# Patient Record
Sex: FEMALE | Race: WHITE | NOT HISPANIC OR LATINO | Employment: OTHER | URBAN - METROPOLITAN AREA
[De-identification: names, ages, dates, MRNs, and addresses within clinical notes are randomized per-mention and may not be internally consistent; named-entity substitution may affect disease eponyms.]

---

## 2017-06-05 LAB — HIV 1+2 AB+HIV1 P24 AG SERPL QL IA: NON REACTIVE

## 2017-10-12 ENCOUNTER — OFFICE VISIT (OUTPATIENT)
Dept: INTERNAL MEDICINE | Facility: CLINIC | Age: 52
End: 2017-10-12
Payer: COMMERCIAL

## 2017-10-12 VITALS
HEIGHT: 69 IN | DIASTOLIC BLOOD PRESSURE: 72 MMHG | HEART RATE: 96 BPM | OXYGEN SATURATION: 98 % | BODY MASS INDEX: 33.99 KG/M2 | WEIGHT: 229.5 LBS | SYSTOLIC BLOOD PRESSURE: 122 MMHG | TEMPERATURE: 98 F

## 2017-10-12 DIAGNOSIS — J45.20 MILD INTERMITTENT ASTHMA WITHOUT COMPLICATION: ICD-10-CM

## 2017-10-12 DIAGNOSIS — Z00.00 ANNUAL PHYSICAL EXAM: Primary | ICD-10-CM

## 2017-10-12 PROCEDURE — 99396 PREV VISIT EST AGE 40-64: CPT | Mod: S$GLB,,, | Performed by: FAMILY MEDICINE

## 2017-10-12 PROCEDURE — 99999 PR PBB SHADOW E&M-EST. PATIENT-LVL III: CPT | Mod: PBBFAC,,, | Performed by: FAMILY MEDICINE

## 2017-10-12 RX ORDER — GABAPENTIN 600 MG/1
600 TABLET ORAL 2 TIMES DAILY
COMMUNITY
End: 2021-04-18 | Stop reason: SDUPTHER

## 2017-10-12 NOTE — Clinical Note
Pap and mmg Dr. Sonia falcon - has appointment next month it has been about a year Colonoscopy about a [facility]

## 2017-10-12 NOTE — PROGRESS NOTES
"Subjective:       Patient ID: Luann Mcgovern is a 51 y.o. female.    Chief Complaint: Annual Exam      Patient here today for wellness visit. Has no acute complaints.      Review of Systems   Constitutional: Negative for fever.   HENT: Negative for congestion.    Eyes: Negative for visual disturbance.   Respiratory: Negative for cough, chest tightness and shortness of breath.    Cardiovascular: Negative for chest pain.   Gastrointestinal: Negative for abdominal pain.   Endocrine: Negative for polyuria.   Musculoskeletal: Negative for gait problem.   Skin: Negative for color change.   Allergic/Immunologic: Negative for immunocompromised state.   Neurological: Negative for dizziness.   Hematological: Does not bruise/bleed easily.     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        Objective:       /72 (BP Location: Right arm, Patient Position: Sitting, BP Method: Large (Automatic))   Pulse 96   Temp 98 °F (36.7 °C) (Tympanic)   Ht 5' 9" (1.753 m)   Wt 104.1 kg " (229 lb 8 oz)   LMP 11/15/2002   SpO2 98%   BMI 33.89 kg/m²   Physical Exam   Constitutional: She is oriented to person, place, and time. Vital signs are normal. She appears well-developed and well-nourished. No distress.   HENT:   Head: Normocephalic and atraumatic.   Right Ear: Hearing, tympanic membrane, external ear and ear canal normal.   Left Ear: Hearing, tympanic membrane, external ear and ear canal normal.   Nose: Nose normal.   Mouth/Throat: Uvula is midline, oropharynx is clear and moist and mucous membranes are normal. No oropharyngeal exudate.   Eyes: Conjunctivae and EOM are normal. Pupils are equal, round, and reactive to light.   Neck: No thyromegaly present.   Cardiovascular: Normal rate and regular rhythm.    Pulmonary/Chest: Effort normal and breath sounds normal. No respiratory distress.   Abdominal: Soft. Bowel sounds are normal. No hernia.   Musculoskeletal: Normal range of motion. She exhibits no edema.   Lymphadenopathy:     She has no cervical adenopathy.   Neurological: She is alert and oriented to person, place, and time.   Skin: Skin is warm and dry. Capillary refill takes less than 2 seconds. She is not diaphoretic.   Psychiatric: She has a normal mood and affect. Her behavior is normal. Judgment and thought content normal.   Nursing note and vitals reviewed.    Assessment:     1. Annual physical exam      Plan:   Annual physical exam  -     Mammo Digital Screening Bilat with CAD; Future; Expected date: 10/12/2017  -     Lipid panel; Future; Expected date: 10/12/2017  -     CBC auto differential; Future; Expected date: 10/12/2017  -     Comprehensive metabolic panel; Future; Expected date: 10/12/2017  -     TSH; Future; Expected date: 10/12/2017      Medication List with Changes/Refills   Current Medications    CETIRIZINE (ZYRTEC) 10 MG TABLET    Take 10 mg by mouth as needed for Allergies.    DEXBROMPHENIRAMINE-PSEUDOEPHED 2-60 MG TAB    Take 1 tablet by mouth as needed.     DIPHENHYDRAMINE (BENADRYL) 25 MG CAPSULE    Take 25 mg by mouth nightly as needed for Itching.    GABAPENTIN (NEURONTIN) 600 MG TABLET    Take 600 mg by mouth 3 (three) times daily.

## 2017-10-13 ENCOUNTER — LAB VISIT (OUTPATIENT)
Dept: LAB | Facility: HOSPITAL | Age: 52
End: 2017-10-13
Attending: FAMILY MEDICINE
Payer: COMMERCIAL

## 2017-10-13 DIAGNOSIS — Z00.00 ANNUAL PHYSICAL EXAM: ICD-10-CM

## 2017-10-13 LAB
ALBUMIN SERPL BCP-MCNC: 3.7 G/DL
ALP SERPL-CCNC: 123 U/L
ALT SERPL W/O P-5'-P-CCNC: 79 U/L
ANION GAP SERPL CALC-SCNC: 10 MMOL/L
AST SERPL-CCNC: 58 U/L
BASOPHILS # BLD AUTO: 0.04 K/UL
BASOPHILS NFR BLD: 0.5 %
BILIRUB SERPL-MCNC: 0.6 MG/DL
BUN SERPL-MCNC: 22 MG/DL
CALCIUM SERPL-MCNC: 9.9 MG/DL
CHLORIDE SERPL-SCNC: 105 MMOL/L
CHOLEST SERPL-MCNC: 205 MG/DL
CHOLEST/HDLC SERPL: 4.5 {RATIO}
CO2 SERPL-SCNC: 25 MMOL/L
CREAT SERPL-MCNC: 1 MG/DL
DIFFERENTIAL METHOD: ABNORMAL
EOSINOPHIL # BLD AUTO: 0.6 K/UL
EOSINOPHIL NFR BLD: 6.5 %
ERYTHROCYTE [DISTWIDTH] IN BLOOD BY AUTOMATED COUNT: 13.5 %
EST. GFR  (AFRICAN AMERICAN): >60 ML/MIN/1.73 M^2
EST. GFR  (NON AFRICAN AMERICAN): >60 ML/MIN/1.73 M^2
GLUCOSE SERPL-MCNC: 93 MG/DL
HCT VFR BLD AUTO: 43.2 %
HDLC SERPL-MCNC: 46 MG/DL
HDLC SERPL: 22.4 %
HGB BLD-MCNC: 14.2 G/DL
LDLC SERPL CALC-MCNC: 132 MG/DL
LYMPHOCYTES # BLD AUTO: 2.6 K/UL
LYMPHOCYTES NFR BLD: 31.1 %
MCH RBC QN AUTO: 29.6 PG
MCHC RBC AUTO-ENTMCNC: 32.9 G/DL
MCV RBC AUTO: 90 FL
MONOCYTES # BLD AUTO: 0.8 K/UL
MONOCYTES NFR BLD: 9.1 %
NEUTROPHILS # BLD AUTO: 4.5 K/UL
NEUTROPHILS NFR BLD: 52.4 %
NONHDLC SERPL-MCNC: 159 MG/DL
PLATELET # BLD AUTO: 299 K/UL
PMV BLD AUTO: 9.6 FL
POTASSIUM SERPL-SCNC: 3.9 MMOL/L
PROT SERPL-MCNC: 7.4 G/DL
RBC # BLD AUTO: 4.79 M/UL
SODIUM SERPL-SCNC: 140 MMOL/L
TRIGL SERPL-MCNC: 135 MG/DL
TSH SERPL DL<=0.005 MIU/L-ACNC: 0.79 UIU/ML
WBC # BLD AUTO: 8.49 K/UL

## 2017-10-13 PROCEDURE — 85025 COMPLETE CBC W/AUTO DIFF WBC: CPT

## 2017-10-13 PROCEDURE — 80061 LIPID PANEL: CPT

## 2017-10-13 PROCEDURE — 36415 COLL VENOUS BLD VENIPUNCTURE: CPT | Mod: PO

## 2017-10-13 PROCEDURE — 80053 COMPREHEN METABOLIC PANEL: CPT

## 2017-10-13 PROCEDURE — 84443 ASSAY THYROID STIM HORMONE: CPT

## 2017-10-16 ENCOUNTER — TELEPHONE (OUTPATIENT)
Dept: INTERNAL MEDICINE | Facility: CLINIC | Age: 52
End: 2017-10-16

## 2017-10-16 NOTE — TELEPHONE ENCOUNTER
----- Message from Jerrica Justice sent at 10/16/2017 12:48 PM CDT -----  returned call..[phone] (home)

## 2017-10-16 NOTE — TELEPHONE ENCOUNTER
----- Message from Bri Freedman MD sent at 10/16/2017  8:21 AM CDT -----  Please let her know that all labs look good except AST and ALT slightly elevated. Would recommend repeat in a year.

## 2017-10-18 ENCOUNTER — PATIENT OUTREACH (OUTPATIENT)
Dept: ADMINISTRATIVE | Facility: HOSPITAL | Age: 52
End: 2017-10-18

## 2017-10-18 NOTE — PROGRESS NOTES
GI ([id number])) was contacted and a copy of the pt's EGD will be faxed , there wasn't a report/procedure for a colonoscopy

## 2018-01-12 ENCOUNTER — HOSPITAL ENCOUNTER (OUTPATIENT)
Dept: RADIOLOGY | Facility: HOSPITAL | Age: 53
Discharge: HOME | End: 2018-01-12
Attending: FAMILY MEDICINE
Payer: COMMERCIAL

## 2018-01-12 ENCOUNTER — OFFICE VISIT (OUTPATIENT)
Dept: INTERNAL MEDICINE | Facility: CLINIC | Age: 53
End: 2018-01-12
Payer: COMMERCIAL

## 2018-01-12 VITALS
HEIGHT: 68 IN | SYSTOLIC BLOOD PRESSURE: 138 MMHG | OXYGEN SATURATION: 96 % | HEART RATE: 81 BPM | TEMPERATURE: 98 F | WEIGHT: 233.69 LBS | DIASTOLIC BLOOD PRESSURE: 76 MMHG | BODY MASS INDEX: 35.42 KG/M2

## 2018-01-12 DIAGNOSIS — G47.00 INSOMNIA, UNSPECIFIED TYPE: ICD-10-CM

## 2018-01-12 DIAGNOSIS — R05.9 COUGH: ICD-10-CM

## 2018-01-12 DIAGNOSIS — J18.9 PNEUMONIA OF RIGHT LUNG DUE TO INFECTIOUS ORGANISM, UNSPECIFIED PART OF LUNG: Primary | ICD-10-CM

## 2018-01-12 PROCEDURE — 71046 X-RAY EXAM CHEST 2 VIEWS: CPT | Mod: TC,PO

## 2018-01-12 PROCEDURE — 99214 OFFICE O/P EST MOD 30 MIN: CPT | Mod: S$GLB,,, | Performed by: FAMILY MEDICINE

## 2018-01-12 PROCEDURE — 99999 PR PBB SHADOW E&M-EST. PATIENT-LVL III: CPT | Mod: PBBFAC,,, | Performed by: FAMILY MEDICINE

## 2018-01-12 PROCEDURE — 71046 X-RAY EXAM CHEST 2 VIEWS: CPT | Mod: 26,,, | Performed by: RADIOLOGY

## 2018-01-12 RX ORDER — ALBUTEROL SULFATE 0.83 MG/ML
2.5 SOLUTION RESPIRATORY (INHALATION) EVERY 6 HOURS PRN
Qty: 25 EACH | Refills: 1 | Status: SHIPPED | OUTPATIENT
Start: 2018-01-12 | End: 2018-05-08 | Stop reason: SDUPTHER

## 2018-01-12 RX ORDER — ESZOPICLONE 3 MG/1
3 TABLET, FILM COATED ORAL NIGHTLY
Qty: 30 TABLET | Refills: 5 | Status: SHIPPED | OUTPATIENT
Start: 2018-01-12 | End: 2018-02-11

## 2018-01-12 NOTE — LETTER
January 12, 2018                   [facility]  Internal Medicine  [address] [facility] [address]  Phone: [phone]   January 12, 2018     Patient: Luann Mcgovern   YOB: 1965   Date of Visit: 1/12/2018       To Whom it May Concern:    Luann Mcgovern was seen in my clinic on 1/12/2018. She may return to work on 1/15/2018.    If you have any questions or concerns, please don't hesitate to call.    Sincerely,       Dr. Bri Hummel LPN

## 2018-01-13 NOTE — PROGRESS NOTES
Subjective:       Patient ID: Luann Mcgovern is a 52 y.o. female.    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 Was seen twice, diagnosed with probable pneumonia, given steroids, Augmentin, Rocephin, omnicef, azithromycin. Here today for recheck,says that she is feeling a bit better today.      Cough   Associated symptoms include rhinorrhea, shortness of breath and wheezing. Pertinent negatives include no fever or sore throat.   Sinusitis   Associated symptoms include congestion, coughing, shortness of breath and sinus pressure. Pertinent negatives include no sore throat.     Review of Systems   Constitutional: Positive for activity change and fatigue. Negative for fever.   HENT: Positive for congestion, rhinorrhea and sinus pressure. Negative for sore throat.    Respiratory: Positive for cough, shortness of breath and wheezing.    Gastrointestinal: Negative for nausea.     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        Objective:       /76   Pulse 81   Temp 97.9 °F (36.6 °C) (Tympanic)   Ht 5' 7.5" (1.715 m)   Wt 106 kg (233 lb 11 oz)   LMP 11/15/2002   SpO2 96%   BMI 36.06 kg/m²   Physical Exam   Constitutional: She appears well-developed and well-nourished. No distress.   HENT:   Head: Normocephalic.   Right Ear: Hearing, tympanic membrane, external ear and ear canal normal.   Left Ear: Hearing, tympanic membrane, external ear and ear canal normal.   Nose: Mucosal edema present. Right sinus exhibits no maxillary sinus tenderness and no frontal sinus tenderness. Left sinus exhibits no maxillary sinus tenderness and no frontal sinus tenderness.   Mouth/Throat: Uvula is midline and mucous membranes are normal. Posterior oropharyngeal erythema present.   Eyes: Conjunctivae and EOM are normal. Pupils are equal, round, and reactive to light.   Cardiovascular: Normal rate, regular rhythm and normal heart sounds.    Pulmonary/Chest: Effort normal. No respiratory distress. She has wheezes (faint expiratory).   Abdominal: Soft. Bowel sounds are normal.   Lymphadenopathy:     She has no cervical adenopathy.   Skin: Skin is warm and dry. Capillary refill takes less than 2 seconds. She is not diaphoretic.   Psychiatric: She has a normal mood and affect. Her behavior is normal.   Nursing note and vitals reviewed.    Assessment:     1. Pneumonia of right lung due to infectious organism, unspecified part of lung    2. Cough    3. Insomnia, unspecified type      Plan:   Pneumonia of right lung due to infectious organism, unspecified part of lung  Comments:  improving      Cough  -     X-Ray Chest PA And Lateral; Future; Expected date: 01/12/2018    Insomnia, unspecified type    Other orders  -     albuterol (PROVENTIL) 2.5 mg /3 mL (0.083 %) nebulizer solution; Take 3 mLs (2.5 mg total) by nebulization every 6 (six) hours as needed for Wheezing or " Shortness of Breath. please dispense in vials.  Dispense: 25 each; Refill: 1  -     eszopiclone 3 mg Tab; Take 1 tablet (3 mg total) by mouth every evening.  Dispense: 30 tablet; Refill: 05      Medication List with Changes/Refills   New Medications    ALBUTEROL (PROVENTIL) 2.5 MG /3 ML (0.083 %) NEBULIZER SOLUTION    Take 3 mLs (2.5 mg total) by nebulization every 6 (six) hours as needed for Wheezing or Shortness of Breath. please dispense in vials.    ESZOPICLONE 3 MG TAB    Take 1 tablet (3 mg total) by mouth every evening.   Current Medications    CETIRIZINE (ZYRTEC) 10 MG TABLET    Take 10 mg by mouth as needed for Allergies.    DEXBROMPHENIRAMINE-PSEUDOEPHED 2-60 MG TAB    Take 1 tablet by mouth as needed.    DIPHENHYDRAMINE (BENADRYL) 25 MG CAPSULE    Take 25 mg by mouth nightly as needed for Itching.    GABAPENTIN (NEURONTIN) 600 MG TABLET    Take 600 mg by mouth 3 (three) times daily.

## 2018-05-08 ENCOUNTER — PATIENT OUTREACH (OUTPATIENT)
Dept: ADMINISTRATIVE | Facility: HOSPITAL | Age: 53
End: 2018-05-08

## 2018-05-08 ENCOUNTER — OFFICE VISIT (OUTPATIENT)
Dept: INTERNAL MEDICINE | Facility: CLINIC | Age: 53
End: 2018-05-08
Payer: COMMERCIAL

## 2018-05-08 ENCOUNTER — TELEPHONE (OUTPATIENT)
Dept: INTERNAL MEDICINE | Facility: CLINIC | Age: 53
End: 2018-05-08

## 2018-05-08 VITALS
TEMPERATURE: 97 F | RESPIRATION RATE: 18 BRPM | WEIGHT: 230.19 LBS | HEART RATE: 87 BPM | HEIGHT: 66 IN | BODY MASS INDEX: 36.99 KG/M2 | SYSTOLIC BLOOD PRESSURE: 110 MMHG | DIASTOLIC BLOOD PRESSURE: 80 MMHG | OXYGEN SATURATION: 96 %

## 2018-05-08 DIAGNOSIS — R05.8 RECURRENT PRODUCTIVE COUGH: ICD-10-CM

## 2018-05-08 DIAGNOSIS — Z12.39 SCREENING FOR MALIGNANT NEOPLASM OF BREAST: ICD-10-CM

## 2018-05-08 DIAGNOSIS — J40 BRONCHITIS: Primary | ICD-10-CM

## 2018-05-08 PROCEDURE — 99213 OFFICE O/P EST LOW 20 MIN: CPT | Mod: 25,S$GLB,, | Performed by: FAMILY MEDICINE

## 2018-05-08 PROCEDURE — 87070 CULTURE OTHR SPECIMN AEROBIC: CPT

## 2018-05-08 PROCEDURE — 96372 THER/PROPH/DIAG INJ SC/IM: CPT | Mod: S$GLB,,, | Performed by: FAMILY MEDICINE

## 2018-05-08 PROCEDURE — 87185 SC STD ENZYME DETCJ PER NZM: CPT

## 2018-05-08 PROCEDURE — 3008F BODY MASS INDEX DOCD: CPT | Mod: CPTII,S$GLB,, | Performed by: FAMILY MEDICINE

## 2018-05-08 PROCEDURE — 87077 CULTURE AEROBIC IDENTIFY: CPT

## 2018-05-08 PROCEDURE — 99999 PR PBB SHADOW E&M-EST. PATIENT-LVL V: CPT | Mod: PBBFAC,,, | Performed by: FAMILY MEDICINE

## 2018-05-08 PROCEDURE — 87205 SMEAR GRAM STAIN: CPT

## 2018-05-08 RX ORDER — ALBUTEROL SULFATE 0.83 MG/ML
2.5 SOLUTION RESPIRATORY (INHALATION) EVERY 6 HOURS PRN
Qty: 25 EACH | Refills: 1 | Status: SHIPPED | OUTPATIENT
Start: 2018-05-08 | End: 2018-11-06 | Stop reason: SDUPTHER

## 2018-05-08 RX ORDER — METHYLPREDNISOLONE 4 MG/1
TABLET ORAL
Qty: 1 PACKAGE | Refills: 0 | Status: SHIPPED | OUTPATIENT
Start: 2018-05-08 | End: 2018-05-18 | Stop reason: ALTCHOICE

## 2018-05-08 RX ORDER — BENZONATATE 200 MG/1
200 CAPSULE ORAL 3 TIMES DAILY PRN
Qty: 30 CAPSULE | Refills: 2 | Status: SHIPPED | OUTPATIENT
Start: 2018-05-08 | End: 2018-05-18

## 2018-05-08 RX ORDER — LEVOFLOXACIN 750 MG/1
750 TABLET ORAL DAILY
Qty: 10 TABLET | Refills: 0 | Status: SHIPPED | OUTPATIENT
Start: 2018-05-08 | End: 2018-05-18 | Stop reason: ALTCHOICE

## 2018-05-08 RX ORDER — DIAZEPAM 5 MG/1
5 TABLET ORAL EVERY 12 HOURS PRN
COMMUNITY
End: 2022-09-14

## 2018-05-08 RX ORDER — HYDROCODONE BITARTRATE AND ACETAMINOPHEN 10; 325 MG/1; MG/1
TABLET ORAL EVERY 12 HOURS PRN
COMMUNITY
End: 2019-12-20 | Stop reason: SDUPTHER

## 2018-05-08 RX ORDER — METHYLPREDNISOLONE ACETATE 80 MG/ML
80 INJECTION, SUSPENSION INTRA-ARTICULAR; INTRALESIONAL; INTRAMUSCULAR; SOFT TISSUE
Status: COMPLETED | OUTPATIENT
Start: 2018-05-08 | End: 2018-05-08

## 2018-05-08 RX ORDER — IPRATROPIUM BROMIDE 0.5 MG/2.5ML
500 SOLUTION RESPIRATORY (INHALATION) 4 TIMES DAILY
Qty: 1 BOX | Refills: 2 | Status: SHIPPED | OUTPATIENT
Start: 2018-05-08 | End: 2022-09-14 | Stop reason: SDUPTHER

## 2018-05-08 RX ADMIN — METHYLPREDNISOLONE ACETATE 80 MG: 80 INJECTION, SUSPENSION INTRA-ARTICULAR; INTRALESIONAL; INTRAMUSCULAR; SOFT TISSUE at 09:05

## 2018-05-08 NOTE — TELEPHONE ENCOUNTER
Dr. Freedman, the patient is asking for an external order for her mammogram for [facility], Thanks.

## 2018-05-08 NOTE — PROGRESS NOTES
Subjective:       Patient ID: Luann Mcgovern is a 52 y.o. female.    Chief Complaint: atypical pneumonia and Recurrent Pneumonia      Patient reports shortness of breath, coughing productive of a greyish yellow sputum, wheezing. She has worsened significantly in the past 2 weeks but has been having trouble with her coughing for the past few months. She is an ER nurse.  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 She decided against going to ER and came in here this morning instead.      Review of Systems   Constitutional: Positive for activity change, appetite change and fatigue. Negative for fever.   HENT: Positive for sore throat. Negative for congestion, rhinorrhea and sinus pressure.    Respiratory: Positive for cough, shortness of breath and wheezing.    Gastrointestinal: Negative for abdominal pain, nausea and vomiting.   Musculoskeletal: Negative for myalgias.   Skin: Negative for rash.     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    Iodine and iodide containing products      Injectables ???     Loperamide-simethicone     Metoclopramide hcl     Diltiazem hcl Rash and Swelling       Objective:       /80   Pulse 87   Temp 97.4 °F (36.3 °C)   Resp 18   Ht 5' 6.14" (1.68 m)   Wt 104.4 kg (230 lb 2.6 oz)   LMP 11/15/2002   SpO2 96%   BMI 36.99 kg/m²   Physical Exam   Constitutional: She is oriented to person, place, and time. Vital signs are normal. She appears well-developed and well-nourished. No distress.   HENT:   Head: Normocephalic and atraumatic.   Right Ear: Hearing, tympanic membrane, external ear and ear canal normal.   Left Ear: Hearing, tympanic membrane, external ear and ear canal normal.   Nose: Nose normal.   Mouth/Throat: Uvula is midline, oropharynx is clear and moist and mucous membranes are normal. No oropharyngeal exudate.   Eyes: Conjunctivae and EOM are normal. Pupils are equal, round, and reactive to light.   Neck: Normal range of motion. Neck supple. No tracheal deviation present. No thyromegaly present.   Cardiovascular: Normal rate, regular rhythm, normal heart sounds and intact distal pulses.    No murmur heard.  Pulmonary/Chest: Effort normal. No respiratory distress. She has wheezes.   Rhonchi  Almost constantly calling   Musculoskeletal: She exhibits no edema or tenderness.   Lymphadenopathy:     She has no cervical adenopathy.   Neurological: She is alert and oriented to person, place, and time.   Skin: Skin is warm and dry. Capillary refill takes less than 2 seconds. She is not diaphoretic.   Psychiatric: She has a normal mood and affect. Her behavior is normal. Judgment and thought content normal.   Nursing note and vitals reviewed.    Assessment:     1. Bronchitis    2. Recurrent productive cough      Plan:   Bronchitis  Reviewed xray taken at " [facility] Last night as well as the radiology report - no infiltrates or active changes. 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 However will treat with antibiotics in view of her exposures and how long she has had this.  Will consider referral to pulmonary         Recurrent productive cough  -     Culture, Respiratory with Gram Stain    Other orders  -     benzonatate (TESSALON) 200 MG capsule; Take 1 capsule (200 mg total) by mouth 3 (three) times daily as needed for Cough.  Dispense: 30 capsule; Refill: 2  -     albuterol (PROVENTIL) 2.5 mg /3 mL (0.083 %) nebulizer solution; Take 3 mLs (2.5 mg total) by nebulization every 6 (six) hours as needed for Wheezing or Shortness of Breath. please dispense in vials.  Dispense: 25 each; Refill: 1  -     levoFLOXacin (LEVAQUIN) 750 MG tablet; Take 1 tablet (750 mg total) by mouth once daily.  Dispense: 10 tablet; Refill: 0  -     methylPREDNISolone acetate injection 80 mg; Inject 1 mL (80 mg total) into the muscle one time.  -     methylPREDNISolone (MEDROL DOSEPACK) 4 mg tablet; use as directed  Dispense: 1 Package; Refill: 0  -     ipratropium (ATROVENT) 0.02 % nebulizer solution; Take 2.5 mLs (500 mcg total) by nebulization 4 (four) times daily. Rescue  Dispense: 1 Box; Refill: 2      Medication List with Changes/Refills   New Medications    BENZONATATE (TESSALON) 200 MG CAPSULE    Take 1 capsule (200 mg total) by mouth 3 (three) times daily as needed for Cough.    IPRATROPIUM (ATROVENT) 0.02 % NEBULIZER SOLUTION    Take 2.5 mLs (500 mcg total) by nebulization 4 (four) times daily. Rescue    LEVOFLOXACIN (LEVAQUIN) 750 MG TABLET    Take 1 tablet (750 mg total) by mouth once daily.    METHYLPREDNISOLONE (MEDROL DOSEPACK) 4 MG TABLET    use as directed   Current Medications    CETIRIZINE (ZYRTEC) 10 MG TABLET    Take 10 mg by mouth as needed for Allergies.    DIAZEPAM (VALIUM) 5 MG TABLET    Take 5 mg by mouth every 12 (twelve) hours as needed for  Anxiety.    DIPHENHYDRAMINE (BENADRYL) 25 MG CAPSULE    Take 25 mg by mouth nightly as needed for Itching.    GABAPENTIN (NEURONTIN) 600 MG TABLET    Take 600 mg by mouth 3 (three) times daily.    HYDROCODONE-ACETAMINOPHEN 10-325MG (NORCO)  MG TAB    Take by mouth every 12 (twelve) hours as needed for Pain.   Changed and/or Refilled Medications    Modified Medication Previous Medication    ALBUTEROL (PROVENTIL) 2.5 MG /3 ML (0.083 %) NEBULIZER SOLUTION albuterol (PROVENTIL) 2.5 mg /3 mL (0.083 %) nebulizer solution       Take 3 mLs (2.5 mg total) by nebulization every 6 (six) hours as needed for Wheezing or Shortness of Breath. please dispense in vials.    Take 3 mLs (2.5 mg total) by nebulization every 6 (six) hours as needed for Wheezing or Shortness of Breath. please dispense in vials.   Discontinued Medications    DEXBROMPHENIRAMINE-PSEUDOEPHED 2-60 MG TAB    Take 1 tablet by mouth as needed.

## 2018-05-10 LAB
BACTERIA SPEC AEROBE CULT: NORMAL
GRAM STN SPEC: NORMAL

## 2018-05-15 ENCOUNTER — PATIENT MESSAGE (OUTPATIENT)
Dept: INTERNAL MEDICINE | Facility: CLINIC | Age: 53
End: 2018-05-15

## 2018-05-18 ENCOUNTER — OFFICE VISIT (OUTPATIENT)
Dept: INTERNAL MEDICINE | Facility: CLINIC | Age: 53
End: 2018-05-18
Payer: COMMERCIAL

## 2018-05-18 VITALS
BODY MASS INDEX: 37.03 KG/M2 | TEMPERATURE: 99 F | OXYGEN SATURATION: 96 % | HEIGHT: 66 IN | WEIGHT: 230.38 LBS | HEART RATE: 94 BPM | DIASTOLIC BLOOD PRESSURE: 90 MMHG | SYSTOLIC BLOOD PRESSURE: 128 MMHG

## 2018-05-18 DIAGNOSIS — R82.90 URINE MALODOR: ICD-10-CM

## 2018-05-18 DIAGNOSIS — R05.8 RECURRENT PRODUCTIVE COUGH: ICD-10-CM

## 2018-05-18 DIAGNOSIS — A49.2 H. INFLUENZAE INFECTION: Primary | ICD-10-CM

## 2018-05-18 LAB
BILIRUB SERPL-MCNC: ABNORMAL MG/DL
BLOOD URINE, POC: ABNORMAL
COLOR, POC UA: ABNORMAL
GLUCOSE UR QL STRIP: ABNORMAL
KETONES UR QL STRIP: ABNORMAL
LEUKOCYTE ESTERASE URINE, POC: ABNORMAL
NITRITE, POC UA: ABNORMAL
PH, POC UA: 5
PROTEIN, POC: ABNORMAL
SPECIFIC GRAVITY, POC UA: 1.02
UROBILINOGEN, POC UA: ABNORMAL

## 2018-05-18 PROCEDURE — 3008F BODY MASS INDEX DOCD: CPT | Mod: CPTII,S$GLB,, | Performed by: FAMILY MEDICINE

## 2018-05-18 PROCEDURE — 87070 CULTURE OTHR SPECIMN AEROBIC: CPT

## 2018-05-18 PROCEDURE — 87086 URINE CULTURE/COLONY COUNT: CPT

## 2018-05-18 PROCEDURE — 99999 PR PBB SHADOW E&M-EST. PATIENT-LVL III: CPT | Mod: PBBFAC,,, | Performed by: FAMILY MEDICINE

## 2018-05-18 PROCEDURE — 87205 SMEAR GRAM STAIN: CPT

## 2018-05-18 PROCEDURE — 81002 URINALYSIS NONAUTO W/O SCOPE: CPT | Mod: S$GLB,,, | Performed by: FAMILY MEDICINE

## 2018-05-18 PROCEDURE — 99213 OFFICE O/P EST LOW 20 MIN: CPT | Mod: 25,S$GLB,, | Performed by: FAMILY MEDICINE

## 2018-05-18 RX ORDER — PANTOPRAZOLE SODIUM 40 MG/1
TABLET, DELAYED RELEASE ORAL
Refills: 3 | COMMUNITY
Start: 2018-05-10 | End: 2018-07-10

## 2018-05-18 RX ORDER — LEVOFLOXACIN 750 MG/1
750 TABLET ORAL DAILY
Qty: 10 TABLET | Refills: 0 | Status: SHIPPED | OUTPATIENT
Start: 2018-05-18 | End: 2018-07-10

## 2018-05-18 NOTE — PROGRESS NOTES
Subjective:       Patient ID: Luann Mcgovern is a 52 y.o. female.    Chief Complaint: follow up bronchitis      Patient here to followup on H.influenzae infection in lungs. She reports overall she is feeling better, coughing improved but still there, still coughing up greyish yellow sputum although decreased. Has body aches and pain in chest from coughing so much. She just completed course of levaquin. She has also notice strong smell to her urine in the past few days.      Review of Systems   Constitutional: Positive for activity change and fatigue. Negative for appetite change and fever.   HENT: Positive for sore throat. Negative for congestion, rhinorrhea, sinus pressure and sneezing.    Respiratory: Positive for cough. Negative for shortness of breath and wheezing.    Gastrointestinal: Negative for abdominal pain, nausea and vomiting.   Musculoskeletal: Positive for myalgias.   Skin: Negative for rash.     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    Iodine and iodide containing products      Injectables ???     Loperamide-simethicone     Metoclopramide hcl     Diltiazem hcl Rash and Swelling       Objective:       BP (!) 128/90 (BP Location: Right arm)   Pulse 94   Temp 98.5 °F (36.9 °C) (Tympanic)   Ht 5' 6" (1.676 m)   Wt 104.5 kg (230 lb 6.1 oz)   LMP 11/15/2002   SpO2 96%   BMI 37.18 kg/m²   Physical Exam   Constitutional: She appears well-developed and well-nourished. No distress.   HENT:   Head: Normocephalic.   Right Ear: Hearing, tympanic membrane, external ear and ear canal normal.   Left Ear: Hearing, tympanic membrane, external ear and ear canal normal.   Nose: Nose normal. Right sinus exhibits no maxillary sinus tenderness and no frontal sinus tenderness. Left sinus exhibits no maxillary sinus tenderness and no frontal sinus tenderness.   Mouth/Throat: Uvula is midline and mucous membranes are normal. Posterior oropharyngeal erythema present.   Eyes: Conjunctivae and EOM are normal. Pupils are equal, round, and reactive to light.   Cardiovascular: Normal rate, regular rhythm and normal heart sounds.    Pulmonary/Chest: Effort normal and breath sounds normal. No respiratory distress.   Lymphadenopathy:     She has no cervical adenopathy.   Skin: Skin is warm and dry. She is not diaphoretic.   Psychiatric: She has a normal mood and affect. Her behavior is normal.   Nursing note and vitals reviewed.    Assessment:     1. H. influenzae infection    2. Recurrent productive cough    3. Urine malodor      Plan:   H. influenzae infection    Recurrent productive cough  -     Culture, Respiratory with Gram Stain    Urine malodor  -     POCT URINE DIPSTICK WITHOUT MICROSCOPE  -     CULTURE, URINE    Other orders  -     levoFLOXacin (LEVAQUIN) 750 MG tablet; Take 1 tablet (750 mg total) by mouth once daily.  Dispense: 10 tablet; Refill: 0    Will continue levaquin until results of sputum culture " are back. Consider ID consult if still positive.  Medication List with Changes/Refills   New Medications    LEVOFLOXACIN (LEVAQUIN) 750 MG TABLET    Take 1 tablet (750 mg total) by mouth once daily.   Current Medications    ALBUTEROL (PROVENTIL) 2.5 MG /3 ML (0.083 %) NEBULIZER SOLUTION    Take 3 mLs (2.5 mg total) by nebulization every 6 (six) hours as needed for Wheezing or Shortness of Breath. please dispense in vials.    BENZONATATE (TESSALON) 200 MG CAPSULE    Take 1 capsule (200 mg total) by mouth 3 (three) times daily as needed for Cough.    CETIRIZINE (ZYRTEC) 10 MG TABLET    Take 10 mg by mouth as needed for Allergies.    DIAZEPAM (VALIUM) 5 MG TABLET    Take 5 mg by mouth every 12 (twelve) hours as needed for Anxiety.    DIPHENHYDRAMINE (BENADRYL) 25 MG CAPSULE    Take 25 mg by mouth nightly as needed for Itching.    GABAPENTIN (NEURONTIN) 600 MG TABLET    Take 600 mg by mouth 3 (three) times daily.    HYDROCODONE-ACETAMINOPHEN 10-325MG (NORCO)  MG TAB    Take by mouth every 12 (twelve) hours as needed for Pain.    IPRATROPIUM (ATROVENT) 0.02 % NEBULIZER SOLUTION    Take 2.5 mLs (500 mcg total) by nebulization 4 (four) times daily. Rescue    PANTOPRAZOLE (PROTONIX) 40 MG TABLET    TAKE ONE (1) TABLET(S) BY MOUTH TWICE A DAY.   Discontinued Medications    LEVOFLOXACIN (LEVAQUIN) 750 MG TABLET    Take 1 tablet (750 mg total) by mouth once daily.    METHYLPREDNISOLONE (MEDROL DOSEPACK) 4 MG TABLET    use as directed

## 2018-05-19 LAB — BACTERIA UR CULT: NORMAL

## 2018-05-21 LAB
BACTERIA SPEC AEROBE CULT: NORMAL
GRAM STN SPEC: NORMAL

## 2018-05-22 ENCOUNTER — TELEPHONE (OUTPATIENT)
Dept: INTERNAL MEDICINE | Facility: CLINIC | Age: 53
End: 2018-05-22

## 2018-05-22 RX ORDER — NYSTATIN 100000 [USP'U]/G
POWDER TOPICAL 2 TIMES DAILY
Qty: 30 G | Refills: 2 | Status: SHIPPED | OUTPATIENT
Start: 2018-05-22 | End: 2018-07-10

## 2018-05-22 RX ORDER — NYSTATIN 100000 U/G
CREAM TOPICAL 2 TIMES DAILY
Qty: 30 G | Refills: 2 | Status: SHIPPED | OUTPATIENT
Start: 2018-05-22 | End: 2018-07-10

## 2018-05-22 NOTE — TELEPHONE ENCOUNTER
----- Message from Evelyn Riveradeny sent at 5/22/2018  8:21 AM CDT -----  Contact: self [phone]  States that she thinks she has yeast under her breast and would like a rx for nystatin cream and nystatin powder. States that she tried the an over the counter medication that did not work. Pt uses     [facility] [address] [place] [id number]  Phone: [phone] Fax: [phone]    Please call back at [phone]//thank you acc

## 2018-05-22 NOTE — TELEPHONE ENCOUNTER
----- Message from Evelyn Riveardeny sent at 5/22/2018  8:21 AM CDT -----  Contact: self [phone]  States that she thinks she has yeast under her breast and would like a rx for nystatin cream and nystatin powder. States that she tried the an over the counter medication that did not work. Pt uses     [facility] [address] [place] [address]  Phone: [phone] Fax: [phone]    Please call back at [phone]//thank you acc

## 2018-05-25 ENCOUNTER — PATIENT OUTREACH (OUTPATIENT)
Dept: ADMINISTRATIVE | Facility: HOSPITAL | Age: 53
End: 2018-05-25

## 2018-07-10 ENCOUNTER — OFFICE VISIT (OUTPATIENT)
Dept: INTERNAL MEDICINE | Facility: CLINIC | Age: 53
End: 2018-07-10
Payer: COMMERCIAL

## 2018-07-10 ENCOUNTER — LAB VISIT (OUTPATIENT)
Dept: LAB | Facility: HOSPITAL | Age: 53
End: 2018-07-10
Payer: COMMERCIAL

## 2018-07-10 VITALS
DIASTOLIC BLOOD PRESSURE: 76 MMHG | HEART RATE: 92 BPM | HEIGHT: 66 IN | TEMPERATURE: 99 F | WEIGHT: 236.56 LBS | OXYGEN SATURATION: 98 % | SYSTOLIC BLOOD PRESSURE: 118 MMHG | RESPIRATION RATE: 18 BRPM | BODY MASS INDEX: 38.02 KG/M2

## 2018-07-10 DIAGNOSIS — Z00.00 ROUTINE ADULT HEALTH MAINTENANCE: ICD-10-CM

## 2018-07-10 DIAGNOSIS — J45.20 MILD INTERMITTENT ASTHMA WITHOUT COMPLICATION: ICD-10-CM

## 2018-07-10 DIAGNOSIS — Z00.00 ROUTINE ADULT HEALTH MAINTENANCE: Primary | ICD-10-CM

## 2018-07-10 DIAGNOSIS — Z23 NEED FOR MEASLES-MUMPS-RUBELLA (MMR) VACCINE: ICD-10-CM

## 2018-07-10 LAB
ALBUMIN SERPL BCP-MCNC: 3.8 G/DL
ALP SERPL-CCNC: 143 U/L
ALT SERPL W/O P-5'-P-CCNC: 76 U/L
ANION GAP SERPL CALC-SCNC: 9 MMOL/L
AST SERPL-CCNC: 45 U/L
BASOPHILS # BLD AUTO: 0.07 K/UL
BASOPHILS NFR BLD: 0.8 %
BILIRUB SERPL-MCNC: 0.4 MG/DL
BUN SERPL-MCNC: 21 MG/DL
CALCIUM SERPL-MCNC: 9.8 MG/DL
CHLORIDE SERPL-SCNC: 105 MMOL/L
CHOLEST SERPL-MCNC: 210 MG/DL
CHOLEST/HDLC SERPL: 3.3 {RATIO}
CO2 SERPL-SCNC: 26 MMOL/L
CREAT SERPL-MCNC: 0.9 MG/DL
DIFFERENTIAL METHOD: ABNORMAL
EOSINOPHIL # BLD AUTO: 0.5 K/UL
EOSINOPHIL NFR BLD: 5.4 %
ERYTHROCYTE [DISTWIDTH] IN BLOOD BY AUTOMATED COUNT: 12.9 %
EST. GFR  (AFRICAN AMERICAN): >60 ML/MIN/1.73 M^2
EST. GFR  (NON AFRICAN AMERICAN): >60 ML/MIN/1.73 M^2
GLUCOSE SERPL-MCNC: 89 MG/DL
HCT VFR BLD AUTO: 45.7 %
HDLC SERPL-MCNC: 64 MG/DL
HDLC SERPL: 30.5 %
HGB BLD-MCNC: 14.4 G/DL
IMM GRANULOCYTES # BLD AUTO: 0.02 K/UL
IMM GRANULOCYTES NFR BLD AUTO: 0.2 %
LDLC SERPL CALC-MCNC: 127 MG/DL
LYMPHOCYTES # BLD AUTO: 2.8 K/UL
LYMPHOCYTES NFR BLD: 33.6 %
MCH RBC QN AUTO: 29.3 PG
MCHC RBC AUTO-ENTMCNC: 31.5 G/DL
MCV RBC AUTO: 93 FL
MONOCYTES # BLD AUTO: 0.6 K/UL
MONOCYTES NFR BLD: 7.4 %
NEUTROPHILS # BLD AUTO: 4.4 K/UL
NEUTROPHILS NFR BLD: 52.6 %
NONHDLC SERPL-MCNC: 146 MG/DL
NRBC BLD-RTO: 0 /100 WBC
PLATELET # BLD AUTO: 293 K/UL
PMV BLD AUTO: 9.7 FL
POTASSIUM SERPL-SCNC: 4 MMOL/L
PROT SERPL-MCNC: 7 G/DL
RBC # BLD AUTO: 4.92 M/UL
SODIUM SERPL-SCNC: 140 MMOL/L
T4 SERPL-MCNC: 8 UG/DL
TRIGL SERPL-MCNC: 95 MG/DL
TSH SERPL DL<=0.005 MIU/L-ACNC: 0.85 UIU/ML
WBC # BLD AUTO: 8.34 K/UL

## 2018-07-10 PROCEDURE — 99999 PR PBB SHADOW E&M-EST. PATIENT-LVL IV: CPT | Mod: PBBFAC,,, | Performed by: FAMILY MEDICINE

## 2018-07-10 PROCEDURE — 90707 MMR VACCINE SC: CPT | Mod: S$GLB,,, | Performed by: FAMILY MEDICINE

## 2018-07-10 PROCEDURE — 80061 LIPID PANEL: CPT

## 2018-07-10 PROCEDURE — 99213 OFFICE O/P EST LOW 20 MIN: CPT | Mod: 25,S$GLB,, | Performed by: FAMILY MEDICINE

## 2018-07-10 PROCEDURE — 80053 COMPREHEN METABOLIC PANEL: CPT

## 2018-07-10 PROCEDURE — 84443 ASSAY THYROID STIM HORMONE: CPT

## 2018-07-10 PROCEDURE — 84436 ASSAY OF TOTAL THYROXINE: CPT

## 2018-07-10 PROCEDURE — 36415 COLL VENOUS BLD VENIPUNCTURE: CPT | Mod: PO

## 2018-07-10 PROCEDURE — 86803 HEPATITIS C AB TEST: CPT

## 2018-07-10 PROCEDURE — 85025 COMPLETE CBC W/AUTO DIFF WBC: CPT

## 2018-07-10 PROCEDURE — 90471 IMMUNIZATION ADMIN: CPT | Mod: S$GLB,,, | Performed by: FAMILY MEDICINE

## 2018-07-10 RX ORDER — ESZOPICLONE 2 MG/1
2 TABLET, FILM COATED ORAL NIGHTLY
COMMUNITY
End: 2018-07-10 | Stop reason: SDUPTHER

## 2018-07-10 RX ORDER — ESZOPICLONE 2 MG/1
2 TABLET, FILM COATED ORAL NIGHTLY
Qty: 30 TABLET | Refills: 5 | Status: SHIPPED | OUTPATIENT
Start: 2018-07-10 | End: 2018-08-17 | Stop reason: SDUPTHER

## 2018-07-10 NOTE — PROGRESS NOTES
Subjective:       Patient ID: Luann Mcgovern is a 52 y.o. female.    Chief Complaint: Annual Exam      Patient here today for annual physical. She reports that she is feeling much better after Hib infection, no longer coughing and feeling back to normal.   She has no new complaints today.  She has apt with her GYN Dr. Calzada October of this year.   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      Review of Systems   Constitutional: Negative for activity change, appetite change, fatigue, fever and unexpected weight change.   HENT: Negative for congestion and sore throat.    Eyes: Negative for visual disturbance.   Respiratory: Negative for cough, chest tightness and shortness of breath.    Cardiovascular: Negative for chest pain.   Gastrointestinal: Negative for abdominal pain.   Endocrine: Negative for polyuria.   Musculoskeletal: Negative for gait problem and myalgias.   Skin: Negative for color change.   Allergic/Immunologic: Negative for immunocompromised state.   Neurological: Negative for dizziness.   Psychiatric/Behavioral: Negative for dysphoric mood and sleep disturbance.     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    Iodine and iodide containing products      Injectables ???     Loperamide-simethicone     Metoclopramide hcl     Diltiazem hcl Rash and Swelling       Objective:       /76   Pulse 92   Temp 98.9 °F (37.2 °C)   Resp 18   Ht 5' 6" (1.676 m)   Wt 107.3 kg (236 lb 8.9 oz)   LMP 11/15/2002   SpO2 98%   BMI 38.18 kg/m²   Physical Exam   Constitutional: She is oriented to person, place, and time. Vital signs are normal. She appears well-developed and well-nourished. No distress.   HENT:   Head: Normocephalic and atraumatic.   Right Ear: Hearing, tympanic membrane, external ear and ear canal normal.   Left Ear: Hearing, tympanic membrane, external ear and ear canal normal.   Nose: Nose normal.   Mouth/Throat: Uvula is midline, oropharynx is clear and moist and mucous membranes are normal. No oropharyngeal exudate.   Eyes: Conjunctivae and EOM are normal. Pupils are equal, round, and reactive to light.   Neck: Normal range of motion. Neck supple. No tracheal deviation present. No thyromegaly present.   Cardiovascular: Normal rate, regular rhythm, normal heart sounds and intact distal pulses.    No murmur heard.  Pulmonary/Chest: Effort normal and breath sounds normal. No respiratory distress. She has no wheezes.   Abdominal: Soft. Bowel sounds are normal. There is no tenderness. There is no guarding. No hernia.   Musculoskeletal: Normal range of motion. She exhibits no edema.   Lymphadenopathy:     She has no cervical adenopathy.   Neurological: She is alert and oriented to person, place, and time.   Skin: Skin is warm and dry. Capillary refill takes less than 2 seconds. She is not diaphoretic.   Psychiatric: She has a normal mood and affect. Her behavior is normal. " Judgment and thought content normal.   Nursing note and vitals reviewed.    Assessment:     1. Routine adult health maintenance    2. Mild intermittent asthma without complication    3. Need for measles-mumps-rubella (MMR) vaccine      Plan:   Routine adult health maintenance  -     Hepatitis C antibody; Future; Expected date: 07/10/2018  -     Comprehensive metabolic panel; Future; Expected date: 07/10/2018  -     CBC auto differential; Future; Expected date: 07/10/2018  -     TSH; Future; Expected date: 07/10/2018  -     T4; Future; Expected date: 07/10/2018  -     Lipid panel; Future; Expected date: 07/10/2018    Mild intermittent asthma without complication    Need for measles-mumps-rubella (MMR) vaccine  -     (In Office Administered) MMR Vaccine (SQ)    Other orders  -     eszopiclone (LUNESTA) 2 MG Tab; Take 1 tablet (2 mg total) by mouth every evening.  Dispense: 30 tablet; Refill: 5      Medication List with Changes/Refills   Current Medications    ALBUTEROL (PROVENTIL) 2.5 MG /3 ML (0.083 %) NEBULIZER SOLUTION    Take 3 mLs (2.5 mg total) by nebulization every 6 (six) hours as needed for Wheezing or Shortness of Breath. please dispense in vials.    CETIRIZINE (ZYRTEC) 10 MG TABLET    Take 10 mg by mouth as needed for Allergies.    DIAZEPAM (VALIUM) 5 MG TABLET    Take 5 mg by mouth every 12 (twelve) hours as needed for Anxiety.    DIPHENHYDRAMINE (BENADRYL) 25 MG CAPSULE    Take 25 mg by mouth nightly as needed for Itching.    GABAPENTIN (NEURONTIN) 600 MG TABLET    Take 600 mg by mouth 3 (three) times daily.    HYDROCODONE-ACETAMINOPHEN 10-325MG (NORCO)  MG TAB    Take by mouth every 12 (twelve) hours as needed for Pain.    IPRATROPIUM (ATROVENT) 0.02 % NEBULIZER SOLUTION    Take 2.5 mLs (500 mcg total) by nebulization 4 (four) times daily. Rescue   Changed and/or Refilled Medications    Modified Medication Previous Medication    ESZOPICLONE (LUNESTA) 2 MG TAB eszopiclone (LUNESTA) 2 MG Tab        Take 1 tablet (2 mg total) by mouth every evening.    Take 2 mg by mouth every evening.   Discontinued Medications    LEVOFLOXACIN (LEVAQUIN) 750 MG TABLET    Take 1 tablet (750 mg total) by mouth once daily.    NYSTATIN (MYCOSTATIN) CREAM    Apply topically 2 (two) times daily.    NYSTATIN (MYCOSTATIN) POWDER    Apply topically 2 (two) times daily.    PANTOPRAZOLE (PROTONIX) 40 MG TABLET    TAKE ONE (1) TABLET(S) BY MOUTH TWICE A DAY.

## 2018-07-11 DIAGNOSIS — R94.5 LIVER FUNCTION ABNORMALITY: Primary | ICD-10-CM

## 2018-07-11 LAB — HCV AB SERPL QL IA: NEGATIVE

## 2018-08-15 NOTE — TELEPHONE ENCOUNTER
Pt called about having Rx for lunesta changed to 3 mg. Also needs orders for MMR tider placed . Pt will do labs at [facility] on 8/16/2018 at 11am  -Please Advise.

## 2018-08-15 NOTE — TELEPHONE ENCOUNTER
----- Message from Lin Brown sent at 8/15/2018  1:29 PM CDT -----  Contact: Bill [phone]  Patient needs to discuss rx refill and test.

## 2018-08-16 ENCOUNTER — APPOINTMENT (OUTPATIENT)
Dept: RADIOLOGY | Facility: HOSPITAL | Age: 53
End: 2018-08-16
Attending: FAMILY MEDICINE
Payer: COMMERCIAL

## 2018-08-16 DIAGNOSIS — R94.5 LIVER FUNCTION ABNORMALITY: ICD-10-CM

## 2018-08-16 DIAGNOSIS — Z00.00 PHYSICAL EXAM, ROUTINE: Primary | ICD-10-CM

## 2018-08-16 PROCEDURE — 76705 ECHO EXAM OF ABDOMEN: CPT | Mod: TC,PO

## 2018-08-16 PROCEDURE — 76705 ECHO EXAM OF ABDOMEN: CPT | Mod: 26,,, | Performed by: RADIOLOGY

## 2018-08-16 RX ORDER — NYSTATIN 100000 [USP'U]/G
POWDER TOPICAL
Qty: 30 G | Refills: 2 | Status: SHIPPED | OUTPATIENT
Start: 2018-08-16 | End: 2021-02-05

## 2018-08-16 RX ORDER — NYSTATIN 100000 U/G
CREAM TOPICAL
Qty: 30 G | Refills: 2 | Status: SHIPPED | OUTPATIENT
Start: 2018-08-16 | End: 2021-02-04 | Stop reason: SDUPTHER

## 2018-08-17 RX ORDER — ESZOPICLONE 2 MG/1
2 TABLET, FILM COATED ORAL NIGHTLY
Qty: 30 TABLET | Refills: 5 | Status: SHIPPED | OUTPATIENT
Start: 2018-08-17 | End: 2018-11-26

## 2018-08-18 ENCOUNTER — PATIENT MESSAGE (OUTPATIENT)
Dept: INTERNAL MEDICINE | Facility: CLINIC | Age: 53
End: 2018-08-18

## 2018-09-26 ENCOUNTER — PATIENT OUTREACH (OUTPATIENT)
Dept: ADMINISTRATIVE | Facility: HOSPITAL | Age: 53
End: 2018-09-26

## 2018-09-26 NOTE — PROGRESS NOTES
Contacted patient as a reminder for pap smear. Left message requesting a call back.      Patient returned call stating she will call to schedule an appointment.

## 2018-10-15 ENCOUNTER — HOSPITAL ENCOUNTER (OUTPATIENT)
Dept: RADIOLOGY | Facility: HOSPITAL | Age: 53
Discharge: HOME | End: 2018-10-15
Attending: FAMILY MEDICINE
Payer: COMMERCIAL

## 2018-10-15 ENCOUNTER — CLINICAL SUPPORT (OUTPATIENT)
Dept: CARDIOLOGY | Facility: CLINIC | Age: 53
End: 2018-10-15
Payer: COMMERCIAL

## 2018-10-15 ENCOUNTER — OFFICE VISIT (OUTPATIENT)
Dept: INTERNAL MEDICINE | Facility: CLINIC | Age: 53
End: 2018-10-15
Payer: COMMERCIAL

## 2018-10-15 VITALS
OXYGEN SATURATION: 97 % | HEART RATE: 97 BPM | RESPIRATION RATE: 18 BRPM | HEIGHT: 66 IN | BODY MASS INDEX: 39.15 KG/M2 | SYSTOLIC BLOOD PRESSURE: 118 MMHG | WEIGHT: 243.63 LBS | DIASTOLIC BLOOD PRESSURE: 82 MMHG | TEMPERATURE: 100 F

## 2018-10-15 DIAGNOSIS — M48.061 SPINAL STENOSIS, LUMBAR REGION, WITHOUT NEUROGENIC CLAUDICATION: ICD-10-CM

## 2018-10-15 DIAGNOSIS — Z01.818 PREOP GENERAL PHYSICAL EXAM: Primary | ICD-10-CM

## 2018-10-15 DIAGNOSIS — L30.9 DERMATITIS: ICD-10-CM

## 2018-10-15 DIAGNOSIS — Z01.818 PREOP GENERAL PHYSICAL EXAM: ICD-10-CM

## 2018-10-15 DIAGNOSIS — J30.9 ALLERGIC RHINITIS, UNSPECIFIED SEASONALITY, UNSPECIFIED TRIGGER: ICD-10-CM

## 2018-10-15 LAB
BILIRUB UR QL STRIP: NEGATIVE
CLARITY UR REFRACT.AUTO: CLEAR
COLOR UR AUTO: YELLOW
GLUCOSE UR QL STRIP: NEGATIVE
HGB UR QL STRIP: NEGATIVE
KETONES UR QL STRIP: NEGATIVE
LEUKOCYTE ESTERASE UR QL STRIP: NEGATIVE
NITRITE UR QL STRIP: NEGATIVE
PH UR STRIP: 5 [PH] (ref 5–8)
PROT UR QL STRIP: NEGATIVE
SP GR UR STRIP: 1.02 (ref 1–1.03)
URN SPEC COLLECT METH UR: NORMAL
UROBILINOGEN UR STRIP-ACNC: NEGATIVE EU/DL

## 2018-10-15 PROCEDURE — 71046 X-RAY EXAM CHEST 2 VIEWS: CPT | Mod: 26,,, | Performed by: RADIOLOGY

## 2018-10-15 PROCEDURE — 93005 ELECTROCARDIOGRAM TRACING: CPT | Mod: S$GLB,,, | Performed by: FAMILY MEDICINE

## 2018-10-15 PROCEDURE — 81003 URINALYSIS AUTO W/O SCOPE: CPT

## 2018-10-15 PROCEDURE — 3008F BODY MASS INDEX DOCD: CPT | Mod: CPTII,S$GLB,, | Performed by: FAMILY MEDICINE

## 2018-10-15 PROCEDURE — 99214 OFFICE O/P EST MOD 30 MIN: CPT | Mod: S$GLB,,, | Performed by: FAMILY MEDICINE

## 2018-10-15 PROCEDURE — 71046 X-RAY EXAM CHEST 2 VIEWS: CPT | Mod: TC,PO

## 2018-10-15 PROCEDURE — 93010 ELECTROCARDIOGRAM REPORT: CPT | Mod: S$GLB,,, | Performed by: INTERNAL MEDICINE

## 2018-10-15 PROCEDURE — 99999 PR PBB SHADOW E&M-EST. PATIENT-LVL III: CPT | Mod: PBBFAC,,, | Performed by: FAMILY MEDICINE

## 2018-10-15 RX ORDER — AZELASTINE 1 MG/ML
1 SPRAY, METERED NASAL 2 TIMES DAILY
Qty: 30 ML | Refills: 5 | Status: SHIPPED | OUTPATIENT
Start: 2018-10-15 | End: 2022-09-14

## 2018-10-15 RX ORDER — METHOCARBAMOL 750 MG/1
500 TABLET, FILM COATED ORAL 3 TIMES DAILY
COMMUNITY
End: 2021-04-20

## 2018-10-15 RX ORDER — TIZANIDINE HYDROCHLORIDE 4 MG/1
4 CAPSULE, GELATIN COATED ORAL
COMMUNITY

## 2018-10-15 RX ORDER — PANTOPRAZOLE SODIUM 40 MG/1
TABLET, DELAYED RELEASE ORAL
Refills: 3 | COMMUNITY
Start: 2018-08-05 | End: 2019-12-20 | Stop reason: SDUPTHER

## 2018-10-15 RX ORDER — PROMETHAZINE HYDROCHLORIDE 25 MG/1
25 TABLET ORAL EVERY 6 HOURS PRN
Status: ON HOLD | COMMUNITY
End: 2021-01-24 | Stop reason: SDUPTHER

## 2018-10-15 RX ORDER — BENZONATATE 200 MG/1
200 CAPSULE ORAL 3 TIMES DAILY PRN
Qty: 90 CAPSULE | Refills: 5 | Status: SHIPPED | OUTPATIENT
Start: 2018-10-15 | End: 2018-10-25

## 2018-10-15 RX ORDER — TRIAMCINOLONE ACETONIDE 1 MG/G
OINTMENT TOPICAL 2 TIMES DAILY
Qty: 30 G | Refills: 3 | Status: SHIPPED | OUTPATIENT
Start: 2018-10-15 | End: 2019-05-07 | Stop reason: SDUPTHER

## 2018-10-15 NOTE — PROGRESS NOTES
Subjective:       Patient ID: Lunan Mcgovern is a 52 y.o. female.    Chief Complaint: Pre-op Exam      Patient here today for preop exam for spinal cord stimulator implant placement. She reports worsened back pain, leg weakness, has been unable to work for several months now.   She does report worsened seasonal allergies as she normally has this time of year. No SOB or increased coughing.  She also reports rash to left thumb for a few months now      Review of Systems   Constitutional: Positive for activity change and fatigue. Negative for appetite change and fever.   HENT: Positive for postnasal drip, sinus pressure and sore throat. Negative for congestion.    Eyes: Negative for visual disturbance.   Respiratory: Negative for cough, chest tightness and shortness of breath.    Cardiovascular: Negative for chest pain.   Gastrointestinal: Negative for abdominal pain, constipation and diarrhea.   Endocrine: Negative for polyuria.   Musculoskeletal: Positive for arthralgias, back pain and gait problem.   Skin: Positive for rash. Negative for color change.   Allergic/Immunologic: Negative for immunocompromised state.   Neurological: Negative for dizziness.     Past Medical History:   Diagnosis Date    Allergy     seasonal      Past Surgical History:   Procedure Laterality Date    KNEE SURGERY Left     age 13 - injury - no tears, just cleaned it up     TONSILLECTOMY      TUBAL LIGATION  01/19/1990    VAGINAL DELIVERY      2 births      Family History   Problem Relation Age of Onset    Thyroid disease Mother      Social History     Socioeconomic History    Marital status:      Spouse name: Not on file    Number of children: 2    Years of education: Not on file    Highest education level: Not on file   Social Needs    Financial resource strain: Not on file    Food insecurity - worry: Not on file    Food insecurity - inability: Not on file    Transportation needs - medical: Not on file     "Transportation needs - non-medical: Not on file   Occupational History    Occupation: RN - ED or surgery     Comment: [employer]     Occupation: CDL - for [employer]   Tobacco Use    Smoking status: Never Smoker    Smokeless tobacco: Never Used   Substance and Sexual Activity    Alcohol use: No    Drug use: No    Sexual activity: Yes     Partners: Male   Other Topics Concern    Not on file   Social History Narrative    Not on file     Review of patient's allergies indicates:   Allergen Reactions    Iodinated contrast- oral and iv dye      Pt states that she is not allergic omnipeg dye, but is only allergic to the older dyes.    Iodine and iodide containing products      Injectables ???     Loperamide-simethicone     Metoclopramide hcl      reglan     Diltiazem hcl Rash and Swelling       Objective:       /82   Pulse 97   Temp 99.7 °F (37.6 °C)   Resp 18   Ht 5' 6" (1.676 m)   Wt 110.5 kg (243 lb 9.7 oz)   LMP 11/15/2002   SpO2 97%   BMI 39.32 kg/m²   Physical Exam   Constitutional: She is oriented to person, place, and time. Vital signs are normal. She appears well-developed and well-nourished. No distress.   HENT:   Head: Normocephalic and atraumatic.   Right Ear: Hearing, tympanic membrane, external ear and ear canal normal.   Left Ear: Hearing, tympanic membrane, external ear and ear canal normal.   Nose: Mucosal edema present.   Mouth/Throat: Uvula is midline, oropharynx is clear and moist and mucous membranes are normal. No oropharyngeal exudate.   Eyes: Conjunctivae and EOM are normal. Pupils are equal, round, and reactive to light.   Neck: Normal range of motion. Neck supple. No tracheal deviation present. No thyromegaly present.   Cardiovascular: Normal rate, regular rhythm, normal heart sounds and intact distal pulses.   No murmur heard.  Pulmonary/Chest: Effort normal and breath sounds normal. No respiratory distress.   Abdominal: Soft. Bowel sounds are normal. "   Musculoskeletal: Normal range of motion. She exhibits no edema.   Lymphadenopathy:     She has no cervical adenopathy.   Neurological: She is alert and oriented to person, place, and time.   Skin: Skin is warm and dry. Capillary refill takes less than 2 seconds. She is not diaphoretic.   Left thumb with eczematous rash on palmar surface.   Psychiatric: She has a normal mood and affect. Her behavior is normal. Judgment and thought content normal.   Nursing note and vitals reviewed.    Assessment:     1. Preop general physical exam    2. Spinal stenosis, lumbar region, without neurogenic claudication    3. Dermatitis    4. Allergic rhinitis, unspecified seasonality, unspecified trigger      Plan:   Preop general physical exam  -     IN OFFICE EKG 12-LEAD (to Muse)  -     X-Ray Chest PA And Lateral; Future; Expected date: 10/15/2018  -     CBC auto differential; Future; Expected date: 10/15/2018  -     Basic metabolic panel; Future; Expected date: 10/15/2018  -     C-reactive protein; Future; Expected date: 10/15/2018  -     Protime-INR; Future; Expected date: 10/15/2018  -     APTT; Future; Expected date: 10/15/2018  -     Sedimentation rate; Future; Expected date: 10/15/2018  -     Urinalysis Microscopic  -     URINALYSIS    After review of labs, EKG, chest xray patient appears to be low risk for surgery. She is cleared for procedure under general anesthesia.              Spinal stenosis, lumbar region, without neurogenic claudication  Dermatitis  Allergic rhinitis, unspecified seasonality, unspecified trigger    Other orders  -     triamcinolone acetonide 0.1% (KENALOG) 0.1 % ointment; Apply topically 2 (two) times daily.  Dispense: 30 g; Refill: 3  -     azelastine (ASTELIN) 137 mcg (0.1 %) nasal spray; 1 spray (137 mcg total) by Nasal route 2 (two) times daily.  Dispense: 30 mL; Refill: 5  -     benzonatate (TESSALON) 200 MG capsule; Take 1 capsule (200 mg total) by mouth 3 (three) times daily as needed for  Cough.  Dispense: 90 capsule; Refill: 5         Medication List           Accurate as of 10/15/18 11:23 AM. If you have any questions, ask your nurse or doctor.               START taking these medications    azelastine 137 mcg (0.1 %) nasal spray  Commonly known as:  ASTELIN  1 spray (137 mcg total) by Nasal route 2 (two) times daily.  Started by:  Bri Freedman MD     benzonatate 200 MG capsule  Commonly known as:  TESSALON  Take 1 capsule (200 mg total) by mouth 3 (three) times daily as needed for Cough.  Started by:  Bri Freedman MD     triamcinolone acetonide 0.1% 0.1 % ointment  Commonly known as:  KENALOG  Apply topically 2 (two) times daily.  Started by:  Bri Freedman MD        CONTINUE taking these medications    albuterol 2.5 mg /3 mL (0.083 %) nebulizer solution  Commonly known as:  PROVENTIL  Take 3 mLs (2.5 mg total) by nebulization every 6 (six) hours as needed for Wheezing or Shortness of Breath. please dispense in vials.     aspirin-acetaminophen-caffeine 250-250-65 mg 250-250-65 mg per tablet  Commonly known as:  EXCEDRIN MIGRAINE     BENADRYL 25 mg capsule  Generic drug:  diphenhydrAMINE     cetirizine 10 MG tablet  Commonly known as:  ZYRTEC     diazePAM 5 MG tablet  Commonly known as:  VALIUM     eszopiclone 2 MG Tab  Commonly known as:  LUNESTA  Take 1 tablet (2 mg total) by mouth every evening.     gabapentin 600 MG tablet  Commonly known as:  NEURONTIN     HYDROcodone-acetaminophen  mg per tablet  Commonly known as:  NORCO     ipratropium 0.02 % nebulizer solution  Commonly known as:  ATROVENT  Take 2.5 mLs (500 mcg total) by nebulization 4 (four) times daily. Rescue     methocarbamol 750 MG Tab  Commonly known as:  ROBAXIN     * NYSTOP powder  Generic drug:  nystatin  APPLY  POWDER TOPICALLY TWICE DAILY     * nystatin cream  Commonly known as:  MYCOSTATIN  APPLY  CREAM TOPICALLY TWICE DAILY     pantoprazole 40 MG tablet  Commonly known as:  PROTONIX     promethazine  25 MG tablet  Commonly known as:  PHENERGAN     tiZANidine 4 mg Cap         * This list has 2 medication(s) that are the same as other medications prescribed for you. Read the directions carefully, and ask your doctor or other care provider to review them with you.               Where to Get Your Medications      These medications were sent to [facility] [address] [place] [id number]    Phone:  [phone]   · azelastine 137 mcg (0.1 %) nasal spray  · benzonatate 200 MG capsule  · triamcinolone acetonide 0.1% 0.1 % ointment

## 2018-11-06 ENCOUNTER — HOSPITAL ENCOUNTER (OUTPATIENT)
Dept: RADIOLOGY | Facility: HOSPITAL | Age: 53
Discharge: HOME | End: 2018-11-06
Attending: FAMILY MEDICINE
Payer: COMMERCIAL

## 2018-11-06 ENCOUNTER — OFFICE VISIT (OUTPATIENT)
Dept: INTERNAL MEDICINE | Facility: CLINIC | Age: 53
End: 2018-11-06
Payer: COMMERCIAL

## 2018-11-06 VITALS
HEIGHT: 66 IN | TEMPERATURE: 99 F | SYSTOLIC BLOOD PRESSURE: 136 MMHG | WEIGHT: 252 LBS | DIASTOLIC BLOOD PRESSURE: 90 MMHG | OXYGEN SATURATION: 96 % | HEART RATE: 110 BPM | BODY MASS INDEX: 40.5 KG/M2 | RESPIRATION RATE: 18 BRPM

## 2018-11-06 DIAGNOSIS — R05.9 COUGH: ICD-10-CM

## 2018-11-06 DIAGNOSIS — J18.9 PNEUMONIA OF RIGHT LOWER LOBE DUE TO INFECTIOUS ORGANISM: Primary | ICD-10-CM

## 2018-11-06 DIAGNOSIS — J45.20 MILD INTERMITTENT ASTHMA WITHOUT COMPLICATION: ICD-10-CM

## 2018-11-06 DIAGNOSIS — B37.0 THRUSH: ICD-10-CM

## 2018-11-06 PROCEDURE — 3008F BODY MASS INDEX DOCD: CPT | Mod: CPTII,S$GLB,, | Performed by: FAMILY MEDICINE

## 2018-11-06 PROCEDURE — 99999 PR PBB SHADOW E&M-EST. PATIENT-LVL IV: CPT | Mod: PBBFAC,,, | Performed by: FAMILY MEDICINE

## 2018-11-06 PROCEDURE — 99213 OFFICE O/P EST LOW 20 MIN: CPT | Mod: S$GLB,,, | Performed by: FAMILY MEDICINE

## 2018-11-06 PROCEDURE — 71046 X-RAY EXAM CHEST 2 VIEWS: CPT | Mod: 26,,, | Performed by: RADIOLOGY

## 2018-11-06 PROCEDURE — 87070 CULTURE OTHR SPECIMN AEROBIC: CPT

## 2018-11-06 PROCEDURE — 87205 SMEAR GRAM STAIN: CPT

## 2018-11-06 PROCEDURE — 71046 X-RAY EXAM CHEST 2 VIEWS: CPT | Mod: TC,PO

## 2018-11-06 RX ORDER — LEVOFLOXACIN 750 MG/1
750 TABLET ORAL DAILY
Qty: 10 TABLET | Refills: 0 | Status: SHIPPED | OUTPATIENT
Start: 2018-11-06 | End: 2018-11-20

## 2018-11-06 RX ORDER — CODEINE PHOSPHATE AND GUAIFENESIN 10; 100 MG/5ML; MG/5ML
5 SOLUTION ORAL 3 TIMES DAILY PRN
Qty: 118 ML | Refills: 0 | Status: SHIPPED | OUTPATIENT
Start: 2018-11-06 | End: 2018-11-16

## 2018-11-06 RX ORDER — ALBUTEROL SULFATE 90 UG/1
POWDER, METERED RESPIRATORY (INHALATION)
COMMUNITY
Start: 2018-09-08 | End: 2019-05-15 | Stop reason: SDUPTHER

## 2018-11-06 RX ORDER — ALBUTEROL SULFATE 0.83 MG/ML
2.5 SOLUTION RESPIRATORY (INHALATION) EVERY 6 HOURS PRN
Qty: 25 EACH | Refills: 11 | Status: SHIPPED | OUTPATIENT
Start: 2018-11-06 | End: 2019-11-06

## 2018-11-06 RX ORDER — CLOTRIMAZOLE 10 MG/1
10 LOZENGE ORAL; TOPICAL
Qty: 50 TABLET | Refills: 1 | Status: SHIPPED | OUTPATIENT
Start: 2018-11-06 | End: 2018-12-06

## 2018-11-06 RX ORDER — PREDNISONE 20 MG/1
40 TABLET ORAL DAILY
Qty: 8 TABLET | Refills: 0 | Status: SHIPPED | OUTPATIENT
Start: 2018-11-06 | End: 2018-11-10

## 2018-11-06 RX ORDER — BENZONATATE 200 MG/1
200 CAPSULE ORAL 3 TIMES DAILY PRN
COMMUNITY
End: 2020-03-30

## 2018-11-06 RX ORDER — ALBUTEROL SULFATE 0.83 MG/ML
SOLUTION RESPIRATORY (INHALATION)
COMMUNITY
Start: 2016-10-11 | End: 2018-11-06 | Stop reason: SDUPTHER

## 2018-11-06 NOTE — PROGRESS NOTES
Subjective:       Patient ID: Luann Mcgovern is a 52 y.o. female.    Chief Complaint: Cough      Patient is complaining of coughing up thick sputum, coughing almost constantly. She is also having significant pain in her mouth and on her tongue. She is 8th day postop from having stimulator placed in her back. Afebrile.      Review of Systems   Constitutional: Positive for activity change, appetite change and fatigue. Negative for fever.   Respiratory: Positive for cough, chest tightness, shortness of breath and wheezing.    Musculoskeletal: Positive for back pain (from incision).   Skin: Positive for color change.     Past Medical History:   Diagnosis Date    Allergy     seasonal      Past Surgical History:   Procedure Laterality Date    KNEE SURGERY Left     age 13 - injury - no tears, just cleaned it up     LUMBAR NERVE STIMLATOR INSERTION  2018    TONSILLECTOMY      TUBAL LIGATION  01/19/1990    VAGINAL DELIVERY      2 births      Family History   Problem Relation Age of Onset    Thyroid disease Mother      Social History     Socioeconomic History    Marital status:      Spouse name: Not on file    Number of children: 2    Years of education: Not on file    Highest education level: Not on file   Social Needs    Financial resource strain: Not on file    Food insecurity - worry: Not on file    Food insecurity - inability: Not on file    Transportation needs - medical: Not on file    Transportation needs - non-medical: Not on file   Occupational History    Occupation: RN - ED or surgery     Comment: [employer]     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    Iodine and iodide containing products      Injectables ???     Loperamide-simethicone     Metoclopramide hcl      reglan     Diltiazem hcl Rash and Swelling       Objective:       BP (!) 136/90 (BP Location: Right arm, Patient Position: Sitting, BP Method: Medium (Automatic))   Pulse 110   Temp 98.8 °F (37.1 °C)   Resp 18   Ht 5' 6" (1.676 m)   Wt 114.3 kg (251 lb 15.8 oz)   LMP 11/15/2002   SpO2 96%   BMI 40.67 kg/m²   Physical Exam   Constitutional: She appears well-developed and well-nourished. No distress.   Almost constant coughing   HENT:   Thrush noted on tongue, pharynx   Cardiovascular: Normal rate, regular rhythm and normal heart sounds.   Pulmonary/Chest: Effort normal. She has wheezes (throughout lungfields).   Skin: She is not diaphoretic.   Large bruise over sternum   Psychiatric: She has a normal mood and affect. Her behavior is normal.   Vitals reviewed.    Assessment:     1. Pneumonia of right lower lobe due to infectious organism    2. Cough    3. Thrush    4. Mild intermittent asthma without complication      Plan:   Pneumonia of right lower lobe due to infectious organism    Cough  -     Culture, Respiratory with Gram Stain  -     X-Ray Chest PA And Lateral; Future; Expected date: 11/06/2018    Thrush    Mild intermittent asthma without complication    Other orders  -     clotrimazole (MYCELEX) 10 mg george; Take 1 tablet (10 mg total) by mouth 5 (five) times daily.  Dispense: 50 tablet; Refill: 1  -     levoFLOXacin (LEVAQUIN) 750 MG tablet; Take 1 tablet (750 mg total) by mouth once daily.  Dispense: 10 tablet; Refill: 0  -     predniSONE (DELTASONE) 20 MG tablet; Take 2 tablets (40 mg total) by mouth once daily. for 4 days  Dispense: 8 tablet; Refill: 0  -     guaifenesin-codeine 100-10 mg/5 ml (TUSSI-ORGANIDIN NR)  mg/5 mL syrup; Take 5 mLs " by mouth 3 (three) times daily as needed for Cough.  Dispense: 118 mL; Refill: 0  -     albuterol (PROVENTIL) 2.5 mg /3 mL (0.083 %) nebulizer solution; Take 3 mLs (2.5 mg total) by nebulization every 6 (six) hours as needed for Wheezing or Shortness of Breath. please dispense in vials.  Dispense: 25 each; Refill: 11         Medication List           Accurate as of 11/6/18  9:56 AM. If you have any questions, ask your nurse or doctor.               START taking these medications    clotrimazole 10 mg george  Commonly known as:  MYCELEX  Take 1 tablet (10 mg total) by mouth 5 (five) times daily.  Started by:  Bri Freedman MD     guaifenesin-codeine 100-10 mg/5 ml  mg/5 mL syrup  Commonly known as:  TUSSI-ORGANIDIN NR  Take 5 mLs by mouth 3 (three) times daily as needed for Cough.  Started by:  Bri Freedman MD     levoFLOXacin 750 MG tablet  Commonly known as:  LEVAQUIN  Take 1 tablet (750 mg total) by mouth once daily.  Started by:  Bri Freedman MD     predniSONE 20 MG tablet  Commonly known as:  DELTASONE  Take 2 tablets (40 mg total) by mouth once daily. for 4 days  Started by:  Bri Freedman MD        CONTINUE taking these medications    ALBUTEROL INHL     aspirin-acetaminophen-caffeine 250-250-65 mg 250-250-65 mg per tablet  Commonly known as:  EXCEDRIN MIGRAINE     azelastine 137 mcg (0.1 %) nasal spray  Commonly known as:  ASTELIN  1 spray (137 mcg total) by Nasal route 2 (two) times daily.     BENADRYL 25 mg capsule  Generic drug:  diphenhydrAMINE     benzonatate 200 MG capsule  Commonly known as:  TESSALON     cetirizine 10 MG tablet  Commonly known as:  ZYRTEC     diazePAM 5 MG tablet  Commonly known as:  VALIUM     eszopiclone 2 MG Tab  Commonly known as:  LUNESTA  Take 1 tablet (2 mg total) by mouth every evening.     gabapentin 600 MG tablet  Commonly known as:  NEURONTIN     HYDROcodone-acetaminophen  mg per tablet  Commonly known as:  NORCO     ipratropium 0.02 %  nebulizer solution  Commonly known as:  ATROVENT  Take 2.5 mLs (500 mcg total) by nebulization 4 (four) times daily. Rescue     methocarbamol 750 MG Tab  Commonly known as:  ROBAXIN     * NYSTOP powder  Generic drug:  nystatin  APPLY  POWDER TOPICALLY TWICE DAILY     * nystatin cream  Commonly known as:  MYCOSTATIN  APPLY  CREAM TOPICALLY TWICE DAILY     pantoprazole 40 MG tablet  Commonly known as:  PROTONIX     * PROAIR RESPICLICK 90 mcg/actuation Aepb  Generic drug:  albuterol sulfate     * albuterol 2.5 mg /3 mL (0.083 %) nebulizer solution  Commonly known as:  PROVENTIL  Take 3 mLs (2.5 mg total) by nebulization every 6 (six) hours as needed for Wheezing or Shortness of Breath. please dispense in vials.     promethazine 25 MG tablet  Commonly known as:  PHENERGAN     tiZANidine 4 mg Cap     triamcinolone acetonide 0.1% 0.1 % ointment  Commonly known as:  KENALOG  Apply topically 2 (two) times daily.         * This list has 4 medication(s) that are the same as other medications prescribed for you. Read the directions carefully, and ask your doctor or other care provider to review them with you.               Where to Get Your Medications      These medications were sent to [facility] [address] [place] [id number]    Phone:  [phone]   · albuterol 2.5 mg /3 mL (0.083 %) nebulizer solution  · clotrimazole 10 mg george  · guaifenesin-codeine 100-10 mg/5 ml  mg/5 mL syrup  · levoFLOXacin 750 MG tablet  · predniSONE 20 MG tablet

## 2018-11-06 NOTE — PATIENT INSTRUCTIONS
Pneumonia (Adult)  Pneumonia is an infection deep within the lungs. It is in the small air sacs (alveoli). Pneumonia may be caused by a virus or bacteria. Pneumonia caused by bacteria is usually treated with an antibiotic. Severe cases may need to be treated in the hospital. Milder cases can be treated at home. Symptoms usually start to get better during the first 2 days of treatment.    Home care  Follow these guidelines when caring for yourself at home:  · Rest at home for the first 2 to 3 days, or until you feel stronger. Dont let yourself get overly tired when you go back to your activities.  · Stay away from cigarette smoke - yours or other peoples.  · You may use acetaminophen or ibuprofen to control fever or pain, unless another medicine was prescribed. If you have chronic liver or kidney disease, talk with your healthcare provider before using these medicines. Also talk with your provider if youve had a stomach ulcer or gastrointestinal bleeding. Dont give aspirin to anyone younger than 18 years of age who is ill with a fever. It may cause severe liver damage.  · Your appetite may be poor, so a light diet is fine.  · Drink 6 to 8 glasses of fluids every day to make sure you are getting enough fluids. Beverages can include water, sport drinks, sodas without caffeine, juices, tea, or soup. Fluids will help loosen secretions in the lung. This will make it easier for you to cough up the phlegm (sputum). If you also have heart or kidney disease, check with your healthcare provider before you drink extra fluids.  · Take antibiotic medicine prescribed until it is all gone, even if you are feeling better after a few days.  Follow-up care  Follow up with your healthcare provider in the next 2 to 3 days, or as advised. This is to be sure the medicine is helping you get better.  If you are 65 or older, you should get a pneumococcal vaccine and a yearly flu (influenza) shot. You should also get these vaccines if  you have chronic lung disease like asthma, emphysema, or COPD. Recently, a second type of pneumonia vaccine has become available for everyone over 65 years old. This is in addition to the previous vaccine. Ask your provider about this.  When to seek medical advice  Call your healthcare provider right away if any of these occur:  · You dont get better within the first 48 hours of treatment  · Shortness of breath gets worse  · Rapid breathing (more than 25 breaths per minute)  · Coughing up blood  · Chest pain gets worse with breathing  · Fever of 100.4°F (38°C) or higher that doesnt get better with fever medicine  · Weakness, dizziness, or fainting that gets worse  · Thirst or dry mouth that gets worse  · Sinus pain, headache, or a stiff neck  · Chest pain not caused by coughing  Date Last Reviewed: 1/1/2017  © 1217-1223 [employer]. [address] [place] [id number]. All rights reserved. This information is not intended as a substitute for professional medical care. Always follow your healthcare professional's instructions.

## 2018-11-08 ENCOUNTER — PATIENT OUTREACH (OUTPATIENT)
Dept: ADMINISTRATIVE | Facility: HOSPITAL | Age: 53
End: 2018-11-08

## 2018-11-09 LAB
BACTERIA SPEC AEROBE CULT: NORMAL
GRAM STN SPEC: NORMAL

## 2018-11-15 ENCOUNTER — PATIENT MESSAGE (OUTPATIENT)
Dept: INTERNAL MEDICINE | Facility: CLINIC | Age: 53
End: 2018-11-15

## 2018-11-19 DIAGNOSIS — R05.9 COUGH: ICD-10-CM

## 2018-11-19 DIAGNOSIS — J45.20 MILD INTERMITTENT ASTHMA WITHOUT COMPLICATION: Primary | ICD-10-CM

## 2018-11-20 ENCOUNTER — OFFICE VISIT (OUTPATIENT)
Dept: INTERNAL MEDICINE | Facility: CLINIC | Age: 53
End: 2018-11-20
Payer: COMMERCIAL

## 2018-11-20 ENCOUNTER — TELEPHONE (OUTPATIENT)
Dept: INTERNAL MEDICINE | Facility: CLINIC | Age: 53
End: 2018-11-20

## 2018-11-20 ENCOUNTER — HOSPITAL ENCOUNTER (OUTPATIENT)
Dept: RADIOLOGY | Facility: HOSPITAL | Age: 53
Discharge: HOME | End: 2018-11-20
Attending: FAMILY MEDICINE
Payer: COMMERCIAL

## 2018-11-20 VITALS
BODY MASS INDEX: 39.82 KG/M2 | RESPIRATION RATE: 16 BRPM | OXYGEN SATURATION: 95 % | HEIGHT: 66 IN | TEMPERATURE: 101 F | DIASTOLIC BLOOD PRESSURE: 73 MMHG | HEART RATE: 95 BPM | SYSTOLIC BLOOD PRESSURE: 134 MMHG | WEIGHT: 247.81 LBS

## 2018-11-20 DIAGNOSIS — R05.9 COUGH: Primary | ICD-10-CM

## 2018-11-20 DIAGNOSIS — R05.9 COUGH: ICD-10-CM

## 2018-11-20 DIAGNOSIS — R50.9 FEVER, UNSPECIFIED FEVER CAUSE: ICD-10-CM

## 2018-11-20 DIAGNOSIS — A49.2 H. INFLUENZAE INFECTION: ICD-10-CM

## 2018-11-20 LAB
CTP QC/QA: YES
FLUAV AG NPH QL: NEGATIVE
FLUBV AG NPH QL: NEGATIVE

## 2018-11-20 PROCEDURE — 71046 X-RAY EXAM CHEST 2 VIEWS: CPT | Mod: 26,,, | Performed by: RADIOLOGY

## 2018-11-20 PROCEDURE — 3008F BODY MASS INDEX DOCD: CPT | Mod: CPTII,S$GLB,, | Performed by: FAMILY MEDICINE

## 2018-11-20 PROCEDURE — 87804 INFLUENZA ASSAY W/OPTIC: CPT | Mod: 59,QW,S$GLB, | Performed by: FAMILY MEDICINE

## 2018-11-20 PROCEDURE — 99999 PR PBB SHADOW E&M-EST. PATIENT-LVL IV: CPT | Mod: PBBFAC,,, | Performed by: FAMILY MEDICINE

## 2018-11-20 PROCEDURE — 71046 X-RAY EXAM CHEST 2 VIEWS: CPT | Mod: TC,PO

## 2018-11-20 PROCEDURE — 99213 OFFICE O/P EST LOW 20 MIN: CPT | Mod: S$GLB,,, | Performed by: FAMILY MEDICINE

## 2018-11-20 RX ORDER — CEFDINIR 300 MG/1
300 CAPSULE ORAL 2 TIMES DAILY
Qty: 20 CAPSULE | Refills: 0 | Status: SHIPPED | OUTPATIENT
Start: 2018-11-20 | End: 2018-11-30

## 2018-11-20 RX ORDER — METHYLPREDNISOLONE 4 MG/1
TABLET ORAL
Qty: 1 PACKAGE | Refills: 0 | Status: SHIPPED | OUTPATIENT
Start: 2018-11-20 | End: 2018-11-26

## 2018-11-20 NOTE — PROGRESS NOTES
Subjective:       Patient ID: Luann Mcgovern is a 53 y.o. female.    Chief Complaint: follow up (pneumonia )      Patient continues to cough, sometimes productive, having chest wall pains, body aches, now also febrile.       Review of Systems   Constitutional: Positive for activity change, appetite change, fatigue and fever. Negative for unexpected weight change.   Respiratory: Positive for cough, shortness of breath and wheezing.    Musculoskeletal: Positive for myalgias.   Neurological: Positive for headaches.     Past Medical History:   Diagnosis Date    Allergy     seasonal      Past Surgical History:   Procedure Laterality Date    KNEE SURGERY Left     age 13 - injury - no tears, just cleaned it up     LUMBAR NERVE STIMLATOR INSERTION  2018    TONSILLECTOMY      TUBAL LIGATION  01/19/1990    VAGINAL DELIVERY      2 births      Family History   Problem Relation Age of Onset    Thyroid disease Mother      Social History     Socioeconomic History    Marital status:      Spouse name: Not on file    Number of children: 2    Years of education: Not on file    Highest education level: Not on file   Social Needs    Financial resource strain: Not on file    Food insecurity - worry: Not on file    Food insecurity - inability: Not on file    Transportation needs - medical: Not on file    Transportation needs - non-medical: Not on file   Occupational History    Occupation: RN - ED or surgery     Comment: [employer]     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    Iodine and iodide containing products      Injectables ???     Loperamide-simethicone     Metoclopramide hcl      reglan     Mobic [meloxicam]      Chest and neck pain .  Decrease O2    Diltiazem hcl Rash and Swelling       Objective:       /73   Pulse 95   Temp (!) 100.7 °F (38.2 °C)   Resp 16   Ht 5' 6" (1.676 m)   Wt 112.4 kg (247 lb 12.8 oz)   LMP 11/15/2002   SpO2 95%   BMI 40.00 kg/m²   Physical Exam   Constitutional: She appears well-developed and well-nourished. No distress.   Almost constantly coughing while in office   HENT:   Head: Normocephalic.   Right Ear: Hearing, tympanic membrane, external ear and ear canal normal.   Left Ear: Hearing, tympanic membrane, external ear and ear canal normal.   Nose: Mucosal edema present. Right sinus exhibits no maxillary sinus tenderness and no frontal sinus tenderness. Left sinus exhibits no maxillary sinus tenderness and no frontal sinus tenderness.   Mouth/Throat: Uvula is midline and mucous membranes are normal. Posterior oropharyngeal erythema present.   Eyes: Conjunctivae and EOM are normal. Pupils are equal, round, and reactive to light.   Cardiovascular: Normal rate, regular rhythm and normal heart sounds.   Pulmonary/Chest: Effort normal. No respiratory distress. She has wheezes.   Lymphadenopathy:     She has no cervical adenopathy.   Skin: Skin is warm and dry. She is not diaphoretic.   Psychiatric: She has a normal mood and affect. Her behavior is normal.   Nursing note and vitals reviewed.    Assessment:     1. Cough    2. Fever, unspecified fever cause    3. H. influenzae infection      Plan:   Cough  -     POCT Influenza A/B - negative  -     Ambulatory consult to Pulmonology  -     Ambulatory consult to Infectious Disease    Fever, unspecified fever cause  H. influenzae infection  -     Other orders  -     cefdinir (OMNICEF) 300 MG capsule; Take 1 capsule (300 mg total) by mouth 2 (two) times " daily. for 10 days  Dispense: 20 capsule; Refill: 0  -     methylPREDNISolone (MEDROL DOSEPACK) 4 mg tablet; use as directed  Dispense: 1 Package; Refill: 0    Chest xray appears slightly improved from previous 2 weeks ago     Medication List           Accurate as of 11/20/18  5:38 PM. If you have any questions, ask your nurse or doctor.               START taking these medications    cefdinir 300 MG capsule  Commonly known as:  OMNICEF  Take 1 capsule (300 mg total) by mouth 2 (two) times daily. for 10 days  Started by:  Bri Freedman MD     methylPREDNISolone 4 mg tablet  Commonly known as:  MEDROL DOSEPACK  use as directed  Started by:  Bri Freedman MD        CONTINUE taking these medications    ALBUTEROL INHL     aspirin-acetaminophen-caffeine 250-250-65 mg 250-250-65 mg per tablet  Commonly known as:  EXCEDRIN MIGRAINE     azelastine 137 mcg (0.1 %) nasal spray  Commonly known as:  ASTELIN  1 spray (137 mcg total) by Nasal route 2 (two) times daily.     BENADRYL 25 mg capsule  Generic drug:  diphenhydrAMINE     benzonatate 200 MG capsule  Commonly known as:  TESSALON     cetirizine 10 MG tablet  Commonly known as:  ZYRTEC     clotrimazole 10 mg george  Commonly known as:  MYCELEX  Take 1 tablet (10 mg total) by mouth 5 (five) times daily.     diazePAM 5 MG tablet  Commonly known as:  VALIUM     eszopiclone 2 MG Tab  Commonly known as:  LUNESTA  Take 1 tablet (2 mg total) by mouth every evening.     gabapentin 600 MG tablet  Commonly known as:  NEURONTIN     HYDROcodone-acetaminophen  mg per tablet  Commonly known as:  NORCO     ipratropium 0.02 % nebulizer solution  Commonly known as:  ATROVENT  Take 2.5 mLs (500 mcg total) by nebulization 4 (four) times daily. Rescue     methocarbamol 750 MG Tab  Commonly known as:  ROBAXIN     * NYSTOP powder  Generic drug:  nystatin  APPLY  POWDER TOPICALLY TWICE DAILY     * nystatin cream  Commonly known as:  MYCOSTATIN  APPLY  CREAM TOPICALLY TWICE  DAILY     pantoprazole 40 MG tablet  Commonly known as:  PROTONIX     * PROAIR RESPICLICK 90 mcg/actuation Aepb  Generic drug:  albuterol sulfate     * albuterol 2.5 mg /3 mL (0.083 %) nebulizer solution  Commonly known as:  PROVENTIL  Take 3 mLs (2.5 mg total) by nebulization every 6 (six) hours as needed for Wheezing or Shortness of Breath. please dispense in vials.     promethazine 25 MG tablet  Commonly known as:  PHENERGAN     tiZANidine 4 mg Cap     triamcinolone acetonide 0.1% 0.1 % ointment  Commonly known as:  KENALOG  Apply topically 2 (two) times daily.         * This list has 4 medication(s) that are the same as other medications prescribed for you. Read the directions carefully, and ask your doctor or other care provider to review them with you.            STOP taking these medications    levoFLOXacin 750 MG tablet  Commonly known as:  LEVAQUIN  Stopped by:  Bri Freedman MD           Where to Get Your Medications      These medications were sent to [facility] [address] [place] [id number]    Phone:  [phone]   · cefdinir 300 MG capsule  · methylPREDNISolone 4 mg tablet

## 2018-11-20 NOTE — TELEPHONE ENCOUNTER
Dr. Freedman has put in a referral for Infectious Disease . Can you please call and get her scheduled . THX

## 2018-11-26 ENCOUNTER — OFFICE VISIT (OUTPATIENT)
Dept: PULMONOLOGY | Facility: CLINIC | Age: 53
End: 2018-11-26
Payer: COMMERCIAL

## 2018-11-26 VITALS
DIASTOLIC BLOOD PRESSURE: 80 MMHG | SYSTOLIC BLOOD PRESSURE: 126 MMHG | HEART RATE: 100 BPM | WEIGHT: 245.69 LBS | HEIGHT: 66 IN | OXYGEN SATURATION: 98 % | RESPIRATION RATE: 18 BRPM | BODY MASS INDEX: 39.48 KG/M2

## 2018-11-26 DIAGNOSIS — R05.3 COUGH, PERSISTENT: ICD-10-CM

## 2018-11-26 DIAGNOSIS — Z01.00 ROUTINE EYE EXAM: ICD-10-CM

## 2018-11-26 DIAGNOSIS — J45.40 NOT WELL CONTROLLED MODERATE PERSISTENT ASTHMA: Primary | ICD-10-CM

## 2018-11-26 PROCEDURE — 3008F BODY MASS INDEX DOCD: CPT | Mod: CPTII,S$GLB,, | Performed by: NURSE PRACTITIONER

## 2018-11-26 PROCEDURE — 99999 PR PBB SHADOW E&M-EST. PATIENT-LVL V: CPT | Mod: PBBFAC,,, | Performed by: NURSE PRACTITIONER

## 2018-11-26 PROCEDURE — 94640 AIRWAY INHALATION TREATMENT: CPT | Mod: 59,S$GLB,, | Performed by: NURSE PRACTITIONER

## 2018-11-26 PROCEDURE — 87205 SMEAR GRAM STAIN: CPT

## 2018-11-26 PROCEDURE — 87070 CULTURE OTHR SPECIMN AEROBIC: CPT

## 2018-11-26 PROCEDURE — 96372 THER/PROPH/DIAG INJ SC/IM: CPT | Mod: 59,S$GLB,, | Performed by: NURSE PRACTITIONER

## 2018-11-26 PROCEDURE — 99204 OFFICE O/P NEW MOD 45 MIN: CPT | Mod: 25,S$GLB,, | Performed by: NURSE PRACTITIONER

## 2018-11-26 RX ORDER — FLUTICASONE FUROATE AND VILANTEROL 200; 25 UG/1; UG/1
1 POWDER RESPIRATORY (INHALATION) DAILY
Qty: 1 EACH | Refills: 11 | Status: SHIPPED | OUTPATIENT
Start: 2018-11-26 | End: 2019-02-15 | Stop reason: ALTCHOICE

## 2018-11-26 RX ORDER — PREDNISONE 20 MG/1
TABLET ORAL
Qty: 20 TABLET | Refills: 0 | Status: SHIPPED | OUTPATIENT
Start: 2018-11-26 | End: 2019-05-07 | Stop reason: SDUPTHER

## 2018-11-26 RX ORDER — ESZOPICLONE 3 MG/1
TABLET, FILM COATED ORAL
COMMUNITY
Start: 2018-11-24 | End: 2019-03-20 | Stop reason: SDUPTHER

## 2018-11-26 RX ORDER — ALBUTEROL SULFATE 0.83 MG/ML
2.5 SOLUTION RESPIRATORY (INHALATION)
Status: COMPLETED | OUTPATIENT
Start: 2018-11-26 | End: 2018-11-26

## 2018-11-26 RX ORDER — TRIAMCINOLONE ACETONIDE 40 MG/ML
40 INJECTION, SUSPENSION INTRA-ARTICULAR; INTRAMUSCULAR
Status: COMPLETED | OUTPATIENT
Start: 2018-11-26 | End: 2018-11-26

## 2018-11-26 RX ORDER — IPRATROPIUM BROMIDE AND ALBUTEROL SULFATE 2.5; .5 MG/3ML; MG/3ML
3 SOLUTION RESPIRATORY (INHALATION)
Status: COMPLETED | OUTPATIENT
Start: 2018-11-26 | End: 2018-11-26

## 2018-11-26 RX ADMIN — TRIAMCINOLONE ACETONIDE 40 MG: 40 INJECTION, SUSPENSION INTRA-ARTICULAR; INTRAMUSCULAR at 09:11

## 2018-11-26 RX ADMIN — IPRATROPIUM BROMIDE AND ALBUTEROL SULFATE 3 ML: 2.5; .5 SOLUTION RESPIRATORY (INHALATION) at 08:11

## 2018-11-26 RX ADMIN — ALBUTEROL SULFATE 2.5 MG: 0.83 SOLUTION RESPIRATORY (INHALATION) at 09:11

## 2018-11-26 NOTE — PROGRESS NOTES
"  Subjective:      Patient ID: Luann Mcgovern is a 53 y.o. female.    Patient Active Problem List   Diagnosis    Mild intermittent asthma without complication    Liver function abnormality     she has been referred by Bri Freedman MD for evaluation and management for   Chief Complaint   Patient presents with    Cough     Chief Complaint: Cough    HPI:  Luann Mcgovern is a 53 y.o. female presents to pulmonary clinic initial evaluation accompanied by her mother and father related to persistent cough since October 29 when she had spinal cord stimulator implant placement for chronic back pain.  Her last flare of coughing was "Easter Sunday" April 1st 2018 with cough, sputum tested positive for Haemophilus influenzae, beta lactamase negative. Normal respiratory vashit also present. She reports after a couple of months cough resolved.   There is history of Asthma.   She has albuterol and atrovent nebs and proair inhaler up that began use of albuterol/atrovent nebs every 6 hours.  Albuterol nebs in between about 3 times a day over past week.  She has had controller Advair in past which she states caused problems with her right eye. She states she is unsure of problem but told by her eye doctor in [place] many years ago was caused by Advair that she had been using for several years.   Asthma had been well controlled over many years and she has not used inhaled steroid long standing, unsure when last used.     Current inhaled medication: Albuterol nebs, Atrovent nebs and proair respiclick albuterol inhaler rescue.   11/20/2018 prescribed cefdinir (OMNICEF) 300 MG capsule 2 (two) times daily. for 10 days  and Medrol dosepack 4 mg, per Primary Care Provider, Dr. Freedman. Levaquin 750 mg prescribed on 11/6. No improvement with antibiotic therapy.   Recent lab testing for Influenza was Negative done after complaint of body aches and temp of 100.7.  Chest xray's no acute infiltrates, Stable scarring versus atelectasis " noted within the right lung base is seen.      Previous Report Reviewed: lab reports, office notes and radiology reports.    Past Medical History: The following portions of the patient's history were reviewed and updated as appropriate:   She  has a past surgical history that includes Tonsillectomy; Vaginal delivery; Tubal ligation (01/19/1990); Knee surgery (Left); and Lumbar nerve stimlator insertion (2018).  Her family history includes Thyroid disease in her mother.  She  reports that  has never smoked. she has never used smokeless tobacco. She reports that she does not drink alcohol or use drugs.  She has a current medication list which includes the following prescription(s): albuterol, albuterol, aspirin-acetaminophen-caffeine 250-250-65 mg, azelastine, benzonatate, cefdinir, cetirizine, clotrimazole, diazepam, diphenhydramine, eszopiclone, gabapentin, hydrocodone-acetaminophen, ipratropium, methocarbamol, nystatin, nystop, pantoprazole, proair respiclick, promethazine, tizanidine, triamcinolone acetonide 0.1%, fluticasone-vilanterol, and prednisone.  She is allergic to iodinated contrast- oral and iv dye; iodine and iodide containing products; loperamide-simethicone; metoclopramide hcl; mobic [meloxicam]; and diltiazem hcl.    Review of Systems   Constitutional: Negative for fever, chills, weight loss, weight gain, activity change, appetite change, fatigue and night sweats.   HENT: Negative for postnasal drip, rhinorrhea, sinus pressure, voice change and congestion.    Eyes: Negative for redness and itching.   Respiratory: Positive for cough, chest tightness, shortness of breath and wheezing. Negative for snoring, sputum production, orthopnea, asthma nighttime symptoms, dyspnea on extertion, use of rescue inhaler and somnolence.    Cardiovascular: Negative.  Negative for chest pain, palpitations and leg swelling.   Genitourinary: Negative for difficulty urinating and hematuria.   Endocrine: Negative for cold  "intolerance and heat intolerance.    Musculoskeletal: Negative for arthralgias, gait problem, joint swelling and myalgias.   Skin: Negative.    Gastrointestinal: Negative for nausea, vomiting, abdominal pain and acid reflux.   Neurological: Negative for dizziness, weakness, light-headedness and headaches.   Hematological: Negative for adenopathy. No excessive bruising.   All other systems reviewed and are negative.    Objective:   /80   Pulse 100   Resp 18   Ht 5' 6" (1.676 m)   Wt 111.4 kg (245 lb 11.2 oz)   LMP 11/15/2002   SpO2 98%   BMI 39.66 kg/m²   Physical Exam   Constitutional: She is oriented to person, place, and time. She appears well-developed and well-nourished. She is active and cooperative.  Non-toxic appearance. She does not appear ill. No distress.   HENT:   Head: Normocephalic.   Right Ear: External ear normal.   Left Ear: External ear normal.   Nose: Nose normal.   Mouth/Throat: Oropharynx is clear and moist. No oropharyngeal exudate.   Eyes: Conjunctivae are normal.   Neck: Normal range of motion. Neck supple.   Cardiovascular: Normal rate, regular rhythm, normal heart sounds and intact distal pulses.   Pulmonary/Chest: Effort normal. No accessory muscle usage or stridor. No tachypnea. She has no decreased breath sounds. She has wheezes in the right upper field, the right middle field, the right lower field, the left middle field and the left lower field. She has no rhonchi. She has no rales.   Abdominal: Soft.   Musculoskeletal: Normal range of motion.   Lymphadenopathy:     She has no cervical adenopathy.   Neurological: She is alert and oriented to person, place, and time.   Skin: Skin is warm and dry.   Psychiatric: She has a normal mood and affect. Her behavior is normal. Judgment and thought content normal.   Vitals reviewed.    Personal Diagnostic Review  Chest xray 11/19/2018  COMPARISON:  11/06/2018  FINDINGS:  Stable scarring versus atelectasis noted within the right lung " base.  The   right hemidiaphragm is chronically elevated.  Spinal electrodes are seen   entering the lower to mid thoracic spine.  The heart is not enlarged.   Surgical clips noted in the right upper quadrant.     Assessment:     1. Not well controlled moderate persistent asthma    2. Cough, persistent    3. Routine eye exam      Orders Placed This Encounter   Procedures    Culture, Respiratory with Gram Stain    Ambulatory Referral to Ophthalmology     Referral Priority:   Routine     Referral Type:   Consultation     Referral Reason:   Specialty Services Required     Requested Specialty:   Ophthalmology     Number of Visits Requested:   1     Plan:   Discussed diagnosis, its evaluation, treatment and usual course. All questions answered.  Problem List Items Addressed This Visit     None      Visit Diagnoses     Not well controlled moderate persistent asthma    -  Primary    Relevant Medications    albuterol-ipratropium 2.5 mg-0.5 mg/3 mL nebulizer solution 3 mL (Completed)    albuterol nebulizer solution 2.5 mg (Completed)    predniSONE (DELTASONE) 20 MG tablet    fluticasone-vilanterol (BREO ELLIPTA) 200-25 mcg/dose DsDv diskus inhaler    triamcinolone acetonide injection 40 mg (Completed)    Cough, persistent        Relevant Medications    predniSONE (DELTASONE) 20 MG tablet    triamcinolone acetonide injection 40 mg (Completed)    Other Relevant Orders    Culture, Respiratory with Gram Stain    Routine eye exam        Relevant Orders    Ambulatory Referral to Ophthalmology        Plan summary:  Diffuse wheezing in clinic, some improvement post nebs, mild residual diffuse expiratory wheeze. Persistent cough in clinic improved post nebs. Cough productive, white and yellow (sputum for gram stain collected).   Begin controller Breo 200 mcg, patient wanted powder inhaler, free and $10 copay coupons provided.  Kenalog 40 mg IM in clinic, patient request IM steroid in clinic.  Prednisone 13 days taper.   No  indication antibiotic therapy at this time, patient has complete levaquin 750 mg and Omnicef since flare. Viral syndrome suspected with acute Asthma flare. Await sputum culture, gram stain.   With presentation of acute asthma flare, defer CT chest to determine treatment response over next 2 weeks.  Consider CT chest if not improving over next 2-3 weeks, sooner consideration if worsening.      Risk and benefits of steroid therapy were reviewed in detail and include, but not limited to, elevated blood sugars, joint avascular necrosis, necrosis of tissue or lipodystrophy or infection of the injection site, hallucinations, depression or worsening depression, insomnia, sweating, hyperactivity, tremors, suppression of one's own cortisone production, delayed wound healing and GI bleeding. Last eye exam 2 years ago, advised patient eye exam today before beginning Breo. Advised of risk of increased intraocular pressure and risk for cataracts with inhaler steroid use. The patient would like to proceed with steroid treatment (injection/medication) knowing and verbally accepting the risks.    Follow-up in about 2 weeks (around 12/10/2018) for persistent Cough/ Asthma w/pulmonologist after acute treatment.

## 2018-11-26 NOTE — LETTER
November 26, 2018      Bri Freedman MD  [id number] [address] [facility]  [address] [place] [id number]  Phone: [phone]  Fax: [phone]          Patient: Luann Mcgovern   MR Number: [id number]   YOB: 1965   Date of Visit: 11/26/2018       Dear Dr. Bri Freedman:    Thank you for referring Luann Mcgovern to me for evaluation. Attached you will find relevant portions of my assessment and plan of care.    If you have questions, please do not hesitate to call me. I look forward to following Luann Mcgovern along with you.    Sincerely,    Rocio Mensah, NP    Enclosure  CC:  No Recipients    If you would like to receive this communication electronically, please contact [email or web address] or [phone] to request more information on [employer] access.    For providers and/or their staff who would like to refer a patient to [facility], please contact us through our one-stop-shop provider referral line, [facility], at [phone].    If you feel you have received this communication in error or would no longer like to receive these types of communications, please e-mail [email or web address]

## 2018-11-27 ENCOUNTER — TELEPHONE (OUTPATIENT)
Dept: PULMONOLOGY | Facility: CLINIC | Age: 53
End: 2018-11-27

## 2018-11-27 ENCOUNTER — TELEPHONE (OUTPATIENT)
Dept: INTERNAL MEDICINE | Facility: CLINIC | Age: 53
End: 2018-11-27

## 2018-11-27 NOTE — TELEPHONE ENCOUNTER
----- Message from Chidi Avilez sent at 11/27/2018  6:37 AM CST -----  Contact: PT  She's calling in regards to ordering a nebulizer, [facility] advise, [phone] (home)

## 2018-11-27 NOTE — TELEPHONE ENCOUNTER
----- Message from Chidi Avilez sent at 11/27/2018  6:37 AM CST -----  Contact: PT  She's calling in regards to ordering a nebulizer, [facility] advise, [phone] (home)

## 2018-11-28 ENCOUNTER — HOSPITAL ENCOUNTER (OUTPATIENT)
Dept: RADIOLOGY | Facility: HOSPITAL | Age: 53
Discharge: HOME | End: 2018-11-28
Attending: INTERNAL MEDICINE
Payer: COMMERCIAL

## 2018-11-28 DIAGNOSIS — R06.02 SHORTNESS OF BREATH: ICD-10-CM

## 2018-11-28 DIAGNOSIS — R05.3 COUGH, PERSISTENT: ICD-10-CM

## 2018-11-28 LAB
BACTERIA SPEC AEROBE CULT: NORMAL
GRAM STN SPEC: NORMAL

## 2018-11-28 PROCEDURE — 71250 CT THORAX DX C-: CPT | Mod: TC

## 2018-11-30 ENCOUNTER — TELEPHONE (OUTPATIENT)
Dept: INTERNAL MEDICINE | Facility: CLINIC | Age: 53
End: 2018-11-30

## 2018-11-30 NOTE — TELEPHONE ENCOUNTER
Spoke with Abida with [facility]  Advised her that Dr. Freedman is aware of  Other medication  They are from surgery. And its ok to refill lunesta .

## 2018-11-30 NOTE — TELEPHONE ENCOUNTER
----- Message from Paresh Ruggiero sent at 11/30/2018  9:51 AM CST -----  Contact: [facility] [phone]  Would like to consult with nurse regarding prescription for Lunesta.  Please call back at [phone].  Md Cira

## 2018-11-30 NOTE — TELEPHONE ENCOUNTER
----- Message from Loly Dia sent at 11/30/2018 10:44 AM CST -----  Contact: pt  She's calling stating that her Rx for Lunesta needs an approval for refill through [facility] in [place], please advise [phone] (home)

## 2018-12-10 ENCOUNTER — OFFICE VISIT (OUTPATIENT)
Dept: PULMONOLOGY | Facility: CLINIC | Age: 53
End: 2018-12-10
Payer: COMMERCIAL

## 2018-12-10 ENCOUNTER — TELEPHONE (OUTPATIENT)
Dept: PULMONOLOGY | Facility: CLINIC | Age: 53
End: 2018-12-10

## 2018-12-10 VITALS
BODY MASS INDEX: 39.74 KG/M2 | WEIGHT: 247.25 LBS | RESPIRATION RATE: 17 BRPM | OXYGEN SATURATION: 98 % | DIASTOLIC BLOOD PRESSURE: 70 MMHG | SYSTOLIC BLOOD PRESSURE: 125 MMHG | HEART RATE: 90 BPM | HEIGHT: 66 IN

## 2018-12-10 DIAGNOSIS — R06.83 SNORING: ICD-10-CM

## 2018-12-10 DIAGNOSIS — G47.19 EXCESSIVE DAYTIME SLEEPINESS: ICD-10-CM

## 2018-12-10 DIAGNOSIS — J45.998 ASTHMA, PERSISTENT CONTROLLED: Primary | ICD-10-CM

## 2018-12-10 DIAGNOSIS — J98.6 ELEVATED DIAPHRAGM: ICD-10-CM

## 2018-12-10 DIAGNOSIS — R06.02 SOBOE (SHORTNESS OF BREATH ON EXERTION): ICD-10-CM

## 2018-12-10 DIAGNOSIS — R29.818 SUSPECTED SLEEP APNEA: ICD-10-CM

## 2018-12-10 PROBLEM — J98.11 ATELECTASIS: Status: ACTIVE | Noted: 2018-12-10

## 2018-12-10 PROCEDURE — 99214 OFFICE O/P EST MOD 30 MIN: CPT | Mod: S$GLB,,, | Performed by: INTERNAL MEDICINE

## 2018-12-10 PROCEDURE — 3008F BODY MASS INDEX DOCD: CPT | Mod: CPTII,S$GLB,, | Performed by: INTERNAL MEDICINE

## 2018-12-10 PROCEDURE — 99999 PR PBB SHADOW E&M-EST. PATIENT-LVL III: CPT | Mod: PBBFAC,,, | Performed by: INTERNAL MEDICINE

## 2018-12-10 NOTE — PROGRESS NOTES
Pulmonary Outpatient Follow Up Visit     Subjective:    This patient is new to me   Patient ID: Luann Mcgovern is a 53 y.o. female.    Chief Complaint: acute cough; Asthma; and Pneumonia      HPI  53-year-old female patient presenting for follow-up.    She was evaluated 2 weeks ago by nurse practitioner in our clinic regarding coughing and wheezing and shortness of Breath.    She is known with longstanding history of asthma at some point she was on Advair the largest does, moved 2008 to [place].  Has been only on short-acting bronchodilator since then.    Her asthma appears stable up until she had October 29 a spinal cord stimulator implant placement for chronic back pain, in the pulmonary clinic last visit she did have a severe coughing and wheezing and she received steroid injection.  She was also started on Breo 200 mcg once a day and a prednisone taper.    She reports a significant improvement.  However she still has shortness of breath on exertion, complains of snoring, daytime fatigue,  has witnessed apnea 1 time, [place] Sleepiness Scale score today of 7.      Review of Systems   Constitutional: Positive for fatigue. Negative for fever and chills.   HENT: Negative for nosebleeds and hearing loss.    Eyes: Negative for redness.   Respiratory: Positive for apnea, snoring, cough, shortness of breath, wheezing and use of rescue inhaler.    Cardiovascular: Negative for chest pain.   Genitourinary: Negative for hematuria.   Endocrine: Negative for polyphagia.    Musculoskeletal: Positive for arthralgias, back pain and gait problem.   Skin: Negative for rash.   Gastrointestinal: Negative for vomiting.   Neurological: Positive for headaches. Negative for syncope.   Hematological: Negative for adenopathy.   Psychiatric/Behavioral: Positive for sleep disturbance. The patient is not nervous/anxious.          Outpatient Encounter Medications as of 12/10/2018   Medication  Sig Dispense Refill    albuterol (PROVENTIL) 2.5 mg /3 mL (0.083 %) nebulizer solution Take 3 mLs (2.5 mg total) by nebulization every 6 (six) hours as needed for Wheezing or Shortness of Breath. please dispense in vials. 25 each 11    ALBUTEROL INHL Inhale into the lungs.      aspirin-acetaminophen-caffeine 250-250-65 mg (EXCEDRIN MIGRAINE) 250-250-65 mg per tablet Take 1 tablet by mouth.      azelastine (ASTELIN) 137 mcg (0.1 %) nasal spray 1 spray (137 mcg total) by Nasal route 2 (two) times daily. 30 mL 5    benzonatate (TESSALON) 200 MG capsule Take 200 mg by mouth 3 (three) times daily as needed for Cough.      cetirizine (ZYRTEC) 10 MG tablet Take 10 mg by mouth as needed for Allergies.      diazePAM (VALIUM) 5 MG tablet Take 5 mg by mouth every 12 (twelve) hours as needed for Anxiety.      diphenhydrAMINE (BENADRYL) 25 mg capsule Take 25 mg by mouth nightly as needed for Itching.      eszopiclone (LUNESTA) 3 mg Tab       fluticasone-vilanterol (BREO ELLIPTA) 200-25 mcg/dose DsDv diskus inhaler Inhale 1 puff into the lungs once daily. Controller 1 each 11    gabapentin (NEURONTIN) 600 MG tablet Take 300 mg by mouth.       hydrocodone-acetaminophen 10-325mg (NORCO)  mg Tab Take by mouth every 12 (twelve) hours as needed for Pain.      ipratropium (ATROVENT) 0.02 % nebulizer solution Take 2.5 mLs (500 mcg total) by nebulization 4 (four) times daily. Rescue 1 Box 2    methocarbamol (ROBAXIN) 750 MG Tab Take 500 mg by mouth 3 (three) times daily.      nystatin (MYCOSTATIN) cream APPLY  CREAM TOPICALLY TWICE DAILY 30 g 2    NYSTOP powder APPLY  POWDER TOPICALLY TWICE DAILY 30 g 2    pantoprazole (PROTONIX) 40 MG tablet TAKE ONE (1) TABLET(S) BY MOUTH TWICE A DAY.  3    PROAIR RESPICLICK 90 mcg/actuation AePB       promethazine (PHENERGAN) 25 MG tablet Take 25 mg by mouth every 6 (six) hours as needed for Nausea.      tiZANidine 4 mg Cap Take 4 mg by mouth every 8 (eight) hours as needed.   "    triamcinolone acetonide 0.1% (KENALOG) 0.1 % ointment Apply topically 2 (two) times daily. 30 g 3    predniSONE (DELTASONE) 20 MG tablet 3 daily x 3 days, 2 daily x 3 days, 1 daily x 3 days, 1/2 daily x 4 days. 20 tablet 0     No facility-administered encounter medications on file as of 12/10/2018.        Objective:     Vital Signs (Most Recent)  Vital Signs  Pulse: 90  Resp: 17  SpO2: 98 %  BP: 125/70  Height and Weight  Height: 5' 6" (167.6 cm)  Weight: 112.1 kg (247 lb 3.9 oz)  BSA (Calculated - sq m): 2.29 sq meters  BMI (Calculated): 40  Weight in (lb) to have BMI = 25: 154.6]  Wt Readings from Last 3 Encounters:   12/10/18 112.1 kg (247 lb 3.9 oz)   11/26/18 111.4 kg (245 lb 11.2 oz)   11/20/18 112.4 kg (247 lb 12.8 oz)     Temp Readings from Last 3 Encounters:   12/10/18 98.5 °F (36.9 °C)   11/28/18 98.2 °F (36.8 °C)   11/20/18 (!) 100.7 °F (38.2 °C)     Height: 5' 6" (167.6 cm)          Physical Exam   Constitutional: She is oriented to person, place, and time. She appears well-developed and well-nourished.   HENT:   Head: Normocephalic.   Mouth/Throat: Mallampati Score: IV.   Neck: Neck supple.   Neck circumference 18 in   Cardiovascular: Normal rate.   Pulmonary/Chest: Normal expansion. She has decreased breath sounds.   Breath sounds decreased on the right   Abdominal: Soft.   Musculoskeletal: She exhibits no tenderness.   Neurological: She is alert and oriented to person, place, and time.   Psychiatric: She has a normal mood and affect.   Nursing note and vitals reviewed.      Laboratory    Lab Results   Component Value Date    WBC 9.09 10/15/2018    HGB 15.5 10/15/2018    HCT 50.0 (H) 10/15/2018    MCV 96 10/15/2018     10/15/2018     BMP  Lab Results   Component Value Date     10/15/2018    K 4.4 10/15/2018     10/15/2018    CO2 29 10/15/2018    BUN 16 10/15/2018    CREATININE 0.9 10/15/2018    CALCIUM 9.4 10/15/2018    ANIONGAP 7 (L) 10/15/2018    ESTGFRAFRICA >60.0 10/15/2018 "    EGFRNONAA >60.0 10/15/2018     BNP  @LABRCNTIP(BNP,BNPTRIAGEBLO)@  Lab Results   Component Value Date    TSH 0.852 07/10/2018     ABG  @LABRCNTIP(PH,PO2,PCO2,HCO3,BE)@    Diagnostic Results:    Chest x-ray personally reviewed 11/20/2018 showed elevated of the right hemidiaphragm    CT chest without contrast 11/28/2018 personally reviewed shows a right lung scar.  Assessment/Plan:   Asthma, persistent controlled  -     Complete PFT with bronchodilator; Future    Elevated diaphragm  -     FL Fluoro of Diaphragm; Future; Expected date: 12/10/2018    SOBOE (shortness of breath on exertion)  -     Complete PFT with bronchodilator; Future  -     Stress test, pulmonary; Future    Suspected sleep apnea  -     Home Sleep Studies; Future    Snoring  -     Home Sleep Studies; Future    Excessive daytime sleepiness  -     Home Sleep Studies; Future    Continue albuterol p.r.n.    Continue Breo 200 mcg daily.    Patient has elevated right hemidiaphragm and shortness of the on exertion, will assess for restrictive defect with a PFT, for right hemidiaphragm paralysis with fluoroscopy, will assess her exercise capacity with 6 min walking test.    Follow-up in about 2 months (around 2/10/2019).    This note was prepared using voice recognition system and is likely to have sound alike errors that may have been overlooked even after proof reading.  Please call me with any questions    Discussed diagnosis, its evaluation, treatment and usual course. All questions answered.    Thank you for the courtesy of participating in the care of this patient    Blane Birch MD

## 2018-12-18 ENCOUNTER — TELEPHONE (OUTPATIENT)
Dept: PULMONOLOGY | Facility: CLINIC | Age: 53
End: 2018-12-18

## 2018-12-18 NOTE — TELEPHONE ENCOUNTER
Called patient and LVM----- Message from Sweetie Dawkins sent at 12/18/2018 11:59 AM CST -----  Contact: pt  Please call pt @ [phone] , pt has some questions regarding a procedure..

## 2018-12-27 ENCOUNTER — CLINICAL SUPPORT (OUTPATIENT)
Dept: PULMONOLOGY | Facility: CLINIC | Age: 53
End: 2018-12-27
Payer: COMMERCIAL

## 2018-12-27 ENCOUNTER — HOSPITAL ENCOUNTER (OUTPATIENT)
Dept: RADIOLOGY | Facility: HOSPITAL | Age: 53
Discharge: HOME | End: 2018-12-27
Attending: INTERNAL MEDICINE
Payer: COMMERCIAL

## 2018-12-27 VITALS — HEIGHT: 67 IN | WEIGHT: 247 LBS | BODY MASS INDEX: 38.77 KG/M2

## 2018-12-27 DIAGNOSIS — J98.6 ELEVATED DIAPHRAGM: ICD-10-CM

## 2018-12-27 DIAGNOSIS — J45.998 ASTHMA, PERSISTENT CONTROLLED: ICD-10-CM

## 2018-12-27 DIAGNOSIS — R06.02 SOBOE (SHORTNESS OF BREATH ON EXERTION): ICD-10-CM

## 2018-12-27 PROCEDURE — 94060 EVALUATION OF WHEEZING: CPT | Mod: 59,S$GLB,, | Performed by: INTERNAL MEDICINE

## 2018-12-27 PROCEDURE — 94726 PLETHYSMOGRAPHY LUNG VOLUMES: CPT | Mod: S$GLB,,, | Performed by: INTERNAL MEDICINE

## 2018-12-27 PROCEDURE — 94729 DIFFUSING CAPACITY: CPT | Mod: S$GLB,,, | Performed by: INTERNAL MEDICINE

## 2018-12-27 PROCEDURE — 76000 FLUOROSCOPY <1 HR PHYS/QHP: CPT | Mod: TC

## 2018-12-27 PROCEDURE — 94618 PULMONARY STRESS TESTING: CPT | Mod: S$GLB,,, | Performed by: INTERNAL MEDICINE

## 2018-12-27 NOTE — PROCEDURES
"[facility]  Six Minute Walk     SUMMARY     Ordering Provider: DR Birch   Interpreting Provider: DR Birch  Performing nurse/tech/RT: Rama  Diagnosis: Shortness of Breath  Height: 5' 6.5" (168.9 cm)  Weight: 112 kg (247 lb)  BMI (Calculated): 39.4   Patient Race:             Phase Oxygen Assessment Supplemental O2 Heart   Rate Blood Pressure Jeremy Dyspnea Scale Rating   Resting 95 % Room Air 95 bpm (!) 136/94 0   Exercise        Minute        1 93 % Room Air 109 bpm     2 93 % Room Air 109 bpm     3 91 % Room Air 115 bpm     4 92 % Room Air 114 bpm     5 93 % Room Air 115 bpm     6  94 % Room Air 111 bpm (!) 139/91 4   Recovery        Minute        1 94 % Room Air 104 bpm     2 94 % Room Air 100 bpm     3 93 % Room Air 102 bpm     4 95 % Room Air 98 bpm (!) 142/94 1     Six Minute Walk Summary     Patient Reported: No complaints     Interpretation:  Did the patient stop or pause?: No                                         Total Time Walked (Calculated): 360 seconds  Final Partial Lap Distance (feet): 150 feet  Total Distance Meters (Calculated): 289.56 meters  Predicted Distance Meters (Calculated): 454.69 meters  Percentage of Predicted (Calculated): 63.68  Peak VO2 (Calculated): 12.67  Mets: 3.62  Has The Patient Had a Previous Six Minute Walk Test?: No       Previous 6MWT Results  Has The Patient Had a Previous Six Minute Walk Test?: No  "

## 2018-12-31 LAB
BRPFT: ABNORMAL
DLCO ADJ PRE: 19.97 ML/(MIN*MMHG) (ref 19.46–30.93)
DLCO SINGLE BREATH LLN: 19.46
DLCO SINGLE BREATH PRE REF: 83.9 %
DLCO SINGLE BREATH REF: 25.2
DLCOC SBVA LLN: 3.32
DLCOC SBVA PRE REF: 119.5 %
DLCOC SBVA REF: 4.7
DLCOC SINGLE BREATH LLN: 19.46
DLCOC SINGLE BREATH PRE REF: 79.3 %
DLCOC SINGLE BREATH REF: 25.2
DLCOVA LLN: 3.32
DLCOVA PRE REF: 126.6 %
DLCOVA PRE: 5.95 ML/(MIN*MMHG*L) (ref 3.32–6.07)
DLCOVA REF: 4.7
DLVAADJ PRE: 5.61 ML/(MIN*MMHG*L) (ref 3.32–6.07)
ERV LLN: 0.92
ERV PRE REF: 11.2 %
ERV REF: 0.92
ERVN2 LLN: 0.92
ERVN2 REF: 0.92
FEF 25 75 CHG: 7 %
FEF 25 75 LLN: 1.45
FEF 25 75 POST REF: 109.2 %
FEF 25 75 PRE REF: 102.1 %
FEF 25 75 REF: 2.68
FET100 CHG: 5.8 %
FEV1 CHG: 5.1 %
FEV1 FVC CHG: 0.6 %
FEV1 FVC LLN: 68
FEV1 FVC POST REF: 106.3 %
FEV1 FVC PRE REF: 105.7 %
FEV1 FVC REF: 80
FEV1 LLN: 2.23
FEV1 POST REF: 79 %
FEV1 PRE REF: 75.2 %
FEV1 REF: 2.89
FEV6 CHG: 3.9 %
FEV6 LLN: 2.93
FEV6 POST REF: 72.8 %
FEV6 POST: 2.66 L (ref 2.93–4.39)
FEV6 PRE REF: 70 %
FEV6 PRE: 2.56 L (ref 2.93–4.39)
FEV6 REF: 3.66
FRCN2 LLN: 2.02
FRCN2 REF: 2.84
FRCPLETH LLN: 2.02
FRCPLETH PREREF: 77.7 %
FRCPLETH REF: 2.84
FVC CHG: 4.5 %
FVC LLN: 2.82
FVC POST REF: 73.8 %
FVC PRE REF: 70.6 %
FVC REF: 3.65
IVC PRE: 2.02 L (ref 2.82–4.48)
IVC SINGLE BREATH LLN: 2.82
IVC SINGLE BREATH PRE REF: 55.3 %
IVC SINGLE BREATH REF: 3.65
MVV LLN: 93
MVV PRE REF: 18.3 %
MVV REF: 109
PEF CHG: -7 %
PEF LLN: 5.13
PEF POST REF: 65.5 %
PEF PRE REF: 70.4 %
PEF REF: 6.98
POST FEF 25 75: 2.93 L/S (ref 1.45–3.91)
POST FET 100: 7.94 SEC
POST FEV1 FVC: 84.74 % (ref 68.3–91.08)
POST FEV1: 2.28 L (ref 2.23–3.55)
POST FVC: 2.69 L (ref 2.82–4.48)
POST PEF: 4.57 L/S (ref 5.13–8.83)
PRE DLCO: 21.15 ML/(MIN*MMHG) (ref 19.46–30.93)
PRE ERV: 0.1 L (ref 0.92–0.92)
PRE FEF 25 75: 2.74 L/S (ref 1.45–3.91)
PRE FET 100: 7.51 SEC
PRE FEV1 FVC: 84.21 % (ref 68.3–91.08)
PRE FEV1: 2.17 L (ref 2.23–3.55)
PRE FRC PL: 2.21 L
PRE FVC: 2.58 L (ref 2.82–4.48)
PRE MVV: 20 L/MIN (ref 92.67–125.37)
PRE PEF: 4.91 L/S (ref 5.13–8.83)
PRE RV: 2.18 L (ref 1.35–2.5)
PRE TLC: 5.17 L (ref 4.38–6.35)
RAW LLN: 3.06
RAW PRE REF: 237.5 %
RAW PRE: 7.27 CMH2O*S/L (ref 3.06–3.06)
RAW REF: 3.06
RV LLN: 1.35
RV PRE REF: 113.2 %
RV REF: 1.92
RVN2 LLN: 1.35
RVN2 REF: 1.92
RVN2TLCN2 LLN: 28
RVN2TLCN2 REF: 37
RVTLC LLN: 28
RVTLC PRE REF: 112.9 %
RVTLC PRE: 42.13 % (ref 27.73–46.91)
RVTLC REF: 37
TLC LLN: 4.38
TLC PRE REF: 96.3 %
TLC REF: 5.36
TLCN2 LLN: 4.38
TLCN2 REF: 5.36
VA PRE: 3.55 L (ref 5.21–5.21)
VA SINGLE BREATH LLN: 5.21
VA SINGLE BREATH PRE REF: 68.1 %
VA SINGLE BREATH REF: 5.21
VC LLN: 2.82
VC PRE REF: 81.9 %
VC PRE: 2.99 L (ref 2.82–4.48)
VC REF: 3.65
VCMAXN2 LLN: 2.82
VCMAXN2 REF: 3.65
VTGRAWPRE: 2.33 L

## 2019-01-02 ENCOUNTER — TELEPHONE (OUTPATIENT)
Dept: PULMONOLOGY | Facility: CLINIC | Age: 54
End: 2019-01-02

## 2019-01-02 NOTE — TELEPHONE ENCOUNTER
Called patient, verified   Patient had no further questions and confirmed understanding verbally about results  ----- Message from Evelyn Lambert sent at 2019  1:50 PM CST -----  Contact: self [phone]  States that she is returning Dang's call. Please call back at [phone]//thank you acc

## 2019-01-02 NOTE — TELEPHONE ENCOUNTER
Called and LVM for her to return my phone call,   Will inform patient that cxr and breathing test were ok per Dr. ----- Message from Blane Birch MD sent at 12/31/2018 12:14 PM CST -----  Please inform patient her breathing test and her chest x-ray where okay.

## 2019-02-15 ENCOUNTER — OFFICE VISIT (OUTPATIENT)
Dept: PULMONOLOGY | Facility: CLINIC | Age: 54
End: 2019-02-15
Payer: COMMERCIAL

## 2019-02-15 VITALS
HEIGHT: 67 IN | SYSTOLIC BLOOD PRESSURE: 140 MMHG | DIASTOLIC BLOOD PRESSURE: 82 MMHG | RESPIRATION RATE: 18 BRPM | WEIGHT: 248.44 LBS | BODY MASS INDEX: 38.99 KG/M2 | HEART RATE: 96 BPM | OXYGEN SATURATION: 98 %

## 2019-02-15 DIAGNOSIS — J45.909 MILD ASTHMA WITHOUT COMPLICATION, UNSPECIFIED WHETHER PERSISTENT: Primary | ICD-10-CM

## 2019-02-15 DIAGNOSIS — R29.818 SUSPECTED SLEEP APNEA: ICD-10-CM

## 2019-02-15 DIAGNOSIS — R06.83 SNORING: ICD-10-CM

## 2019-02-15 PROCEDURE — 3008F PR BODY MASS INDEX (BMI) DOCUMENTED: ICD-10-PCS | Mod: CPTII,S$GLB,, | Performed by: INTERNAL MEDICINE

## 2019-02-15 PROCEDURE — 3008F BODY MASS INDEX DOCD: CPT | Mod: CPTII,S$GLB,, | Performed by: INTERNAL MEDICINE

## 2019-02-15 PROCEDURE — 99999 PR PBB SHADOW E&M-EST. PATIENT-LVL III: ICD-10-PCS | Mod: PBBFAC,,, | Performed by: INTERNAL MEDICINE

## 2019-02-15 PROCEDURE — 99999 PR PBB SHADOW E&M-EST. PATIENT-LVL III: CPT | Mod: PBBFAC,,, | Performed by: INTERNAL MEDICINE

## 2019-02-15 PROCEDURE — 99214 OFFICE O/P EST MOD 30 MIN: CPT | Mod: S$GLB,,, | Performed by: INTERNAL MEDICINE

## 2019-02-15 PROCEDURE — 99214 PR OFFICE/OUTPT VISIT, EST, LEVL IV, 30-39 MIN: ICD-10-PCS | Mod: S$GLB,,, | Performed by: INTERNAL MEDICINE

## 2019-02-15 NOTE — PATIENT INSTRUCTIONS
Your provider has scheduled you for a sleep study.   You should be receiving a phone call from the sleep lab shortly after your sleep study had been approved by your insurance. Please make sure you have your current phone number in the [facility] system. If you do not hear from anyone in the next 10 business days, please call the sleep lab at [phone] to schedule your sleep study.   The sleep studies are performed at [facility] seven nights a week. When you are scheduling your sleep study, they will also make you a follow up appointment with your provider. This follow up appointment will be 10-14 days after your sleep study to review the results. If it is noted that you do not have sleep apnea on your initial sleep study, you may receive a call back for a second night sleep study with the CPAP before you come back to the office.

## 2019-02-15 NOTE — PROGRESS NOTES
Pulmonary Outpatient Follow Up Visit     Subjective:       Patient ID: Luann Mcgovern is a 53 y.o. female.    Chief Complaint: Shortness of Breath      HPI  53-year-old female patient presenting for follow-up.    She was initially evaluated by our nurse practitioner November 2018 after she had what seemed to be asthma attack October 2018 after having a stimulator placed for back pain.    I have seen her in December 10, 2018, she underwent PFT, complained of shortness of breath on exertion, underwent stress test and diaphragm fluoroscopy for elevated diaphragm.    Patient is controlled currently on Breo 200 mcg.  Not needing albuterol.  Her asthma was significant during her pregnancy she had status asthmaticus and at some point was also on theophylline.  She was also on Advair large does prior to 2008 when she moved to [place].    She did home sleep study however it was not valid.    Has snoring, fatigue and [place] Sleepiness Scale score today was 7.  STOP - BANG Questionnaire:     1. Snoring : Do you snore loudly ?    Yes    2. Tired : Do you often feel tired, fatigued, or sleepy during daytime? Yes    3. Observed: Has anyone observed you stop breathing during your sleep?   No     4. Blood pressure : Do you have or are you being treated for high blood pressure?   No    5. BMI :BMI more than 35 kg/m2?   Yes    6. Age : Age over 50 yr old?   Yes    7. Neck circumference:   For male, is your shirt collar 17 inches / 43cm or larger?  For female, is your shirt collar 16 inches / 41cm or larger?    Yes    8. Gender: Gender male?   No    STOP BANG SCORE 5    High risk of COREY: Yes 5 - 8  Intermediate risk of COREY: Yes 3 - 4  Low risk of COREY: Yes 0 - 2      References:   STOP Questionnaire   A Tool to Screen Patients for Obstructive Sleep Apnea: VIGNESH Frederick., Paras Olmos M.B.B.S., Diogenes Bone M.D.,Luann Barros, Ph.D., HANY Braswell.B.B.S., [facility]  Syed Perez.,_ Heather Castle M.D., AQUILINO WallaceC.P.C.; Anesthesiology 2008; 108:812-21 Copyright © 2008, the American Society of Anesthesiologists, Inc. [employer]      Known with long history of asthma, her asthma on dormant for few years before the exacerbation perioperatively in 2018.      Started on Breo 200 mcg prednisone taper and received intramuscular steroid on her visit to our practice in November 2018.  Review of Systems   Constitutional: Positive for fatigue. Negative for fever and chills.   HENT: Negative for nosebleeds and hearing loss.    Eyes: Negative for redness.   Respiratory: Positive for apnea and snoring. Negative for cough, shortness of breath, wheezing and use of rescue inhaler.    Cardiovascular: Negative for chest pain.   Genitourinary: Negative for hematuria.   Endocrine: Negative for polyphagia.    Musculoskeletal: Positive for arthralgias, back pain and gait problem.   Skin: Negative for rash.   Gastrointestinal: Negative for vomiting.   Neurological: Positive for headaches. Negative for syncope.   Hematological: Negative for adenopathy.   Psychiatric/Behavioral: Positive for sleep disturbance. The patient is not nervous/anxious.        Outpatient Encounter Medications as of 2/15/2019   Medication Sig Dispense Refill    albuterol (PROVENTIL) 2.5 mg /3 mL (0.083 %) nebulizer solution Take 3 mLs (2.5 mg total) by nebulization every 6 (six) hours as needed for Wheezing or Shortness of Breath. please dispense in vials. 25 each 11    aspirin-acetaminophen-caffeine 250-250-65 mg (EXCEDRIN MIGRAINE) 250-250-65 mg per tablet Take 1 tablet by mouth.      azelastine (ASTELIN) 137 mcg (0.1 %) nasal spray 1 spray (137 mcg total) by Nasal route 2 (two) times daily. 30 mL 5    benzonatate (TESSALON) 200 MG capsule Take 200 mg by mouth 3 (three) times daily as needed for Cough.      cetirizine (ZYRTEC) 10 MG tablet Take 10 mg by mouth as needed for Allergies.    "   diazePAM (VALIUM) 5 MG tablet Take 5 mg by mouth every 12 (twelve) hours as needed for Anxiety.      diphenhydrAMINE (BENADRYL) 25 mg capsule Take 25 mg by mouth nightly as needed for Itching.      eszopiclone (LUNESTA) 3 mg Tab       gabapentin (NEURONTIN) 600 MG tablet Take 300 mg by mouth.       hydrocodone-acetaminophen 10-325mg (NORCO)  mg Tab Take by mouth every 12 (twelve) hours as needed for Pain.      ipratropium (ATROVENT) 0.02 % nebulizer solution Take 2.5 mLs (500 mcg total) by nebulization 4 (four) times daily. Rescue 1 Box 2    methocarbamol (ROBAXIN) 750 MG Tab Take 500 mg by mouth 3 (three) times daily.      nystatin (MYCOSTATIN) cream APPLY  CREAM TOPICALLY TWICE DAILY 30 g 2    NYSTOP powder APPLY  POWDER TOPICALLY TWICE DAILY 30 g 2    pantoprazole (PROTONIX) 40 MG tablet TAKE ONE (1) TABLET(S) BY MOUTH TWICE A DAY.  3    PROAIR RESPICLICK 90 mcg/actuation AePB       promethazine (PHENERGAN) 25 MG tablet Take 25 mg by mouth every 6 (six) hours as needed for Nausea.      tiZANidine 4 mg Cap Take 4 mg by mouth every 8 (eight) hours as needed.      [DISCONTINUED] fluticasone-vilanterol (BREO ELLIPTA) 200-25 mcg/dose DsDv diskus inhaler Inhale 1 puff into the lungs once daily. Controller 1 each 11    ALBUTEROL INHL Inhale into the lungs.      fluticasone furoate (ARNUITY ELLIPTA) 100 mcg/actuation DsDv Inhale 100 mcg into the lungs once daily. Controller 30 each 3    predniSONE (DELTASONE) 20 MG tablet 3 daily x 3 days, 2 daily x 3 days, 1 daily x 3 days, 1/2 daily x 4 days. 20 tablet 0    triamcinolone acetonide 0.1% (KENALOG) 0.1 % ointment Apply topically 2 (two) times daily. 30 g 3     No facility-administered encounter medications on file as of 2/15/2019.        Objective:     Vital Signs (Most Recent)  Vital Signs  Pulse: 96  Resp: 18  SpO2: 98 %  BP: (!) 140/82  Height and Weight  Height: 5' 6.5" (168.9 cm)  Weight: 112.7 kg (248 lb 7.3 oz)  BSA (Calculated - sq m): " 2.3 sq meters  BMI (Calculated): 39.6  Weight in (lb) to have BMI = 25: 156.9]  Wt Readings from Last 2 Encounters:   02/15/19 112.7 kg (248 lb 7.3 oz)   12/27/18 112 kg (247 lb)       Physical Exam   Constitutional: She is oriented to person, place, and time. She appears well-developed and well-nourished.   HENT:   Head: Normocephalic.   Mouth/Throat: Mallampati Score: IV.   Neck: Neck supple.   Neck circumference 18 in   Cardiovascular: Normal rate.   Pulmonary/Chest: Normal expansion. She has decreased breath sounds.   Breath sounds decreased on the right   Abdominal: Soft.   Musculoskeletal: She exhibits no tenderness.   Neurological: She is alert and oriented to person, place, and time.   Psychiatric: She has a normal mood and affect.   Nursing note and vitals reviewed.      Laboratory  Lab Results   Component Value Date    WBC 9.09 10/15/2018    RBC 5.20 10/15/2018    HGB 15.5 10/15/2018    HCT 50.0 (H) 10/15/2018    MCV 96 10/15/2018    MCH 29.8 10/15/2018    MCHC 31.0 (L) 10/15/2018    RDW 12.8 10/15/2018     10/15/2018    MPV 9.6 10/15/2018    GRAN 4.6 10/15/2018    GRAN 51.0 10/15/2018    LYMPH 2.6 10/15/2018    LYMPH 28.5 10/15/2018    MONO 0.8 10/15/2018    MONO 8.8 10/15/2018    EOS 0.9 (H) 10/15/2018    BASO 0.10 10/15/2018    EOSINOPHIL 10.3 (H) 10/15/2018    BASOPHIL 1.1 10/15/2018       BMP  Lab Results   Component Value Date     10/15/2018    K 4.4 10/15/2018     10/15/2018    CO2 29 10/15/2018    BUN 16 10/15/2018    CREATININE 0.9 10/15/2018    CALCIUM 9.4 10/15/2018    ANIONGAP 7 (L) 10/15/2018    ESTGFRAFRICA >60.0 10/15/2018    EGFRNONAA >60.0 10/15/2018    AST 45 (H) 07/10/2018    ALT 76 (H) 07/10/2018    PROT 7.0 07/10/2018       No results found for: BNP    Lab Results   Component Value Date    TSH 0.852 07/10/2018       Lab Results   Component Value Date    SEDRATE 27 10/15/2018       Lab Results   Component Value Date    CRP 8.9 (H) 10/15/2018         Diagnostic  Results:  I have personally reviewed today the following studies:    Chest x-ray Lungs are clear.  Elevated right hemidiaphragm  CT chest Clear lungs elevated right hemidiaphragm  PFT Nonspecific ventilatory limitation.  No restriction, DLCO within normal.  6 min walking test Total distance walked in six minutes is mildly reduced indicating an mild reduction in  functional capacity.  There was moderate oxygen desaturation with exercise . Lowest O2 sat 91%.  Diaphragm fluoroscopy shows negative sniff test.  Assessment/Plan:   Mild asthma without complication, unspecified whether persistent  -     fluticasone furoate (ARNUITY ELLIPTA) 100 mcg/actuation DsDv; Inhale 100 mcg into the lungs once daily. Controller  Dispense: 30 each; Refill: 3    Suspected sleep apnea  -     Home Sleep Studies; Future    Snoring  -     Home Sleep Studies; Future      Continue albuterol p.r.n.    Deescalate controller inhaler to Arnuity 100 mcg 1 puff daily.      Patient willing to undergo repeat home sleep study.    Up-to-date on influenza vaccination and pneumococcal vaccines.?  History of IgA deficiency.    Follow-up in about 2 months (around 4/15/2019).    This note was prepared using voice recognition system and is likely to have sound alike errors that may have been overlooked even after proof reading.  Please call me with any questions    Discussed diagnosis, its evaluation, treatment and usual course. All questions answered.      Blane Birch MD

## 2019-02-18 ENCOUNTER — TELEPHONE (OUTPATIENT)
Dept: PULMONOLOGY | Facility: CLINIC | Age: 54
End: 2019-02-18

## 2019-02-18 NOTE — TELEPHONE ENCOUNTER
----- Message from Sandeep Montero sent at 2/18/2019  1:12 PM CST -----  [facility]

## 2019-02-28 DIAGNOSIS — J45.909 MILD ASTHMA WITHOUT COMPLICATION, UNSPECIFIED WHETHER PERSISTENT: ICD-10-CM

## 2019-02-28 NOTE — TELEPHONE ENCOUNTER
----- Message from Seymouryosvanyradha Mariama sent at 2/28/2019  8:48 AM CST -----  Contact: Pt  ..1. What is the name of the medication you are requesting? ARNUITY -ELLIPA  2. What is the dose? 100 Mcg  3. How do you take the medication? Orally, topically, etc? Orally  4. How often do you take this medication? Daily  5. Do you need a 30 day or 90 day supply? 90 day  6. How many refills are you requesting? 3  7. What is your preferred pharmacy and location of the pharmacy.. ..  [facility] [address] [place] [address] - [address] [facility]  [address] [facility] [address]  Phone: [phone] Fax: [phone]          8. Who can we contact with further questions? ..[phone] ([place])

## 2019-02-28 NOTE — TELEPHONE ENCOUNTER
Sent request to Dr. Birch ----- Message from Yoli Leslie sent at 2/28/2019  8:48 AM CST -----  Contact: Pt  ..1. What is the name of the medication you are requesting? ARNUITY -ELLIPA  2. What is the dose? 100 Mcg  3. How do you take the medication? Orally, topically, etc? Orally  4. How often do you take this medication? Daily  5. Do you need a 30 day or 90 day supply? 90 day  6. How many refills are you requesting? 3  7. What is your preferred pharmacy and location of the pharmacy.. ..  [facility] [address] [place] [address] - [address] [place] [address]  [address] [facility] [address]  Phone: [phone] Fax: [phone]          8. Who can we contact with further questions? ..[phone] (home)

## 2019-03-21 RX ORDER — ESZOPICLONE 3 MG/1
3 TABLET, FILM COATED ORAL NIGHTLY
Qty: 30 TABLET | Refills: 5 | Status: SHIPPED | OUTPATIENT
Start: 2019-03-21 | End: 2019-03-22 | Stop reason: SDUPTHER

## 2019-03-22 ENCOUNTER — TELEPHONE (OUTPATIENT)
Dept: INTERNAL MEDICINE | Facility: CLINIC | Age: 54
End: 2019-03-22

## 2019-03-22 RX ORDER — ESZOPICLONE 3 MG/1
3 TABLET, FILM COATED ORAL NIGHTLY
Qty: 30 TABLET | Refills: 5 | Status: SHIPPED | OUTPATIENT
Start: 2019-03-22 | End: 2019-12-20

## 2019-03-22 NOTE — TELEPHONE ENCOUNTER
Spoke with lolly with [facility] ,, wanted to call and see if Md was aware of of all medications that pt is on that's prescribe from other providers . Also needed a Dx code for lunesta  G47.00 given

## 2019-03-22 NOTE — TELEPHONE ENCOUNTER
----- Message from Anup Foster sent at 3/22/2019  8:33 AM CDT -----  ..Type:  Pharmacy Calling to Clarify an RX    Name of Caller:Jose   Pharmacy Name: [facility]   Prescription Name: Eszoticlo 0.3  What do they need to clarify?:  Best Call Back Number:[phone]  Additional Information: Jose  ( [facility] pharmacy ) is requesting a call from nurse to discuss questions and concerns regarding a medication.

## 2019-05-07 ENCOUNTER — TELEPHONE (OUTPATIENT)
Dept: INTERNAL MEDICINE | Facility: CLINIC | Age: 54
End: 2019-05-07

## 2019-05-07 ENCOUNTER — HOSPITAL ENCOUNTER (OUTPATIENT)
Dept: RADIOLOGY | Facility: HOSPITAL | Age: 54
Discharge: HOME | End: 2019-05-07
Attending: FAMILY MEDICINE
Payer: COMMERCIAL

## 2019-05-07 ENCOUNTER — OFFICE VISIT (OUTPATIENT)
Dept: INTERNAL MEDICINE | Facility: CLINIC | Age: 54
End: 2019-05-07
Payer: COMMERCIAL

## 2019-05-07 VITALS
DIASTOLIC BLOOD PRESSURE: 92 MMHG | WEIGHT: 240.06 LBS | TEMPERATURE: 98 F | SYSTOLIC BLOOD PRESSURE: 138 MMHG | OXYGEN SATURATION: 97 % | BODY MASS INDEX: 38.17 KG/M2 | HEART RATE: 96 BPM

## 2019-05-07 DIAGNOSIS — J45.20 MILD INTERMITTENT ASTHMA WITHOUT COMPLICATION: ICD-10-CM

## 2019-05-07 DIAGNOSIS — M79.642 LEFT HAND PAIN: ICD-10-CM

## 2019-05-07 DIAGNOSIS — J32.9 SINUSITIS, UNSPECIFIED CHRONICITY, UNSPECIFIED LOCATION: Primary | ICD-10-CM

## 2019-05-07 DIAGNOSIS — R05.3 COUGH, PERSISTENT: ICD-10-CM

## 2019-05-07 PROCEDURE — 3008F PR BODY MASS INDEX (BMI) DOCUMENTED: ICD-10-PCS | Mod: CPTII,S$GLB,, | Performed by: FAMILY MEDICINE

## 2019-05-07 PROCEDURE — 73130 X-RAY EXAM OF HAND: CPT | Mod: TC,PO,LT

## 2019-05-07 PROCEDURE — 99999 PR PBB SHADOW E&M-EST. PATIENT-LVL III: ICD-10-PCS | Mod: PBBFAC,,, | Performed by: FAMILY MEDICINE

## 2019-05-07 PROCEDURE — 99213 OFFICE O/P EST LOW 20 MIN: CPT | Mod: S$GLB,,, | Performed by: FAMILY MEDICINE

## 2019-05-07 PROCEDURE — 99999 PR PBB SHADOW E&M-EST. PATIENT-LVL III: CPT | Mod: PBBFAC,,, | Performed by: FAMILY MEDICINE

## 2019-05-07 PROCEDURE — 73130 X-RAY EXAM OF HAND: CPT | Mod: 26,LT,, | Performed by: RADIOLOGY

## 2019-05-07 PROCEDURE — 3008F BODY MASS INDEX DOCD: CPT | Mod: CPTII,S$GLB,, | Performed by: FAMILY MEDICINE

## 2019-05-07 PROCEDURE — 99213 PR OFFICE/OUTPT VISIT, EST, LEVL III, 20-29 MIN: ICD-10-PCS | Mod: S$GLB,,, | Performed by: FAMILY MEDICINE

## 2019-05-07 PROCEDURE — 73130 XR HAND COMPLETE 3 VIEW LEFT: ICD-10-PCS | Mod: 26,LT,, | Performed by: RADIOLOGY

## 2019-05-07 RX ORDER — TRIAMCINOLONE ACETONIDE 1 MG/G
OINTMENT TOPICAL 2 TIMES DAILY
Qty: 30 G | Refills: 3 | Status: SHIPPED | OUTPATIENT
Start: 2019-05-07 | End: 2021-04-15

## 2019-05-07 RX ORDER — LEVOFLOXACIN 750 MG/1
750 TABLET ORAL DAILY
Qty: 7 TABLET | Refills: 0 | Status: SHIPPED | OUTPATIENT
Start: 2019-05-07 | End: 2019-12-20 | Stop reason: ALTCHOICE

## 2019-05-07 RX ORDER — PREDNISONE 20 MG/1
TABLET ORAL
Qty: 20 TABLET | Refills: 0 | Status: SHIPPED | OUTPATIENT
Start: 2019-05-07 | End: 2019-12-20 | Stop reason: ALTCHOICE

## 2019-05-07 RX ORDER — DICLOFENAC SODIUM 10 MG/G
2 GEL TOPICAL 4 TIMES DAILY
Qty: 1 TUBE | Refills: 3 | Status: SHIPPED | OUTPATIENT
Start: 2019-05-07 | End: 2021-06-24

## 2019-05-07 NOTE — PROGRESS NOTES
Subjective:      Patient ID: Luann Mcgovern is a 53 y.o. female.    Chief Complaint: Sinus Problem (x2 weeks) and Hand Pain (left thumb)      Patient reports sinus pain and pressure for over 2 weeks now, has tried multiple over the counter medications without relief, is concerned the infection will go into her lungs again, so far not wheezing currently.   She also reports pain in left hand thenar eminence which comes and goes, when pain gets worse dry skin patch on thumb gets larger.     Review of Systems   Constitutional: Positive for fatigue. Negative for activity change, appetite change and fever.   HENT: Positive for congestion, postnasal drip, rhinorrhea, sinus pressure, sinus pain and sore throat.    Respiratory: Positive for cough. Negative for shortness of breath and wheezing.    Gastrointestinal: Negative for abdominal pain, nausea and vomiting.   Musculoskeletal: Positive for arthralgias. Negative for myalgias.   Skin: Positive for rash.   Neurological: Positive for headaches.     Past Medical History:   Diagnosis Date    Allergy     seasonal     Asthma     Pneumonia      Past Surgical History:   Procedure Laterality Date    KNEE SURGERY Left     age 13 - injury - no tears, just cleaned it up     LUMBAR NERVE STIMLATOR INSERTION  2018    TONSILLECTOMY      TUBAL LIGATION  01/19/1990    VAGINAL DELIVERY      2 births      Family History   Problem Relation Age of Onset    Thyroid disease Mother      Social History     Socioeconomic History    Marital status:      Spouse name: Not on file    Number of children: 2    Years of education: Not on file    Highest education level: Not on file   Occupational History    Occupation: RN - ED or surgery     Comment: [employer]     Occupation: CDL - for [employer]   Social Needs    Financial resource strain: Not on file    Food insecurity:     Worry: Not on file     Inability: Not on file    Transportation needs:     Medical: Not on  file     Non-medical: Not on file   Tobacco Use    Smoking status: Never Smoker    Smokeless tobacco: Never Used   Substance and Sexual Activity    Alcohol use: No    Drug use: No    Sexual activity: Yes     Partners: Male   Lifestyle    Physical activity:     Days per week: Not on file     Minutes per session: Not on file    Stress: Not on file   Relationships    Social connections:     Talks on phone: Not on file     Gets together: Not on file     Attends Buddhism service: Not on file     Active member of club or organization: Not on file     Attends meetings of clubs or organizations: Not on file     Relationship status: Not on file   Other Topics Concern    Not on file   Social History Narrative    Not on file     Review of patient's allergies indicates:   Allergen Reactions    Iodinated contrast- oral and iv dye      Pt states that she is not allergic omnipeg dye, but is only allergic to the older dyes.    Iodine and iodide containing products      Injectables ???     Loperamide-simethicone     Metoclopramide hcl      reglan     Mobic [meloxicam]      Chest and neck pain .  Decrease O2    Diltiazem hcl Rash and Swelling       Objective:       BP (!) 138/92   Pulse 96   Temp 98.4 °F (36.9 °C)   Wt 108.9 kg (240 lb 1.3 oz)   LMP 11/15/2002   SpO2 97%   BMI 38.17 kg/m²   Physical Exam   Constitutional: She appears well-developed and well-nourished. No distress.   HENT:   Head: Normocephalic.   Right Ear: Hearing, tympanic membrane, external ear and ear canal normal.   Left Ear: Hearing, tympanic membrane, external ear and ear canal normal.   Nose: Mucosal edema present. Right sinus exhibits maxillary sinus tenderness and frontal sinus tenderness. Left sinus exhibits maxillary sinus tenderness and frontal sinus tenderness.   Mouth/Throat: Uvula is midline and mucous membranes are normal. Posterior oropharyngeal erythema present.   Eyes: Pupils are equal, round, and reactive to light.  Conjunctivae and EOM are normal.   Cardiovascular: Normal rate, regular rhythm and normal heart sounds.   Pulmonary/Chest: Effort normal and breath sounds normal. No respiratory distress.   Abdominal: Soft. Bowel sounds are normal.   Musculoskeletal: Normal range of motion. She exhibits tenderness (left thenar eminence).   Lymphadenopathy:     She has no cervical adenopathy.   Skin: Skin is warm and dry. Capillary refill takes less than 2 seconds. Rash (eczematous rash on left thumb) noted. She is not diaphoretic.   Psychiatric: She has a normal mood and affect. Her behavior is normal.   Nursing note and vitals reviewed.    Assessment:     1. Sinusitis, unspecified chronicity, unspecified location    2. Mild intermittent asthma without complication    3. Cough, persistent    4. Left hand pain      Plan:   Sinusitis, unspecified chronicity, unspecified location    Mild intermittent asthma without complication    Cough, persistent  -     predniSONE (DELTASONE) 20 MG tablet; 3 daily x 3 days, 2 daily x 3 days, 1 daily x 3 days, 1/2 daily x 4 days.  Dispense: 20 tablet; Refill: 0    Left hand pain  -     X-Ray Hand 3 view Left; Future; Expected date: 05/07/2019    Other orders  -     levoFLOXacin (LEVAQUIN) 750 MG tablet; Take 1 tablet (750 mg total) by mouth once daily.  Dispense: 7 tablet; Refill: 0  -     triamcinolone acetonide 0.1% (KENALOG) 0.1 % ointment; Apply topically 2 (two) times daily.  Dispense: 30 g; Refill: 3  -     diclofenac sodium (VOLTAREN) 1 % Gel; Apply 2 g topically 4 (four) times daily.  Dispense: 1 Tube; Refill: 3      Medication List with Changes/Refills   New Medications    DICLOFENAC SODIUM (VOLTAREN) 1 % GEL    Apply 2 g topically 4 (four) times daily.    LEVOFLOXACIN (LEVAQUIN) 750 MG TABLET    Take 1 tablet (750 mg total) by mouth once daily.   Current Medications    ALBUTEROL (PROVENTIL) 2.5 MG /3 ML (0.083 %) NEBULIZER SOLUTION    Take 3 mLs (2.5 mg total) by nebulization every 6 (six)  hours as needed for Wheezing or Shortness of Breath. please dispense in vials.    ALBUTEROL INHL    Inhale into the lungs.    ASPIRIN-ACETAMINOPHEN-CAFFEINE 250-250-65 MG (EXCEDRIN MIGRAINE) 250-250-65 MG PER TABLET    Take 1 tablet by mouth.    AZELASTINE (ASTELIN) 137 MCG (0.1 %) NASAL SPRAY    1 spray (137 mcg total) by Nasal route 2 (two) times daily.    BENZONATATE (TESSALON) 200 MG CAPSULE    Take 200 mg by mouth 3 (three) times daily as needed for Cough.    CETIRIZINE (ZYRTEC) 10 MG TABLET    Take 10 mg by mouth as needed for Allergies.    DIAZEPAM (VALIUM) 5 MG TABLET    Take 5 mg by mouth every 12 (twelve) hours as needed for Anxiety.    DIPHENHYDRAMINE (BENADRYL) 25 MG CAPSULE    Take 25 mg by mouth nightly as needed for Itching.    ESZOPICLONE (LUNESTA) 3 MG TAB    Take 1 tablet (3 mg total) by mouth every evening.    FLUTICASONE FUROATE (ARNUITY ELLIPTA) 100 MCG/ACTUATION DSDV    Inhale 100 mcg into the lungs once daily. Controller    GABAPENTIN (NEURONTIN) 600 MG TABLET    Take 300 mg by mouth.     HYDROCODONE-ACETAMINOPHEN 10-325MG (NORCO)  MG TAB    Take by mouth every 12 (twelve) hours as needed for Pain.    IPRATROPIUM (ATROVENT) 0.02 % NEBULIZER SOLUTION    Take 2.5 mLs (500 mcg total) by nebulization 4 (four) times daily. Rescue    METHOCARBAMOL (ROBAXIN) 750 MG TAB    Take 500 mg by mouth 3 (three) times daily.    NYSTATIN (MYCOSTATIN) CREAM    APPLY  CREAM TOPICALLY TWICE DAILY    NYSTOP POWDER    APPLY  POWDER TOPICALLY TWICE DAILY    PANTOPRAZOLE (PROTONIX) 40 MG TABLET    TAKE ONE (1) TABLET(S) BY MOUTH TWICE A DAY.    PROAIR RESPICLICK 90 MCG/ACTUATION AEPB        PROMETHAZINE (PHENERGAN) 25 MG TABLET    Take 25 mg by mouth every 6 (six) hours as needed for Nausea.    TIZANIDINE 4 MG CAP    Take 4 mg by mouth every 8 (eight) hours as needed.   Changed and/or Refilled Medications    Modified Medication Previous Medication    PREDNISONE (DELTASONE) 20 MG TABLET predniSONE (DELTASONE) 20  MG tablet       3 daily x 3 days, 2 daily x 3 days, 1 daily x 3 days, 1/2 daily x 4 days.    3 daily x 3 days, 2 daily x 3 days, 1 daily x 3 days, 1/2 daily x 4 days.    TRIAMCINOLONE ACETONIDE 0.1% (KENALOG) 0.1 % OINTMENT triamcinolone acetonide 0.1% (KENALOG) 0.1 % ointment       Apply topically 2 (two) times daily.    Apply topically 2 (two) times daily.

## 2019-05-15 ENCOUNTER — TELEPHONE (OUTPATIENT)
Dept: INTERNAL MEDICINE | Facility: CLINIC | Age: 54
End: 2019-05-15

## 2019-05-15 RX ORDER — ALBUTEROL SULFATE 90 UG/1
2 POWDER, METERED RESPIRATORY (INHALATION) EVERY 4 HOURS PRN
Qty: 1 EACH | Refills: 11 | Status: SHIPPED | OUTPATIENT
Start: 2019-05-15 | End: 2020-05-17

## 2019-05-15 NOTE — TELEPHONE ENCOUNTER
----- Message from Rita Solomon sent at 5/15/2019 10:59 AM CDT -----  Contact: Self  Patient requesting a prescription be sent over for ProAir Respiclick to pharmacy listed below. Please call back at [phone]      Pt uses:    [facility] [address] [place] [id number]  Phone: [phone] Fax: [phone]

## 2019-06-05 ENCOUNTER — TELEPHONE (OUTPATIENT)
Dept: RHEUMATOLOGY | Facility: CLINIC | Age: 54
End: 2019-06-05

## 2019-06-06 ENCOUNTER — HOSPITAL ENCOUNTER (OUTPATIENT)
Dept: RADIOLOGY | Facility: HOSPITAL | Age: 54
Discharge: HOME | End: 2019-06-06
Attending: INTERNAL MEDICINE
Payer: COMMERCIAL

## 2019-06-06 ENCOUNTER — OFFICE VISIT (OUTPATIENT)
Dept: RHEUMATOLOGY | Facility: CLINIC | Age: 54
End: 2019-06-06
Payer: COMMERCIAL

## 2019-06-06 VITALS
HEIGHT: 67 IN | DIASTOLIC BLOOD PRESSURE: 98 MMHG | SYSTOLIC BLOOD PRESSURE: 144 MMHG | BODY MASS INDEX: 37.06 KG/M2 | WEIGHT: 236.13 LBS | HEART RATE: 90 BPM

## 2019-06-06 DIAGNOSIS — G89.29 CHRONIC PAIN OF BOTH SHOULDERS: ICD-10-CM

## 2019-06-06 DIAGNOSIS — M25.511 CHRONIC PAIN OF BOTH SHOULDERS: Primary | ICD-10-CM

## 2019-06-06 DIAGNOSIS — G89.29 CHRONIC PAIN OF BOTH SHOULDERS: Primary | ICD-10-CM

## 2019-06-06 DIAGNOSIS — M25.512 CHRONIC PAIN OF BOTH SHOULDERS: ICD-10-CM

## 2019-06-06 DIAGNOSIS — M25.512 CHRONIC PAIN OF BOTH SHOULDERS: Primary | ICD-10-CM

## 2019-06-06 DIAGNOSIS — M25.511 CHRONIC PAIN OF BOTH SHOULDERS: ICD-10-CM

## 2019-06-06 PROCEDURE — 3008F BODY MASS INDEX DOCD: CPT | Mod: CPTII,S$GLB,, | Performed by: INTERNAL MEDICINE

## 2019-06-06 PROCEDURE — 99999 PR PBB SHADOW E&M-EST. PATIENT-LVL III: CPT | Mod: PBBFAC,,, | Performed by: INTERNAL MEDICINE

## 2019-06-06 PROCEDURE — 3008F PR BODY MASS INDEX (BMI) DOCUMENTED: ICD-10-PCS | Mod: CPTII,S$GLB,, | Performed by: INTERNAL MEDICINE

## 2019-06-06 PROCEDURE — 73030 X-RAY EXAM OF SHOULDER: CPT | Mod: 26,50,, | Performed by: RADIOLOGY

## 2019-06-06 PROCEDURE — 73030 X-RAY EXAM OF SHOULDER: CPT | Mod: TC,50

## 2019-06-06 PROCEDURE — 99999 PR PBB SHADOW E&M-EST. PATIENT-LVL III: ICD-10-PCS | Mod: PBBFAC,,, | Performed by: INTERNAL MEDICINE

## 2019-06-06 PROCEDURE — 99204 OFFICE O/P NEW MOD 45 MIN: CPT | Mod: S$GLB,,, | Performed by: INTERNAL MEDICINE

## 2019-06-06 PROCEDURE — 73030 XR SHOULDER COMPLETE 2 OR MORE VIEWS BILATERAL: ICD-10-PCS | Mod: 26,50,, | Performed by: RADIOLOGY

## 2019-06-06 PROCEDURE — 99204 PR OFFICE/OUTPT VISIT, NEW, LEVL IV, 45-59 MIN: ICD-10-PCS | Mod: S$GLB,,, | Performed by: INTERNAL MEDICINE

## 2019-06-06 NOTE — ASSESSMENT & PLAN NOTE
Severe worsening bilateral shoulder joint pain associated with restricted range of motion likely secondary to adhesive capsulitis with underlying impingement syndrome.  She is here to rule out any underlying autoimmune disease before proceeding interventional therapy with her orthopedic surgeon at [facility].  Check serologies to rule out any underlying autoimmune inflammatory arthritic disease.

## 2019-06-06 NOTE — PROGRESS NOTES
RHEUMATOLOGY CLINIC INITIAL VISIT  Chief complaints:-  Much shoulders hurt.  I want to make sure I do not have any underlying autoimmune arthritis before undergoing any workup with my orthopedic surgeon..    HPI:-  Luann Ro a 53 y.o. pleasant female comes in for an initial visit with above chief complaints.  She has severe degenerative disc disease and has underwent thoracic spinal cord stimulator implant recently.  She has recovered well and is undergoing physical therapy.  She was doing well until a few months ago when she started noticing pain around the left shoulder.  A month or 2 later the right shoulder also started hurting.  The pain is constant, progressive, sharp, associated with restricted range of motion.  She could not move at certain ankles and it catches and causes sharp stabbing pain.  She denies any significant pain over her MCPs and PIP joints.  She has constant worsening pain over bilateral 1st carpometacarpal joints.  She denies photosensitive malar rash, sicca syndrome,  treatment resistant headaches, seizures or psychosis.  Review of Systems   Constitutional: Positive for malaise/fatigue. Negative for chills and fever.   HENT: Negative for congestion and sore throat.    Eyes: Negative for blurred vision and redness.   Respiratory: Negative for cough and shortness of breath.    Cardiovascular: Negative for chest pain and leg swelling.   Gastrointestinal: Negative for abdominal pain.   Genitourinary: Negative for dysuria.   Musculoskeletal: Positive for back pain, joint pain, myalgias and neck pain. Negative for falls.   Skin: Negative for rash.   Neurological: Negative for headaches.   Endo/Heme/Allergies: Does not bruise/bleed easily.   Psychiatric/Behavioral: Negative for memory loss. The patient does not have insomnia.        Past Medical History:   Diagnosis Date    Allergy     seasonal     Asthma     Pneumonia        Past Surgical History:   Procedure Laterality Date    KNEE  "SURGERY Left     age 13 - injury - no tears, just cleaned it up     LUMBAR NERVE STIMLATOR INSERTION  2018    TONSILLECTOMY      TUBAL LIGATION  01/19/1990    VAGINAL DELIVERY      2 births         Social History     Tobacco Use    Smoking status: Never Smoker    Smokeless tobacco: Never Used   Substance Use Topics    Alcohol use: No    Drug use: No       Family History   Problem Relation Age of Onset    Thyroid disease Mother        Review of patient's allergies indicates:   Allergen Reactions    Iodinated contrast- oral and iv dye      Pt states that she is not allergic omnipeg dye, but is only allergic to the older dyes.    Iodine and iodide containing products      Injectables ???     Loperamide-simethicone     Metoclopramide hcl      reglan     Mobic [meloxicam]      Chest and neck pain .  Decrease O2    Diltiazem hcl Rash and Swelling       Vitals:    06/06/19 1218 06/06/19 1223   BP: (!) 159/110 (!) 144/98   Pulse: 98 90   Weight: 107.1 kg (236 lb 1.8 oz)    Height: 5' 6.5" (1.689 m)    PainSc:   8        Physical Exam   Constitutional: She is oriented to person, place, and time and well-developed, well-nourished, and in no distress. No distress.   HENT:   Head: Normocephalic.   Mouth/Throat: Oropharynx is clear and moist.   Eyes: Pupils are equal, round, and reactive to light. Conjunctivae and EOM are normal.   Neck: Normal range of motion. Neck supple.   Cardiovascular: Normal rate and intact distal pulses.   Pulmonary/Chest: Effort normal. No respiratory distress.   Abdominal: Soft. There is no tenderness.   Musculoskeletal:   No synovitis over small joints of hands or feet.  Severe tenderness of bilateral shoulder joint associated with significant restriction in range of motion.  Tenderness over bilateral acromioclavicular joints.   Neurological: She is alert and oriented to person, place, and time. No cranial nerve deficit.   Skin: Skin is warm. No rash noted. No erythema.   Psychiatric: " Mood and affect normal.   Nursing note and vitals reviewed.        Radiographs:-  Independent visualization of images done.  Degenerative disc disease.      Medication List with Changes/Refills   Current Medications    ALBUTEROL (PROVENTIL) 2.5 MG /3 ML (0.083 %) NEBULIZER SOLUTION    Take 3 mLs (2.5 mg total) by nebulization every 6 (six) hours as needed for Wheezing or Shortness of Breath. please dispense in vials.    ALBUTEROL INHL    Inhale into the lungs.    ASPIRIN-ACETAMINOPHEN-CAFFEINE 250-250-65 MG (EXCEDRIN MIGRAINE) 250-250-65 MG PER TABLET    Take 1 tablet by mouth.    AZELASTINE (ASTELIN) 137 MCG (0.1 %) NASAL SPRAY    1 spray (137 mcg total) by Nasal route 2 (two) times daily.    BENZONATATE (TESSALON) 200 MG CAPSULE    Take 200 mg by mouth 3 (three) times daily as needed for Cough.    CETIRIZINE (ZYRTEC) 10 MG TABLET    Take 10 mg by mouth as needed for Allergies.    DIAZEPAM (VALIUM) 5 MG TABLET    Take 5 mg by mouth every 12 (twelve) hours as needed for Anxiety.    DICLOFENAC SODIUM (VOLTAREN) 1 % GEL    Apply 2 g topically 4 (four) times daily.    DIPHENHYDRAMINE (BENADRYL) 25 MG CAPSULE    Take 25 mg by mouth nightly as needed for Itching.    ESZOPICLONE (LUNESTA) 3 MG TAB    Take 1 tablet (3 mg total) by mouth every evening.    GABAPENTIN (NEURONTIN) 600 MG TABLET    Take 600 mg by mouth 2 (two) times daily.     HYDROCODONE-ACETAMINOPHEN 10-325MG (NORCO)  MG TAB    Take by mouth every 12 (twelve) hours as needed for Pain.    IPRATROPIUM (ATROVENT) 0.02 % NEBULIZER SOLUTION    Take 2.5 mLs (500 mcg total) by nebulization 4 (four) times daily. Rescue    LEVOFLOXACIN (LEVAQUIN) 750 MG TABLET    Take 1 tablet (750 mg total) by mouth once daily.    METHOCARBAMOL (ROBAXIN) 750 MG TAB    Take 500 mg by mouth 3 (three) times daily.    NYSTATIN (MYCOSTATIN) CREAM    APPLY  CREAM TOPICALLY TWICE DAILY    NYSTOP POWDER    APPLY  POWDER TOPICALLY TWICE DAILY    PANTOPRAZOLE (PROTONIX) 40 MG TABLET     TAKE ONE (1) TABLET(S) BY MOUTH TWICE A DAY.    PREDNISONE (DELTASONE) 20 MG TABLET    3 daily x 3 days, 2 daily x 3 days, 1 daily x 3 days, 1/2 daily x 4 days.    PROAIR RESPICLICK 90 MCG/ACTUATION AEPB    Inhale 2 puffs into the lungs every 4 (four) hours as needed.    PROMETHAZINE (PHENERGAN) 25 MG TABLET    Take 25 mg by mouth every 6 (six) hours as needed for Nausea.    TIZANIDINE 4 MG CAP    Take 4 mg by mouth every 8 (eight) hours as needed.    TRIAMCINOLONE ACETONIDE 0.1% (KENALOG) 0.1 % OINTMENT    Apply topically 2 (two) times daily.       Assessment/Plans:-  1. Chronic pain of both shoulders      Problem List Items Addressed This Visit        Orthopedic    Chronic pain of both shoulders - Primary    Current Assessment & Plan     Severe worsening bilateral shoulder joint pain associated with restricted range of motion likely secondary to adhesive capsulitis with underlying impingement syndrome.  She is here to rule out any underlying autoimmune disease before proceeding interventional therapy with her orthopedic surgeon at [facility].  Check serologies to rule out any underlying autoimmune inflammatory arthritic disease.         Relevant Orders    ALFREDO Screen w/Reflex    Rheumatoid factor    Cyclic citrul peptide antibody, IgG    Sedimentation rate    C-reactive protein    X-Ray Shoulder 2 or more views Bilat (Completed)          Follow up if symptoms worsen or fail to improve.    Thank you for allowing me to participate in the care ofLuann Mcgovern.    Disclaimer: This note was prepared using voice recognition system and is likely to have sound alike errors and is not proof read.  Please call me with any questions.

## 2019-08-20 ENCOUNTER — TELEPHONE (OUTPATIENT)
Dept: INTERNAL MEDICINE | Facility: CLINIC | Age: 54
End: 2019-08-20

## 2019-08-20 NOTE — TELEPHONE ENCOUNTER
Results are on doctor desk        ----- Message from Rita Solomon sent at 8/20/2019 12:40 PM CDT -----  Contact: Self  Patient requesting a call back regarding results for annual GYN visit from 10/2018 wit Dr. Calzada. She states that the results have not been sent to Dr. Freedman and would like to know what she has to do to have the results sent over. Patient also states that she received a letter stating she is overdue for her annual with Dr. Freedman but she is thinking it's because we don't have her last results. Please call patient back at [phone]

## 2019-08-22 ENCOUNTER — HOSPITAL ENCOUNTER (OUTPATIENT)
Dept: RADIOLOGY | Facility: HOSPITAL | Age: 54
Discharge: HOME | End: 2019-08-22
Attending: ORTHOPAEDIC SURGERY
Payer: COMMERCIAL

## 2019-08-22 DIAGNOSIS — M75.101 ROTATOR CUFF SYNDROME OF RIGHT SHOULDER: ICD-10-CM

## 2019-08-22 PROCEDURE — 73040 XR ARTHROGRAM SHOULDER RIGHT WITH CT TO FOLLOW: ICD-10-PCS | Mod: 26,RT,, | Performed by: RADIOLOGY

## 2019-08-22 PROCEDURE — 23350 XR ARTHROGRAM SHOULDER RIGHT WITH CT TO FOLLOW: ICD-10-PCS | Mod: RT,,, | Performed by: RADIOLOGY

## 2019-08-22 PROCEDURE — 73040 CONTRAST X-RAY OF SHOULDER: CPT | Mod: TC,RT

## 2019-08-22 PROCEDURE — 25000003 PHARM REV CODE 250: Performed by: ORTHOPAEDIC SURGERY

## 2019-08-22 PROCEDURE — 25500020 PHARM REV CODE 255: Performed by: ORTHOPAEDIC SURGERY

## 2019-08-22 PROCEDURE — 73201 CT UPPER EXTREMITY W/DYE: CPT | Mod: TC,RT

## 2019-08-22 PROCEDURE — 23350 INJECTION FOR SHOULDER X-RAY: CPT | Mod: RT,,, | Performed by: RADIOLOGY

## 2019-08-22 PROCEDURE — 73201 CT UPPER EXTREMITY W/DYE: CPT | Mod: 26,RT,, | Performed by: RADIOLOGY

## 2019-08-22 PROCEDURE — 73040 CONTRAST X-RAY OF SHOULDER: CPT | Mod: 26,RT,, | Performed by: RADIOLOGY

## 2019-08-22 PROCEDURE — 73201 CT ARTHROGRAM SHOULDER RIGHT: ICD-10-PCS | Mod: 26,RT,, | Performed by: RADIOLOGY

## 2019-08-22 RX ORDER — LIDOCAINE HYDROCHLORIDE 10 MG/ML
5 INJECTION INFILTRATION; PERINEURAL ONCE
Status: COMPLETED | OUTPATIENT
Start: 2019-08-22 | End: 2019-08-22

## 2019-08-22 RX ADMIN — IOHEXOL 10 ML: 350 INJECTION, SOLUTION INTRAVENOUS at 10:08

## 2019-08-22 RX ADMIN — LIDOCAINE HYDROCHLORIDE 5 ML: 10 INJECTION, SOLUTION EPIDURAL; INFILTRATION; INTRACAUDAL; PERINEURAL at 10:08

## 2019-08-26 ENCOUNTER — PATIENT OUTREACH (OUTPATIENT)
Dept: ADMINISTRATIVE | Facility: HOSPITAL | Age: 54
End: 2019-08-26

## 2019-12-20 ENCOUNTER — HOSPITAL ENCOUNTER (OUTPATIENT)
Dept: RADIOLOGY | Facility: HOSPITAL | Age: 54
Discharge: HOME | End: 2019-12-20
Attending: FAMILY MEDICINE
Payer: COMMERCIAL

## 2019-12-20 ENCOUNTER — OFFICE VISIT (OUTPATIENT)
Dept: INTERNAL MEDICINE | Facility: CLINIC | Age: 54
End: 2019-12-20
Payer: COMMERCIAL

## 2019-12-20 ENCOUNTER — CLINICAL SUPPORT (OUTPATIENT)
Dept: CARDIOLOGY | Facility: CLINIC | Age: 54
End: 2019-12-20
Payer: COMMERCIAL

## 2019-12-20 VITALS
HEIGHT: 66 IN | DIASTOLIC BLOOD PRESSURE: 70 MMHG | TEMPERATURE: 99 F | HEART RATE: 102 BPM | SYSTOLIC BLOOD PRESSURE: 110 MMHG | BODY MASS INDEX: 36.6 KG/M2 | WEIGHT: 227.75 LBS | OXYGEN SATURATION: 97 %

## 2019-12-20 DIAGNOSIS — M18.12 ARTHRITIS OF CARPOMETACARPAL (CMC) JOINT OF LEFT THUMB: ICD-10-CM

## 2019-12-20 DIAGNOSIS — Z01.818 PREOP EXAMINATION: ICD-10-CM

## 2019-12-20 DIAGNOSIS — J45.20 MILD INTERMITTENT ASTHMA WITHOUT COMPLICATION: ICD-10-CM

## 2019-12-20 DIAGNOSIS — Z00.00 ROUTINE ADULT HEALTH MAINTENANCE: Primary | ICD-10-CM

## 2019-12-20 DIAGNOSIS — R94.5 LIVER FUNCTION ABNORMALITY: ICD-10-CM

## 2019-12-20 PROCEDURE — 71046 XR CHEST PA AND LATERAL: ICD-10-PCS | Mod: 26,,, | Performed by: RADIOLOGY

## 2019-12-20 PROCEDURE — 99999 PR PBB SHADOW E&M-EST. PATIENT-LVL III: CPT | Mod: PBBFAC,,, | Performed by: FAMILY MEDICINE

## 2019-12-20 PROCEDURE — 3008F PR BODY MASS INDEX (BMI) DOCUMENTED: ICD-10-PCS | Mod: CPTII,S$GLB,, | Performed by: FAMILY MEDICINE

## 2019-12-20 PROCEDURE — 3008F BODY MASS INDEX DOCD: CPT | Mod: CPTII,S$GLB,, | Performed by: FAMILY MEDICINE

## 2019-12-20 PROCEDURE — 93010 EKG 12-LEAD: ICD-10-PCS | Mod: S$GLB,,, | Performed by: INTERNAL MEDICINE

## 2019-12-20 PROCEDURE — 93005 ELECTROCARDIOGRAM TRACING: CPT

## 2019-12-20 PROCEDURE — 99999 PR PBB SHADOW E&M-EST. PATIENT-LVL III: ICD-10-PCS | Mod: PBBFAC,,, | Performed by: FAMILY MEDICINE

## 2019-12-20 PROCEDURE — 99214 OFFICE O/P EST MOD 30 MIN: CPT | Mod: S$GLB,,, | Performed by: FAMILY MEDICINE

## 2019-12-20 PROCEDURE — 71046 X-RAY EXAM CHEST 2 VIEWS: CPT | Mod: 26,,, | Performed by: RADIOLOGY

## 2019-12-20 PROCEDURE — 71046 X-RAY EXAM CHEST 2 VIEWS: CPT | Mod: TC,PO

## 2019-12-20 PROCEDURE — 99214 PR OFFICE/OUTPT VISIT, EST, LEVL IV, 30-39 MIN: ICD-10-PCS | Mod: S$GLB,,, | Performed by: FAMILY MEDICINE

## 2019-12-20 PROCEDURE — 93010 ELECTROCARDIOGRAM REPORT: CPT | Mod: S$GLB,,, | Performed by: INTERNAL MEDICINE

## 2019-12-20 RX ORDER — ESZOPICLONE 3 MG/1
3 TABLET, FILM COATED ORAL NIGHTLY
Qty: 90 TABLET | Refills: 1 | Status: SHIPPED | OUTPATIENT
Start: 2019-12-20 | End: 2020-08-21 | Stop reason: SDUPTHER

## 2019-12-20 RX ORDER — PANTOPRAZOLE SODIUM 40 MG/1
40 TABLET, DELAYED RELEASE ORAL 2 TIMES DAILY
Qty: 180 TABLET | Refills: 3 | Status: SHIPPED | OUTPATIENT
Start: 2019-12-20 | End: 2021-04-26 | Stop reason: SDUPTHER

## 2019-12-20 RX ORDER — ESZOPICLONE 3 MG/1
3 TABLET, FILM COATED ORAL NIGHTLY
Qty: 30 TABLET | Refills: 5 | Status: SHIPPED | OUTPATIENT
Start: 2019-12-20 | End: 2019-12-20 | Stop reason: SDUPTHER

## 2019-12-20 RX ORDER — PANTOPRAZOLE SODIUM 40 MG/1
40 TABLET, DELAYED RELEASE ORAL 2 TIMES DAILY
Qty: 60 TABLET | Refills: 11 | Status: SHIPPED | OUTPATIENT
Start: 2019-12-20 | End: 2019-12-20 | Stop reason: SDUPTHER

## 2019-12-22 NOTE — PROGRESS NOTES
Subjective:      Patient ID: Luann Mcgovern is a 54 y.o. female.    Chief Complaint: Follow-up (annual )      Patient here today for preop exam for left thumb CMC arthroplasty scheduled later this month with Dr. Delcid.   Other than pain in her hand she has been doing well and has no complaints today. She is also due for annual labs and physical    Review of Systems   Constitutional: Negative for activity change and unexpected weight change.   HENT: Negative for hearing loss, rhinorrhea and trouble swallowing.    Eyes: Negative for discharge and visual disturbance.   Respiratory: Negative for chest tightness and wheezing.    Cardiovascular: Negative for chest pain and palpitations.   Gastrointestinal: Negative for blood in stool, constipation, diarrhea and vomiting.   Endocrine: Negative for polydipsia and polyuria.   Genitourinary: Negative for difficulty urinating, dysuria, hematuria and menstrual problem.   Musculoskeletal: Positive for arthralgias. Negative for joint swelling and neck pain.   Neurological: Negative for weakness and headaches.   Psychiatric/Behavioral: Negative for confusion and dysphoric mood.     Past Medical History:   Diagnosis Date    Allergy     seasonal     Asthma     Pneumonia           Past Surgical History:   Procedure Laterality Date    KNEE SURGERY Left     age 13 - injury - no tears, just cleaned it up     LUMBAR NERVE STIMLATOR INSERTION  2018    TONSILLECTOMY      TUBAL LIGATION  01/19/1990    VAGINAL DELIVERY      2 births      Family History   Problem Relation Age of Onset    Thyroid disease Mother      Social History     Socioeconomic History    Marital status:      Spouse name: Not on file    Number of children: 2    Years of education: Not on file    Highest education level: Not on file   Occupational History    Occupation: RN - ED or surgery     Comment: [employer]     Occupation: CDL - for [employer]   Social Needs    Financial resource  "strain: Not on file    Food insecurity:     Worry: Not on file     Inability: Not on file    Transportation needs:     Medical: Not on file     Non-medical: Not on file   Tobacco Use    Smoking status: Never Smoker    Smokeless tobacco: Never Used   Substance and Sexual Activity    Alcohol use: No     Frequency: Never     Binge frequency: Never    Drug use: No    Sexual activity: Yes     Partners: Male   Lifestyle    Physical activity:     Days per week: 3 days     Minutes per session: 60 min    Stress: To some extent   Relationships    Social connections:     Talks on phone: More than three times a week     Gets together: More than three times a week     Attends Religion service: Not on file     Active member of club or organization: Patient refused     Attends meetings of clubs or organizations: Patient refused     Relationship status:    Other Topics Concern    Not on file   Social History Narrative    Not on file     Review of patient's allergies indicates:   Allergen Reactions    Iodinated contrast media      Pt states that she is not allergic omnipeg dye, but is only allergic to the older dyes.    Iodine and iodide containing products      Injectables ???     Loperamide-simethicone     Metoclopramide hcl      reglan     Mobic [meloxicam]      Chest and neck pain .  Decrease O2    Diltiazem hcl Rash and Swelling       Objective:       /70 (BP Location: Left arm, Patient Position: Sitting, BP Method: Large (Manual))   Pulse 102   Temp 98.6 °F (37 °C) (Tympanic)   Ht 5' 6" (1.676 m)   Wt 103.3 kg (227 lb 11.8 oz)   LMP 11/15/2002   SpO2 97%   BMI 36.76 kg/m²   Physical Exam   Constitutional: She is oriented to person, place, and time. Vital signs are normal. She appears well-developed and well-nourished. No distress.   HENT:   Head: Normocephalic and atraumatic.   Right Ear: Hearing, tympanic membrane, external ear and ear canal normal.   Left Ear: Hearing, tympanic " membrane, external ear and ear canal normal.   Nose: Nose normal.   Mouth/Throat: Uvula is midline, oropharynx is clear and moist and mucous membranes are normal. No oropharyngeal exudate.   Eyes: Pupils are equal, round, and reactive to light. Conjunctivae and EOM are normal.   Neck: Normal range of motion. Neck supple. No tracheal deviation present. No thyromegaly present.   Cardiovascular: Normal rate, regular rhythm, normal heart sounds and intact distal pulses.   No murmur heard.  Pulmonary/Chest: Effort normal and breath sounds normal. No respiratory distress.   Abdominal: Soft. Bowel sounds are normal. There is no tenderness. There is no guarding. No hernia.   Lymphadenopathy:     She has no cervical adenopathy.   Neurological: She is alert and oriented to person, place, and time.   Skin: Skin is warm and dry. Capillary refill takes less than 2 seconds. She is not diaphoretic.   Psychiatric: She has a normal mood and affect. Her behavior is normal. Judgment and thought content normal.   Vitals reviewed.    Assessment:     1. Routine adult health maintenance    2. Mild intermittent asthma without complication    3. Liver function abnormality    4. Arthritis of carpometacarpal (CMC) joint of left thumb    5. Preop examination      Plan:   Routine adult health maintenance  -     CBC auto differential; Future; Expected date: 12/20/2019  -     Comprehensive metabolic panel; Future; Expected date: 12/20/2019  -     Hemoglobin A1c; Future; Expected date: 12/20/2019  -     Lipid panel; Future; Expected date: 12/20/2019  -     TSH; Future; Expected date: 12/20/2019  -     Vitamin D; Future; Expected date: 12/20/2019  -     Mammo Digital Screening Bilat; Future; Expected date: 06/20/2020    Mild intermittent asthma without complication    Liver function abnormality  -     Comprehensive metabolic panel; Future; Expected date: 12/20/2019    Arthritis of carpometacarpal (CMC) joint of left thumb  -     IN OFFICE EKG  12-LEAD (to Muse)  -     X-Ray Chest PA And Lateral; Future; Expected date: 12/20/2019    Preop examination  -     IN OFFICE EKG 12-LEAD (to Muse)  -     X-Ray Chest PA And Lateral; Future; Expected date: 12/20/2019    Other orders  -     pantoprazole (PROTONIX) 40 MG tablet; Take 1 tablet (40 mg total) by mouth 2 (two) times daily.  Dispense: 60 tablet; Refill: 11  -     eszopiclone (LUNESTA) 3 mg Tab; Take 1 tablet (3 mg total) by mouth every evening.  Dispense: 90 tablet; Refill: 1      Addendum 12/23/19: after review of above labs, EKG, chest xray patient appears to be low risk for procedure. She is cleared for surgery.    Medication List with Changes/Refills   Current Medications    ALBUTEROL INHL    Inhale into the lungs.    ASPIRIN-ACETAMINOPHEN-CAFFEINE 250-250-65 MG (EXCEDRIN MIGRAINE) 250-250-65 MG PER TABLET    Take 1 tablet by mouth.    AZELASTINE (ASTELIN) 137 MCG (0.1 %) NASAL SPRAY    1 spray (137 mcg total) by Nasal route 2 (two) times daily.    BENZONATATE (TESSALON) 200 MG CAPSULE    Take 200 mg by mouth 3 (three) times daily as needed for Cough.    CETIRIZINE (ZYRTEC) 10 MG TABLET    Take 10 mg by mouth as needed for Allergies.    DIAZEPAM (VALIUM) 5 MG TABLET    Take 5 mg by mouth every 12 (twelve) hours as needed for Anxiety.    DICLOFENAC SODIUM (VOLTAREN) 1 % GEL    Apply 2 g topically 4 (four) times daily.    DIPHENHYDRAMINE (BENADRYL) 25 MG CAPSULE    Take 25 mg by mouth nightly as needed for Itching.    GABAPENTIN (NEURONTIN) 600 MG TABLET    Take 600 mg by mouth 2 (two) times daily.     HYDROCODONE-ACETAMINOPHEN (NORCO)  MG PER TABLET    TAKE 1 TABLET BY MOUTH THREE TIMES DAILY AS NEEDED FOR CHRONIC PAIN    IPRATROPIUM (ATROVENT) 0.02 % NEBULIZER SOLUTION    Take 2.5 mLs (500 mcg total) by nebulization 4 (four) times daily. Rescue    METHOCARBAMOL (ROBAXIN) 750 MG TAB    Take 500 mg by mouth 3 (three) times daily.    NYSTATIN (MYCOSTATIN) CREAM    APPLY  CREAM TOPICALLY TWICE DAILY     NYSTOP POWDER    APPLY  POWDER TOPICALLY TWICE DAILY    PROAIR RESPICLICK 90 MCG/ACTUATION AEPB    Inhale 2 puffs into the lungs every 4 (four) hours as needed.    PROMETHAZINE (PHENERGAN) 25 MG TABLET    Take 25 mg by mouth every 6 (six) hours as needed for Nausea.    TIZANIDINE 4 MG CAP    Take 4 mg by mouth every 8 (eight) hours as needed.    TRIAMCINOLONE ACETONIDE 0.1% (KENALOG) 0.1 % OINTMENT    Apply topically 2 (two) times daily.   Changed and/or Refilled Medications    Modified Medication Previous Medication    ESZOPICLONE (LUNESTA) 3 MG TAB eszopiclone (LUNESTA) 3 mg Tab       Take 1 tablet (3 mg total) by mouth every evening.    Take 1 tablet (3 mg total) by mouth every evening.    PANTOPRAZOLE (PROTONIX) 40 MG TABLET pantoprazole (PROTONIX) 40 MG tablet       Take 1 tablet (40 mg total) by mouth 2 (two) times daily.    TAKE ONE (1) TABLET(S) BY MOUTH TWICE A DAY.   Discontinued Medications    HYDROCODONE-ACETAMINOPHEN 10-325MG (NORCO)  MG TAB    Take by mouth every 12 (twelve) hours as needed for Pain.    LEVOFLOXACIN (LEVAQUIN) 750 MG TABLET    Take 1 tablet (750 mg total) by mouth once daily.    PREDNISONE (DELTASONE) 20 MG TABLET    3 daily x 3 days, 2 daily x 3 days, 1 daily x 3 days, 1/2 daily x 4 days.

## 2019-12-23 DIAGNOSIS — E78.5 ELEVATED LIPIDS: ICD-10-CM

## 2019-12-23 DIAGNOSIS — E55.9 VITAMIN D DEFICIENCY: ICD-10-CM

## 2019-12-23 DIAGNOSIS — R73.9 HYPERGLYCEMIA: Primary | ICD-10-CM

## 2020-01-27 ENCOUNTER — TELEPHONE (OUTPATIENT)
Dept: INTERNAL MEDICINE | Facility: CLINIC | Age: 55
End: 2020-01-27

## 2020-01-27 ENCOUNTER — OFFICE VISIT (OUTPATIENT)
Dept: INTERNAL MEDICINE | Facility: CLINIC | Age: 55
End: 2020-01-27
Payer: COMMERCIAL

## 2020-01-27 ENCOUNTER — HOSPITAL ENCOUNTER (OUTPATIENT)
Dept: RADIOLOGY | Facility: HOSPITAL | Age: 55
Discharge: HOME | End: 2020-01-27
Attending: FAMILY MEDICINE
Payer: COMMERCIAL

## 2020-01-27 VITALS
OXYGEN SATURATION: 98 % | SYSTOLIC BLOOD PRESSURE: 138 MMHG | WEIGHT: 228.81 LBS | HEART RATE: 95 BPM | BODY MASS INDEX: 36.77 KG/M2 | TEMPERATURE: 98 F | HEIGHT: 66 IN | DIASTOLIC BLOOD PRESSURE: 80 MMHG

## 2020-01-27 DIAGNOSIS — R05.9 COUGH: ICD-10-CM

## 2020-01-27 DIAGNOSIS — R06.00 DYSPNEA, UNSPECIFIED TYPE: ICD-10-CM

## 2020-01-27 DIAGNOSIS — R06.00 DYSPNEA, UNSPECIFIED TYPE: Primary | ICD-10-CM

## 2020-01-27 PROCEDURE — 96372 PR INJECTION,THERAP/PROPH/DIAG2ST, IM OR SUBCUT: ICD-10-PCS | Mod: S$GLB,,, | Performed by: FAMILY MEDICINE

## 2020-01-27 PROCEDURE — 99213 PR OFFICE/OUTPT VISIT, EST, LEVL III, 20-29 MIN: ICD-10-PCS | Mod: 25,S$GLB,, | Performed by: FAMILY MEDICINE

## 2020-01-27 PROCEDURE — 71046 X-RAY EXAM CHEST 2 VIEWS: CPT | Mod: TC

## 2020-01-27 PROCEDURE — 99999 PR PBB SHADOW E&M-EST. PATIENT-LVL V: ICD-10-PCS | Mod: PBBFAC,,, | Performed by: FAMILY MEDICINE

## 2020-01-27 PROCEDURE — 96372 THER/PROPH/DIAG INJ SC/IM: CPT | Mod: S$GLB,,, | Performed by: FAMILY MEDICINE

## 2020-01-27 PROCEDURE — 70220 XR SINUSES MIN 3 VIEWS: ICD-10-PCS | Mod: 26,,, | Performed by: RADIOLOGY

## 2020-01-27 PROCEDURE — 70220 X-RAY EXAM OF SINUSES: CPT | Mod: 26,,, | Performed by: RADIOLOGY

## 2020-01-27 PROCEDURE — 99999 PR PBB SHADOW E&M-EST. PATIENT-LVL V: CPT | Mod: PBBFAC,,, | Performed by: FAMILY MEDICINE

## 2020-01-27 PROCEDURE — 71046 X-RAY EXAM CHEST 2 VIEWS: CPT | Mod: 26,,, | Performed by: RADIOLOGY

## 2020-01-27 PROCEDURE — 3008F PR BODY MASS INDEX (BMI) DOCUMENTED: ICD-10-PCS | Mod: CPTII,S$GLB,, | Performed by: FAMILY MEDICINE

## 2020-01-27 PROCEDURE — 3008F BODY MASS INDEX DOCD: CPT | Mod: CPTII,S$GLB,, | Performed by: FAMILY MEDICINE

## 2020-01-27 PROCEDURE — 99213 OFFICE O/P EST LOW 20 MIN: CPT | Mod: 25,S$GLB,, | Performed by: FAMILY MEDICINE

## 2020-01-27 PROCEDURE — 71046 XR CHEST PA AND LATERAL: ICD-10-PCS | Mod: 26,,, | Performed by: RADIOLOGY

## 2020-01-27 PROCEDURE — 70220 X-RAY EXAM OF SINUSES: CPT | Mod: TC

## 2020-01-27 RX ORDER — BETAMETHASONE SODIUM PHOSPHATE AND BETAMETHASONE ACETATE 3; 3 MG/ML; MG/ML
9 INJECTION, SUSPENSION INTRA-ARTICULAR; INTRALESIONAL; INTRAMUSCULAR; SOFT TISSUE
Status: COMPLETED | OUTPATIENT
Start: 2020-01-27 | End: 2020-01-27

## 2020-01-27 RX ORDER — PREDNISONE 10 MG/1
TABLET ORAL
Qty: 18 TABLET | Refills: 0 | Status: SHIPPED | OUTPATIENT
Start: 2020-01-27 | End: 2020-08-21

## 2020-01-27 RX ADMIN — BETAMETHASONE SODIUM PHOSPHATE AND BETAMETHASONE ACETATE 9 MG: 3; 3 INJECTION, SUSPENSION INTRA-ARTICULAR; INTRALESIONAL; INTRAMUSCULAR; SOFT TISSUE at 03:01

## 2020-01-27 NOTE — PROGRESS NOTES
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  She had surgeon or left arm 1 month ago but states she has not had any postop infection.  No recent fever weight loss or chills.  She does have occasional green sputum.  She takes Zyrtec Sudafed and Astelin nasal spray on a regular basis.    However she did have postop infection about 2 years ago and had a post surgical pneumoniae.      History:  Past Medical History:   Diagnosis Date    Allergy     seasonal     Asthma     Pneumonia      Past Surgical History:   Procedure Laterality Date    KNEE SURGERY Left     age 13 - injury - no tears, just cleaned it up     LUMBAR NERVE STIMLATOR INSERTION  2018    TONSILLECTOMY      TUBAL LIGATION  01/19/1990    VAGINAL DELIVERY      2 births      Family History   Problem Relation Age of Onset    Thyroid disease Mother      Social History     Socioeconomic History    Marital status:      Spouse name: Not on file    Number of children: 2    Years of education: Not on file    Highest education level: Not on file   Occupational History    Occupation: RN - ED or surgery     Comment: [employer]     Occupation: CDL - for [employer]   Social Needs    Financial resource strain: Not on file    Food insecurity:     Worry: Not on file     Inability: Not on file    Transportation needs:     Medical: Not on file     Non-medical: Not on file   Tobacco Use    Smoking status: Never Smoker    Smokeless tobacco: Never Used   Substance and Sexual Activity    Alcohol use: No     Frequency: Never     Binge frequency: Never     Drug use: No    Sexual activity: Yes     Partners: Male   Lifestyle    Physical activity:     Days per week: 3 days     Minutes per session: 60 min    Stress: To some extent   Relationships    Social connections:     Talks on phone: More than three times a week     Gets together: More than three times a week     Attends [employer] service: Not on file     Active member of club or organization: Patient refused     Attends meetings of clubs or organizations: Patient refused     Relationship status:    Other Topics Concern    Not on file   Social History Narrative    Not on file     Patient Active Problem List   Diagnosis    Mild intermittent asthma without complication    Liver function abnormality    Atelectasis    Chronic pain of both shoulders     Review of patient's allergies indicates:   Allergen Reactions    Iodinated contrast media      Pt states that she is not allergic omnipeg dye, but is only allergic to the older dyes.    Iodine and iodide containing products      Injectables ???     Loperamide-simethicone     Metoclopramide hcl      reglan     Mobic [meloxicam]      Chest and neck pain .  Decrease O2    Diltiazem hcl Rash and Swelling       The following were reviewed at this visit: active problem list, medication list, allergies, family history, social history, and health maintenance.    Medications:  Current Outpatient Medications on File Prior to Visit   Medication Sig Dispense Refill    ALBUTEROL INHL Inhale into the lungs.      aspirin-acetaminophen-caffeine 250-250-65 mg (EXCEDRIN MIGRAINE) 250-250-65 mg per tablet Take 1 tablet by mouth.      azelastine (ASTELIN) 137 mcg (0.1 %) nasal spray 1 spray (137 mcg total) by Nasal route 2 (two) times daily. 30 mL 5    benzonatate (TESSALON) 200 MG capsule Take 200 mg by mouth 3 (three) times daily as needed for Cough.      cetirizine (ZYRTEC) 10 MG tablet Take 10 mg by mouth as needed for Allergies.      diazePAM (VALIUM) 5 MG  tablet Take 5 mg by mouth every 12 (twelve) hours as needed for Anxiety.      diclofenac sodium (VOLTAREN) 1 % Gel Apply 2 g topically 4 (four) times daily. 1 Tube 3    diphenhydrAMINE (BENADRYL) 25 mg capsule Take 25 mg by mouth nightly as needed for Itching.      eszopiclone (LUNESTA) 3 mg Tab Take 1 tablet (3 mg total) by mouth every evening. 90 tablet 1    gabapentin (NEURONTIN) 600 MG tablet Take 600 mg by mouth 2 (two) times daily.       HYDROcodone-acetaminophen (NORCO)  mg per tablet TAKE 1 TABLET BY MOUTH THREE TIMES DAILY AS NEEDED FOR CHRONIC PAIN 61 tablet 0    ipratropium (ATROVENT) 0.02 % nebulizer solution Take 2.5 mLs (500 mcg total) by nebulization 4 (four) times daily. Rescue 1 Box 2    methocarbamol (ROBAXIN) 750 MG Tab Take 500 mg by mouth 3 (three) times daily.      nystatin (MYCOSTATIN) cream APPLY  CREAM TOPICALLY TWICE DAILY 30 g 2    NYSTOP powder APPLY  POWDER TOPICALLY TWICE DAILY 30 g 2    pantoprazole (PROTONIX) 40 MG tablet Take 1 tablet (40 mg total) by mouth 2 (two) times daily. 180 tablet 3    promethazine (PHENERGAN) 25 MG tablet Take 25 mg by mouth every 6 (six) hours as needed for Nausea.      tiZANidine 4 mg Cap Take 4 mg by mouth every 8 (eight) hours as needed.      triamcinolone acetonide 0.1% (KENALOG) 0.1 % ointment Apply topically 2 (two) times daily. 30 g 3    PROAIR RESPICLICK 90 mcg/actuation AePB Inhale 2 puffs into the lungs every 4 (four) hours as needed. 1 each 11     No current facility-administered medications on file prior to visit.        Medications have been reviewed and reconciled with patient at this visit.      Exam:  Wt Readings from Last 3 Encounters:   01/27/20 103.8 kg (228 lb 13.4 oz)   12/20/19 103.3 kg (227 lb 11.8 oz)   06/06/19 107.1 kg (236 lb 1.8 oz)     Temp Readings from Last 3 Encounters:   01/27/20 97.9 °F (36.6 °C) (Tympanic)   12/20/19 98.6 °F (37 °C) (Tympanic)   05/07/19 98.4 °F (36.9 °C)     BP Readings from Last 3  Encounters:   01/27/20 138/80   12/20/19 110/70   06/06/19 (!) 144/98     Pulse Readings from Last 3 Encounters:   01/27/20 95   12/20/19 102   06/06/19 90     Body mass index is 36.94 kg/m².      Review of Systems   Constitutional: Negative for chills, fever and weight loss.   HENT: Positive for congestion.    Respiratory: Positive for cough. Negative for shortness of breath.    Cardiovascular: Negative for chest pain and palpitations.     Physical Exam  WNWD, A&O, ambulatory adult female in no acute distress    HEENT-ear canals clear  Pharynx with post knows will drip noted  Neck supple  Lungs few rhonchi in  Heart regular rate and rhythm  Psychiatric normal affect cognition      Luann was seen today for cough and congestion.    Diagnoses and all orders for this visit:    Dyspnea, unspecified type  -     X-Ray Chest PA And Lateral  -     X-Ray Sinuses 3 or more views; Future    Cough  -     X-Ray Chest PA And Lateral  -     X-Ray Sinuses 3 or more views; Future    Other orders  -     betamethasone acetate-betamethasone sodium phosphate injection 9 mg  -     predniSONE (DELTASONE) 10 MG tablet; Three per day for 3 days, of then 2 per day for 3 days, then 1 per day for another 3 days        The patient verbalized good understanding of the medical issues discussed today and expressed appreciation for the care provided.  Patient was given the opportunity to ask questions and be an active participant in their medical care. Patient had no further questions or concerns at this time.   The patient was encouraged to participate in appropriate physical activity.    After visit summary was printed and given to patient upon discharge today.  Patient goals and care plan are included in After Visit Summary.    This note was produced with voice recognition software and may have sound a like errors

## 2020-01-27 NOTE — TELEPHONE ENCOUNTER
----- Message from Lali Le sent at 1/27/2020 11:52 AM CST -----  Contact: PT  Type:  Needs Medical Advice    Who Called: pt  Symptoms (please be specific): possible acute bronchitis   How long has patient had these symptoms:  5 or 6 days, getting worse  Pharmacy name and phone #: .  [facility] [address] [place] [address] - [address] [facility]  [address] [facility] [address]  Phone: [phone] Fax: [phone]  Would the patient rather a call back or a response via [facility]? Call back  Best Call Back Number: [phone]  Additional Information: Nothing available in the schedule today.    Thank you

## 2020-01-27 NOTE — TELEPHONE ENCOUNTER
Patient called to state that she is having some bronchitis issues and needs an appointment. The next available appt is 01/28/20 at 9:40am. The patient declined and called and scheduled an appointment with Dr. YISEL Fabian. Patient just wanted to inform the provider

## 2020-01-28 ENCOUNTER — PATIENT MESSAGE (OUTPATIENT)
Dept: INTERNAL MEDICINE | Facility: CLINIC | Age: 55
End: 2020-01-28

## 2020-03-03 ENCOUNTER — HOSPITAL ENCOUNTER (OUTPATIENT)
Dept: RADIOLOGY | Facility: HOSPITAL | Age: 55
Discharge: HOME | End: 2020-03-03
Attending: FAMILY MEDICINE
Payer: COMMERCIAL

## 2020-03-03 ENCOUNTER — OFFICE VISIT (OUTPATIENT)
Dept: INTERNAL MEDICINE | Facility: CLINIC | Age: 55
End: 2020-03-03
Payer: COMMERCIAL

## 2020-03-03 VITALS
SYSTOLIC BLOOD PRESSURE: 150 MMHG | BODY MASS INDEX: 37.77 KG/M2 | OXYGEN SATURATION: 98 % | HEART RATE: 111 BPM | TEMPERATURE: 99 F | WEIGHT: 235 LBS | HEIGHT: 66 IN | DIASTOLIC BLOOD PRESSURE: 100 MMHG

## 2020-03-03 DIAGNOSIS — R05.9 COUGH: ICD-10-CM

## 2020-03-03 DIAGNOSIS — R05.9 COUGH: Primary | ICD-10-CM

## 2020-03-03 DIAGNOSIS — J45.901 EXACERBATION OF ASTHMA, UNSPECIFIED ASTHMA SEVERITY, UNSPECIFIED WHETHER PERSISTENT: ICD-10-CM

## 2020-03-03 DIAGNOSIS — Z20.828 EXPOSURE TO THE FLU: ICD-10-CM

## 2020-03-03 PROCEDURE — 71046 XR CHEST PA AND LATERAL: ICD-10-PCS | Mod: 26,,, | Performed by: RADIOLOGY

## 2020-03-03 PROCEDURE — 99999 PR PBB SHADOW E&M-EST. PATIENT-LVL III: CPT | Mod: PBBFAC,,, | Performed by: FAMILY MEDICINE

## 2020-03-03 PROCEDURE — 71046 X-RAY EXAM CHEST 2 VIEWS: CPT | Mod: TC,PO

## 2020-03-03 PROCEDURE — 71046 X-RAY EXAM CHEST 2 VIEWS: CPT | Mod: 26,,, | Performed by: RADIOLOGY

## 2020-03-03 PROCEDURE — 99213 OFFICE O/P EST LOW 20 MIN: CPT | Mod: S$GLB,,, | Performed by: FAMILY MEDICINE

## 2020-03-03 PROCEDURE — 3008F BODY MASS INDEX DOCD: CPT | Mod: CPTII,S$GLB,, | Performed by: FAMILY MEDICINE

## 2020-03-03 PROCEDURE — 99999 PR PBB SHADOW E&M-EST. PATIENT-LVL III: ICD-10-PCS | Mod: PBBFAC,,, | Performed by: FAMILY MEDICINE

## 2020-03-03 PROCEDURE — 3008F PR BODY MASS INDEX (BMI) DOCUMENTED: ICD-10-PCS | Mod: CPTII,S$GLB,, | Performed by: FAMILY MEDICINE

## 2020-03-03 PROCEDURE — 99213 PR OFFICE/OUTPT VISIT, EST, LEVL III, 20-29 MIN: ICD-10-PCS | Mod: S$GLB,,, | Performed by: FAMILY MEDICINE

## 2020-03-03 RX ORDER — CODEINE PHOSPHATE AND GUAIFENESIN 10; 100 MG/5ML; MG/5ML
5 SOLUTION ORAL 3 TIMES DAILY PRN
Qty: 120 ML | Refills: 0 | Status: SHIPPED | OUTPATIENT
Start: 2020-03-03 | End: 2020-03-13

## 2020-03-03 RX ORDER — METHYLPREDNISOLONE 4 MG/1
TABLET ORAL
Qty: 1 PACKAGE | Refills: 0 | Status: SHIPPED | OUTPATIENT
Start: 2020-03-03 | End: 2020-03-24

## 2020-03-03 RX ORDER — LEVOFLOXACIN 750 MG/1
750 TABLET ORAL DAILY
Qty: 7 TABLET | Refills: 0 | Status: SHIPPED | OUTPATIENT
Start: 2020-03-03 | End: 2020-08-21

## 2020-03-05 NOTE — PROGRESS NOTES
Subjective:      Patient ID: Luann Mcgovern is a 54 y.o. female.    Chief Complaint: Cough and Wheezing      Patient reports car coughing, wheezing, shortness of breath for almost a week now.  She just returned from a trip to visit family in [place], was exposed to multiple grandchildren with the flu.  She denies any recent fevers.    Review of Systems   Constitutional: Positive for fatigue. Negative for activity change, appetite change and fever.   HENT: Positive for congestion, postnasal drip, rhinorrhea and sore throat. Negative for sinus pressure and sinus pain.    Respiratory: Positive for cough, shortness of breath and wheezing.    Gastrointestinal: Negative for abdominal pain, nausea and vomiting.   Musculoskeletal: Negative for myalgias.   Skin: Negative for rash.   Neurological: Negative for headaches.     Past Medical History:   Diagnosis Date    Allergy     seasonal     Asthma     Pneumonia           Past Surgical History:   Procedure Laterality Date    KNEE SURGERY Left     age 13 - injury - no tears, just cleaned it up     LUMBAR NERVE STIMLATOR INSERTION  2018    TONSILLECTOMY      TUBAL LIGATION  01/19/1990    VAGINAL DELIVERY      2 births      Family History   Problem Relation Age of Onset    Thyroid disease Mother      Social History     Socioeconomic History    Marital status:      Spouse name: Not on file    Number of children: 2    Years of education: Not on file    Highest education level: Not on file   Occupational History    Occupation: RN - ED or surgery     Comment: [employer]     Occupation: CDL - for [employer]   Social Needs    Financial resource strain: Not on file    Food insecurity:     Worry: Not on file     Inability: Not on file    Transportation needs:     Medical: Not on file     Non-medical: Not on file   Tobacco Use    Smoking status: Never Smoker    Smokeless tobacco: Never Used   Substance and Sexual Activity    Alcohol use: No      "Frequency: Never     Binge frequency: Never    Drug use: No    Sexual activity: Yes     Partners: Male   Lifestyle    Physical activity:     Days per week: 3 days     Minutes per session: 60 min    Stress: To some extent   Relationships    Social connections:     Talks on phone: More than three times a week     Gets together: More than three times a week     Attends Roman Catholic service: Not on file     Active member of club or organization: Patient refused     Attends meetings of clubs or organizations: Patient refused     Relationship status:    Other Topics Concern    Not on file   Social History Narrative    Not on file     Review of patient's allergies indicates:   Allergen Reactions    Iodinated contrast media      Pt states that she is not allergic omnipeg dye, but is only allergic to the older dyes.    Iodine and iodide containing products      Injectables ???     Loperamide-simethicone     Metoclopramide hcl      reglan     Mobic [meloxicam]      Chest and neck pain .  Decrease O2    Diltiazem hcl Rash and Swelling       Objective:       BP (!) 150/100 (BP Location: Right arm, Patient Position: Sitting, BP Method: X-Large (Manual))   Pulse (!) 111   Temp 98.5 °F (36.9 °C) (Tympanic)   Ht 5' 6" (1.676 m)   Wt 106.6 kg (235 lb 0.2 oz)   LMP 11/15/2002   SpO2 98%   BMI 37.93 kg/m²   Physical Exam   Constitutional: She appears well-developed and well-nourished. No distress.   HENT:   Head: Normocephalic.   Right Ear: Hearing, tympanic membrane, external ear and ear canal normal.   Left Ear: Hearing, tympanic membrane, external ear and ear canal normal.   Nose: Mucosal edema present. Right sinus exhibits no maxillary sinus tenderness and no frontal sinus tenderness. Left sinus exhibits no maxillary sinus tenderness and no frontal sinus tenderness.   Mouth/Throat: Uvula is midline and mucous membranes are normal. Posterior oropharyngeal erythema present.   Eyes: Pupils are equal, round, " and reactive to light. Conjunctivae and EOM are normal.   Cardiovascular: Normal rate, regular rhythm and normal heart sounds.   Pulmonary/Chest: Effort normal. No respiratory distress. She has wheezes.   Lymphadenopathy:     She has no cervical adenopathy.   Skin: Skin is warm and dry. She is not diaphoretic.   Psychiatric: She has a normal mood and affect. Her behavior is normal.   Nursing note and vitals reviewed.    Assessment:     1. Cough    2. Exposure to the flu    3. Exacerbation of asthma, unspecified asthma severity, unspecified whether persistent      Plan:   Cough  -     X-Ray Chest PA And Lateral; Future; Expected date: 03/03/2020 - NAF  -     POCT Influenza A/B Molecular -negative    Exposure to the flu    Exacerbation of asthma, unspecified asthma severity, unspecified whether persistent    Other orders  -     levoFLOXacin (LEVAQUIN) 750 MG tablet; Take 1 tablet (750 mg total) by mouth once daily.  Dispense: 7 tablet; Refill: 0  -     methylPREDNISolone (MEDROL DOSEPACK) 4 mg tablet; use as directed  Dispense: 1 Package; Refill: 0  -     guaifenesin-codeine 100-10 mg/5 ml (TUSSI-ORGANIDIN NR)  mg/5 mL syrup; Take 5 mLs by mouth 3 (three) times daily as needed for Cough.  Dispense: 120 mL; Refill: 0      Medication List with Changes/Refills   New Medications    GUAIFENESIN-CODEINE 100-10 MG/5 ML (TUSSI-ORGANIDIN NR)  MG/5 ML SYRUP    Take 5 mLs by mouth 3 (three) times daily as needed for Cough.    LEVOFLOXACIN (LEVAQUIN) 750 MG TABLET    Take 1 tablet (750 mg total) by mouth once daily.    METHYLPREDNISOLONE (MEDROL DOSEPACK) 4 MG TABLET    use as directed   Current Medications    ALBUTEROL INHL    Inhale into the lungs.    ASPIRIN-ACETAMINOPHEN-CAFFEINE 250-250-65 MG (EXCEDRIN MIGRAINE) 250-250-65 MG PER TABLET    Take 1 tablet by mouth.    AZELASTINE (ASTELIN) 137 MCG (0.1 %) NASAL SPRAY    1 spray (137 mcg total) by Nasal route 2 (two) times daily.    BENZONATATE (TESSALON) 200 MG  CAPSULE    Take 200 mg by mouth 3 (three) times daily as needed for Cough.    CETIRIZINE (ZYRTEC) 10 MG TABLET    Take 10 mg by mouth as needed for Allergies.    DIAZEPAM (VALIUM) 5 MG TABLET    Take 5 mg by mouth every 12 (twelve) hours as needed for Anxiety.    DICLOFENAC SODIUM (VOLTAREN) 1 % GEL    Apply 2 g topically 4 (four) times daily.    DIPHENHYDRAMINE (BENADRYL) 25 MG CAPSULE    Take 25 mg by mouth nightly as needed for Itching.    ESZOPICLONE (LUNESTA) 3 MG TAB    Take 1 tablet (3 mg total) by mouth every evening.    GABAPENTIN (NEURONTIN) 600 MG TABLET    Take 600 mg by mouth 2 (two) times daily.     HYDROCODONE-ACETAMINOPHEN (NORCO)  MG PER TABLET    TAKE 1 TABLET BY MOUTH THREE TIMES DAILY AS NEEDED FOR CHRONIC PAIN    IPRATROPIUM (ATROVENT) 0.02 % NEBULIZER SOLUTION    Take 2.5 mLs (500 mcg total) by nebulization 4 (four) times daily. Rescue    METHOCARBAMOL (ROBAXIN) 750 MG TAB    Take 500 mg by mouth 3 (three) times daily.    NYSTATIN (MYCOSTATIN) CREAM    APPLY  CREAM TOPICALLY TWICE DAILY    NYSTOP POWDER    APPLY  POWDER TOPICALLY TWICE DAILY    PANTOPRAZOLE (PROTONIX) 40 MG TABLET    Take 1 tablet (40 mg total) by mouth 2 (two) times daily.    PREDNISONE (DELTASONE) 10 MG TABLET    Three per day for 3 days, of then 2 per day for 3 days, then 1 per day for another 3 days    PROAIR RESPICLICK 90 MCG/ACTUATION AEPB    Inhale 2 puffs into the lungs every 4 (four) hours as needed.    PROMETHAZINE (PHENERGAN) 25 MG TABLET    Take 25 mg by mouth every 6 (six) hours as needed for Nausea.    TIZANIDINE 4 MG CAP    Take 4 mg by mouth every 8 (eight) hours as needed.    TRIAMCINOLONE ACETONIDE 0.1% (KENALOG) 0.1 % OINTMENT    Apply topically 2 (two) times daily.

## 2020-03-15 ENCOUNTER — PATIENT MESSAGE (OUTPATIENT)
Dept: INTERNAL MEDICINE | Facility: CLINIC | Age: 55
End: 2020-03-15

## 2020-03-17 ENCOUNTER — TELEPHONE (OUTPATIENT)
Dept: INTERNAL MEDICINE | Facility: CLINIC | Age: 55
End: 2020-03-17

## 2020-03-17 ENCOUNTER — APPOINTMENT (OUTPATIENT)
Dept: INTERNAL MEDICINE | Facility: CLINIC | Age: 55
End: 2020-03-17
Payer: COMMERCIAL

## 2020-03-17 DIAGNOSIS — J45.20 INTERMITTENT ASTHMA, UNSPECIFIED ASTHMA SEVERITY, UNSPECIFIED WHETHER COMPLICATED: ICD-10-CM

## 2020-03-17 DIAGNOSIS — R05.9 COUGH: Primary | ICD-10-CM

## 2020-03-17 DIAGNOSIS — R50.9 FEVER, UNSPECIFIED FEVER CAUSE: ICD-10-CM

## 2020-03-17 DIAGNOSIS — R06.02 SOB (SHORTNESS OF BREATH): ICD-10-CM

## 2020-03-17 LAB
CTP QC/QA: YES
POC MOLECULAR INFLUENZA A AGN: NEGATIVE
POC MOLECULAR INFLUENZA B AGN: NEGATIVE

## 2020-03-17 PROCEDURE — U0002 COVID-19 LAB TEST NON-CDC: HCPCS

## 2020-03-17 NOTE — TELEPHONE ENCOUNTER
----- Message from Layla Neff sent at 3/17/2020  7:22 AM CDT -----  Contact: Patient   Luann would like a call back at [phone], Regards to her last visit she stated that she still has the cough and fever.    Thanks  Td

## 2020-03-17 NOTE — TELEPHONE ENCOUNTER
Patient has 100.2 fever, sore throat, SOB,  and dry cough. She would like to know what to do from here. As she did go to [place] on 2/21. Her  is freaking out and wants her to be tested. She is taking tylenol and motrin to help the temperature down. I informed her about our [place]. She would like to have a script to go to a drive through

## 2020-03-20 ENCOUNTER — TELEPHONE (OUTPATIENT)
Dept: INTERNAL MEDICINE | Facility: CLINIC | Age: 55
End: 2020-03-20

## 2020-03-20 NOTE — TELEPHONE ENCOUNTER
----- Message from Melissa Lara sent at 3/20/2020  4:24 PM CDT -----  Contact: self  Pt is requesting a call back regarding COVID-19 results. Please call pt back at [phone]

## 2020-03-20 NOTE — TELEPHONE ENCOUNTER
----- Message from Lexy Locke sent at 3/20/2020 11:35 AM CDT -----  Contact: pt   Type:  Test Results    Who Called: EDGARD WHITE   Name of Test (Lab/Mammo/Etc): COVID TESTING  Date of Test:  03/17/2020  Ordering Provider: niko  Where the test was performed: [facility]  Would the patient rather a call back or a response via [facility]?  Call   Best Call Back Number:  [phone] (home) or  [phone]  Additional Information:

## 2020-03-22 LAB — SARS-COV-2 RNA RESP QL NAA+PROBE: NORMAL

## 2020-03-23 ENCOUNTER — TELEPHONE (OUTPATIENT)
Dept: INTERNAL MEDICINE | Facility: CLINIC | Age: 55
End: 2020-03-23

## 2020-03-30 RX ORDER — BENZONATATE 200 MG/1
CAPSULE ORAL
Qty: 30 CAPSULE | Refills: 3 | Status: SHIPPED | OUTPATIENT
Start: 2020-03-30 | End: 2020-09-02

## 2020-05-17 RX ORDER — ALBUTEROL SULFATE 90 UG/1
POWDER, METERED RESPIRATORY (INHALATION)
Qty: 1 EACH | OUTPATIENT
Start: 2020-05-17 | End: 2020-05-25

## 2020-05-25 RX ORDER — ALBUTEROL SULFATE 90 UG/1
POWDER, METERED RESPIRATORY (INHALATION)
Qty: 1 EACH | Refills: 11 | Status: SHIPPED | OUTPATIENT
Start: 2020-05-25 | End: 2022-09-14 | Stop reason: SDUPTHER

## 2020-08-10 ENCOUNTER — PATIENT OUTREACH (OUTPATIENT)
Dept: ADMINISTRATIVE | Facility: HOSPITAL | Age: 55
End: 2020-08-10

## 2020-08-21 ENCOUNTER — APPOINTMENT (OUTPATIENT)
Dept: RADIOLOGY | Facility: HOSPITAL | Age: 55
End: 2020-08-21
Attending: FAMILY MEDICINE
Payer: COMMERCIAL

## 2020-08-21 ENCOUNTER — LAB VISIT (OUTPATIENT)
Dept: LAB | Facility: HOSPITAL | Age: 55
End: 2020-08-21
Attending: FAMILY MEDICINE
Payer: COMMERCIAL

## 2020-08-21 ENCOUNTER — CLINICAL SUPPORT (OUTPATIENT)
Dept: INTERNAL MEDICINE | Facility: CLINIC | Age: 55
End: 2020-08-21
Payer: COMMERCIAL

## 2020-08-21 ENCOUNTER — OFFICE VISIT (OUTPATIENT)
Dept: INTERNAL MEDICINE | Facility: CLINIC | Age: 55
End: 2020-08-21
Payer: COMMERCIAL

## 2020-08-21 VITALS
OXYGEN SATURATION: 98 % | TEMPERATURE: 98 F | HEIGHT: 68 IN | DIASTOLIC BLOOD PRESSURE: 76 MMHG | HEART RATE: 96 BPM | SYSTOLIC BLOOD PRESSURE: 128 MMHG | WEIGHT: 227.38 LBS | BODY MASS INDEX: 34.46 KG/M2

## 2020-08-21 DIAGNOSIS — M47.12 CERVICAL SPONDYLOSIS WITH MYELOPATHY AND RADICULOPATHY: ICD-10-CM

## 2020-08-21 DIAGNOSIS — M47.22 CERVICAL SPONDYLOSIS WITH MYELOPATHY AND RADICULOPATHY: ICD-10-CM

## 2020-08-21 DIAGNOSIS — Z01.818 PREOP EXAMINATION: ICD-10-CM

## 2020-08-21 DIAGNOSIS — M47.22 CERVICAL SPONDYLOSIS WITH MYELOPATHY AND RADICULOPATHY: Primary | ICD-10-CM

## 2020-08-21 DIAGNOSIS — M47.12 CERVICAL SPONDYLOSIS WITH MYELOPATHY AND RADICULOPATHY: Primary | ICD-10-CM

## 2020-08-21 LAB
ALBUMIN SERPL BCP-MCNC: 3.7 G/DL (ref 3.5–5.2)
ALBUMIN SERPL BCP-MCNC: 3.7 G/DL (ref 3.5–5.2)
ALP SERPL-CCNC: 108 U/L (ref 55–135)
ALT SERPL W/O P-5'-P-CCNC: 77 U/L (ref 10–44)
ANION GAP SERPL CALC-SCNC: 10 MMOL/L (ref 8–16)
AST SERPL-CCNC: 65 U/L (ref 10–40)
BASOPHILS # BLD AUTO: 0.07 K/UL (ref 0–0.2)
BASOPHILS NFR BLD: 0.8 % (ref 0–1.9)
BILIRUB SERPL-MCNC: 0.3 MG/DL (ref 0.1–1)
BUN SERPL-MCNC: 21 MG/DL (ref 6–20)
CALCIUM SERPL-MCNC: 9.5 MG/DL (ref 8.7–10.5)
CHLORIDE SERPL-SCNC: 106 MMOL/L (ref 95–110)
CO2 SERPL-SCNC: 26 MMOL/L (ref 23–29)
CREAT SERPL-MCNC: 0.8 MG/DL (ref 0.5–1.4)
CRP SERPL-MCNC: 8.9 MG/L (ref 0–8.2)
DIFFERENTIAL METHOD: ABNORMAL
EOSINOPHIL # BLD AUTO: 0.6 K/UL (ref 0–0.5)
EOSINOPHIL NFR BLD: 7.1 % (ref 0–8)
ERYTHROCYTE [DISTWIDTH] IN BLOOD BY AUTOMATED COUNT: 13.3 % (ref 11.5–14.5)
ERYTHROCYTE [SEDIMENTATION RATE] IN BLOOD BY WESTERGREN METHOD: 36 MM/HR (ref 0–36)
EST. GFR  (AFRICAN AMERICAN): >60 ML/MIN/1.73 M^2
EST. GFR  (NON AFRICAN AMERICAN): >60 ML/MIN/1.73 M^2
GLUCOSE SERPL-MCNC: 86 MG/DL (ref 70–110)
HCT VFR BLD AUTO: 46.7 % (ref 37–48.5)
HGB BLD-MCNC: 14.3 G/DL (ref 12–16)
IMM GRANULOCYTES # BLD AUTO: 0.05 K/UL (ref 0–0.04)
IMM GRANULOCYTES NFR BLD AUTO: 0.6 % (ref 0–0.5)
LYMPHOCYTES # BLD AUTO: 2.6 K/UL (ref 1–4.8)
LYMPHOCYTES NFR BLD: 30.7 % (ref 18–48)
MCH RBC QN AUTO: 29.2 PG (ref 27–31)
MCHC RBC AUTO-ENTMCNC: 30.6 G/DL (ref 32–36)
MCV RBC AUTO: 96 FL (ref 82–98)
MONOCYTES # BLD AUTO: 0.6 K/UL (ref 0.3–1)
MONOCYTES NFR BLD: 7.6 % (ref 4–15)
NEUTROPHILS # BLD AUTO: 4.4 K/UL (ref 1.8–7.7)
NEUTROPHILS NFR BLD: 53.2 % (ref 38–73)
NRBC BLD-RTO: 0 /100 WBC
PLATELET # BLD AUTO: 280 K/UL (ref 150–350)
PMV BLD AUTO: 9.7 FL (ref 9.2–12.9)
POTASSIUM SERPL-SCNC: 4.1 MMOL/L (ref 3.5–5.1)
PROT SERPL-MCNC: 7.2 G/DL (ref 6–8.4)
RBC # BLD AUTO: 4.89 M/UL (ref 4–5.4)
SODIUM SERPL-SCNC: 142 MMOL/L (ref 136–145)
WBC # BLD AUTO: 8.34 K/UL (ref 3.9–12.7)

## 2020-08-21 PROCEDURE — 3008F PR BODY MASS INDEX (BMI) DOCUMENTED: ICD-10-PCS | Mod: CPTII,S$GLB,, | Performed by: FAMILY MEDICINE

## 2020-08-21 PROCEDURE — 3008F BODY MASS INDEX DOCD: CPT | Mod: CPTII,S$GLB,, | Performed by: FAMILY MEDICINE

## 2020-08-21 PROCEDURE — 85025 COMPLETE CBC W/AUTO DIFF WBC: CPT

## 2020-08-21 PROCEDURE — 93010 ELECTROCARDIOGRAM REPORT: CPT | Mod: S$GLB,,, | Performed by: INTERNAL MEDICINE

## 2020-08-21 PROCEDURE — 86140 C-REACTIVE PROTEIN: CPT

## 2020-08-21 PROCEDURE — 99999 PR PBB SHADOW E&M-EST. PATIENT-LVL IV: ICD-10-PCS | Mod: PBBFAC,,, | Performed by: FAMILY MEDICINE

## 2020-08-21 PROCEDURE — 93005 EKG 12-LEAD: ICD-10-PCS | Mod: S$GLB,,, | Performed by: FAMILY MEDICINE

## 2020-08-21 PROCEDURE — 71046 XR CHEST PA AND LATERAL: ICD-10-PCS | Mod: 26,,, | Performed by: RADIOLOGY

## 2020-08-21 PROCEDURE — 99213 OFFICE O/P EST LOW 20 MIN: CPT | Mod: S$GLB,,, | Performed by: FAMILY MEDICINE

## 2020-08-21 PROCEDURE — 85730 THROMBOPLASTIN TIME PARTIAL: CPT

## 2020-08-21 PROCEDURE — 71046 X-RAY EXAM CHEST 2 VIEWS: CPT | Mod: TC,PO

## 2020-08-21 PROCEDURE — 80053 COMPREHEN METABOLIC PANEL: CPT

## 2020-08-21 PROCEDURE — 87147 CULTURE TYPE IMMUNOLOGIC: CPT

## 2020-08-21 PROCEDURE — 93005 ELECTROCARDIOGRAM TRACING: CPT | Mod: S$GLB,,, | Performed by: FAMILY MEDICINE

## 2020-08-21 PROCEDURE — 87088 URINE BACTERIA CULTURE: CPT

## 2020-08-21 PROCEDURE — 36415 COLL VENOUS BLD VENIPUNCTURE: CPT | Mod: PO

## 2020-08-21 PROCEDURE — 87086 URINE CULTURE/COLONY COUNT: CPT

## 2020-08-21 PROCEDURE — 93010 EKG 12-LEAD: ICD-10-PCS | Mod: S$GLB,,, | Performed by: INTERNAL MEDICINE

## 2020-08-21 PROCEDURE — 99213 PR OFFICE/OUTPT VISIT, EST, LEVL III, 20-29 MIN: ICD-10-PCS | Mod: S$GLB,,, | Performed by: FAMILY MEDICINE

## 2020-08-21 PROCEDURE — 71046 X-RAY EXAM CHEST 2 VIEWS: CPT | Mod: 26,,, | Performed by: RADIOLOGY

## 2020-08-21 PROCEDURE — 99999 PR PBB SHADOW E&M-EST. PATIENT-LVL IV: CPT | Mod: PBBFAC,,, | Performed by: FAMILY MEDICINE

## 2020-08-21 PROCEDURE — 87081 CULTURE SCREEN ONLY: CPT

## 2020-08-21 PROCEDURE — 85652 RBC SED RATE AUTOMATED: CPT

## 2020-08-21 PROCEDURE — 85610 PROTHROMBIN TIME: CPT

## 2020-08-21 PROCEDURE — 81001 URINALYSIS AUTO W/SCOPE: CPT

## 2020-08-21 RX ORDER — ESZOPICLONE 3 MG/1
3 TABLET, FILM COATED ORAL NIGHTLY
Qty: 90 TABLET | Refills: 1 | Status: SHIPPED | OUTPATIENT
Start: 2020-08-21 | End: 2021-04-20

## 2020-08-21 NOTE — PROGRESS NOTES
MRSA swab done, patient tolerated well. Advised to call the office as needed, patient verbalized understanding.

## 2020-08-21 NOTE — PROGRESS NOTES
Subjective:      Patient ID: Luann Mcgovern is a 54 y.o. female.    Chief Complaint: Pre-op Exam      Here for pre-op eval.  Surgeon: Dr. Jett Zuniga  Date Of Surgery: pending - next month  Diagnosis: cervical spondylosis with radiculopathy  Procedure: C2-C6 ACDF    No complications from previous anesthesia.  Pt has no active cardiac conditions.  No h/o CAD or prior MI.   No h/o CHF.  No h/o DM, insulin dependent.  No h/o CKD on HD.  Good functional status.   Patient denies CP, SOB, or palpitations during the last six months.    Review of Systems   Constitutional: Negative for activity change, appetite change, fatigue and fever.   Respiratory: Negative for shortness of breath.    Cardiovascular: Negative for chest pain and palpitations.   Gastrointestinal: Negative for abdominal pain, constipation and diarrhea.   Musculoskeletal: Positive for arthralgias and neck pain.     Past Medical History:   Diagnosis Date    Allergy     seasonal     Asthma     Pneumonia           Past Surgical History:   Procedure Laterality Date    KNEE SURGERY Left     age 13 - injury - no tears, just cleaned it up     LUMBAR NERVE STIMLATOR INSERTION  2018    TONSILLECTOMY      TUBAL LIGATION  01/19/1990    VAGINAL DELIVERY      2 births      Family History   Problem Relation Age of Onset    Thyroid disease Mother      Social History     Socioeconomic History    Marital status:      Spouse name: Not on file    Number of children: 2    Years of education: Not on file    Highest education level: Not on file   Occupational History    Occupation: RN - ED or surgery     Comment: [employer]     Occupation: CDL - for [employer]   Social Needs    Financial resource strain: Not on file    Food insecurity     Worry: Not on file     Inability: Not on file    Transportation needs     Medical: Not on file     Non-medical: Not on file   Tobacco Use    Smoking status: Never Smoker    Smokeless tobacco: Never Used  "  Substance and Sexual Activity    Alcohol use: No     Frequency: Never     Binge frequency: Never    Drug use: No    Sexual activity: Yes     Partners: Male   Lifestyle    Physical activity     Days per week: 3 days     Minutes per session: 60 min    Stress: To some extent   Relationships    Social connections     Talks on phone: More than three times a week     Gets together: More than three times a week     Attends Adventism service: Not on file     Active member of club or organization: Patient refused     Attends meetings of clubs or organizations: Patient refused     Relationship status:    Other Topics Concern    Not on file   Social History Narrative    Not on file     Review of patient's allergies indicates:   Allergen Reactions    Meloxicam Anaphylaxis     Chest and neck pain .  Decrease O2    Iodinated contrast media      Pt states that she is not allergic omnipeg dye, but is only allergic to the older dyes.    Iodine and iodide containing products      Injectables ???     Loperamide-simethicone     Metoclopramide hcl      reglan     Diltiazem hcl Rash and Swelling       Objective:       /76 (BP Location: Right arm, Patient Position: Sitting, BP Method: X-Large (Manual))   Pulse 96   Temp 98.1 °F (36.7 °C) (Temporal)   Ht 5' 8" (1.727 m)   Wt 103.1 kg (227 lb 6.5 oz)   LMP 11/15/2002   SpO2 98%   BMI 34.58 kg/m²   Physical Exam  Vitals signs reviewed.   Constitutional:       General: She is not in acute distress.     Appearance: Normal appearance. She is well-developed. She is not ill-appearing or diaphoretic.   HENT:      Head: Normocephalic and atraumatic.      Right Ear: Hearing, tympanic membrane, ear canal and external ear normal.      Left Ear: Hearing, tympanic membrane, ear canal and external ear normal.      Nose: Nose normal.      Mouth/Throat:      Pharynx: Uvula midline. No oropharyngeal exudate.   Eyes:      Conjunctiva/sclera: Conjunctivae normal.      " Pupils: Pupils are equal, round, and reactive to light.   Neck:      Musculoskeletal: Normal range of motion and neck supple.      Thyroid: No thyromegaly.      Trachea: No tracheal deviation.   Cardiovascular:      Rate and Rhythm: Normal rate and regular rhythm.      Heart sounds: Normal heart sounds. No murmur.   Pulmonary:      Effort: Pulmonary effort is normal. No respiratory distress.      Breath sounds: Normal breath sounds.   Abdominal:      General: Bowel sounds are normal.      Palpations: Abdomen is soft.      Tenderness: There is no abdominal tenderness. There is no guarding.      Hernia: No hernia is present.   Musculoskeletal: Normal range of motion.   Lymphadenopathy:      Cervical: No cervical adenopathy.   Skin:     General: Skin is warm and dry.      Capillary Refill: Capillary refill takes less than 2 seconds.   Neurological:      General: No focal deficit present.      Mental Status: She is alert and oriented to person, place, and time.   Psychiatric:         Mood and Affect: Mood normal.         Behavior: Behavior normal.         Thought Content: Thought content normal.         Judgment: Judgment normal.       Assessment:     1. Cervical spondylosis with myelopathy and radiculopathy    2. Preop examination      Plan:   Cervical spondylosis with myelopathy and radiculopathy    Preop examination  -     X-Ray Chest PA And Lateral; Future; Expected date: 08/21/2020  -     IN OFFICE EKG 12-LEAD (to Muse)  -     CBC auto differential; Future; Expected date: 08/21/2020  -     Comprehensive metabolic panel; Future; Expected date: 02/17/2021  -     Sedimentation rate; Future; Expected date: 08/21/2020  -     Urinalysis; Future; Expected date: 08/21/2020  -     Urine culture; Future; Expected date: 08/21/2020  -     Protime-INR; Future; Expected date: 08/21/2020  -     APTT; Future; Expected date: 08/21/2020  -     C-reactive protein; Future; Expected date: 08/21/2020  -     ALBUMIN; Future; Expected  date: 08/21/2020  -     Culture, MRSA; Future; Expected date: 08/21/2020    CXR stable, NAF. Right hemidiaphragm elevated unchanged from previous        Addendum 8/24/2020: Urine culture grew out strep - patient started on augmentin. After review of above labs, EKG, CXR patient appears to be stable for surgery. She is cleared for surgery under general anesthesia.    Medication List with Changes/Refills   Current Medications    ALBUTEROL INHL    Inhale into the lungs.    ASPIRIN-ACETAMINOPHEN-CAFFEINE 250-250-65 MG (EXCEDRIN MIGRAINE) 250-250-65 MG PER TABLET    Take 1 tablet by mouth.    AZELASTINE (ASTELIN) 137 MCG (0.1 %) NASAL SPRAY    1 spray (137 mcg total) by Nasal route 2 (two) times daily.    BENZONATATE (TESSALON) 200 MG CAPSULE    Take 1 capsule by mouth three times daily as needed for cough    CETIRIZINE (ZYRTEC) 10 MG TABLET    Take 10 mg by mouth as needed for Allergies.    DIAZEPAM (VALIUM) 5 MG TABLET    Take 5 mg by mouth every 12 (twelve) hours as needed for Anxiety.    DICLOFENAC SODIUM (VOLTAREN) 1 % GEL    Apply 2 g topically 4 (four) times daily.    DIPHENHYDRAMINE (BENADRYL) 25 MG CAPSULE    Take 25 mg by mouth nightly as needed for Itching.    ESZOPICLONE (LUNESTA) 3 MG TAB    Take 1 tablet (3 mg total) by mouth every evening.    GABAPENTIN (NEURONTIN) 600 MG TABLET    Take 600 mg by mouth 2 (two) times daily.     HYDROCODONE-ACETAMINOPHEN (NORCO)  MG PER TABLET    TAKE 1 TABLET BY MOUTH THREE TIMES DAILY AS NEEDED FOR CHRONIC PAIN    IPRATROPIUM (ATROVENT) 0.02 % NEBULIZER SOLUTION    Take 2.5 mLs (500 mcg total) by nebulization 4 (four) times daily. Rescue    METHOCARBAMOL (ROBAXIN) 750 MG TAB    Take 500 mg by mouth 3 (three) times daily.    NYSTATIN (MYCOSTATIN) CREAM    APPLY  CREAM TOPICALLY TWICE DAILY    NYSTOP POWDER    APPLY  POWDER TOPICALLY TWICE DAILY    PANTOPRAZOLE (PROTONIX) 40 MG TABLET    Take 1 tablet (40 mg total) by mouth 2 (two) times daily.    PROAIR RESPICLICK 90  MCG/ACTUATION INHALER    INHALE 2 PUFFS BY MOUTH EVERY 4 HOURS AS NEEDED    PROMETHAZINE (PHENERGAN) 25 MG TABLET    Take 25 mg by mouth every 6 (six) hours as needed for Nausea.    TIZANIDINE 4 MG CAP    Take 4 mg by mouth every 8 (eight) hours as needed.    TRIAMCINOLONE ACETONIDE 0.1% (KENALOG) 0.1 % OINTMENT    Apply topically 2 (two) times daily.   Discontinued Medications    LEVOFLOXACIN (LEVAQUIN) 750 MG TABLET    Take 1 tablet (750 mg total) by mouth once daily.    PREDNISONE (DELTASONE) 10 MG TABLET    Three per day for 3 days, of then 2 per day for 3 days, then 1 per day for another 3 days

## 2020-08-22 LAB
APTT BLDCRRT: 29.9 SEC (ref 21–32)
BACTERIA #/AREA URNS AUTO: ABNORMAL /HPF
BACTERIA UR CULT: ABNORMAL
BILIRUB UR QL STRIP: NEGATIVE
CAOX CRY UR QL COMP ASSIST: ABNORMAL
CLARITY UR REFRACT.AUTO: ABNORMAL
COLOR UR AUTO: YELLOW
GLUCOSE UR QL STRIP: NEGATIVE
HGB UR QL STRIP: NEGATIVE
INR PPP: 0.9 (ref 0.8–1.2)
KETONES UR QL STRIP: NEGATIVE
LEUKOCYTE ESTERASE UR QL STRIP: ABNORMAL
MICROSCOPIC COMMENT: ABNORMAL
NITRITE UR QL STRIP: NEGATIVE
PH UR STRIP: 5 [PH] (ref 5–8)
PROT UR QL STRIP: NEGATIVE
PROTHROMBIN TIME: 10.5 SEC (ref 9–12.5)
RBC #/AREA URNS AUTO: 1 /HPF (ref 0–4)
SP GR UR STRIP: 1.02 (ref 1–1.03)
SQUAMOUS #/AREA URNS AUTO: 2 /HPF
URN SPEC COLLECT METH UR: ABNORMAL
WBC #/AREA URNS AUTO: 53 /HPF (ref 0–5)

## 2020-08-23 LAB — MRSA SPEC QL CULT: NORMAL

## 2020-08-24 RX ORDER — AMOXICILLIN AND CLAVULANATE POTASSIUM 875; 125 MG/1; MG/1
1 TABLET, FILM COATED ORAL EVERY 12 HOURS
Qty: 20 TABLET | Refills: 0 | Status: SHIPPED | OUTPATIENT
Start: 2020-08-24 | End: 2020-11-03

## 2020-08-24 RX ORDER — AMOXICILLIN AND CLAVULANATE POTASSIUM 875; 125 MG/1; MG/1
1 TABLET, FILM COATED ORAL EVERY 12 HOURS
Qty: 20 TABLET | Refills: 0 | Status: SHIPPED | OUTPATIENT
Start: 2020-08-24 | End: 2020-08-24 | Stop reason: SDUPTHER

## 2020-09-12 ENCOUNTER — NURSE TRIAGE (OUTPATIENT)
Dept: ADMINISTRATIVE | Facility: CLINIC | Age: 55
End: 2020-09-12

## 2020-09-12 NOTE — TELEPHONE ENCOUNTER
Reason for Disposition   Health Information question, no triage required and triager able to answer question    Additional Information   Negative: [1] Caller is not with the adult (patient) AND [2] reporting urgent symptoms   Negative: Lab result questions   Negative: Medication questions   Negative: Caller can't be reached by phone   Negative: Caller has already spoken to PCP or another triager   Negative: RN needs further essential information from caller in order to complete triage   Negative: Requesting regular office appointment   Negative: [1] Caller requesting NON-URGENT health information AND [2] PCP's office is the best resource    Protocols used: INFORMATION ONLY CALL-A-  pt has UTI that was treated. Having back surg fusion on Monday. pt has rx to get repeat urinalysis rodney. rec UC to get test and rec to request copy of results to take to surg. Pt agrees. Call back with questions

## 2020-09-17 ENCOUNTER — PATIENT OUTREACH (OUTPATIENT)
Dept: ADMINISTRATIVE | Facility: HOSPITAL | Age: 55
End: 2020-09-17

## 2020-09-20 ENCOUNTER — DOCUMENTATION ONLY (OUTPATIENT)
Dept: ADMINISTRATIVE | Facility: HOSPITAL | Age: 55
End: 2020-09-20

## 2020-11-03 ENCOUNTER — OFFICE VISIT (OUTPATIENT)
Dept: INTERNAL MEDICINE | Facility: CLINIC | Age: 55
End: 2020-11-03
Payer: COMMERCIAL

## 2020-11-03 VITALS
HEART RATE: 106 BPM | OXYGEN SATURATION: 98 % | DIASTOLIC BLOOD PRESSURE: 102 MMHG | TEMPERATURE: 98 F | BODY MASS INDEX: 36.74 KG/M2 | WEIGHT: 241.63 LBS | SYSTOLIC BLOOD PRESSURE: 144 MMHG

## 2020-11-03 DIAGNOSIS — F32.A DEPRESSION, UNSPECIFIED DEPRESSION TYPE: Primary | ICD-10-CM

## 2020-11-03 DIAGNOSIS — R82.90 URINE MALODOR: ICD-10-CM

## 2020-11-03 DIAGNOSIS — F43.10 PTSD (POST-TRAUMATIC STRESS DISORDER): ICD-10-CM

## 2020-11-03 LAB
BACTERIA #/AREA URNS AUTO: ABNORMAL /HPF
BILIRUB UR QL STRIP: NEGATIVE
CAOX CRY UR QL COMP ASSIST: ABNORMAL
CLARITY UR REFRACT.AUTO: ABNORMAL
COLOR UR AUTO: YELLOW
GLUCOSE UR QL STRIP: NEGATIVE
HGB UR QL STRIP: ABNORMAL
KETONES UR QL STRIP: NEGATIVE
LEUKOCYTE ESTERASE UR QL STRIP: ABNORMAL
MICROSCOPIC COMMENT: ABNORMAL
NITRITE UR QL STRIP: POSITIVE
PH UR STRIP: 5 [PH] (ref 5–8)
PROT UR QL STRIP: NEGATIVE
RBC #/AREA URNS AUTO: 3 /HPF (ref 0–4)
SP GR UR STRIP: 1.02 (ref 1–1.03)
SQUAMOUS #/AREA URNS AUTO: 1 /HPF
URN SPEC COLLECT METH UR: ABNORMAL
WBC #/AREA URNS AUTO: 51 /HPF (ref 0–5)
WBC CLUMPS UR QL AUTO: ABNORMAL

## 2020-11-03 PROCEDURE — 87086 URINE CULTURE/COLONY COUNT: CPT

## 2020-11-03 PROCEDURE — 99213 PR OFFICE/OUTPT VISIT, EST, LEVL III, 20-29 MIN: ICD-10-PCS | Mod: S$GLB,,, | Performed by: FAMILY MEDICINE

## 2020-11-03 PROCEDURE — 99999 PR PBB SHADOW E&M-EST. PATIENT-LVL V: ICD-10-PCS | Mod: PBBFAC,,, | Performed by: FAMILY MEDICINE

## 2020-11-03 PROCEDURE — 87186 SC STD MICRODIL/AGAR DIL: CPT

## 2020-11-03 PROCEDURE — 81001 URINALYSIS AUTO W/SCOPE: CPT

## 2020-11-03 PROCEDURE — 3008F PR BODY MASS INDEX (BMI) DOCUMENTED: ICD-10-PCS | Mod: CPTII,S$GLB,, | Performed by: FAMILY MEDICINE

## 2020-11-03 PROCEDURE — 87088 URINE BACTERIA CULTURE: CPT

## 2020-11-03 PROCEDURE — 3008F BODY MASS INDEX DOCD: CPT | Mod: CPTII,S$GLB,, | Performed by: FAMILY MEDICINE

## 2020-11-03 PROCEDURE — 87077 CULTURE AEROBIC IDENTIFY: CPT

## 2020-11-03 PROCEDURE — 99213 OFFICE O/P EST LOW 20 MIN: CPT | Mod: S$GLB,,, | Performed by: FAMILY MEDICINE

## 2020-11-03 PROCEDURE — 99999 PR PBB SHADOW E&M-EST. PATIENT-LVL V: CPT | Mod: PBBFAC,,, | Performed by: FAMILY MEDICINE

## 2020-11-03 RX ORDER — ESCITALOPRAM OXALATE 10 MG/1
10 TABLET ORAL DAILY
Qty: 30 TABLET | Refills: 11 | Status: SHIPPED | OUTPATIENT
Start: 2020-11-03 | End: 2020-11-23 | Stop reason: SDUPTHER
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
## 2020-11-03 NOTE — PATIENT INSTRUCTIONS
I put in a referral to psychiatry for counseling, please call the department at [phone] to schedule your appointment.

## 2020-11-04 RX ORDER — CIPROFLOXACIN 500 MG/1
500 TABLET ORAL 2 TIMES DAILY
Qty: 10 TABLET | Refills: 0 | Status: SHIPPED | OUTPATIENT
Start: 2020-11-04 | End: 2020-11-23

## 2020-11-06 ENCOUNTER — OFFICE VISIT (OUTPATIENT)
Dept: PSYCHIATRY | Facility: CLINIC | Age: 55
End: 2020-11-06
Payer: COMMERCIAL

## 2020-11-06 DIAGNOSIS — F32.A DEPRESSION, UNSPECIFIED DEPRESSION TYPE: ICD-10-CM

## 2020-11-06 DIAGNOSIS — F43.10 PTSD (POST-TRAUMATIC STRESS DISORDER): Primary | ICD-10-CM

## 2020-11-06 DIAGNOSIS — F41.1 GENERALIZED ANXIETY DISORDER WITH PANIC ATTACKS: ICD-10-CM

## 2020-11-06 DIAGNOSIS — F41.0 GENERALIZED ANXIETY DISORDER WITH PANIC ATTACKS: ICD-10-CM

## 2020-11-06 LAB — BACTERIA UR CULT: ABNORMAL

## 2020-11-06 PROCEDURE — 99999 PR PBB SHADOW E&M-EST. PATIENT-LVL III: CPT | Mod: PBBFAC,,,

## 2020-11-06 PROCEDURE — 90791 PSYCH DIAGNOSTIC EVALUATION: CPT | Mod: S$GLB,,, | Performed by: SOCIAL WORKER

## 2020-11-06 PROCEDURE — 99999 PR PBB SHADOW E&M-EST. PATIENT-LVL III: ICD-10-PCS | Mod: PBBFAC,,,

## 2020-11-06 PROCEDURE — 90791 PR PSYCHIATRIC DIAGNOSTIC EVALUATION: ICD-10-PCS | Mod: S$GLB,,, | Performed by: SOCIAL WORKER

## 2020-11-08 ENCOUNTER — PATIENT MESSAGE (OUTPATIENT)
Dept: INTERNAL MEDICINE | Facility: CLINIC | Age: 55
End: 2020-11-08

## 2020-11-09 ENCOUNTER — PATIENT MESSAGE (OUTPATIENT)
Dept: INTERNAL MEDICINE | Facility: CLINIC | Age: 55
End: 2020-11-09

## 2020-11-09 RX ORDER — CODEINE PHOSPHATE AND GUAIFENESIN 10; 100 MG/5ML; MG/5ML
5 SOLUTION ORAL 3 TIMES DAILY PRN
Qty: 120 ML | Refills: 0 | Status: SHIPPED | OUTPATIENT
Start: 2020-11-09 | End: 2020-11-19

## 2020-11-13 ENCOUNTER — TELEPHONE (OUTPATIENT)
Dept: INTERNAL MEDICINE | Facility: CLINIC | Age: 55
End: 2020-11-13

## 2020-11-13 NOTE — TELEPHONE ENCOUNTER
Since acute cough pharmacy can only fill the medicine for 7 days. Pharmacists wanted to make sure that was ok before he filled it. Please advise.

## 2020-11-13 NOTE — TELEPHONE ENCOUNTER
----- Message from Maribeth Tyler sent at 2020  8:59 AM CST -----  Regardindays max on medication and 105 ml  Type:  Pharmacy Calling to Clarify an RX    Name of Caller:Jose  Pharmacy Name:  [facility] [address] [place] [id number]  Phone: [phone] Fax: [phone]  Prescription Name:guaifenesin-codeine 100-10 mg/5 ml (TUSSI-ORGANIDIN NR)  mg/5 mL syrup  What do they need to clarify?: needs a Okay to fill for a 7 day supply, it is the max for an acute condition and can only be filled at 105ml  Best Call Back Number:[phone]  Additional Information: Please call back.Thanks

## 2020-11-23 ENCOUNTER — LAB VISIT (OUTPATIENT)
Dept: LAB | Facility: HOSPITAL | Age: 55
End: 2020-11-23
Payer: COMMERCIAL

## 2020-11-23 ENCOUNTER — OFFICE VISIT (OUTPATIENT)
Dept: INTERNAL MEDICINE | Facility: CLINIC | Age: 55
End: 2020-11-23
Payer: COMMERCIAL

## 2020-11-23 VITALS
TEMPERATURE: 99 F | WEIGHT: 248 LBS | OXYGEN SATURATION: 95 % | BODY MASS INDEX: 37.59 KG/M2 | DIASTOLIC BLOOD PRESSURE: 92 MMHG | SYSTOLIC BLOOD PRESSURE: 142 MMHG | HEART RATE: 87 BPM | HEIGHT: 68 IN

## 2020-11-23 DIAGNOSIS — Z00.00 ROUTINE ADULT HEALTH MAINTENANCE: ICD-10-CM

## 2020-11-23 DIAGNOSIS — F43.10 PTSD (POST-TRAUMATIC STRESS DISORDER): ICD-10-CM

## 2020-11-23 DIAGNOSIS — F32.A DEPRESSION, UNSPECIFIED DEPRESSION TYPE: Primary | ICD-10-CM

## 2020-11-23 DIAGNOSIS — N39.0 URINARY TRACT INFECTION WITHOUT HEMATURIA, SITE UNSPECIFIED: ICD-10-CM

## 2020-11-23 LAB
BASOPHILS # BLD AUTO: 0.08 K/UL (ref 0–0.2)
BASOPHILS NFR BLD: 1.1 % (ref 0–1.9)
BILIRUB SERPL-MCNC: NORMAL MG/DL
BLOOD URINE, POC: NORMAL
CLARITY, POC UA: NORMAL
COLOR, POC UA: NORMAL
DIFFERENTIAL METHOD: ABNORMAL
EOSINOPHIL # BLD AUTO: 0.6 K/UL (ref 0–0.5)
EOSINOPHIL NFR BLD: 8.1 % (ref 0–8)
ERYTHROCYTE [DISTWIDTH] IN BLOOD BY AUTOMATED COUNT: 12.4 % (ref 11.5–14.5)
GLUCOSE UR QL STRIP: NORMAL
HCT VFR BLD AUTO: 45.2 % (ref 37–48.5)
HGB BLD-MCNC: 13.7 G/DL (ref 12–16)
IMM GRANULOCYTES # BLD AUTO: 0.03 K/UL (ref 0–0.04)
IMM GRANULOCYTES NFR BLD AUTO: 0.4 % (ref 0–0.5)
KETONES UR QL STRIP: NEGATIVE
LEUKOCYTE ESTERASE URINE, POC: NORMAL
LYMPHOCYTES # BLD AUTO: 2.5 K/UL (ref 1–4.8)
LYMPHOCYTES NFR BLD: 34.4 % (ref 18–48)
MCH RBC QN AUTO: 29.5 PG (ref 27–31)
MCHC RBC AUTO-ENTMCNC: 30.3 G/DL (ref 32–36)
MCV RBC AUTO: 97 FL (ref 82–98)
MONOCYTES # BLD AUTO: 0.8 K/UL (ref 0.3–1)
MONOCYTES NFR BLD: 10.2 % (ref 4–15)
NEUTROPHILS # BLD AUTO: 3.4 K/UL (ref 1.8–7.7)
NEUTROPHILS NFR BLD: 45.8 % (ref 38–73)
NITRITE, POC UA: POSITIVE
NRBC BLD-RTO: 0 /100 WBC
PH, POC UA: 5
PLATELET # BLD AUTO: 278 K/UL (ref 150–350)
PMV BLD AUTO: 9.8 FL (ref 9.2–12.9)
PROTEIN, POC: NEGATIVE
RBC # BLD AUTO: 4.64 M/UL (ref 4–5.4)
SPECIFIC GRAVITY, POC UA: 1
UROBILINOGEN, POC UA: NORMAL
WBC # BLD AUTO: 7.32 K/UL (ref 3.9–12.7)

## 2020-11-23 PROCEDURE — 99999 PR PBB SHADOW E&M-EST. PATIENT-LVL V: CPT | Mod: PBBFAC,,, | Performed by: FAMILY MEDICINE

## 2020-11-23 PROCEDURE — 87077 CULTURE AEROBIC IDENTIFY: CPT

## 2020-11-23 PROCEDURE — 80061 LIPID PANEL: CPT

## 2020-11-23 PROCEDURE — 81002 POCT URINE DIPSTICK WITHOUT MICROSCOPE: ICD-10-PCS | Mod: S$GLB,,, | Performed by: FAMILY MEDICINE

## 2020-11-23 PROCEDURE — 99999 PR PBB SHADOW E&M-EST. PATIENT-LVL V: ICD-10-PCS | Mod: PBBFAC,,, | Performed by: FAMILY MEDICINE

## 2020-11-23 PROCEDURE — 99213 PR OFFICE/OUTPT VISIT, EST, LEVL III, 20-29 MIN: ICD-10-PCS | Mod: 25,S$GLB,, | Performed by: FAMILY MEDICINE

## 2020-11-23 PROCEDURE — 85025 COMPLETE CBC W/AUTO DIFF WBC: CPT

## 2020-11-23 PROCEDURE — 99213 OFFICE O/P EST LOW 20 MIN: CPT | Mod: 25,S$GLB,, | Performed by: FAMILY MEDICINE

## 2020-11-23 PROCEDURE — 80053 COMPREHEN METABOLIC PANEL: CPT

## 2020-11-23 PROCEDURE — 36415 COLL VENOUS BLD VENIPUNCTURE: CPT | Mod: PO

## 2020-11-23 PROCEDURE — 1125F AMNT PAIN NOTED PAIN PRSNT: CPT | Mod: S$GLB,,, | Performed by: FAMILY MEDICINE

## 2020-11-23 PROCEDURE — 81002 URINALYSIS NONAUTO W/O SCOPE: CPT | Mod: S$GLB,,, | Performed by: FAMILY MEDICINE

## 2020-11-23 PROCEDURE — 3008F BODY MASS INDEX DOCD: CPT | Mod: CPTII,S$GLB,, | Performed by: FAMILY MEDICINE

## 2020-11-23 PROCEDURE — 84443 ASSAY THYROID STIM HORMONE: CPT

## 2020-11-23 PROCEDURE — 87088 URINE BACTERIA CULTURE: CPT

## 2020-11-23 PROCEDURE — 87086 URINE CULTURE/COLONY COUNT: CPT

## 2020-11-23 PROCEDURE — 1125F PR PAIN SEVERITY QUANTIFIED, PAIN PRESENT: ICD-10-PCS | Mod: S$GLB,,, | Performed by: FAMILY MEDICINE

## 2020-11-23 PROCEDURE — 83036 HEMOGLOBIN GLYCOSYLATED A1C: CPT

## 2020-11-23 PROCEDURE — 87186 SC STD MICRODIL/AGAR DIL: CPT

## 2020-11-23 PROCEDURE — 3008F PR BODY MASS INDEX (BMI) DOCUMENTED: ICD-10-PCS | Mod: CPTII,S$GLB,, | Performed by: FAMILY MEDICINE

## 2020-11-23 RX ORDER — ESCITALOPRAM OXALATE 20 MG/1
20 TABLET ORAL DAILY
Qty: 30 TABLET | Refills: 11 | Status: SHIPPED | OUTPATIENT
Start: 2020-11-23 | End: 2021-05-10

## 2020-11-23 NOTE — PROGRESS NOTES
Subjective:      Patient ID: Luann Mcgovern is a 55 y.o. female.    Chief Complaint: Urinary Tract Infection      Patient here to follow-up on depression/PTSD.  She reports some improvement, still having significant problems with her shoulder, her neck and family situation.  Also need to follow-up on UTI, says  not having any symptoms but was not initially either.    Urinary Tract Infection       Review of Systems   Psychiatric/Behavioral: Positive for dysphoric mood and sleep disturbance. The patient is nervous/anxious.      Past Medical History:   Diagnosis Date    Allergy     seasonal     Asthma     Pneumonia           Past Surgical History:   Procedure Laterality Date    KNEE SURGERY Left     age 13 - injury - no tears, just cleaned it up     LUMBAR NERVE STIMLATOR INSERTION  2018    TONSILLECTOMY      TUBAL LIGATION  01/19/1990    VAGINAL DELIVERY      2 births      Family History   Problem Relation Age of Onset    Thyroid disease Mother      Social History     Socioeconomic History    Marital status:      Spouse name: Not on file    Number of children: 2    Years of education: Not on file    Highest education level: Not on file   Occupational History    Occupation: RN - ED or surgery     Comment: [employer]     Occupation: CDL - for [employer]   Social Needs    Financial resource strain: Not on file    Food insecurity     Worry: Not on file     Inability: Not on file    Transportation needs     Medical: Not on file     Non-medical: Not on file   Tobacco Use    Smoking status: Never Smoker    Smokeless tobacco: Never Used   Substance and Sexual Activity    Alcohol use: No     Frequency: Never     Binge frequency: Never    Drug use: No    Sexual activity: Yes     Partners: Male   Lifestyle    Physical activity     Days per week: 3 days     Minutes per session: 60 min    Stress: To some extent   Relationships    Social connections     Talks on phone: More than three  "times a week     Gets together: More than three times a week     Attends Druze service: Not on file     Active member of club or organization: Patient refused     Attends meetings of clubs or organizations: Patient refused     Relationship status:    Other Topics Concern    Not on file   Social History Narrative    Not on file     Review of patient's allergies indicates:   Allergen Reactions    Meloxicam Anaphylaxis     Chest and neck pain .  Decrease O2    Iodinated contrast media      Pt states that she is not allergic omnipeg dye, but is only allergic to the older dyes.    Iodine and iodide containing products      Injectables ???     Loperamide-simethicone     Metoclopramide hcl      reglan     Diltiazem hcl Rash and Swelling       Objective:       BP (!) 142/92 (BP Location: Left arm)   Pulse 87   Temp 98.5 °F (36.9 °C) (Tympanic)   Ht 5' 8" (1.727 m)   Wt 112.5 kg (248 lb 0.3 oz)   LMP 11/15/2002   SpO2 95%   BMI 37.71 kg/m²   Physical Exam  Vitals signs and nursing note reviewed.   Constitutional:       General: She is not in acute distress.     Appearance: Normal appearance. She is well-developed. She is not ill-appearing or diaphoretic.   Cardiovascular:      Rate and Rhythm: Normal rate and regular rhythm.      Heart sounds: Normal heart sounds.   Pulmonary:      Effort: Pulmonary effort is normal. No respiratory distress.      Breath sounds: Normal breath sounds.   Abdominal:      General: Bowel sounds are normal.      Palpations: Abdomen is soft.      Tenderness: There is no abdominal tenderness.   Neurological:      Mental Status: She is alert and oriented to person, place, and time.   Psychiatric:         Attention and Perception: Attention normal.         Mood and Affect: Mood is depressed.         Speech: Speech is rapid and pressured.         Behavior: Behavior normal.         Thought Content: Thought content normal.         Judgment: Judgment normal.       Assessment: "     1. Depression, unspecified depression type    2. PTSD (post-traumatic stress disorder)    3. Urinary tract infection without hematuria, site unspecified    4. Routine adult health maintenance      Plan:   Depression, unspecified depression type    PTSD (post-traumatic stress disorder)    Urinary tract infection without hematuria, site unspecified  -     POCT URINE DIPSTICK WITHOUT MICROSCOPE  -     Urine culture; Future; Expected date: 11/23/2020    Routine adult health maintenance  -     Comprehensive Metabolic Panel; Future; Expected date: 11/23/2020  -     CBC Auto Differential; Future; Expected date: 11/23/2020  -     Lipid Panel; Future; Expected date: 11/23/2020  -     TSH; Future; Expected date: 11/23/2020  -     Hemoglobin A1C; Future; Expected date: 11/23/2020    Other orders  -     escitalopram oxalate (LEXAPRO) 20 MG tablet; Take 1 tablet (20 mg total) by mouth once daily.  Dispense: 30 tablet; Refill: 11     Will check routine fasting labs today as well.  Medication List with Changes/Refills   Current Medications    ALBUTEROL INHL    Inhale into the lungs.    ASPIRIN-ACETAMINOPHEN-CAFFEINE 250-250-65 MG (EXCEDRIN MIGRAINE) 250-250-65 MG PER TABLET    Take 1 tablet by mouth.    AZELASTINE (ASTELIN) 137 MCG (0.1 %) NASAL SPRAY    1 spray (137 mcg total) by Nasal route 2 (two) times daily.    BENZONATATE (TESSALON) 200 MG CAPSULE    Take 1 capsule by mouth three times daily as needed for cough    CETIRIZINE (ZYRTEC) 10 MG TABLET    Take 10 mg by mouth as needed for Allergies.    DIAZEPAM (VALIUM) 5 MG TABLET    Take 5 mg by mouth every 12 (twelve) hours as needed for Anxiety.    DICLOFENAC SODIUM (VOLTAREN) 1 % GEL    Apply 2 g topically 4 (four) times daily.    DIPHENHYDRAMINE (BENADRYL) 25 MG CAPSULE    Take 25 mg by mouth nightly as needed for Itching.    ESZOPICLONE (LUNESTA) 3 MG TAB    Take 1 tablet (3 mg total) by mouth every evening.    GABAPENTIN (NEURONTIN) 600 MG TABLET    Take 600 mg by  mouth 2 (two) times daily.     HYDROCODONE-ACETAMINOPHEN (NORCO)  MG PER TABLET    TAKE 1 TABLET BY MOUTH THREE TIMES DAILY AS NEEDED FOR CHRONIC PAIN    IPRATROPIUM (ATROVENT) 0.02 % NEBULIZER SOLUTION    Take 2.5 mLs (500 mcg total) by nebulization 4 (four) times daily. Rescue    METHOCARBAMOL (ROBAXIN) 750 MG TAB    Take 500 mg by mouth 3 (three) times daily.    NYSTATIN (MYCOSTATIN) CREAM    APPLY  CREAM TOPICALLY TWICE DAILY    NYSTOP POWDER    APPLY  POWDER TOPICALLY TWICE DAILY    PANTOPRAZOLE (PROTONIX) 40 MG TABLET    Take 1 tablet (40 mg total) by mouth 2 (two) times daily.    PROAIR RESPICLICK 90 MCG/ACTUATION INHALER    INHALE 2 PUFFS BY MOUTH EVERY 4 HOURS AS NEEDED    PROMETHAZINE (PHENERGAN) 25 MG TABLET    Take 25 mg by mouth every 6 (six) hours as needed for Nausea.    TIZANIDINE 4 MG CAP    Take 4 mg by mouth every 8 (eight) hours as needed.    TRIAMCINOLONE ACETONIDE 0.1% (KENALOG) 0.1 % OINTMENT    Apply topically 2 (two) times daily.   Changed and/or Refilled Medications    Modified Medication Previous Medication    ESCITALOPRAM OXALATE (LEXAPRO) 20 MG TABLET escitalopram oxalate (LEXAPRO) 10 MG tablet       Take 1 tablet (20 mg total) by mouth once daily.    Take 1 tablet (10 mg total) by mouth once daily.   Discontinued Medications    CIPROFLOXACIN HCL (CIPRO) 500 MG TABLET    Take 1 tablet (500 mg total) by mouth 2 (two) times daily.

## 2020-11-24 ENCOUNTER — TELEPHONE (OUTPATIENT)
Dept: INTERNAL MEDICINE | Facility: CLINIC | Age: 55
End: 2020-11-24

## 2020-11-24 LAB
ALBUMIN SERPL BCP-MCNC: 3.9 G/DL (ref 3.5–5.2)
ALP SERPL-CCNC: 108 U/L (ref 55–135)
ALT SERPL W/O P-5'-P-CCNC: 54 U/L (ref 10–44)
ANION GAP SERPL CALC-SCNC: 11 MMOL/L (ref 8–16)
AST SERPL-CCNC: 56 U/L (ref 10–40)
BILIRUB SERPL-MCNC: 0.3 MG/DL (ref 0.1–1)
BUN SERPL-MCNC: 17 MG/DL (ref 6–20)
CALCIUM SERPL-MCNC: 9.1 MG/DL (ref 8.7–10.5)
CHLORIDE SERPL-SCNC: 102 MMOL/L (ref 95–110)
CHOLEST SERPL-MCNC: 219 MG/DL (ref 120–199)
CHOLEST/HDLC SERPL: 4.7 {RATIO} (ref 2–5)
CO2 SERPL-SCNC: 29 MMOL/L (ref 23–29)
CREAT SERPL-MCNC: 1.1 MG/DL (ref 0.5–1.4)
EST. GFR  (AFRICAN AMERICAN): >60 ML/MIN/1.73 M^2
EST. GFR  (NON AFRICAN AMERICAN): 56.3 ML/MIN/1.73 M^2
ESTIMATED AVG GLUCOSE: 131 MG/DL (ref 68–131)
GLUCOSE SERPL-MCNC: 98 MG/DL (ref 70–110)
HBA1C MFR BLD HPLC: 6.2 % (ref 4–5.6)
HDLC SERPL-MCNC: 47 MG/DL (ref 40–75)
HDLC SERPL: 21.5 % (ref 20–50)
LDLC SERPL CALC-MCNC: 130.6 MG/DL (ref 63–159)
NONHDLC SERPL-MCNC: 172 MG/DL
POTASSIUM SERPL-SCNC: 4.3 MMOL/L (ref 3.5–5.1)
PROT SERPL-MCNC: 7.4 G/DL (ref 6–8.4)
SODIUM SERPL-SCNC: 142 MMOL/L (ref 136–145)
TRIGL SERPL-MCNC: 207 MG/DL (ref 30–150)
TSH SERPL DL<=0.005 MIU/L-ACNC: 1.42 UIU/ML (ref 0.4–4)

## 2020-11-24 NOTE — TELEPHONE ENCOUNTER
----- Message from Bri Freedman MD sent at 11/24/2020  7:07 AM CST -----  Please let them know that lab results are stable - very little change from last time checked.

## 2020-11-25 ENCOUNTER — OFFICE VISIT (OUTPATIENT)
Dept: PSYCHIATRY | Facility: CLINIC | Age: 55
End: 2020-11-25
Payer: COMMERCIAL

## 2020-11-25 DIAGNOSIS — F41.0 GENERALIZED ANXIETY DISORDER WITH PANIC ATTACKS: ICD-10-CM

## 2020-11-25 DIAGNOSIS — F43.10 PTSD (POST-TRAUMATIC STRESS DISORDER): Primary | ICD-10-CM

## 2020-11-25 DIAGNOSIS — F41.1 GENERALIZED ANXIETY DISORDER WITH PANIC ATTACKS: ICD-10-CM

## 2020-11-25 LAB — BACTERIA UR CULT: ABNORMAL

## 2020-11-25 PROCEDURE — 90837 PSYTX W PT 60 MINUTES: CPT | Mod: S$GLB,,, | Performed by: SOCIAL WORKER

## 2020-11-25 PROCEDURE — 90837 PR PSYCHOTHERAPY W/PATIENT, 60 MIN: ICD-10-PCS | Mod: S$GLB,,, | Performed by: SOCIAL WORKER

## 2020-11-27 RX ORDER — SULFAMETHOXAZOLE AND TRIMETHOPRIM 800; 160 MG/1; MG/1
1 TABLET ORAL 2 TIMES DAILY
Qty: 14 TABLET | Refills: 0 | Status: SHIPPED | OUTPATIENT
Start: 2020-11-27 | End: 2021-01-06

## 2020-12-11 ENCOUNTER — NURSE TRIAGE (OUTPATIENT)
Dept: ADMINISTRATIVE | Facility: CLINIC | Age: 55
End: 2020-12-11

## 2020-12-11 NOTE — TELEPHONE ENCOUNTER
Pt already has a V test scheduled. Having a cough. Pt just looking to cancel her appt today. No further questions. Sent pt to scheduling. Advised pt to call back with any questions or new/worsening symptoms.    Reason for Disposition   General information question, no triage required and triager able to answer question    Protocols used: INFORMATION ONLY CALL-A-AH

## 2021-01-06 ENCOUNTER — OFFICE VISIT (OUTPATIENT)
Dept: INTERNAL MEDICINE | Facility: CLINIC | Age: 56
End: 2021-01-06
Payer: COMMERCIAL

## 2021-01-06 VITALS
OXYGEN SATURATION: 93 % | BODY MASS INDEX: 37.58 KG/M2 | TEMPERATURE: 100 F | SYSTOLIC BLOOD PRESSURE: 149 MMHG | HEART RATE: 109 BPM | DIASTOLIC BLOOD PRESSURE: 83 MMHG | WEIGHT: 247.13 LBS

## 2021-01-06 DIAGNOSIS — R05.9 COUGH: Primary | ICD-10-CM

## 2021-01-06 DIAGNOSIS — R51.9 NONINTRACTABLE HEADACHE, UNSPECIFIED CHRONICITY PATTERN, UNSPECIFIED HEADACHE TYPE: ICD-10-CM

## 2021-01-06 PROCEDURE — 3008F PR BODY MASS INDEX (BMI) DOCUMENTED: ICD-10-PCS | Mod: CPTII,S$GLB,, | Performed by: INTERNAL MEDICINE

## 2021-01-06 PROCEDURE — 1126F PR PAIN SEVERITY QUANTIFIED, NO PAIN PRESENT: ICD-10-PCS | Mod: S$GLB,,, | Performed by: INTERNAL MEDICINE

## 2021-01-06 PROCEDURE — 99213 OFFICE O/P EST LOW 20 MIN: CPT | Mod: S$GLB,,, | Performed by: INTERNAL MEDICINE

## 2021-01-06 PROCEDURE — 99213 PR OFFICE/OUTPT VISIT, EST, LEVL III, 20-29 MIN: ICD-10-PCS | Mod: S$GLB,,, | Performed by: INTERNAL MEDICINE

## 2021-01-06 PROCEDURE — 3008F BODY MASS INDEX DOCD: CPT | Mod: CPTII,S$GLB,, | Performed by: INTERNAL MEDICINE

## 2021-01-06 PROCEDURE — 99999 PR PBB SHADOW E&M-EST. PATIENT-LVL III: ICD-10-PCS | Mod: PBBFAC,,, | Performed by: INTERNAL MEDICINE

## 2021-01-06 PROCEDURE — 99999 PR PBB SHADOW E&M-EST. PATIENT-LVL III: CPT | Mod: PBBFAC,,, | Performed by: INTERNAL MEDICINE

## 2021-01-06 PROCEDURE — 1126F AMNT PAIN NOTED NONE PRSNT: CPT | Mod: S$GLB,,, | Performed by: INTERNAL MEDICINE

## 2021-01-06 RX ORDER — TRAZODONE HYDROCHLORIDE 50 MG/1
50 TABLET ORAL NIGHTLY
Qty: 30 TABLET | Refills: 11 | Status: SHIPPED | OUTPATIENT
Start: 2021-01-06 | End: 2021-02-04 | Stop reason: SDUPTHER

## 2021-01-06 RX ORDER — BENZONATATE 200 MG/1
200 CAPSULE ORAL 3 TIMES DAILY PRN
Qty: 30 CAPSULE | Refills: 1 | Status: SHIPPED | OUTPATIENT
Start: 2021-01-06 | End: 2022-09-14 | Stop reason: SDUPTHER

## 2021-01-06 RX ORDER — PROMETHAZINE HYDROCHLORIDE AND DEXTROMETHORPHAN HYDROBROMIDE 6.25; 15 MG/5ML; MG/5ML
5 SYRUP ORAL EVERY 4 HOURS PRN
Qty: 240 ML | Refills: 1 | Status: SHIPPED | OUTPATIENT
Start: 2021-01-06 | End: 2021-01-16

## 2021-01-06 RX ORDER — NALOXONE HYDROCHLORIDE 4 MG/.1ML
SPRAY NASAL
COMMUNITY
Start: 2020-12-29

## 2021-01-18 ENCOUNTER — HOSPITAL ENCOUNTER (EMERGENCY)
Facility: HOSPITAL | Age: 56
Discharge: HOME | End: 2021-01-18
Attending: EMERGENCY MEDICINE
Payer: COMMERCIAL

## 2021-01-18 ENCOUNTER — NURSE TRIAGE (OUTPATIENT)
Dept: ADMINISTRATIVE | Facility: CLINIC | Age: 56
End: 2021-01-18

## 2021-01-18 VITALS
RESPIRATION RATE: 22 BRPM | HEART RATE: 89 BPM | SYSTOLIC BLOOD PRESSURE: 143 MMHG | OXYGEN SATURATION: 97 % | DIASTOLIC BLOOD PRESSURE: 85 MMHG | HEIGHT: 68 IN | TEMPERATURE: 98 F | WEIGHT: 240 LBS | BODY MASS INDEX: 36.37 KG/M2

## 2021-01-18 DIAGNOSIS — U07.1 LAB TEST POSITIVE FOR DETECTION OF COVID-19 VIRUS: Primary | ICD-10-CM

## 2021-01-18 DIAGNOSIS — R05.9 COUGH: ICD-10-CM

## 2021-01-18 LAB
BACTERIA #/AREA URNS HPF: ABNORMAL /HPF
BILIRUB UR QL STRIP: ABNORMAL
CLARITY UR: ABNORMAL
COLOR UR: ABNORMAL
CTP QC/QA: YES
GLUCOSE UR QL STRIP: ABNORMAL
HGB UR QL STRIP: ABNORMAL
HYALINE CASTS #/AREA URNS LPF: 0 /LPF
KETONES UR QL STRIP: ABNORMAL
LEUKOCYTE ESTERASE UR QL STRIP: ABNORMAL
MICROSCOPIC COMMENT: ABNORMAL
NITRITE UR QL STRIP: ABNORMAL
PH UR STRIP: ABNORMAL [PH] (ref 5–8)
PROT UR QL STRIP: ABNORMAL
RBC #/AREA URNS HPF: >100 /HPF (ref 0–4)
SARS-COV-2 RDRP RESP QL NAA+PROBE: POSITIVE
SP GR UR STRIP: ABNORMAL (ref 1–1.03)
SQUAMOUS #/AREA URNS HPF: 0 /HPF
URN SPEC COLLECT METH UR: ABNORMAL
UROBILINOGEN UR STRIP-ACNC: ABNORMAL EU/DL
WBC #/AREA URNS HPF: 0 /HPF (ref 0–5)
YEAST URNS QL MICRO: ABNORMAL

## 2021-01-18 PROCEDURE — 96372 THER/PROPH/DIAG INJ SC/IM: CPT

## 2021-01-18 PROCEDURE — 99284 EMERGENCY DEPT VISIT MOD MDM: CPT | Mod: 25

## 2021-01-18 PROCEDURE — 63600175 PHARM REV CODE 636 W HCPCS: Performed by: EMERGENCY MEDICINE

## 2021-01-18 PROCEDURE — 81000 URINALYSIS NONAUTO W/SCOPE: CPT

## 2021-01-18 PROCEDURE — U0002 COVID-19 LAB TEST NON-CDC: HCPCS | Performed by: EMERGENCY MEDICINE

## 2021-01-18 RX ORDER — DEXAMETHASONE 6 MG/1
6 TABLET ORAL DAILY
Qty: 10 TABLET | Refills: 0 | Status: SHIPPED | OUTPATIENT
Start: 2021-01-18 | End: 2021-01-28

## 2021-01-18 RX ORDER — DEXAMETHASONE SODIUM PHOSPHATE 4 MG/ML
8 INJECTION, SOLUTION INTRA-ARTICULAR; INTRALESIONAL; INTRAMUSCULAR; INTRAVENOUS; SOFT TISSUE
Status: COMPLETED | OUTPATIENT
Start: 2021-01-18 | End: 2021-01-18

## 2021-01-18 RX ADMIN — DEXAMETHASONE SODIUM PHOSPHATE 8 MG: 4 INJECTION INTRA-ARTICULAR; INTRALESIONAL; INTRAMUSCULAR; INTRAVENOUS; SOFT TISSUE at 04:01

## 2021-01-20 ENCOUNTER — INFUSION (OUTPATIENT)
Dept: INFECTIOUS DISEASES | Facility: HOSPITAL | Age: 56
DRG: 853 | End: 2021-01-20
Attending: EMERGENCY MEDICINE
Payer: COMMERCIAL

## 2021-01-20 VITALS
TEMPERATURE: 98 F | SYSTOLIC BLOOD PRESSURE: 138 MMHG | HEART RATE: 74 BPM | RESPIRATION RATE: 20 BRPM | DIASTOLIC BLOOD PRESSURE: 78 MMHG | OXYGEN SATURATION: 96 %

## 2021-01-20 DIAGNOSIS — U07.1 LAB TEST POSITIVE FOR DETECTION OF COVID-19 VIRUS: ICD-10-CM

## 2021-01-20 PROCEDURE — M0239 BAMLANIVIMAB-XXXX INFUSION: HCPCS | Performed by: INTERNAL MEDICINE

## 2021-01-20 PROCEDURE — 25000003 PHARM REV CODE 250: Performed by: INTERNAL MEDICINE

## 2021-01-20 PROCEDURE — 63600175 PHARM REV CODE 636 W HCPCS: Performed by: INTERNAL MEDICINE

## 2021-01-20 RX ORDER — DIPHENHYDRAMINE HYDROCHLORIDE 50 MG/ML
25 INJECTION INTRAMUSCULAR; INTRAVENOUS ONCE AS NEEDED
Status: DISCONTINUED | OUTPATIENT
Start: 2021-01-20 | End: 2021-10-15

## 2021-01-20 RX ORDER — ONDANSETRON 4 MG/1
4 TABLET, ORALLY DISINTEGRATING ORAL ONCE AS NEEDED
Status: DISCONTINUED | OUTPATIENT
Start: 2021-01-20 | End: 2021-04-15

## 2021-01-20 RX ORDER — ALBUTEROL SULFATE 90 UG/1
2 AEROSOL, METERED RESPIRATORY (INHALATION)
Status: DISCONTINUED | OUTPATIENT
Start: 2021-01-20 | End: 2021-04-15

## 2021-01-20 RX ORDER — EPINEPHRINE 0.1 MG/ML
0.3 INJECTION INTRAVENOUS
Status: DISCONTINUED | OUTPATIENT
Start: 2021-01-20 | End: 2021-10-15

## 2021-01-20 RX ORDER — ACETAMINOPHEN 325 MG/1
650 TABLET ORAL ONCE AS NEEDED
Status: DISCONTINUED | OUTPATIENT
Start: 2021-01-20 | End: 2021-04-15

## 2021-01-20 RX ORDER — SODIUM CHLORIDE 0.9 % (FLUSH) 0.9 %
10 SYRINGE (ML) INJECTION
Status: DISCONTINUED | OUTPATIENT
Start: 2021-01-20 | End: 2021-04-15

## 2021-01-20 RX ADMIN — SODIUM CHLORIDE 700 MG: 0.9 INJECTION, SOLUTION INTRAVENOUS at 02:01

## 2021-01-21 ENCOUNTER — HOSPITAL ENCOUNTER (OUTPATIENT)
Facility: HOSPITAL | Age: 56
Discharge: HOME | DRG: 853 | End: 2021-01-24
Attending: EMERGENCY MEDICINE | Admitting: STUDENT IN AN ORGANIZED HEALTH CARE EDUCATION/TRAINING PROGRAM
Payer: COMMERCIAL

## 2021-01-21 ENCOUNTER — TELEPHONE (OUTPATIENT)
Dept: INTERNAL MEDICINE | Facility: CLINIC | Age: 56
End: 2021-01-21

## 2021-01-21 ENCOUNTER — ANESTHESIA EVENT (OUTPATIENT)
Dept: SURGERY | Facility: HOSPITAL | Age: 56
DRG: 853 | End: 2021-01-21
Payer: COMMERCIAL

## 2021-01-21 ENCOUNTER — ANESTHESIA (OUTPATIENT)
Dept: SURGERY | Facility: HOSPITAL | Age: 56
DRG: 853 | End: 2021-01-21
Payer: COMMERCIAL

## 2021-01-21 DIAGNOSIS — N20.9 UROLITHIASIS: ICD-10-CM

## 2021-01-21 DIAGNOSIS — A41.9 SEVERE SEPSIS: ICD-10-CM

## 2021-01-21 DIAGNOSIS — N13.30 HYDROURETERONEPHROSIS: ICD-10-CM

## 2021-01-21 DIAGNOSIS — N30.00 ACUTE CYSTITIS WITHOUT HEMATURIA: ICD-10-CM

## 2021-01-21 DIAGNOSIS — N20.1 RIGHT URETERAL STONE: Primary | ICD-10-CM

## 2021-01-21 DIAGNOSIS — R65.20 SEVERE SEPSIS: ICD-10-CM

## 2021-01-21 LAB
ALBUMIN SERPL BCP-MCNC: 4.3 G/DL (ref 3.5–5.2)
ALP SERPL-CCNC: 133 U/L (ref 55–135)
ALT SERPL W/O P-5'-P-CCNC: 68 U/L (ref 10–44)
ANION GAP SERPL CALC-SCNC: 13 MMOL/L (ref 8–16)
AST SERPL-CCNC: 53 U/L (ref 10–40)
BACTERIA #/AREA URNS HPF: ABNORMAL /HPF
BASOPHILS # BLD AUTO: 0.05 K/UL (ref 0–0.2)
BASOPHILS NFR BLD: 0.3 % (ref 0–1.9)
BILIRUB SERPL-MCNC: 0.5 MG/DL (ref 0.1–1)
BILIRUB UR QL STRIP: NEGATIVE
BUN SERPL-MCNC: 31 MG/DL (ref 6–20)
CALCIUM SERPL-MCNC: 10.1 MG/DL (ref 8.7–10.5)
CHLORIDE SERPL-SCNC: 101 MMOL/L (ref 95–110)
CLARITY UR: ABNORMAL
CO2 SERPL-SCNC: 27 MMOL/L (ref 23–29)
COLOR UR: YELLOW
CREAT SERPL-MCNC: 1.1 MG/DL (ref 0.5–1.4)
DIFFERENTIAL METHOD: ABNORMAL
EOSINOPHIL # BLD AUTO: 0 K/UL (ref 0–0.5)
EOSINOPHIL NFR BLD: 0.1 % (ref 0–8)
ERYTHROCYTE [DISTWIDTH] IN BLOOD BY AUTOMATED COUNT: 12.7 % (ref 11.5–14.5)
EST. GFR  (AFRICAN AMERICAN): >60 ML/MIN/1.73 M^2
EST. GFR  (NON AFRICAN AMERICAN): 56 ML/MIN/1.73 M^2
GLUCOSE SERPL-MCNC: 175 MG/DL (ref 70–110)
GLUCOSE UR QL STRIP: NEGATIVE
HCT VFR BLD AUTO: 49.5 % (ref 37–48.5)
HCV AB SERPL QL IA: NEGATIVE
HGB BLD-MCNC: 15.8 G/DL (ref 12–16)
HGB UR QL STRIP: ABNORMAL
HIV 1+2 AB+HIV1 P24 AG SERPL QL IA: NEGATIVE
HYALINE CASTS #/AREA URNS LPF: 0 /LPF
IMM GRANULOCYTES # BLD AUTO: 0.11 K/UL (ref 0–0.04)
IMM GRANULOCYTES NFR BLD AUTO: 0.7 % (ref 0–0.5)
KETONES UR QL STRIP: NEGATIVE
LACTATE SERPL-SCNC: 3.7 MMOL/L (ref 0.5–2.2)
LEUKOCYTE ESTERASE UR QL STRIP: ABNORMAL
LIPASE SERPL-CCNC: 23 U/L (ref 4–60)
LYMPHOCYTES # BLD AUTO: 1.3 K/UL (ref 1–4.8)
LYMPHOCYTES NFR BLD: 7.8 % (ref 18–48)
MCH RBC QN AUTO: 28.8 PG (ref 27–31)
MCHC RBC AUTO-ENTMCNC: 31.9 G/DL (ref 32–36)
MCV RBC AUTO: 90 FL (ref 82–98)
MICROSCOPIC COMMENT: ABNORMAL
MONOCYTES # BLD AUTO: 0.5 K/UL (ref 0.3–1)
MONOCYTES NFR BLD: 3 % (ref 4–15)
NEUTROPHILS # BLD AUTO: 14.2 K/UL (ref 1.8–7.7)
NEUTROPHILS NFR BLD: 88.1 % (ref 38–73)
NITRITE UR QL STRIP: POSITIVE
NRBC BLD-RTO: 0 /100 WBC
PH UR STRIP: 6 [PH] (ref 5–8)
PLATELET # BLD AUTO: 424 K/UL (ref 150–350)
PMV BLD AUTO: 8.9 FL (ref 9.2–12.9)
POTASSIUM SERPL-SCNC: 4 MMOL/L (ref 3.5–5.1)
PROT SERPL-MCNC: 8 G/DL (ref 6–8.4)
PROT UR QL STRIP: ABNORMAL
RBC # BLD AUTO: 5.48 M/UL (ref 4–5.4)
RBC #/AREA URNS HPF: >100 /HPF (ref 0–4)
SODIUM SERPL-SCNC: 141 MMOL/L (ref 136–145)
SP GR UR STRIP: >=1.03 (ref 1–1.03)
URN SPEC COLLECT METH UR: ABNORMAL
UROBILINOGEN UR STRIP-ACNC: NEGATIVE EU/DL
WBC # BLD AUTO: 16.09 K/UL (ref 3.9–12.7)
WBC #/AREA URNS HPF: 10 /HPF (ref 0–5)

## 2021-01-21 PROCEDURE — 86803 HEPATITIS C AB TEST: CPT

## 2021-01-21 PROCEDURE — G0378 HOSPITAL OBSERVATION PER HR: HCPCS

## 2021-01-21 PROCEDURE — 71000033 HC RECOVERY, INTIAL HOUR: Performed by: UROLOGY

## 2021-01-21 PROCEDURE — 36000707: Performed by: UROLOGY

## 2021-01-21 PROCEDURE — 63600175 PHARM REV CODE 636 W HCPCS: Performed by: STUDENT IN AN ORGANIZED HEALTH CARE EDUCATION/TRAINING PROGRAM

## 2021-01-21 PROCEDURE — C2617 STENT, NON-COR, TEM W/O DEL: HCPCS | Performed by: UROLOGY

## 2021-01-21 PROCEDURE — 99284 EMERGENCY DEPT VISIT MOD MDM: CPT | Mod: 25,,, | Performed by: UROLOGY

## 2021-01-21 PROCEDURE — C1769 GUIDE WIRE: HCPCS | Performed by: UROLOGY

## 2021-01-21 PROCEDURE — 37000008 HC ANESTHESIA 1ST 15 MINUTES: Performed by: UROLOGY

## 2021-01-21 PROCEDURE — 71000039 HC RECOVERY, EACH ADD'L HOUR: Performed by: UROLOGY

## 2021-01-21 PROCEDURE — 96375 TX/PRO/DX INJ NEW DRUG ADDON: CPT

## 2021-01-21 PROCEDURE — 87088 URINE BACTERIA CULTURE: CPT

## 2021-01-21 PROCEDURE — 52332 PR CYSTOSCOPY,INSERT URETERAL STENT: ICD-10-PCS | Mod: RT,,, | Performed by: UROLOGY

## 2021-01-21 PROCEDURE — 81000 URINALYSIS NONAUTO W/SCOPE: CPT

## 2021-01-21 PROCEDURE — 36415 COLL VENOUS BLD VENIPUNCTURE: CPT

## 2021-01-21 PROCEDURE — C1758 CATHETER, URETERAL: HCPCS | Performed by: UROLOGY

## 2021-01-21 PROCEDURE — 25500020 PHARM REV CODE 255: Performed by: UROLOGY

## 2021-01-21 PROCEDURE — 87186 SC STD MICRODIL/AGAR DIL: CPT

## 2021-01-21 PROCEDURE — 63600175 PHARM REV CODE 636 W HCPCS: Performed by: NURSE ANESTHETIST, CERTIFIED REGISTERED

## 2021-01-21 PROCEDURE — 83690 ASSAY OF LIPASE: CPT

## 2021-01-21 PROCEDURE — 86703 HIV-1/HIV-2 1 RESULT ANTBDY: CPT

## 2021-01-21 PROCEDURE — 99291 CRITICAL CARE FIRST HOUR: CPT | Mod: 25

## 2021-01-21 PROCEDURE — 25000003 PHARM REV CODE 250: Performed by: EMERGENCY MEDICINE

## 2021-01-21 PROCEDURE — 96365 THER/PROPH/DIAG IV INF INIT: CPT

## 2021-01-21 PROCEDURE — 87086 URINE CULTURE/COLONY COUNT: CPT

## 2021-01-21 PROCEDURE — 87040 BLOOD CULTURE FOR BACTERIA: CPT | Mod: 59

## 2021-01-21 PROCEDURE — 96367 TX/PROPH/DG ADDL SEQ IV INF: CPT | Mod: 59

## 2021-01-21 PROCEDURE — 63600175 PHARM REV CODE 636 W HCPCS: Performed by: EMERGENCY MEDICINE

## 2021-01-21 PROCEDURE — 87077 CULTURE AEROBIC IDENTIFY: CPT

## 2021-01-21 PROCEDURE — 83605 ASSAY OF LACTIC ACID: CPT

## 2021-01-21 PROCEDURE — 85025 COMPLETE CBC W/AUTO DIFF WBC: CPT

## 2021-01-21 PROCEDURE — 52332 CYSTOSCOPY AND TREATMENT: CPT | Mod: RT,,, | Performed by: UROLOGY

## 2021-01-21 PROCEDURE — 36000706: Performed by: UROLOGY

## 2021-01-21 PROCEDURE — 80053 COMPREHEN METABOLIC PANEL: CPT

## 2021-01-21 PROCEDURE — 96376 TX/PRO/DX INJ SAME DRUG ADON: CPT

## 2021-01-21 PROCEDURE — 99284 PR EMERGENCY DEPT VISIT,LEVEL IV: ICD-10-PCS | Mod: 25,,, | Performed by: UROLOGY

## 2021-01-21 PROCEDURE — 37000009 HC ANESTHESIA EA ADD 15 MINS: Performed by: UROLOGY

## 2021-01-21 DEVICE — STENT URETERAL UNIV 6FR 24CM
Type: IMPLANTABLE DEVICE | Site: URETER | Status: NON-FUNCTIONAL
Removed: 2021-02-10

## 2021-01-21 RX ORDER — HYDROMORPHONE HYDROCHLORIDE 1 MG/ML
2 INJECTION, SOLUTION INTRAMUSCULAR; INTRAVENOUS; SUBCUTANEOUS
Status: COMPLETED | OUTPATIENT
Start: 2021-01-21 | End: 2021-01-21

## 2021-01-21 RX ORDER — HYDROMORPHONE HYDROCHLORIDE 1 MG/ML
1 INJECTION, SOLUTION INTRAMUSCULAR; INTRAVENOUS; SUBCUTANEOUS
Status: COMPLETED | OUTPATIENT
Start: 2021-01-21 | End: 2021-01-21

## 2021-01-21 RX ORDER — SODIUM CHLORIDE, SODIUM LACTATE, POTASSIUM CHLORIDE, CALCIUM CHLORIDE 600; 310; 30; 20 MG/100ML; MG/100ML; MG/100ML; MG/100ML
INJECTION, SOLUTION INTRAVENOUS CONTINUOUS PRN
Status: DISCONTINUED | OUTPATIENT
Start: 2021-01-21 | End: 2021-01-21

## 2021-01-21 RX ORDER — NALOXONE HCL 0.4 MG/ML
VIAL (ML) INJECTION
Status: DISCONTINUED | OUTPATIENT
Start: 2021-01-21 | End: 2021-01-21

## 2021-01-21 RX ORDER — ONDANSETRON 2 MG/ML
4 INJECTION INTRAMUSCULAR; INTRAVENOUS
Status: COMPLETED | OUTPATIENT
Start: 2021-01-21 | End: 2021-01-21

## 2021-01-21 RX ORDER — ONDANSETRON 2 MG/ML
8 INJECTION INTRAMUSCULAR; INTRAVENOUS
Status: COMPLETED | OUTPATIENT
Start: 2021-01-21 | End: 2021-01-21

## 2021-01-21 RX ORDER — PHENYLEPHRINE HYDROCHLORIDE 10 MG/ML
INJECTION INTRAVENOUS
Status: DISCONTINUED | OUTPATIENT
Start: 2021-01-21 | End: 2021-01-21

## 2021-01-21 RX ORDER — PROPOFOL 10 MG/ML
VIAL (ML) INTRAVENOUS
Status: DISCONTINUED | OUTPATIENT
Start: 2021-01-21 | End: 2021-01-21

## 2021-01-21 RX ORDER — SUCCINYLCHOLINE CHLORIDE 20 MG/ML
INJECTION INTRAMUSCULAR; INTRAVENOUS
Status: DISCONTINUED | OUTPATIENT
Start: 2021-01-21 | End: 2021-01-21

## 2021-01-21 RX ADMIN — NALOXONE HYDROCHLORIDE 0.08 MG: 0.4 INJECTION, SOLUTION INTRAMUSCULAR; INTRAVENOUS; SUBCUTANEOUS at 09:01

## 2021-01-21 RX ADMIN — SUCCINYLCHOLINE CHLORIDE 120 MG: 20 INJECTION, SOLUTION INTRAMUSCULAR; INTRAVENOUS at 08:01

## 2021-01-21 RX ADMIN — PHENYLEPHRINE HYDROCHLORIDE 200 MCG: 10 INJECTION INTRAVENOUS at 08:01

## 2021-01-21 RX ADMIN — PROPOFOL 120 MG: 10 INJECTION, EMULSION INTRAVENOUS at 08:01

## 2021-01-21 RX ADMIN — CEFTRIAXONE 1 G: 1 INJECTION, SOLUTION INTRAVENOUS at 06:01

## 2021-01-21 RX ADMIN — HYDROMORPHONE HYDROCHLORIDE 1 MG: 1 INJECTION, SOLUTION INTRAMUSCULAR; INTRAVENOUS; SUBCUTANEOUS at 03:01

## 2021-01-21 RX ADMIN — PROMETHAZINE HYDROCHLORIDE 12.5 MG: 25 INJECTION INTRAMUSCULAR; INTRAVENOUS at 03:01

## 2021-01-21 RX ADMIN — ONDANSETRON 8 MG: 2 INJECTION INTRAMUSCULAR; INTRAVENOUS at 03:01

## 2021-01-21 RX ADMIN — HYDROMORPHONE HYDROCHLORIDE 2 MG: 1 INJECTION, SOLUTION INTRAMUSCULAR; INTRAVENOUS; SUBCUTANEOUS at 06:01

## 2021-01-21 RX ADMIN — SODIUM CHLORIDE, SODIUM LACTATE, POTASSIUM CHLORIDE, AND CALCIUM CHLORIDE: 600; 310; 30; 20 INJECTION, SOLUTION INTRAVENOUS at 08:01

## 2021-01-21 RX ADMIN — ONDANSETRON 4 MG: 2 INJECTION INTRAMUSCULAR; INTRAVENOUS at 06:01

## 2021-01-22 PROBLEM — F41.9 ANXIETY AND DEPRESSION: Status: ACTIVE | Noted: 2021-01-22

## 2021-01-22 PROBLEM — F32.A ANXIETY AND DEPRESSION: Status: ACTIVE | Noted: 2021-01-22

## 2021-01-22 PROBLEM — N13.30 HYDROURETERONEPHROSIS: Status: ACTIVE | Noted: 2021-01-21

## 2021-01-22 PROBLEM — U07.1 COVID-19 VIRUS DETECTED: Status: ACTIVE | Noted: 2021-01-22

## 2021-01-22 PROBLEM — N20.1 URETEROLITHIASIS: Status: ACTIVE | Noted: 2021-01-21

## 2021-01-22 PROBLEM — J45.909 ASTHMA: Status: ACTIVE | Noted: 2021-01-22

## 2021-01-22 PROBLEM — Z96.0 S/P URETERAL STENT PLACEMENT: Status: ACTIVE | Noted: 2021-01-21

## 2021-01-22 PROBLEM — R65.20 SEVERE SEPSIS: Status: ACTIVE | Noted: 2021-01-22

## 2021-01-22 PROBLEM — A41.9 SEVERE SEPSIS: Status: ACTIVE | Noted: 2021-01-22

## 2021-01-22 LAB
ANION GAP SERPL CALC-SCNC: 14 MMOL/L (ref 8–16)
BASOPHILS NFR BLD: 0 % (ref 0–1.9)
BUN SERPL-MCNC: 42 MG/DL (ref 6–20)
CALCIUM SERPL-MCNC: 8.5 MG/DL (ref 8.7–10.5)
CHLORIDE SERPL-SCNC: 100 MMOL/L (ref 95–110)
CO2 SERPL-SCNC: 27 MMOL/L (ref 23–29)
CREAT SERPL-MCNC: 3.2 MG/DL (ref 0.5–1.4)
DIFFERENTIAL METHOD: ABNORMAL
EOSINOPHIL NFR BLD: 0 % (ref 0–8)
ERYTHROCYTE [DISTWIDTH] IN BLOOD BY AUTOMATED COUNT: 13.3 % (ref 11.5–14.5)
EST. GFR  (AFRICAN AMERICAN): 18 ML/MIN/1.73 M^2
EST. GFR  (NON AFRICAN AMERICAN): 15 ML/MIN/1.73 M^2
GLUCOSE SERPL-MCNC: 104 MG/DL (ref 70–110)
HCT VFR BLD AUTO: 47.5 % (ref 37–48.5)
HGB BLD-MCNC: 13.9 G/DL (ref 12–16)
IMM GRANULOCYTES # BLD AUTO: ABNORMAL K/UL (ref 0–0.04)
IMM GRANULOCYTES NFR BLD AUTO: ABNORMAL % (ref 0–0.5)
LYMPHOCYTES NFR BLD: 5 % (ref 18–48)
MAGNESIUM SERPL-MCNC: 1.5 MG/DL (ref 1.6–2.6)
MCH RBC QN AUTO: 28.8 PG (ref 27–31)
MCHC RBC AUTO-ENTMCNC: 29.3 G/DL (ref 32–36)
MCV RBC AUTO: 99 FL (ref 82–98)
METAMYELOCYTES NFR BLD MANUAL: 2 %
MONOCYTES NFR BLD: 4 % (ref 4–15)
MYELOCYTES NFR BLD MANUAL: 1 %
NEUTROPHILS NFR BLD: 78 % (ref 38–73)
NEUTS BAND NFR BLD MANUAL: 10 %
NRBC BLD-RTO: 0 /100 WBC
PLATELET # BLD AUTO: 288 K/UL (ref 150–350)
PLATELET BLD QL SMEAR: ABNORMAL
PMV BLD AUTO: 9.6 FL (ref 9.2–12.9)
POCT GLUCOSE: 126 MG/DL (ref 70–110)
POTASSIUM SERPL-SCNC: 4.9 MMOL/L (ref 3.5–5.1)
RBC # BLD AUTO: 4.82 M/UL (ref 4–5.4)
SODIUM SERPL-SCNC: 141 MMOL/L (ref 136–145)
WBC # BLD AUTO: 39.02 K/UL (ref 3.9–12.7)

## 2021-01-22 PROCEDURE — 85027 COMPLETE CBC AUTOMATED: CPT

## 2021-01-22 PROCEDURE — 99224 PR SUBSEQUENT OBSERVATION CARE,LEVEL I: CPT | Mod: ,,, | Performed by: UROLOGY

## 2021-01-22 PROCEDURE — 96376 TX/PRO/DX INJ SAME DRUG ADON: CPT

## 2021-01-22 PROCEDURE — 25000003 PHARM REV CODE 250: Performed by: UROLOGY

## 2021-01-22 PROCEDURE — G0378 HOSPITAL OBSERVATION PER HR: HCPCS

## 2021-01-22 PROCEDURE — 63600175 PHARM REV CODE 636 W HCPCS: Performed by: INTERNAL MEDICINE

## 2021-01-22 PROCEDURE — 25000003 PHARM REV CODE 250: Performed by: NURSE PRACTITIONER

## 2021-01-22 PROCEDURE — 51702 INSERT TEMP BLADDER CATH: CPT

## 2021-01-22 PROCEDURE — 27000221 HC OXYGEN, UP TO 24 HOURS

## 2021-01-22 PROCEDURE — 85007 BL SMEAR W/DIFF WBC COUNT: CPT

## 2021-01-22 PROCEDURE — 80048 BASIC METABOLIC PNL TOTAL CA: CPT

## 2021-01-22 PROCEDURE — 63600175 PHARM REV CODE 636 W HCPCS: Performed by: NURSE PRACTITIONER

## 2021-01-22 PROCEDURE — 96361 HYDRATE IV INFUSION ADD-ON: CPT

## 2021-01-22 PROCEDURE — 83735 ASSAY OF MAGNESIUM: CPT

## 2021-01-22 PROCEDURE — 99224 PR SUBSEQUENT OBSERVATION CARE,LEVEL I: ICD-10-PCS | Mod: ,,, | Performed by: UROLOGY

## 2021-01-22 PROCEDURE — 25000003 PHARM REV CODE 250: Performed by: FAMILY MEDICINE

## 2021-01-22 PROCEDURE — 21400001 HC TELEMETRY ROOM

## 2021-01-22 PROCEDURE — 36415 COLL VENOUS BLD VENIPUNCTURE: CPT

## 2021-01-22 RX ORDER — PROMETHAZINE HYDROCHLORIDE 12.5 MG/1
12.5 TABLET ORAL EVERY 6 HOURS PRN
Status: DISCONTINUED | OUTPATIENT
Start: 2021-01-22 | End: 2021-01-24 | Stop reason: HOSPADM

## 2021-01-22 RX ORDER — SODIUM CHLORIDE 9 MG/ML
INJECTION, SOLUTION INTRAVENOUS CONTINUOUS
Status: DISCONTINUED | OUTPATIENT
Start: 2021-01-22 | End: 2021-01-23

## 2021-01-22 RX ORDER — TRAZODONE HYDROCHLORIDE 50 MG/1
50 TABLET ORAL NIGHTLY PRN
Status: DISCONTINUED | OUTPATIENT
Start: 2021-01-22 | End: 2021-01-24 | Stop reason: HOSPADM

## 2021-01-22 RX ORDER — ONDANSETRON 2 MG/ML
4 INJECTION INTRAMUSCULAR; INTRAVENOUS EVERY 6 HOURS PRN
Status: DISCONTINUED | OUTPATIENT
Start: 2021-01-22 | End: 2021-01-24 | Stop reason: HOSPADM

## 2021-01-22 RX ORDER — ONDANSETRON 4 MG/1
4 TABLET, ORALLY DISINTEGRATING ORAL ONCE
Status: COMPLETED | OUTPATIENT
Start: 2021-01-22 | End: 2021-01-22

## 2021-01-22 RX ORDER — NALOXONE HCL 0.4 MG/ML
VIAL (ML) INJECTION
Status: DISPENSED
Start: 2021-01-22 | End: 2021-01-22

## 2021-01-22 RX ORDER — DIAZEPAM 5 MG/1
5 TABLET ORAL EVERY 12 HOURS PRN
Status: DISCONTINUED | OUTPATIENT
Start: 2021-01-22 | End: 2021-01-24 | Stop reason: HOSPADM

## 2021-01-22 RX ORDER — PANTOPRAZOLE SODIUM 40 MG/1
40 TABLET, DELAYED RELEASE ORAL 2 TIMES DAILY
Status: DISCONTINUED | OUTPATIENT
Start: 2021-01-22 | End: 2021-01-24 | Stop reason: HOSPADM

## 2021-01-22 RX ORDER — TIZANIDINE 4 MG/1
4 TABLET ORAL EVERY 8 HOURS PRN
Status: DISCONTINUED | OUTPATIENT
Start: 2021-01-22 | End: 2021-01-24 | Stop reason: HOSPADM

## 2021-01-22 RX ORDER — ALBUTEROL SULFATE 90 UG/1
2 AEROSOL, METERED RESPIRATORY (INHALATION) EVERY 4 HOURS PRN
Status: DISCONTINUED | OUTPATIENT
Start: 2021-01-22 | End: 2021-01-24 | Stop reason: HOSPADM

## 2021-01-22 RX ORDER — ACETAMINOPHEN 325 MG/1
650 TABLET ORAL EVERY 6 HOURS PRN
Status: DISCONTINUED | OUTPATIENT
Start: 2021-01-22 | End: 2021-01-24 | Stop reason: HOSPADM

## 2021-01-22 RX ORDER — OXYBUTYNIN CHLORIDE 5 MG/1
10 TABLET ORAL 3 TIMES DAILY
Status: DISCONTINUED | OUTPATIENT
Start: 2021-01-22 | End: 2021-01-24 | Stop reason: HOSPADM

## 2021-01-22 RX ORDER — PHENAZOPYRIDINE HYDROCHLORIDE 100 MG/1
100 TABLET, FILM COATED ORAL
Status: DISCONTINUED | OUTPATIENT
Start: 2021-01-22 | End: 2021-01-24 | Stop reason: HOSPADM

## 2021-01-22 RX ORDER — HYDRALAZINE HYDROCHLORIDE 20 MG/ML
10 INJECTION INTRAMUSCULAR; INTRAVENOUS EVERY 6 HOURS PRN
Status: DISCONTINUED | OUTPATIENT
Start: 2021-01-22 | End: 2021-01-24 | Stop reason: HOSPADM

## 2021-01-22 RX ORDER — NALOXONE HCL 0.4 MG/ML
VIAL (ML) INJECTION CODE/TRAUMA/SEDATION MEDICATION
Status: COMPLETED | OUTPATIENT
Start: 2021-01-22 | End: 2021-01-22

## 2021-01-22 RX ORDER — ONDANSETRON HYDROCHLORIDE 4 MG/5ML
4 SOLUTION ORAL ONCE
Status: DISCONTINUED | OUTPATIENT
Start: 2021-01-22 | End: 2021-01-22

## 2021-01-22 RX ORDER — HYDROCODONE BITARTRATE AND ACETAMINOPHEN 5; 325 MG/1; MG/1
1 TABLET ORAL EVERY 6 HOURS PRN
Status: DISCONTINUED | OUTPATIENT
Start: 2021-01-22 | End: 2021-01-24 | Stop reason: HOSPADM

## 2021-01-22 RX ORDER — HYDROCODONE BITARTRATE AND ACETAMINOPHEN 7.5; 325 MG/1; MG/1
1 TABLET ORAL EVERY 6 HOURS PRN
Status: DISCONTINUED | OUTPATIENT
Start: 2021-01-22 | End: 2021-01-22

## 2021-01-22 RX ORDER — ESCITALOPRAM OXALATE 10 MG/1
20 TABLET ORAL DAILY
Status: DISCONTINUED | OUTPATIENT
Start: 2021-01-22 | End: 2021-01-24 | Stop reason: HOSPADM

## 2021-01-22 RX ORDER — TAMSULOSIN HYDROCHLORIDE 0.4 MG/1
0.4 CAPSULE ORAL DAILY
Status: DISCONTINUED | OUTPATIENT
Start: 2021-01-22 | End: 2021-01-24 | Stop reason: HOSPADM

## 2021-01-22 RX ORDER — GABAPENTIN 300 MG/1
600 CAPSULE ORAL 2 TIMES DAILY
Status: DISCONTINUED | OUTPATIENT
Start: 2021-01-22 | End: 2021-01-24 | Stop reason: HOSPADM

## 2021-01-22 RX ORDER — CETIRIZINE HYDROCHLORIDE 10 MG/1
10 TABLET ORAL DAILY
Status: DISCONTINUED | OUTPATIENT
Start: 2021-01-22 | End: 2021-01-24 | Stop reason: HOSPADM

## 2021-01-22 RX ORDER — DIPHENHYDRAMINE HCL 25 MG
25 CAPSULE ORAL NIGHTLY PRN
Status: DISCONTINUED | OUTPATIENT
Start: 2021-01-22 | End: 2021-01-24 | Stop reason: HOSPADM

## 2021-01-22 RX ORDER — HYDROMORPHONE HYDROCHLORIDE 1 MG/ML
0.5 INJECTION, SOLUTION INTRAMUSCULAR; INTRAVENOUS; SUBCUTANEOUS EVERY 4 HOURS PRN
Status: DISCONTINUED | OUTPATIENT
Start: 2021-01-22 | End: 2021-01-22

## 2021-01-22 RX ADMIN — TAMSULOSIN HYDROCHLORIDE 0.4 MG: 0.4 CAPSULE ORAL at 10:01

## 2021-01-22 RX ADMIN — NALOXONE HYDROCHLORIDE 0.4 MG: 0.4 INJECTION, SOLUTION INTRAMUSCULAR; INTRAVENOUS; SUBCUTANEOUS at 09:01

## 2021-01-22 RX ADMIN — PROMETHAZINE HYDROCHLORIDE 12.5 MG: 12.5 TABLET ORAL at 02:01

## 2021-01-22 RX ADMIN — PANTOPRAZOLE SODIUM 40 MG: 40 TABLET, DELAYED RELEASE ORAL at 08:01

## 2021-01-22 RX ADMIN — SODIUM CHLORIDE 100 ML/HR: 0.9 INJECTION, SOLUTION INTRAVENOUS at 10:01

## 2021-01-22 RX ADMIN — HYDROMORPHONE HYDROCHLORIDE 0.5 MG: 1 INJECTION, SOLUTION INTRAMUSCULAR; INTRAVENOUS; SUBCUTANEOUS at 08:01

## 2021-01-22 RX ADMIN — ONDANSETRON 4 MG: 2 INJECTION INTRAMUSCULAR; INTRAVENOUS at 07:01

## 2021-01-22 RX ADMIN — SODIUM CHLORIDE 100 ML/HR: 0.9 INJECTION, SOLUTION INTRAVENOUS at 01:01

## 2021-01-22 RX ADMIN — GABAPENTIN 600 MG: 300 CAPSULE ORAL at 08:01

## 2021-01-22 RX ADMIN — PHENAZOPYRIDINE HYDROCHLORIDE 100 MG: 100 TABLET ORAL at 04:01

## 2021-01-22 RX ADMIN — PHENAZOPYRIDINE HYDROCHLORIDE 100 MG: 100 TABLET ORAL at 11:01

## 2021-01-22 RX ADMIN — PANTOPRAZOLE SODIUM 40 MG: 40 TABLET, DELAYED RELEASE ORAL at 09:01

## 2021-01-22 RX ADMIN — ESCITALOPRAM OXALATE 20 MG: 10 TABLET ORAL at 09:01

## 2021-01-22 RX ADMIN — ONDANSETRON 4 MG: 4 TABLET, ORALLY DISINTEGRATING ORAL at 02:01

## 2021-01-22 RX ADMIN — ONDANSETRON 4 MG: 2 INJECTION INTRAMUSCULAR; INTRAVENOUS at 04:01

## 2021-01-22 RX ADMIN — HYDROMORPHONE HYDROCHLORIDE 0.5 MG: 1 INJECTION, SOLUTION INTRAMUSCULAR; INTRAVENOUS; SUBCUTANEOUS at 03:01

## 2021-01-22 RX ADMIN — CEFTRIAXONE 1 G: 1 INJECTION, SOLUTION INTRAVENOUS at 06:01

## 2021-01-22 RX ADMIN — OXYBUTYNIN CHLORIDE 10 MG: 5 TABLET ORAL at 08:01

## 2021-01-22 RX ADMIN — GABAPENTIN 600 MG: 300 CAPSULE ORAL at 09:01

## 2021-01-22 RX ADMIN — OXYBUTYNIN CHLORIDE 10 MG: 5 TABLET ORAL at 10:01

## 2021-01-22 RX ADMIN — OXYBUTYNIN CHLORIDE 10 MG: 5 TABLET ORAL at 04:01

## 2021-01-22 RX ADMIN — CETIRIZINE HYDROCHLORIDE 10 MG: 10 TABLET, FILM COATED ORAL at 09:01

## 2021-01-23 LAB
ANION GAP SERPL CALC-SCNC: 15 MMOL/L (ref 8–16)
BASOPHILS # BLD AUTO: 0.05 K/UL (ref 0–0.2)
BASOPHILS NFR BLD: 0.3 % (ref 0–1.9)
BUN SERPL-MCNC: 47 MG/DL (ref 6–20)
CALCIUM SERPL-MCNC: 8.2 MG/DL (ref 8.7–10.5)
CHLORIDE SERPL-SCNC: 104 MMOL/L (ref 95–110)
CO2 SERPL-SCNC: 20 MMOL/L (ref 23–29)
CREAT SERPL-MCNC: 2.9 MG/DL (ref 0.5–1.4)
DIFFERENTIAL METHOD: ABNORMAL
EOSINOPHIL # BLD AUTO: 0.3 K/UL (ref 0–0.5)
EOSINOPHIL NFR BLD: 1.9 % (ref 0–8)
ERYTHROCYTE [DISTWIDTH] IN BLOOD BY AUTOMATED COUNT: 13.3 % (ref 11.5–14.5)
EST. GFR  (AFRICAN AMERICAN): 20 ML/MIN/1.73 M^2
EST. GFR  (NON AFRICAN AMERICAN): 17 ML/MIN/1.73 M^2
GLUCOSE SERPL-MCNC: 73 MG/DL (ref 70–110)
HCT VFR BLD AUTO: 47.1 % (ref 37–48.5)
HGB BLD-MCNC: 14 G/DL (ref 12–16)
IMM GRANULOCYTES # BLD AUTO: 0.18 K/UL (ref 0–0.04)
IMM GRANULOCYTES NFR BLD AUTO: 1.2 % (ref 0–0.5)
LYMPHOCYTES # BLD AUTO: 1.2 K/UL (ref 1–4.8)
LYMPHOCYTES NFR BLD: 7.6 % (ref 18–48)
MAGNESIUM SERPL-MCNC: 1.7 MG/DL (ref 1.6–2.6)
MCH RBC QN AUTO: 28.5 PG (ref 27–31)
MCHC RBC AUTO-ENTMCNC: 29.7 G/DL (ref 32–36)
MCV RBC AUTO: 96 FL (ref 82–98)
MONOCYTES # BLD AUTO: 1.2 K/UL (ref 0.3–1)
MONOCYTES NFR BLD: 7.7 % (ref 4–15)
NEUTROPHILS # BLD AUTO: 12.5 K/UL (ref 1.8–7.7)
NEUTROPHILS NFR BLD: 81.3 % (ref 38–73)
NRBC BLD-RTO: 0 /100 WBC
PLATELET # BLD AUTO: 137 K/UL (ref 150–350)
PLATELET BLD QL SMEAR: ABNORMAL
PMV BLD AUTO: 10.1 FL (ref 9.2–12.9)
POTASSIUM SERPL-SCNC: 4.8 MMOL/L (ref 3.5–5.1)
RBC # BLD AUTO: 4.92 M/UL (ref 4–5.4)
SODIUM SERPL-SCNC: 139 MMOL/L (ref 136–145)
WBC # BLD AUTO: 15.36 K/UL (ref 3.9–12.7)

## 2021-01-23 PROCEDURE — 27000221 HC OXYGEN, UP TO 24 HOURS

## 2021-01-23 PROCEDURE — 80048 BASIC METABOLIC PNL TOTAL CA: CPT

## 2021-01-23 PROCEDURE — 25000003 PHARM REV CODE 250: Performed by: NURSE PRACTITIONER

## 2021-01-23 PROCEDURE — 25000003 PHARM REV CODE 250: Performed by: UROLOGY

## 2021-01-23 PROCEDURE — 85025 COMPLETE CBC W/AUTO DIFF WBC: CPT

## 2021-01-23 PROCEDURE — 99231 PR SUBSEQUENT HOSPITAL CARE,LEVL I: ICD-10-PCS | Mod: ,,, | Performed by: UROLOGY

## 2021-01-23 PROCEDURE — 83735 ASSAY OF MAGNESIUM: CPT

## 2021-01-23 PROCEDURE — 36415 COLL VENOUS BLD VENIPUNCTURE: CPT

## 2021-01-23 PROCEDURE — 63600175 PHARM REV CODE 636 W HCPCS: Performed by: NURSE PRACTITIONER

## 2021-01-23 PROCEDURE — 99900035 HC TECH TIME PER 15 MIN (STAT)

## 2021-01-23 PROCEDURE — 21400001 HC TELEMETRY ROOM

## 2021-01-23 PROCEDURE — 99231 SBSQ HOSP IP/OBS SF/LOW 25: CPT | Mod: ,,, | Performed by: UROLOGY

## 2021-01-23 PROCEDURE — G0378 HOSPITAL OBSERVATION PER HR: HCPCS

## 2021-01-23 RX ADMIN — ACETAMINOPHEN 650 MG: 325 TABLET ORAL at 10:01

## 2021-01-23 RX ADMIN — PANTOPRAZOLE SODIUM 40 MG: 40 TABLET, DELAYED RELEASE ORAL at 08:01

## 2021-01-23 RX ADMIN — ONDANSETRON 4 MG: 2 INJECTION INTRAMUSCULAR; INTRAVENOUS at 08:01

## 2021-01-23 RX ADMIN — TAMSULOSIN HYDROCHLORIDE 0.4 MG: 0.4 CAPSULE ORAL at 08:01

## 2021-01-23 RX ADMIN — GABAPENTIN 600 MG: 300 CAPSULE ORAL at 08:01

## 2021-01-23 RX ADMIN — ACETAMINOPHEN 650 MG: 325 TABLET ORAL at 04:01

## 2021-01-23 RX ADMIN — OXYBUTYNIN CHLORIDE 10 MG: 5 TABLET ORAL at 08:01

## 2021-01-23 RX ADMIN — CEFTRIAXONE 1 G: 1 INJECTION, SOLUTION INTRAVENOUS at 05:01

## 2021-01-23 RX ADMIN — PHENAZOPYRIDINE HYDROCHLORIDE 100 MG: 100 TABLET ORAL at 12:01

## 2021-01-23 RX ADMIN — HYDROCODONE BITARTRATE AND ACETAMINOPHEN 1 TABLET: 5; 325 TABLET ORAL at 05:01

## 2021-01-23 RX ADMIN — OXYBUTYNIN CHLORIDE 10 MG: 5 TABLET ORAL at 02:01

## 2021-01-23 RX ADMIN — PHENAZOPYRIDINE HYDROCHLORIDE 100 MG: 100 TABLET ORAL at 08:01

## 2021-01-23 RX ADMIN — PHENAZOPYRIDINE HYDROCHLORIDE 100 MG: 100 TABLET ORAL at 04:01

## 2021-01-23 RX ADMIN — CETIRIZINE HYDROCHLORIDE 10 MG: 10 TABLET, FILM COATED ORAL at 08:01

## 2021-01-23 RX ADMIN — ESCITALOPRAM OXALATE 20 MG: 10 TABLET ORAL at 08:01

## 2021-01-24 VITALS
SYSTOLIC BLOOD PRESSURE: 119 MMHG | HEART RATE: 69 BPM | BODY MASS INDEX: 36.49 KG/M2 | TEMPERATURE: 96 F | WEIGHT: 240.75 LBS | HEIGHT: 68 IN | RESPIRATION RATE: 18 BRPM | DIASTOLIC BLOOD PRESSURE: 65 MMHG | OXYGEN SATURATION: 94 %

## 2021-01-24 DIAGNOSIS — N20.1 RIGHT URETERAL STONE: Primary | ICD-10-CM

## 2021-01-24 PROBLEM — A41.9 SEVERE SEPSIS: Status: RESOLVED | Noted: 2021-01-22 | Resolved: 2021-01-24

## 2021-01-24 PROBLEM — N13.30 HYDROURETERONEPHROSIS: Status: RESOLVED | Noted: 2021-01-21 | Resolved: 2021-01-24

## 2021-01-24 PROBLEM — R65.20 SEVERE SEPSIS: Status: RESOLVED | Noted: 2021-01-22 | Resolved: 2021-01-24

## 2021-01-24 LAB
ANION GAP SERPL CALC-SCNC: 14 MMOL/L (ref 8–16)
BASOPHILS # BLD AUTO: 0.09 K/UL (ref 0–0.2)
BASOPHILS NFR BLD: 0.7 % (ref 0–1.9)
BUN SERPL-MCNC: 29 MG/DL (ref 6–20)
CALCIUM SERPL-MCNC: 8.9 MG/DL (ref 8.7–10.5)
CHLORIDE SERPL-SCNC: 104 MMOL/L (ref 95–110)
CO2 SERPL-SCNC: 24 MMOL/L (ref 23–29)
CREAT SERPL-MCNC: 1.9 MG/DL (ref 0.5–1.4)
DIFFERENTIAL METHOD: ABNORMAL
EOSINOPHIL # BLD AUTO: 0.7 K/UL (ref 0–0.5)
EOSINOPHIL NFR BLD: 4.9 % (ref 0–8)
ERYTHROCYTE [DISTWIDTH] IN BLOOD BY AUTOMATED COUNT: 13.2 % (ref 11.5–14.5)
EST. GFR  (AFRICAN AMERICAN): 33 ML/MIN/1.73 M^2
EST. GFR  (NON AFRICAN AMERICAN): 29 ML/MIN/1.73 M^2
GLUCOSE SERPL-MCNC: 119 MG/DL (ref 70–110)
HCT VFR BLD AUTO: 46 % (ref 37–48.5)
HGB BLD-MCNC: 13.6 G/DL (ref 12–16)
IMM GRANULOCYTES # BLD AUTO: 0.13 K/UL (ref 0–0.04)
IMM GRANULOCYTES NFR BLD AUTO: 1 % (ref 0–0.5)
LYMPHOCYTES # BLD AUTO: 1.7 K/UL (ref 1–4.8)
LYMPHOCYTES NFR BLD: 12.3 % (ref 18–48)
MAGNESIUM SERPL-MCNC: 2 MG/DL (ref 1.6–2.6)
MCH RBC QN AUTO: 28.8 PG (ref 27–31)
MCHC RBC AUTO-ENTMCNC: 29.6 G/DL (ref 32–36)
MCV RBC AUTO: 97 FL (ref 82–98)
MONOCYTES # BLD AUTO: 1 K/UL (ref 0.3–1)
MONOCYTES NFR BLD: 7.3 % (ref 4–15)
NEUTROPHILS # BLD AUTO: 10 K/UL (ref 1.8–7.7)
NEUTROPHILS NFR BLD: 73.8 % (ref 38–73)
NRBC BLD-RTO: 0 /100 WBC
PLATELET # BLD AUTO: 199 K/UL (ref 150–350)
PLATELET BLD QL SMEAR: ABNORMAL
PMV BLD AUTO: 10.2 FL (ref 9.2–12.9)
POTASSIUM SERPL-SCNC: 4.2 MMOL/L (ref 3.5–5.1)
RBC # BLD AUTO: 4.73 M/UL (ref 4–5.4)
SODIUM SERPL-SCNC: 142 MMOL/L (ref 136–145)
WBC # BLD AUTO: 13.51 K/UL (ref 3.9–12.7)

## 2021-01-24 PROCEDURE — 80048 BASIC METABOLIC PNL TOTAL CA: CPT

## 2021-01-24 PROCEDURE — 36415 COLL VENOUS BLD VENIPUNCTURE: CPT

## 2021-01-24 PROCEDURE — 99231 SBSQ HOSP IP/OBS SF/LOW 25: CPT | Mod: ,,, | Performed by: UROLOGY

## 2021-01-24 PROCEDURE — 99231 PR SUBSEQUENT HOSPITAL CARE,LEVL I: ICD-10-PCS | Mod: ,,, | Performed by: UROLOGY

## 2021-01-24 PROCEDURE — G0378 HOSPITAL OBSERVATION PER HR: HCPCS

## 2021-01-24 PROCEDURE — 25000003 PHARM REV CODE 250: Performed by: NURSE PRACTITIONER

## 2021-01-24 PROCEDURE — 83735 ASSAY OF MAGNESIUM: CPT

## 2021-01-24 PROCEDURE — 85025 COMPLETE CBC W/AUTO DIFF WBC: CPT

## 2021-01-24 PROCEDURE — 25000003 PHARM REV CODE 250: Performed by: INTERNAL MEDICINE

## 2021-01-24 PROCEDURE — 25000003 PHARM REV CODE 250: Performed by: UROLOGY

## 2021-01-24 RX ORDER — AMOXICILLIN AND CLAVULANATE POTASSIUM 500; 125 MG/1; MG/1
1 TABLET, FILM COATED ORAL 2 TIMES DAILY
Qty: 27 TABLET | Refills: 0 | Status: ON HOLD | OUTPATIENT
Start: 2021-01-24 | End: 2021-02-10 | Stop reason: HOSPADM

## 2021-01-24 RX ORDER — PROMETHAZINE HYDROCHLORIDE 25 MG/1
25 TABLET ORAL EVERY 6 HOURS PRN
Qty: 60 TABLET | Refills: 0 | Status: SHIPPED | OUTPATIENT
Start: 2021-01-24 | End: 2021-04-14 | Stop reason: SDUPTHER

## 2021-01-24 RX ORDER — OXYBUTYNIN CHLORIDE 5 MG/1
5 TABLET ORAL 3 TIMES DAILY
Qty: 90 TABLET | Refills: 11 | Status: SHIPPED | OUTPATIENT
Start: 2021-01-24 | End: 2021-04-20

## 2021-01-24 RX ORDER — AMOXICILLIN AND CLAVULANATE POTASSIUM 500; 125 MG/1; MG/1
1 TABLET, FILM COATED ORAL 2 TIMES DAILY
Status: DISCONTINUED | OUTPATIENT
Start: 2021-01-24 | End: 2021-01-24 | Stop reason: HOSPADM

## 2021-01-24 RX ORDER — PHENAZOPYRIDINE HYDROCHLORIDE 100 MG/1
100 TABLET, FILM COATED ORAL
Qty: 30 TABLET | Refills: 0 | Status: SHIPPED | OUTPATIENT
Start: 2021-01-24 | End: 2021-02-04 | Stop reason: SDUPTHER

## 2021-01-24 RX ORDER — ALFUZOSIN HYDROCHLORIDE 10 MG/1
10 TABLET, EXTENDED RELEASE ORAL
Qty: 30 TABLET | Refills: 11 | Status: SHIPPED | OUTPATIENT
Start: 2021-01-24 | End: 2021-04-20

## 2021-01-24 RX ADMIN — ACETAMINOPHEN 650 MG: 325 TABLET ORAL at 03:01

## 2021-01-24 RX ADMIN — OXYBUTYNIN CHLORIDE 10 MG: 5 TABLET ORAL at 08:01

## 2021-01-24 RX ADMIN — AMOXICILLIN AND CLAVULANATE POTASSIUM 500 MG: 500; 125 TABLET, FILM COATED ORAL at 08:01

## 2021-01-24 RX ADMIN — GABAPENTIN 600 MG: 300 CAPSULE ORAL at 08:01

## 2021-01-24 RX ADMIN — CETIRIZINE HYDROCHLORIDE 10 MG: 10 TABLET, FILM COATED ORAL at 08:01

## 2021-01-24 RX ADMIN — HYDROCODONE BITARTRATE AND ACETAMINOPHEN 1 TABLET: 5; 325 TABLET ORAL at 08:01

## 2021-01-24 RX ADMIN — ESCITALOPRAM OXALATE 20 MG: 10 TABLET ORAL at 08:01

## 2021-01-24 RX ADMIN — PANTOPRAZOLE SODIUM 40 MG: 40 TABLET, DELAYED RELEASE ORAL at 08:01

## 2021-01-24 RX ADMIN — TAMSULOSIN HYDROCHLORIDE 0.4 MG: 0.4 CAPSULE ORAL at 08:01

## 2021-01-24 RX ADMIN — ACETAMINOPHEN 650 MG: 325 TABLET ORAL at 12:01

## 2021-01-24 RX ADMIN — PHENAZOPYRIDINE HYDROCHLORIDE 100 MG: 100 TABLET ORAL at 08:01

## 2021-01-25 ENCOUNTER — TELEPHONE (OUTPATIENT)
Dept: UROLOGY | Facility: CLINIC | Age: 56
End: 2021-01-25

## 2021-01-25 LAB — BACTERIA UR CULT: ABNORMAL

## 2021-01-26 ENCOUNTER — PATIENT OUTREACH (OUTPATIENT)
Dept: ADMINISTRATIVE | Facility: CLINIC | Age: 56
End: 2021-01-26

## 2021-01-27 DIAGNOSIS — Z01.818 PREOP TESTING: Primary | ICD-10-CM

## 2021-01-27 LAB
BACTERIA BLD CULT: NORMAL
BACTERIA BLD CULT: NORMAL

## 2021-02-01 ENCOUNTER — TELEPHONE (OUTPATIENT)
Dept: UROLOGY | Facility: CLINIC | Age: 56
End: 2021-02-01

## 2021-02-04 ENCOUNTER — HOSPITAL ENCOUNTER (OUTPATIENT)
Dept: RADIOLOGY | Facility: HOSPITAL | Age: 56
Discharge: HOME | End: 2021-02-04
Attending: FAMILY MEDICINE
Payer: MEDICARE

## 2021-02-04 ENCOUNTER — OFFICE VISIT (OUTPATIENT)
Dept: INTERNAL MEDICINE | Facility: CLINIC | Age: 56
End: 2021-02-04
Payer: MEDICARE

## 2021-02-04 VITALS
HEART RATE: 110 BPM | WEIGHT: 245.38 LBS | OXYGEN SATURATION: 95 % | BODY MASS INDEX: 37.19 KG/M2 | TEMPERATURE: 97 F | HEIGHT: 68 IN | SYSTOLIC BLOOD PRESSURE: 150 MMHG | DIASTOLIC BLOOD PRESSURE: 93 MMHG

## 2021-02-04 DIAGNOSIS — J12.82 PNEUMONIA DUE TO COVID-19 VIRUS: ICD-10-CM

## 2021-02-04 DIAGNOSIS — G47.00 INSOMNIA, UNSPECIFIED TYPE: ICD-10-CM

## 2021-02-04 DIAGNOSIS — U07.1 PNEUMONIA DUE TO COVID-19 VIRUS: ICD-10-CM

## 2021-02-04 DIAGNOSIS — B37.2 CANDIDIASIS OF SKIN: ICD-10-CM

## 2021-02-04 DIAGNOSIS — J12.82 PNEUMONIA DUE TO COVID-19 VIRUS: Primary | ICD-10-CM

## 2021-02-04 DIAGNOSIS — U07.1 PNEUMONIA DUE TO COVID-19 VIRUS: Primary | ICD-10-CM

## 2021-02-04 DIAGNOSIS — N20.0 BILATERAL RENAL STONES: ICD-10-CM

## 2021-02-04 PROCEDURE — 3008F PR BODY MASS INDEX (BMI) DOCUMENTED: ICD-10-PCS | Mod: S$GLB,,, | Performed by: FAMILY MEDICINE

## 2021-02-04 PROCEDURE — 99214 PR OFFICE/OUTPT VISIT, EST, LEVL IV, 30-39 MIN: ICD-10-PCS | Mod: S$GLB,,, | Performed by: FAMILY MEDICINE

## 2021-02-04 PROCEDURE — 99999 PR PBB SHADOW E&M-EST. PATIENT-LVL III: CPT | Mod: PBBFAC,,, | Performed by: FAMILY MEDICINE

## 2021-02-04 PROCEDURE — 1125F PR PAIN SEVERITY QUANTIFIED, PAIN PRESENT: ICD-10-PCS | Mod: S$GLB,,, | Performed by: FAMILY MEDICINE

## 2021-02-04 PROCEDURE — 63600175 PHARM REV CODE 636 W HCPCS: Mod: PO

## 2021-02-04 PROCEDURE — 71046 X-RAY EXAM CHEST 2 VIEWS: CPT | Mod: TC,PO

## 2021-02-04 PROCEDURE — 3008F BODY MASS INDEX DOCD: CPT | Mod: S$GLB,,, | Performed by: FAMILY MEDICINE

## 2021-02-04 PROCEDURE — 71046 X-RAY EXAM CHEST 2 VIEWS: CPT | Mod: 26,,, | Performed by: RADIOLOGY

## 2021-02-04 PROCEDURE — 99214 OFFICE O/P EST MOD 30 MIN: CPT | Mod: S$GLB,,, | Performed by: FAMILY MEDICINE

## 2021-02-04 PROCEDURE — 1125F AMNT PAIN NOTED PAIN PRSNT: CPT | Mod: S$GLB,,, | Performed by: FAMILY MEDICINE

## 2021-02-04 PROCEDURE — 99999 PR PBB SHADOW E&M-EST. PATIENT-LVL III: ICD-10-PCS | Mod: PBBFAC,,, | Performed by: FAMILY MEDICINE

## 2021-02-04 PROCEDURE — 71046 XR CHEST PA AND LATERAL: ICD-10-PCS | Mod: 26,,, | Performed by: RADIOLOGY

## 2021-02-04 RX ORDER — TRAZODONE HYDROCHLORIDE 100 MG/1
100 TABLET ORAL NIGHTLY
Qty: 30 TABLET | Refills: 11 | Status: SHIPPED | OUTPATIENT
Start: 2021-02-04 | End: 2021-04-18 | Stop reason: SDUPTHER

## 2021-02-04 RX ORDER — NYSTATIN 100000 U/G
CREAM TOPICAL 2 TIMES DAILY
Qty: 30 G | Refills: 2 | Status: SHIPPED | OUTPATIENT
Start: 2021-02-04 | End: 2021-04-15 | Stop reason: SDUPTHER

## 2021-02-04 RX ORDER — PHENAZOPYRIDINE HYDROCHLORIDE 100 MG/1
100 TABLET, FILM COATED ORAL
Qty: 30 TABLET | Refills: 3 | Status: SHIPPED | OUTPATIENT
Start: 2021-02-04 | End: 2021-04-20

## 2021-02-05 ENCOUNTER — TELEPHONE (OUTPATIENT)
Dept: INTERNAL MEDICINE | Facility: CLINIC | Age: 56
End: 2021-02-05

## 2021-02-09 ENCOUNTER — TELEPHONE (OUTPATIENT)
Dept: UROLOGY | Facility: CLINIC | Age: 56
End: 2021-02-09

## 2021-02-10 ENCOUNTER — HOSPITAL ENCOUNTER (OUTPATIENT)
Facility: HOSPITAL | Age: 56
Discharge: HOME | End: 2021-02-10
Attending: UROLOGY | Admitting: UROLOGY
Payer: MEDICARE

## 2021-02-10 ENCOUNTER — ANESTHESIA EVENT (OUTPATIENT)
Dept: SURGERY | Facility: HOSPITAL | Age: 56
End: 2021-02-10
Payer: MEDICARE

## 2021-02-10 ENCOUNTER — ANESTHESIA (OUTPATIENT)
Dept: SURGERY | Facility: HOSPITAL | Age: 56
End: 2021-02-10
Payer: MEDICARE

## 2021-02-10 ENCOUNTER — TELEPHONE (OUTPATIENT)
Dept: UROLOGY | Facility: CLINIC | Age: 56
End: 2021-02-10

## 2021-02-10 DIAGNOSIS — N20.1 RIGHT URETERAL STONE: ICD-10-CM

## 2021-02-10 DIAGNOSIS — N20.1 RIGHT URETERAL STONE: Primary | ICD-10-CM

## 2021-02-10 PROCEDURE — 88300 PR  SURG PATH,GROSS,LEVEL I: ICD-10-PCS | Mod: 26,,, | Performed by: PATHOLOGY

## 2021-02-10 PROCEDURE — 63600175 PHARM REV CODE 636 W HCPCS: Performed by: NURSE ANESTHETIST, CERTIFIED REGISTERED

## 2021-02-10 PROCEDURE — 52356 CYSTO/URETERO W/LITHOTRIPSY: CPT | Mod: RT,,, | Performed by: UROLOGY

## 2021-02-10 PROCEDURE — 25000003 PHARM REV CODE 250: Performed by: NURSE ANESTHETIST, CERTIFIED REGISTERED

## 2021-02-10 PROCEDURE — 88300 SURGICAL PATH GROSS: CPT | Performed by: PATHOLOGY

## 2021-02-10 PROCEDURE — 52356 PR CYSTO/URETERO W/LITHOTRIPSY: ICD-10-PCS | Mod: RT,,, | Performed by: UROLOGY

## 2021-02-10 PROCEDURE — 82365 CALCULUS SPECTROSCOPY: CPT

## 2021-02-10 PROCEDURE — 88300 SURGICAL PATH GROSS: CPT | Mod: 26,,, | Performed by: PATHOLOGY

## 2021-02-10 PROCEDURE — C2617 STENT, NON-COR, TEM W/O DEL: HCPCS | Performed by: UROLOGY

## 2021-02-10 PROCEDURE — 63600175 PHARM REV CODE 636 W HCPCS: Performed by: UROLOGY

## 2021-02-10 PROCEDURE — 71000015 HC POSTOP RECOV 1ST HR: Performed by: UROLOGY

## 2021-02-10 PROCEDURE — 36000707: Performed by: UROLOGY

## 2021-02-10 PROCEDURE — 37000008 HC ANESTHESIA 1ST 15 MINUTES: Performed by: UROLOGY

## 2021-02-10 PROCEDURE — 36000706: Performed by: UROLOGY

## 2021-02-10 PROCEDURE — 71000039 HC RECOVERY, EACH ADD'L HOUR: Performed by: UROLOGY

## 2021-02-10 PROCEDURE — C1769 GUIDE WIRE: HCPCS | Performed by: UROLOGY

## 2021-02-10 PROCEDURE — 71000033 HC RECOVERY, INTIAL HOUR: Performed by: UROLOGY

## 2021-02-10 PROCEDURE — 27201423 OPTIME MED/SURG SUP & DEVICES STERILE SUPPLY: Performed by: UROLOGY

## 2021-02-10 PROCEDURE — 37000009 HC ANESTHESIA EA ADD 15 MINS: Performed by: UROLOGY

## 2021-02-10 DEVICE — STENT URETERAL UNIV 6FR 24CM: Type: IMPLANTABLE DEVICE | Site: URETER | Status: FUNCTIONAL

## 2021-02-10 RX ORDER — NITROFURANTOIN 25; 75 MG/1; MG/1
100 CAPSULE ORAL 2 TIMES DAILY
Qty: 10 CAPSULE | Refills: 0 | Status: ON HOLD | OUTPATIENT
Start: 2021-02-10 | End: 2021-03-31 | Stop reason: HOSPADM

## 2021-02-10 RX ORDER — PHENYLEPHRINE HYDROCHLORIDE 10 MG/ML
INJECTION INTRAVENOUS
Status: DISCONTINUED | OUTPATIENT
Start: 2021-02-10 | End: 2021-02-10

## 2021-02-10 RX ORDER — CEFAZOLIN SODIUM 2 G/50ML
2 SOLUTION INTRAVENOUS
Status: COMPLETED | OUTPATIENT
Start: 2021-02-10 | End: 2021-02-10

## 2021-02-10 RX ORDER — SUCCINYLCHOLINE CHLORIDE 20 MG/ML
INJECTION INTRAMUSCULAR; INTRAVENOUS
Status: DISCONTINUED | OUTPATIENT
Start: 2021-02-10 | End: 2021-02-10

## 2021-02-10 RX ORDER — DEXAMETHASONE SODIUM PHOSPHATE 4 MG/ML
INJECTION, SOLUTION INTRA-ARTICULAR; INTRALESIONAL; INTRAMUSCULAR; INTRAVENOUS; SOFT TISSUE
Status: DISCONTINUED | OUTPATIENT
Start: 2021-02-10 | End: 2021-02-10

## 2021-02-10 RX ORDER — FENTANYL CITRATE 50 UG/ML
INJECTION, SOLUTION INTRAMUSCULAR; INTRAVENOUS
Status: DISCONTINUED | OUTPATIENT
Start: 2021-02-10 | End: 2021-02-10

## 2021-02-10 RX ORDER — ROCURONIUM BROMIDE 10 MG/ML
INJECTION, SOLUTION INTRAVENOUS
Status: DISCONTINUED | OUTPATIENT
Start: 2021-02-10 | End: 2021-02-10

## 2021-02-10 RX ORDER — MIDAZOLAM HYDROCHLORIDE 1 MG/ML
INJECTION, SOLUTION INTRAMUSCULAR; INTRAVENOUS
Status: DISCONTINUED | OUTPATIENT
Start: 2021-02-10 | End: 2021-02-10

## 2021-02-10 RX ORDER — PROPOFOL 10 MG/ML
VIAL (ML) INTRAVENOUS
Status: DISCONTINUED | OUTPATIENT
Start: 2021-02-10 | End: 2021-02-10

## 2021-02-10 RX ORDER — FENTANYL CITRATE 50 UG/ML
25 INJECTION, SOLUTION INTRAMUSCULAR; INTRAVENOUS EVERY 5 MIN PRN
Status: DISCONTINUED | OUTPATIENT
Start: 2021-02-10 | End: 2021-02-10 | Stop reason: HOSPADM

## 2021-02-10 RX ORDER — ONDANSETRON 2 MG/ML
INJECTION INTRAMUSCULAR; INTRAVENOUS
Status: DISCONTINUED | OUTPATIENT
Start: 2021-02-10 | End: 2021-02-10

## 2021-02-10 RX ORDER — LIDOCAINE HYDROCHLORIDE 20 MG/ML
INJECTION INTRAVENOUS
Status: DISCONTINUED | OUTPATIENT
Start: 2021-02-10 | End: 2021-02-10

## 2021-02-10 RX ORDER — ONDANSETRON 2 MG/ML
4 INJECTION INTRAMUSCULAR; INTRAVENOUS DAILY PRN
Status: DISCONTINUED | OUTPATIENT
Start: 2021-02-10 | End: 2021-02-10 | Stop reason: HOSPADM

## 2021-02-10 RX ADMIN — FENTANYL CITRATE 100 MCG: 50 INJECTION, SOLUTION INTRAMUSCULAR; INTRAVENOUS at 02:02

## 2021-02-10 RX ADMIN — DEXAMETHASONE SODIUM PHOSPHATE 8 MG: 4 INJECTION, SOLUTION INTRAMUSCULAR; INTRAVENOUS at 02:02

## 2021-02-10 RX ADMIN — ROCURONIUM BROMIDE 5 MG: 10 INJECTION, SOLUTION INTRAVENOUS at 02:02

## 2021-02-10 RX ADMIN — MIDAZOLAM HYDROCHLORIDE 2 MG: 1 INJECTION, SOLUTION INTRAMUSCULAR; INTRAVENOUS at 02:02

## 2021-02-10 RX ADMIN — CEFAZOLIN SODIUM 2 G: 2 SOLUTION INTRAVENOUS at 02:02

## 2021-02-10 RX ADMIN — ONDANSETRON 4 MG: 2 INJECTION, SOLUTION INTRAMUSCULAR; INTRAVENOUS at 02:02

## 2021-02-10 RX ADMIN — PROPOFOL 130 MG: 10 INJECTION, EMULSION INTRAVENOUS at 02:02

## 2021-02-10 RX ADMIN — SUCCINYLCHOLINE CHLORIDE 140 MG: 20 INJECTION, SOLUTION INTRAMUSCULAR; INTRAVENOUS at 02:02

## 2021-02-10 RX ADMIN — PHENYLEPHRINE HYDROCHLORIDE 200 MCG: 10 INJECTION INTRAVENOUS at 02:02

## 2021-02-10 RX ADMIN — LIDOCAINE HYDROCHLORIDE 100 MG: 20 INJECTION, SOLUTION INTRAVENOUS at 02:02

## 2021-02-11 VITALS
OXYGEN SATURATION: 97 % | HEIGHT: 68 IN | DIASTOLIC BLOOD PRESSURE: 73 MMHG | SYSTOLIC BLOOD PRESSURE: 159 MMHG | BODY MASS INDEX: 35.95 KG/M2 | HEART RATE: 93 BPM | RESPIRATION RATE: 19 BRPM | WEIGHT: 237.19 LBS | TEMPERATURE: 98 F

## 2021-02-11 LAB
FINAL PATHOLOGIC DIAGNOSIS: NORMAL
GROSS: NORMAL
Lab: NORMAL

## 2021-02-17 LAB
COMPN STONE: NORMAL
SPECIMEN SOURCE: NORMAL
STONE ANALYSIS IR-IMP: NORMAL

## 2021-02-18 ENCOUNTER — PATIENT OUTREACH (OUTPATIENT)
Dept: ADMINISTRATIVE | Facility: HOSPITAL | Age: 56
End: 2021-02-18

## 2021-03-01 PROBLEM — F41.1 GENERALIZED ANXIETY DISORDER WITH PANIC ATTACKS: Status: ACTIVE | Noted: 2021-03-01

## 2021-03-01 PROBLEM — F41.0 GENERALIZED ANXIETY DISORDER WITH PANIC ATTACKS: Status: ACTIVE | Noted: 2021-03-01

## 2021-03-01 PROBLEM — F43.10 PTSD (POST-TRAUMATIC STRESS DISORDER): Status: ACTIVE | Noted: 2021-03-01

## 2021-03-03 ENCOUNTER — TELEPHONE (OUTPATIENT)
Dept: UROLOGY | Facility: CLINIC | Age: 56
End: 2021-03-03

## 2021-03-03 ENCOUNTER — LAB VISIT (OUTPATIENT)
Dept: LAB | Facility: HOSPITAL | Age: 56
End: 2021-03-03
Payer: MEDICARE

## 2021-03-03 DIAGNOSIS — N20.1 RIGHT URETERAL STONE: ICD-10-CM

## 2021-03-03 DIAGNOSIS — N20.1 RIGHT URETERAL STONE: Primary | ICD-10-CM

## 2021-03-03 PROCEDURE — 87086 URINE CULTURE/COLONY COUNT: CPT | Performed by: UROLOGY

## 2021-03-03 PROCEDURE — 87077 CULTURE AEROBIC IDENTIFY: CPT | Performed by: UROLOGY

## 2021-03-03 PROCEDURE — 87186 SC STD MICRODIL/AGAR DIL: CPT | Performed by: UROLOGY

## 2021-03-03 PROCEDURE — 87088 URINE BACTERIA CULTURE: CPT | Performed by: UROLOGY

## 2021-03-03 PROCEDURE — 81001 URINALYSIS AUTO W/SCOPE: CPT | Performed by: UROLOGY

## 2021-03-04 LAB
BILIRUB UR QL STRIP: NEGATIVE
CAOX CRY UR QL COMP ASSIST: ABNORMAL
CLARITY UR REFRACT.AUTO: ABNORMAL
COLOR UR AUTO: YELLOW
GLUCOSE UR QL STRIP: NEGATIVE
HGB UR QL STRIP: NEGATIVE
KETONES UR QL STRIP: NEGATIVE
LEUKOCYTE ESTERASE UR QL STRIP: ABNORMAL
MICROSCOPIC COMMENT: ABNORMAL
NITRITE UR QL STRIP: NEGATIVE
PH UR STRIP: 5 [PH] (ref 5–8)
PROT UR QL STRIP: NEGATIVE
RBC #/AREA URNS AUTO: 1 /HPF (ref 0–4)
SP GR UR STRIP: 1.02 (ref 1–1.03)
SQUAMOUS #/AREA URNS AUTO: 1 /HPF
URN SPEC COLLECT METH UR: ABNORMAL
WBC #/AREA URNS AUTO: 23 /HPF (ref 0–5)

## 2021-03-06 LAB — BACTERIA UR CULT: ABNORMAL

## 2021-03-08 ENCOUNTER — TELEPHONE (OUTPATIENT)
Dept: UROLOGY | Facility: CLINIC | Age: 56
End: 2021-03-08

## 2021-03-10 ENCOUNTER — TELEPHONE (OUTPATIENT)
Dept: UROLOGY | Facility: CLINIC | Age: 56
End: 2021-03-10

## 2021-03-10 ENCOUNTER — PATIENT MESSAGE (OUTPATIENT)
Dept: UROLOGY | Facility: CLINIC | Age: 56
End: 2021-03-10

## 2021-03-10 DIAGNOSIS — N20.1 RIGHT URETERAL STONE: Primary | ICD-10-CM

## 2021-03-10 DIAGNOSIS — N30.00 ACUTE CYSTITIS WITHOUT HEMATURIA: ICD-10-CM

## 2021-03-10 RX ORDER — CIPROFLOXACIN 500 MG/1
500 TABLET ORAL EVERY 12 HOURS
Qty: 10 TABLET | Refills: 0 | Status: SHIPPED | OUTPATIENT
Start: 2021-03-10 | End: 2021-03-15

## 2021-03-23 ENCOUNTER — TELEPHONE (OUTPATIENT)
Dept: RADIOLOGY | Facility: HOSPITAL | Age: 56
End: 2021-03-23

## 2021-03-24 ENCOUNTER — HOSPITAL ENCOUNTER (OUTPATIENT)
Dept: RADIOLOGY | Facility: HOSPITAL | Age: 56
Discharge: HOME | End: 2021-03-24
Attending: UROLOGY
Payer: MEDICARE

## 2021-03-24 ENCOUNTER — PATIENT OUTREACH (OUTPATIENT)
Dept: ADMINISTRATIVE | Facility: OTHER | Age: 56
End: 2021-03-24

## 2021-03-24 DIAGNOSIS — Z12.31 ENCOUNTER FOR SCREENING MAMMOGRAM FOR MALIGNANT NEOPLASM OF BREAST: Primary | ICD-10-CM

## 2021-03-24 DIAGNOSIS — N20.1 RIGHT URETERAL STONE: ICD-10-CM

## 2021-03-24 PROCEDURE — 76770 US RETROPERITONEAL COMPLETE: ICD-10-PCS | Mod: 26,,, | Performed by: RADIOLOGY

## 2021-03-24 PROCEDURE — 76770 US EXAM ABDO BACK WALL COMP: CPT | Mod: TC

## 2021-03-24 PROCEDURE — 76770 US EXAM ABDO BACK WALL COMP: CPT | Mod: 26,,, | Performed by: RADIOLOGY

## 2021-03-25 ENCOUNTER — OFFICE VISIT (OUTPATIENT)
Dept: UROLOGY | Facility: CLINIC | Age: 56
End: 2021-03-25
Payer: MEDICARE

## 2021-03-25 VITALS
DIASTOLIC BLOOD PRESSURE: 86 MMHG | WEIGHT: 241.63 LBS | TEMPERATURE: 99 F | BODY MASS INDEX: 36.74 KG/M2 | SYSTOLIC BLOOD PRESSURE: 132 MMHG

## 2021-03-25 DIAGNOSIS — N30.00 ACUTE CYSTITIS WITHOUT HEMATURIA: ICD-10-CM

## 2021-03-25 DIAGNOSIS — N20.0 LEFT RENAL STONE: ICD-10-CM

## 2021-03-25 DIAGNOSIS — N20.1 RIGHT URETERAL STONE: Primary | ICD-10-CM

## 2021-03-25 PROCEDURE — 99214 PR OFFICE/OUTPT VISIT, EST, LEVL IV, 30-39 MIN: ICD-10-PCS | Mod: S$GLB,,, | Performed by: UROLOGY

## 2021-03-25 PROCEDURE — 87186 SC STD MICRODIL/AGAR DIL: CPT | Performed by: UROLOGY

## 2021-03-25 PROCEDURE — 3008F PR BODY MASS INDEX (BMI) DOCUMENTED: ICD-10-PCS | Mod: S$GLB,,, | Performed by: UROLOGY

## 2021-03-25 PROCEDURE — 3008F BODY MASS INDEX DOCD: CPT | Mod: S$GLB,,, | Performed by: UROLOGY

## 2021-03-25 PROCEDURE — 87088 URINE BACTERIA CULTURE: CPT | Performed by: UROLOGY

## 2021-03-25 PROCEDURE — 99999 PR PBB SHADOW E&M-EST. PATIENT-LVL V: CPT | Mod: PBBFAC,,, | Performed by: UROLOGY

## 2021-03-25 PROCEDURE — 1125F AMNT PAIN NOTED PAIN PRSNT: CPT | Mod: S$GLB,,, | Performed by: UROLOGY

## 2021-03-25 PROCEDURE — 87077 CULTURE AEROBIC IDENTIFY: CPT | Performed by: UROLOGY

## 2021-03-25 PROCEDURE — 99214 OFFICE O/P EST MOD 30 MIN: CPT | Mod: S$GLB,,, | Performed by: UROLOGY

## 2021-03-25 PROCEDURE — 99999 PR PBB SHADOW E&M-EST. PATIENT-LVL V: ICD-10-PCS | Mod: PBBFAC,,, | Performed by: UROLOGY

## 2021-03-25 PROCEDURE — 87086 URINE CULTURE/COLONY COUNT: CPT | Performed by: UROLOGY

## 2021-03-25 PROCEDURE — 1125F PR PAIN SEVERITY QUANTIFIED, PAIN PRESENT: ICD-10-PCS | Mod: S$GLB,,, | Performed by: UROLOGY

## 2021-03-29 ENCOUNTER — TELEPHONE (OUTPATIENT)
Dept: UROLOGY | Facility: CLINIC | Age: 56
End: 2021-03-29

## 2021-03-29 LAB — BACTERIA UR CULT: ABNORMAL

## 2021-03-30 ENCOUNTER — PATIENT MESSAGE (OUTPATIENT)
Dept: UROLOGY | Facility: CLINIC | Age: 56
End: 2021-03-30

## 2021-03-30 ENCOUNTER — TELEPHONE (OUTPATIENT)
Dept: UROLOGY | Facility: CLINIC | Age: 56
End: 2021-03-30

## 2021-03-30 DIAGNOSIS — N20.0 LEFT RENAL STONE: Primary | ICD-10-CM

## 2021-03-30 RX ORDER — CIPROFLOXACIN 500 MG/1
500 TABLET ORAL EVERY 12 HOURS
Qty: 14 TABLET | Refills: 0 | Status: ON HOLD | OUTPATIENT
Start: 2021-03-30 | End: 2021-04-07

## 2021-03-31 ENCOUNTER — HOSPITAL ENCOUNTER (OUTPATIENT)
Facility: HOSPITAL | Age: 56
Discharge: HOME | End: 2021-03-31
Attending: UROLOGY | Admitting: UROLOGY
Payer: MEDICARE

## 2021-03-31 ENCOUNTER — ANESTHESIA EVENT (OUTPATIENT)
Dept: SURGERY | Facility: HOSPITAL | Age: 56
End: 2021-03-31
Payer: MEDICARE

## 2021-03-31 ENCOUNTER — ANESTHESIA (OUTPATIENT)
Dept: SURGERY | Facility: HOSPITAL | Age: 56
End: 2021-03-31
Payer: MEDICARE

## 2021-03-31 ENCOUNTER — TELEPHONE (OUTPATIENT)
Dept: UROLOGY | Facility: CLINIC | Age: 56
End: 2021-03-31

## 2021-03-31 DIAGNOSIS — N20.1 RIGHT URETERAL STONE: ICD-10-CM

## 2021-03-31 DIAGNOSIS — N20.0 LEFT RENAL STONE: ICD-10-CM

## 2021-03-31 PROCEDURE — 63600175 PHARM REV CODE 636 W HCPCS: Performed by: NURSE ANESTHETIST, CERTIFIED REGISTERED

## 2021-03-31 PROCEDURE — 71000033 HC RECOVERY, INTIAL HOUR: Performed by: UROLOGY

## 2021-03-31 PROCEDURE — 63600175 PHARM REV CODE 636 W HCPCS: Performed by: ANESTHESIOLOGY

## 2021-03-31 PROCEDURE — 37000008 HC ANESTHESIA 1ST 15 MINUTES: Performed by: UROLOGY

## 2021-03-31 PROCEDURE — 71000015 HC POSTOP RECOV 1ST HR: Performed by: UROLOGY

## 2021-03-31 PROCEDURE — C1769 GUIDE WIRE: HCPCS | Performed by: UROLOGY

## 2021-03-31 PROCEDURE — 25000003 PHARM REV CODE 250: Performed by: NURSE ANESTHETIST, CERTIFIED REGISTERED

## 2021-03-31 PROCEDURE — 52332 CYSTOSCOPY AND TREATMENT: CPT | Mod: LT,,, | Performed by: UROLOGY

## 2021-03-31 PROCEDURE — C2617 STENT, NON-COR, TEM W/O DEL: HCPCS | Performed by: UROLOGY

## 2021-03-31 PROCEDURE — 63600175 PHARM REV CODE 636 W HCPCS: Performed by: UROLOGY

## 2021-03-31 PROCEDURE — 52332 PR CYSTOSCOPY,INSERT URETERAL STENT: ICD-10-PCS | Mod: LT,,, | Performed by: UROLOGY

## 2021-03-31 PROCEDURE — 36000707: Performed by: UROLOGY

## 2021-03-31 PROCEDURE — 36000706: Performed by: UROLOGY

## 2021-03-31 PROCEDURE — C1894 INTRO/SHEATH, NON-LASER: HCPCS | Performed by: UROLOGY

## 2021-03-31 PROCEDURE — 25000003 PHARM REV CODE 250: Performed by: ANESTHESIOLOGY

## 2021-03-31 PROCEDURE — 37000009 HC ANESTHESIA EA ADD 15 MINS: Performed by: UROLOGY

## 2021-03-31 DEVICE — STENT URETERAL UNIV 6FR 24CM
Type: IMPLANTABLE DEVICE | Site: URETER | Status: NON-FUNCTIONAL
Removed: 2021-04-07

## 2021-03-31 RX ORDER — ROCURONIUM BROMIDE 10 MG/ML
INJECTION, SOLUTION INTRAVENOUS
Status: DISCONTINUED | OUTPATIENT
Start: 2021-03-31 | End: 2021-03-31

## 2021-03-31 RX ORDER — MEPERIDINE HYDROCHLORIDE 25 MG/ML
12.5 INJECTION INTRAMUSCULAR; INTRAVENOUS; SUBCUTANEOUS ONCE AS NEEDED
Status: DISCONTINUED | OUTPATIENT
Start: 2021-03-31 | End: 2021-03-31 | Stop reason: HOSPADM

## 2021-03-31 RX ORDER — HYDROMORPHONE HYDROCHLORIDE 2 MG/ML
0.2 INJECTION, SOLUTION INTRAMUSCULAR; INTRAVENOUS; SUBCUTANEOUS EVERY 5 MIN PRN
Status: DISCONTINUED | OUTPATIENT
Start: 2021-03-31 | End: 2021-03-31 | Stop reason: HOSPADM

## 2021-03-31 RX ORDER — DOCUSATE SODIUM 100 MG/1
100 CAPSULE, LIQUID FILLED ORAL 2 TIMES DAILY
Qty: 20 CAPSULE | Refills: 0 | Status: SHIPPED | OUTPATIENT
Start: 2021-03-31

## 2021-03-31 RX ORDER — SODIUM CHLORIDE 0.9 % (FLUSH) 0.9 %
3 SYRINGE (ML) INJECTION EVERY 8 HOURS
Status: DISCONTINUED | OUTPATIENT
Start: 2021-03-31 | End: 2021-03-31 | Stop reason: HOSPADM

## 2021-03-31 RX ORDER — LIDOCAINE HYDROCHLORIDE 10 MG/ML
INJECTION, SOLUTION EPIDURAL; INFILTRATION; INTRACAUDAL; PERINEURAL
Status: DISCONTINUED | OUTPATIENT
Start: 2021-03-31 | End: 2021-03-31

## 2021-03-31 RX ORDER — SUCCINYLCHOLINE CHLORIDE 20 MG/ML
INJECTION INTRAMUSCULAR; INTRAVENOUS
Status: DISCONTINUED | OUTPATIENT
Start: 2021-03-31 | End: 2021-03-31

## 2021-03-31 RX ORDER — METOCLOPRAMIDE HYDROCHLORIDE 5 MG/ML
10 INJECTION INTRAMUSCULAR; INTRAVENOUS EVERY 10 MIN PRN
Status: DISCONTINUED | OUTPATIENT
Start: 2021-03-31 | End: 2021-03-31 | Stop reason: HOSPADM

## 2021-03-31 RX ORDER — DIPHENHYDRAMINE HYDROCHLORIDE 50 MG/ML
25 INJECTION INTRAMUSCULAR; INTRAVENOUS EVERY 6 HOURS PRN
Status: DISCONTINUED | OUTPATIENT
Start: 2021-03-31 | End: 2021-03-31 | Stop reason: HOSPADM

## 2021-03-31 RX ORDER — OXYCODONE HYDROCHLORIDE 5 MG/1
5 TABLET ORAL EVERY 4 HOURS PRN
Qty: 20 TABLET | Refills: 0 | Status: SHIPPED | OUTPATIENT
Start: 2021-03-31 | End: 2021-04-20

## 2021-03-31 RX ORDER — PROPOFOL 10 MG/ML
VIAL (ML) INTRAVENOUS
Status: DISCONTINUED | OUTPATIENT
Start: 2021-03-31 | End: 2021-03-31

## 2021-03-31 RX ORDER — DEXAMETHASONE SODIUM PHOSPHATE 4 MG/ML
INJECTION, SOLUTION INTRA-ARTICULAR; INTRALESIONAL; INTRAMUSCULAR; INTRAVENOUS; SOFT TISSUE
Status: DISCONTINUED | OUTPATIENT
Start: 2021-03-31 | End: 2021-03-31

## 2021-03-31 RX ORDER — SCOLOPAMINE TRANSDERMAL SYSTEM 1 MG/1
1 PATCH, EXTENDED RELEASE TRANSDERMAL
Status: DISCONTINUED | OUTPATIENT
Start: 2021-03-31 | End: 2021-03-31 | Stop reason: HOSPADM

## 2021-03-31 RX ORDER — FENTANYL CITRATE 50 UG/ML
INJECTION, SOLUTION INTRAMUSCULAR; INTRAVENOUS
Status: DISCONTINUED | OUTPATIENT
Start: 2021-03-31 | End: 2021-03-31

## 2021-03-31 RX ORDER — ONDANSETRON 2 MG/ML
INJECTION INTRAMUSCULAR; INTRAVENOUS
Status: DISCONTINUED | OUTPATIENT
Start: 2021-03-31 | End: 2021-03-31

## 2021-03-31 RX ORDER — CIPROFLOXACIN 500 MG/1
500 TABLET ORAL EVERY 12 HOURS
Qty: 28 TABLET | Refills: 0 | Status: SHIPPED | OUTPATIENT
Start: 2021-03-31 | End: 2021-04-14

## 2021-03-31 RX ORDER — MIDAZOLAM HYDROCHLORIDE 1 MG/ML
INJECTION, SOLUTION INTRAMUSCULAR; INTRAVENOUS
Status: DISCONTINUED | OUTPATIENT
Start: 2021-03-31 | End: 2021-03-31

## 2021-03-31 RX ADMIN — DEXAMETHASONE SODIUM PHOSPHATE 4 MG: 4 INJECTION, SOLUTION INTRAMUSCULAR; INTRAVENOUS at 09:03

## 2021-03-31 RX ADMIN — SUCCINYLCHOLINE CHLORIDE 100 MG: 20 INJECTION, SOLUTION INTRAMUSCULAR; INTRAVENOUS at 08:03

## 2021-03-31 RX ADMIN — SCOPALAMINE 1 PATCH: 1 PATCH, EXTENDED RELEASE TRANSDERMAL at 07:03

## 2021-03-31 RX ADMIN — LIDOCAINE HYDROCHLORIDE 50 MG: 10 INJECTION, SOLUTION EPIDURAL; INFILTRATION; INTRACAUDAL; PERINEURAL at 08:03

## 2021-03-31 RX ADMIN — PROPOFOL 140 MG: 10 INJECTION, EMULSION INTRAVENOUS at 08:03

## 2021-03-31 RX ADMIN — FENTANYL CITRATE 50 MCG: 50 INJECTION, SOLUTION INTRAMUSCULAR; INTRAVENOUS at 09:03

## 2021-03-31 RX ADMIN — FENTANYL CITRATE 50 MCG: 50 INJECTION, SOLUTION INTRAMUSCULAR; INTRAVENOUS at 08:03

## 2021-03-31 RX ADMIN — HYDROMORPHONE HYDROCHLORIDE 0.2 MG: 2 INJECTION INTRAMUSCULAR; INTRAVENOUS; SUBCUTANEOUS at 10:03

## 2021-03-31 RX ADMIN — PIPERACILLIN AND TAZOBACTAM 4.5 G: 4; .5 INJECTION, POWDER, LYOPHILIZED, FOR SOLUTION INTRAVENOUS; PARENTERAL at 09:03

## 2021-03-31 RX ADMIN — ROCURONIUM BROMIDE 5 MG: 10 INJECTION, SOLUTION INTRAVENOUS at 08:03

## 2021-03-31 RX ADMIN — ONDANSETRON 4 MG: 2 INJECTION, SOLUTION INTRAMUSCULAR; INTRAVENOUS at 09:03

## 2021-03-31 RX ADMIN — SODIUM CHLORIDE, SODIUM LACTATE, POTASSIUM CHLORIDE, AND CALCIUM CHLORIDE: .6; .31; .03; .02 INJECTION, SOLUTION INTRAVENOUS at 08:03

## 2021-03-31 RX ADMIN — MIDAZOLAM HYDROCHLORIDE 2 MG: 1 INJECTION, SOLUTION INTRAMUSCULAR; INTRAVENOUS at 08:03

## 2021-04-01 VITALS
DIASTOLIC BLOOD PRESSURE: 64 MMHG | TEMPERATURE: 98 F | HEART RATE: 71 BPM | RESPIRATION RATE: 16 BRPM | BODY MASS INDEX: 36.69 KG/M2 | WEIGHT: 242.06 LBS | SYSTOLIC BLOOD PRESSURE: 130 MMHG | OXYGEN SATURATION: 98 % | HEIGHT: 68 IN

## 2021-04-06 ENCOUNTER — TELEPHONE (OUTPATIENT)
Dept: UROLOGY | Facility: CLINIC | Age: 56
End: 2021-04-06

## 2021-04-06 ENCOUNTER — TELEPHONE (OUTPATIENT)
Dept: PREADMISSION TESTING | Facility: HOSPITAL | Age: 56
End: 2021-04-06

## 2021-04-06 DIAGNOSIS — N20.0 LEFT RENAL STONE: Primary | ICD-10-CM

## 2021-04-07 ENCOUNTER — HOSPITAL ENCOUNTER (OUTPATIENT)
Dept: RADIOLOGY | Facility: HOSPITAL | Age: 56
Discharge: HOME | End: 2021-04-07
Attending: UROLOGY | Admitting: UROLOGY
Payer: MEDICARE

## 2021-04-07 ENCOUNTER — ANESTHESIA (OUTPATIENT)
Dept: SURGERY | Facility: HOSPITAL | Age: 56
End: 2021-04-07
Payer: MEDICARE

## 2021-04-07 ENCOUNTER — HOSPITAL ENCOUNTER (OUTPATIENT)
Facility: HOSPITAL | Age: 56
Discharge: HOME | End: 2021-04-08
Attending: UROLOGY | Admitting: UROLOGY
Payer: MEDICARE

## 2021-04-07 ENCOUNTER — ANESTHESIA EVENT (OUTPATIENT)
Dept: SURGERY | Facility: HOSPITAL | Age: 56
End: 2021-04-07
Payer: MEDICARE

## 2021-04-07 DIAGNOSIS — N20.0 LEFT RENAL STONE: ICD-10-CM

## 2021-04-07 PROCEDURE — 27201423 OPTIME MED/SURG SUP & DEVICES STERILE SUPPLY: Performed by: UROLOGY

## 2021-04-07 PROCEDURE — 25000003 PHARM REV CODE 250: Performed by: ANESTHESIOLOGY

## 2021-04-07 PROCEDURE — 74018 RADEX ABDOMEN 1 VIEW: CPT | Mod: 26,,, | Performed by: RADIOLOGY

## 2021-04-07 PROCEDURE — 25000003 PHARM REV CODE 250: Performed by: NURSE ANESTHETIST, CERTIFIED REGISTERED

## 2021-04-07 PROCEDURE — 94660 CPAP INITIATION&MGMT: CPT

## 2021-04-07 PROCEDURE — 52356 PR CYSTO/URETERO W/LITHOTRIPSY: ICD-10-PCS | Mod: LT,,, | Performed by: UROLOGY

## 2021-04-07 PROCEDURE — 25000242 PHARM REV CODE 250 ALT 637 W/ HCPCS: Performed by: ANESTHESIOLOGY

## 2021-04-07 PROCEDURE — D9220A PRA ANESTHESIA: Mod: CRNA,,, | Performed by: NURSE ANESTHETIST, CERTIFIED REGISTERED

## 2021-04-07 PROCEDURE — 87176 TISSUE HOMOGENIZATION CULTR: CPT | Performed by: UROLOGY

## 2021-04-07 PROCEDURE — 87102 FUNGUS ISOLATION CULTURE: CPT | Performed by: UROLOGY

## 2021-04-07 PROCEDURE — 27200651 HC AIRWAY, LMA: Performed by: NURSE ANESTHETIST, CERTIFIED REGISTERED

## 2021-04-07 PROCEDURE — 74018 XR ABDOMEN AP 1 VIEW: ICD-10-PCS | Mod: 26,,, | Performed by: RADIOLOGY

## 2021-04-07 PROCEDURE — 27000190 HC CPAP FULL FACE MASK W/VALVE

## 2021-04-07 PROCEDURE — 99900035 HC TECH TIME PER 15 MIN (STAT)

## 2021-04-07 PROCEDURE — 52356 CYSTO/URETERO W/LITHOTRIPSY: CPT | Mod: LT,,, | Performed by: UROLOGY

## 2021-04-07 PROCEDURE — 63600175 PHARM REV CODE 636 W HCPCS: Performed by: ANESTHESIOLOGY

## 2021-04-07 PROCEDURE — 25500020 PHARM REV CODE 255: Performed by: UROLOGY

## 2021-04-07 PROCEDURE — C1758 CATHETER, URETERAL: HCPCS | Performed by: UROLOGY

## 2021-04-07 PROCEDURE — C1894 INTRO/SHEATH, NON-LASER: HCPCS | Performed by: UROLOGY

## 2021-04-07 PROCEDURE — 71000033 HC RECOVERY, INTIAL HOUR: Performed by: UROLOGY

## 2021-04-07 PROCEDURE — 36000706: Performed by: UROLOGY

## 2021-04-07 PROCEDURE — 37000009 HC ANESTHESIA EA ADD 15 MINS: Performed by: UROLOGY

## 2021-04-07 PROCEDURE — D9220A PRA ANESTHESIA: ICD-10-PCS | Mod: CRNA,,, | Performed by: NURSE ANESTHETIST, CERTIFIED REGISTERED

## 2021-04-07 PROCEDURE — C2617 STENT, NON-COR, TEM W/O DEL: HCPCS | Performed by: UROLOGY

## 2021-04-07 PROCEDURE — 63600175 PHARM REV CODE 636 W HCPCS: Performed by: NURSE ANESTHETIST, CERTIFIED REGISTERED

## 2021-04-07 PROCEDURE — 87075 CULTR BACTERIA EXCEPT BLOOD: CPT | Performed by: UROLOGY

## 2021-04-07 PROCEDURE — 94640 AIRWAY INHALATION TREATMENT: CPT

## 2021-04-07 PROCEDURE — 36000707: Performed by: UROLOGY

## 2021-04-07 PROCEDURE — 63600175 PHARM REV CODE 636 W HCPCS: Performed by: UROLOGY

## 2021-04-07 PROCEDURE — 71000039 HC RECOVERY, EACH ADD'L HOUR: Performed by: UROLOGY

## 2021-04-07 PROCEDURE — D9220A PRA ANESTHESIA: Mod: ANES,,, | Performed by: ANESTHESIOLOGY

## 2021-04-07 PROCEDURE — 37000008 HC ANESTHESIA 1ST 15 MINUTES: Performed by: UROLOGY

## 2021-04-07 PROCEDURE — 74018 RADEX ABDOMEN 1 VIEW: CPT | Mod: TC

## 2021-04-07 PROCEDURE — C1769 GUIDE WIRE: HCPCS | Performed by: UROLOGY

## 2021-04-07 PROCEDURE — 87070 CULTURE OTHR SPECIMN AEROBIC: CPT | Performed by: UROLOGY

## 2021-04-07 PROCEDURE — D9220A PRA ANESTHESIA: ICD-10-PCS | Mod: ANES,,, | Performed by: ANESTHESIOLOGY

## 2021-04-07 PROCEDURE — 82365 CALCULUS SPECTROSCOPY: CPT | Performed by: UROLOGY

## 2021-04-07 PROCEDURE — 87106 FUNGI IDENTIFICATION YEAST: CPT | Performed by: UROLOGY

## 2021-04-07 DEVICE — STENT URETERAL UNIV 6FR 24CM: Type: IMPLANTABLE DEVICE | Site: URETER | Status: FUNCTIONAL

## 2021-04-07 RX ORDER — MIDAZOLAM HYDROCHLORIDE 1 MG/ML
INJECTION INTRAMUSCULAR; INTRAVENOUS
Status: DISCONTINUED | OUTPATIENT
Start: 2021-04-07 | End: 2021-04-07

## 2021-04-07 RX ORDER — FENTANYL CITRATE 50 UG/ML
25 INJECTION, SOLUTION INTRAMUSCULAR; INTRAVENOUS EVERY 5 MIN PRN
Status: DISCONTINUED | OUTPATIENT
Start: 2021-04-07 | End: 2021-04-07 | Stop reason: HOSPADM

## 2021-04-07 RX ORDER — DEXAMETHASONE SODIUM PHOSPHATE 4 MG/ML
INJECTION, SOLUTION INTRA-ARTICULAR; INTRALESIONAL; INTRAMUSCULAR; INTRAVENOUS; SOFT TISSUE
Status: DISCONTINUED | OUTPATIENT
Start: 2021-04-07 | End: 2021-04-07

## 2021-04-07 RX ORDER — PROPOFOL 10 MG/ML
VIAL (ML) INTRAVENOUS CONTINUOUS PRN
Status: DISCONTINUED | OUTPATIENT
Start: 2021-04-07 | End: 2021-04-07

## 2021-04-07 RX ORDER — MEPERIDINE HYDROCHLORIDE 25 MG/ML
12.5 INJECTION INTRAMUSCULAR; INTRAVENOUS; SUBCUTANEOUS ONCE
Status: DISCONTINUED | OUTPATIENT
Start: 2021-04-07 | End: 2021-04-08 | Stop reason: HOSPADM

## 2021-04-07 RX ORDER — ONDANSETRON 2 MG/ML
4 INJECTION INTRAMUSCULAR; INTRAVENOUS ONCE AS NEEDED
Status: DISCONTINUED | OUTPATIENT
Start: 2021-04-07 | End: 2021-04-08 | Stop reason: SDUPTHER

## 2021-04-07 RX ORDER — FENTANYL CITRATE 50 UG/ML
INJECTION, SOLUTION INTRAMUSCULAR; INTRAVENOUS
Status: DISCONTINUED | OUTPATIENT
Start: 2021-04-07 | End: 2021-04-07

## 2021-04-07 RX ORDER — ALBUTEROL SULFATE 0.83 MG/ML
2.5 SOLUTION RESPIRATORY (INHALATION) EVERY 4 HOURS PRN
Status: DISCONTINUED | OUTPATIENT
Start: 2021-04-07 | End: 2021-04-08 | Stop reason: HOSPADM

## 2021-04-07 RX ORDER — SCOLOPAMINE TRANSDERMAL SYSTEM 1 MG/1
1 PATCH, EXTENDED RELEASE TRANSDERMAL
Status: DISCONTINUED | OUTPATIENT
Start: 2021-04-07 | End: 2021-04-08 | Stop reason: HOSPADM

## 2021-04-07 RX ORDER — LABETALOL HYDROCHLORIDE 5 MG/ML
20 INJECTION, SOLUTION INTRAVENOUS ONCE
Status: DISCONTINUED | OUTPATIENT
Start: 2021-04-07 | End: 2021-04-08 | Stop reason: HOSPADM

## 2021-04-07 RX ORDER — OXYBUTYNIN CHLORIDE 5 MG/1
5 TABLET ORAL 3 TIMES DAILY PRN
Status: DISCONTINUED | OUTPATIENT
Start: 2021-04-07 | End: 2021-04-08 | Stop reason: HOSPADM

## 2021-04-07 RX ORDER — DIPHENHYDRAMINE HYDROCHLORIDE 50 MG/ML
25 INJECTION INTRAMUSCULAR; INTRAVENOUS EVERY 6 HOURS PRN
Status: DISCONTINUED | OUTPATIENT
Start: 2021-04-07 | End: 2021-04-08 | Stop reason: SDUPTHER

## 2021-04-07 RX ORDER — SODIUM CHLORIDE, SODIUM LACTATE, POTASSIUM CHLORIDE, CALCIUM CHLORIDE 600; 310; 30; 20 MG/100ML; MG/100ML; MG/100ML; MG/100ML
INJECTION, SOLUTION INTRAVENOUS CONTINUOUS
Status: DISCONTINUED | OUTPATIENT
Start: 2021-04-07 | End: 2021-04-08 | Stop reason: HOSPADM

## 2021-04-07 RX ORDER — DIPHENHYDRAMINE HYDROCHLORIDE 50 MG/ML
25 INJECTION INTRAMUSCULAR; INTRAVENOUS EVERY 6 HOURS PRN
Status: DISCONTINUED | OUTPATIENT
Start: 2021-04-07 | End: 2021-04-08 | Stop reason: HOSPADM

## 2021-04-07 RX ORDER — HYDROCODONE BITARTRATE AND ACETAMINOPHEN 5; 325 MG/1; MG/1
1 TABLET ORAL
Status: DISCONTINUED | OUTPATIENT
Start: 2021-04-07 | End: 2021-04-07

## 2021-04-07 RX ORDER — CIPROFLOXACIN 500 MG/1
500 TABLET ORAL EVERY 12 HOURS
Qty: 16 TABLET | Refills: 0 | Status: SHIPPED | OUTPATIENT
Start: 2021-04-07 | End: 2021-04-14 | Stop reason: SDUPTHER

## 2021-04-07 RX ORDER — LABETALOL HYDROCHLORIDE 5 MG/ML
5 INJECTION, SOLUTION INTRAVENOUS ONCE
Status: COMPLETED | OUTPATIENT
Start: 2021-04-07 | End: 2021-04-07

## 2021-04-07 RX ORDER — HYDROMORPHONE HYDROCHLORIDE 2 MG/ML
INJECTION, SOLUTION INTRAMUSCULAR; INTRAVENOUS; SUBCUTANEOUS
Status: DISCONTINUED | OUTPATIENT
Start: 2021-04-07 | End: 2021-04-07

## 2021-04-07 RX ORDER — LIDOCAINE HYDROCHLORIDE 20 MG/ML
INJECTION, SOLUTION EPIDURAL; INFILTRATION; INTRACAUDAL; PERINEURAL
Status: DISCONTINUED | OUTPATIENT
Start: 2021-04-07 | End: 2021-04-07

## 2021-04-07 RX ORDER — PROPOFOL 10 MG/ML
VIAL (ML) INTRAVENOUS
Status: DISCONTINUED | OUTPATIENT
Start: 2021-04-07 | End: 2021-04-07

## 2021-04-07 RX ORDER — HYDROCODONE BITARTRATE AND ACETAMINOPHEN 5; 325 MG/1; MG/1
1 TABLET ORAL
Status: DISCONTINUED | OUTPATIENT
Start: 2021-04-07 | End: 2021-04-08 | Stop reason: HOSPADM

## 2021-04-07 RX ORDER — ONDANSETRON 4 MG/1
TABLET, ORALLY DISINTEGRATING ORAL
Status: COMPLETED
Start: 2021-04-07 | End: 2021-04-07

## 2021-04-07 RX ORDER — ONDANSETRON 4 MG/1
TABLET, ORALLY DISINTEGRATING ORAL
Status: DISCONTINUED | OUTPATIENT
Start: 2021-04-07 | End: 2021-04-07

## 2021-04-07 RX ORDER — ONDANSETRON 2 MG/ML
4 INJECTION INTRAMUSCULAR; INTRAVENOUS ONCE AS NEEDED
Status: DISCONTINUED | OUTPATIENT
Start: 2021-04-07 | End: 2021-04-08 | Stop reason: HOSPADM

## 2021-04-07 RX ADMIN — PIPERACILLIN AND TAZOBACTAM 4.5 G: 4; .5 INJECTION, POWDER, LYOPHILIZED, FOR SOLUTION INTRAVENOUS; PARENTERAL at 12:04

## 2021-04-07 RX ADMIN — ONDANSETRON 4 MG: 4 TABLET, ORALLY DISINTEGRATING ORAL at 01:04

## 2021-04-07 RX ADMIN — FENTANYL CITRATE 25 MCG: 50 INJECTION, SOLUTION INTRAMUSCULAR; INTRAVENOUS at 02:04

## 2021-04-07 RX ADMIN — MIDAZOLAM HYDROCHLORIDE 2 MG: 1 INJECTION INTRAMUSCULAR; INTRAVENOUS at 01:04

## 2021-04-07 RX ADMIN — SCOPOLAMINE 1 PATCH: 1 PATCH, EXTENDED RELEASE TRANSDERMAL at 01:04

## 2021-04-07 RX ADMIN — ALBUTEROL SULFATE 2.5 MG: 2.5 SOLUTION RESPIRATORY (INHALATION) at 04:04

## 2021-04-07 RX ADMIN — FENTANYL CITRATE 25 MCG: 50 INJECTION, SOLUTION INTRAMUSCULAR; INTRAVENOUS at 01:04

## 2021-04-07 RX ADMIN — LABETALOL HYDROCHLORIDE 5 MG: 5 INJECTION, SOLUTION INTRAVENOUS at 04:04

## 2021-04-07 RX ADMIN — LIDOCAINE HYDROCHLORIDE 100 MG: 20 INJECTION, SOLUTION EPIDURAL; INFILTRATION; INTRACAUDAL; PERINEURAL at 01:04

## 2021-04-07 RX ADMIN — PROPOFOL 100 MCG/KG/MIN: 10 INJECTION, EMULSION INTRAVENOUS at 01:04

## 2021-04-07 RX ADMIN — SODIUM CHLORIDE, SODIUM LACTATE, POTASSIUM CHLORIDE, AND CALCIUM CHLORIDE: 600; 310; 30; 20 INJECTION, SOLUTION INTRAVENOUS at 03:04

## 2021-04-07 RX ADMIN — FENTANYL CITRATE 50 MCG: 50 INJECTION, SOLUTION INTRAMUSCULAR; INTRAVENOUS at 01:04

## 2021-04-07 RX ADMIN — PROPOFOL 200 MG: 10 INJECTION, EMULSION INTRAVENOUS at 01:04

## 2021-04-07 RX ADMIN — DEXAMETHASONE SODIUM PHOSPHATE 4 MG: 4 INJECTION, SOLUTION INTRA-ARTICULAR; INTRALESIONAL; INTRAMUSCULAR; INTRAVENOUS; SOFT TISSUE at 01:04

## 2021-04-07 RX ADMIN — HYDROMORPHONE HYDROCHLORIDE 0.5 MG: 2 INJECTION, SOLUTION INTRAMUSCULAR; INTRAVENOUS; SUBCUTANEOUS at 02:04

## 2021-04-07 RX ADMIN — SODIUM CHLORIDE, SODIUM LACTATE, POTASSIUM CHLORIDE, AND CALCIUM CHLORIDE: 600; 310; 30; 20 INJECTION, SOLUTION INTRAVENOUS at 12:04

## 2021-04-08 VITALS
HEART RATE: 97 BPM | HEIGHT: 70 IN | WEIGHT: 240.63 LBS | TEMPERATURE: 98 F | BODY MASS INDEX: 34.45 KG/M2 | RESPIRATION RATE: 22 BRPM | OXYGEN SATURATION: 92 % | SYSTOLIC BLOOD PRESSURE: 135 MMHG | DIASTOLIC BLOOD PRESSURE: 75 MMHG

## 2021-04-08 PROCEDURE — 94660 CPAP INITIATION&MGMT: CPT

## 2021-04-08 PROCEDURE — 99900035 HC TECH TIME PER 15 MIN (STAT)

## 2021-04-10 LAB
BACTERIA SPEC AEROBE CULT: ABNORMAL
COMPN STONE: NORMAL
SPECIMEN SOURCE: NORMAL
STONE ANALYSIS IR-IMP: NORMAL

## 2021-04-12 ENCOUNTER — TELEPHONE (OUTPATIENT)
Dept: UROLOGY | Facility: CLINIC | Age: 56
End: 2021-04-12

## 2021-04-12 ENCOUNTER — PATIENT MESSAGE (OUTPATIENT)
Dept: UROLOGY | Facility: CLINIC | Age: 56
End: 2021-04-12

## 2021-04-12 ENCOUNTER — OFFICE VISIT (OUTPATIENT)
Dept: PSYCHIATRY | Facility: CLINIC | Age: 56
End: 2021-04-12
Payer: MEDICARE

## 2021-04-12 DIAGNOSIS — F41.1 GENERALIZED ANXIETY DISORDER WITH PANIC ATTACKS: ICD-10-CM

## 2021-04-12 DIAGNOSIS — F43.10 PTSD (POST-TRAUMATIC STRESS DISORDER): Primary | ICD-10-CM

## 2021-04-12 DIAGNOSIS — F41.0 GENERALIZED ANXIETY DISORDER WITH PANIC ATTACKS: ICD-10-CM

## 2021-04-12 PROCEDURE — 90837 PR PSYCHOTHERAPY W/PATIENT, 60 MIN: ICD-10-PCS | Mod: S$GLB,,, | Performed by: SOCIAL WORKER

## 2021-04-12 PROCEDURE — 90837 PSYTX W PT 60 MINUTES: CPT | Mod: S$GLB,,, | Performed by: SOCIAL WORKER

## 2021-04-13 LAB — BACTERIA SPEC ANAEROBE CULT: NORMAL

## 2021-04-14 ENCOUNTER — PROCEDURE VISIT (OUTPATIENT)
Dept: UROLOGY | Facility: CLINIC | Age: 56
End: 2021-04-14
Payer: MEDICARE

## 2021-04-14 VITALS
DIASTOLIC BLOOD PRESSURE: 88 MMHG | WEIGHT: 240.5 LBS | SYSTOLIC BLOOD PRESSURE: 138 MMHG | HEIGHT: 70 IN | BODY MASS INDEX: 34.43 KG/M2

## 2021-04-14 DIAGNOSIS — N20.0 LEFT RENAL STONE: ICD-10-CM

## 2021-04-14 DIAGNOSIS — N30.00 ACUTE CYSTITIS WITHOUT HEMATURIA: Primary | ICD-10-CM

## 2021-04-14 LAB
BILIRUB SERPL-MCNC: ABNORMAL MG/DL
BLOOD URINE, POC: POSITIVE
CLARITY, POC UA: ABNORMAL
COLOR, POC UA: YELLOW
GLUCOSE UR QL STRIP: ABNORMAL
KETONES UR QL STRIP: ABNORMAL
LEUKOCYTE ESTERASE URINE, POC: POSITIVE
NITRITE, POC UA: POSITIVE
PH, POC UA: 5
PROTEIN, POC: ABNORMAL
SPECIFIC GRAVITY, POC UA: 1.02
UROBILINOGEN, POC UA: ABNORMAL

## 2021-04-14 PROCEDURE — 52000 PR CYSTOURETHROSCOPY: ICD-10-PCS | Mod: S$GLB,,, | Performed by: UROLOGY

## 2021-04-14 PROCEDURE — 52000 CYSTOURETHROSCOPY: CPT | Mod: S$GLB,,, | Performed by: UROLOGY

## 2021-04-14 PROCEDURE — 81002 POCT URINE DIPSTICK WITHOUT MICROSCOPE: ICD-10-PCS | Mod: S$GLB,,, | Performed by: UROLOGY

## 2021-04-14 PROCEDURE — 81002 URINALYSIS NONAUTO W/O SCOPE: CPT | Mod: S$GLB,,, | Performed by: UROLOGY

## 2021-04-14 RX ORDER — PROMETHAZINE HYDROCHLORIDE 25 MG/1
25 TABLET ORAL EVERY 6 HOURS PRN
Qty: 60 TABLET | Refills: 0 | Status: SHIPPED | OUTPATIENT
Start: 2021-04-14 | End: 2021-06-21 | Stop reason: SDUPTHER

## 2021-04-14 RX ORDER — CIPROFLOXACIN 500 MG/1
500 TABLET ORAL EVERY 12 HOURS
Qty: 6 TABLET | Refills: 0 | Status: SHIPPED | OUTPATIENT
Start: 2021-04-14 | End: 2021-04-17

## 2021-04-14 RX ORDER — FLUCONAZOLE 200 MG/1
200 TABLET ORAL DAILY
Qty: 14 TABLET | Refills: 0 | Status: SHIPPED | OUTPATIENT
Start: 2021-04-14 | End: 2021-04-28

## 2021-04-15 ENCOUNTER — OFFICE VISIT (OUTPATIENT)
Dept: INTERNAL MEDICINE | Facility: CLINIC | Age: 56
End: 2021-04-15
Payer: MEDICARE

## 2021-04-15 ENCOUNTER — TELEPHONE (OUTPATIENT)
Dept: INTERNAL MEDICINE | Facility: CLINIC | Age: 56
End: 2021-04-15

## 2021-04-15 VITALS
SYSTOLIC BLOOD PRESSURE: 133 MMHG | OXYGEN SATURATION: 94 % | HEART RATE: 94 BPM | HEIGHT: 68 IN | WEIGHT: 245.13 LBS | BODY MASS INDEX: 37.15 KG/M2 | TEMPERATURE: 98 F | DIASTOLIC BLOOD PRESSURE: 71 MMHG

## 2021-04-15 DIAGNOSIS — F32.A DEPRESSION, UNSPECIFIED DEPRESSION TYPE: ICD-10-CM

## 2021-04-15 DIAGNOSIS — R41.3 MEMORY DIFFICULTY: ICD-10-CM

## 2021-04-15 DIAGNOSIS — Z86.16 HISTORY OF COVID-19: Primary | ICD-10-CM

## 2021-04-15 DIAGNOSIS — R06.02 SOB (SHORTNESS OF BREATH): ICD-10-CM

## 2021-04-15 PROCEDURE — 1125F PR PAIN SEVERITY QUANTIFIED, PAIN PRESENT: ICD-10-PCS | Mod: S$GLB,,, | Performed by: FAMILY MEDICINE

## 2021-04-15 PROCEDURE — 99999 PR PBB SHADOW E&M-EST. PATIENT-LVL V: CPT | Mod: PBBFAC,,, | Performed by: FAMILY MEDICINE

## 2021-04-15 PROCEDURE — 1125F AMNT PAIN NOTED PAIN PRSNT: CPT | Mod: S$GLB,,, | Performed by: FAMILY MEDICINE

## 2021-04-15 PROCEDURE — 3008F PR BODY MASS INDEX (BMI) DOCUMENTED: ICD-10-PCS | Mod: S$GLB,,, | Performed by: FAMILY MEDICINE

## 2021-04-15 PROCEDURE — 99214 PR OFFICE/OUTPT VISIT, EST, LEVL IV, 30-39 MIN: ICD-10-PCS | Mod: S$GLB,,, | Performed by: FAMILY MEDICINE

## 2021-04-15 PROCEDURE — 99214 OFFICE O/P EST MOD 30 MIN: CPT | Mod: S$GLB,,, | Performed by: FAMILY MEDICINE

## 2021-04-15 PROCEDURE — 99999 PR PBB SHADOW E&M-EST. PATIENT-LVL V: ICD-10-PCS | Mod: PBBFAC,,, | Performed by: FAMILY MEDICINE

## 2021-04-15 PROCEDURE — 3008F BODY MASS INDEX DOCD: CPT | Mod: S$GLB,,, | Performed by: FAMILY MEDICINE

## 2021-04-15 RX ORDER — NYSTATIN 100000 [USP'U]/G
POWDER TOPICAL
Qty: 30 G | Refills: 3 | Status: SHIPPED | OUTPATIENT
Start: 2021-04-15 | End: 2022-05-05

## 2021-04-15 RX ORDER — NYSTATIN 100000 U/G
CREAM TOPICAL 2 TIMES DAILY
Qty: 30 G | Refills: 3 | Status: SHIPPED | OUTPATIENT
Start: 2021-04-15 | End: 2022-05-05

## 2021-04-18 RX ORDER — TRAZODONE HYDROCHLORIDE 150 MG/1
150 TABLET ORAL NIGHTLY
Qty: 30 TABLET | Refills: 11 | Status: SHIPPED | OUTPATIENT
Start: 2021-04-18 | End: 2021-10-15 | Stop reason: SDUPTHER

## 2021-04-18 RX ORDER — GABAPENTIN 600 MG/1
600 TABLET ORAL 2 TIMES DAILY
Qty: 60 TABLET | Refills: 11 | Status: SHIPPED | OUTPATIENT
Start: 2021-04-18

## 2021-04-19 ENCOUNTER — HOSPITAL ENCOUNTER (OUTPATIENT)
Dept: PULMONOLOGY | Facility: HOSPITAL | Age: 56
Discharge: HOME | End: 2021-04-19
Attending: FAMILY MEDICINE
Payer: MEDICARE

## 2021-04-19 ENCOUNTER — TELEPHONE (OUTPATIENT)
Dept: PULMONOLOGY | Facility: CLINIC | Age: 56
End: 2021-04-19

## 2021-04-19 DIAGNOSIS — R06.02 SOB (SHORTNESS OF BREATH): ICD-10-CM

## 2021-04-19 PROCEDURE — 94618 PULMONARY STRESS TESTING: CPT

## 2021-04-20 ENCOUNTER — OFFICE VISIT (OUTPATIENT)
Dept: INTERNAL MEDICINE | Facility: CLINIC | Age: 56
End: 2021-04-20
Payer: MEDICARE

## 2021-04-20 ENCOUNTER — LAB VISIT (OUTPATIENT)
Dept: LAB | Facility: HOSPITAL | Age: 56
End: 2021-04-20
Attending: INTERNAL MEDICINE
Payer: MEDICARE

## 2021-04-20 VITALS
DIASTOLIC BLOOD PRESSURE: 78 MMHG | WEIGHT: 242.38 LBS | BODY MASS INDEX: 36.86 KG/M2 | SYSTOLIC BLOOD PRESSURE: 117 MMHG | TEMPERATURE: 99 F | HEART RATE: 88 BPM

## 2021-04-20 DIAGNOSIS — N39.0 URINARY TRACT INFECTION WITHOUT HEMATURIA, SITE UNSPECIFIED: ICD-10-CM

## 2021-04-20 DIAGNOSIS — R94.31 ABNORMAL EKG: ICD-10-CM

## 2021-04-20 DIAGNOSIS — R06.02 SOB (SHORTNESS OF BREATH): ICD-10-CM

## 2021-04-20 DIAGNOSIS — R05.3 CHRONIC COUGHING: ICD-10-CM

## 2021-04-20 DIAGNOSIS — G93.31 POSTVIRAL FATIGUE SYNDROME: ICD-10-CM

## 2021-04-20 DIAGNOSIS — R73.03 PREDIABETES: ICD-10-CM

## 2021-04-20 DIAGNOSIS — G90.1 DYSAUTONOMIA: ICD-10-CM

## 2021-04-20 DIAGNOSIS — J45.41 ASTHMA EXACERBATION, NON-ALLERGIC, MODERATE PERSISTENT: ICD-10-CM

## 2021-04-20 DIAGNOSIS — Z86.16 HISTORY OF COVID-19: Primary | ICD-10-CM

## 2021-04-20 DIAGNOSIS — R53.83 FATIGUE, UNSPECIFIED TYPE: ICD-10-CM

## 2021-04-20 DIAGNOSIS — R41.3 MEMORY DIFFICULTY: ICD-10-CM

## 2021-04-20 DIAGNOSIS — F43.10 PTSD (POST-TRAUMATIC STRESS DISORDER): ICD-10-CM

## 2021-04-20 DIAGNOSIS — E55.9 VITAMIN D DEFICIENCY: ICD-10-CM

## 2021-04-20 LAB
25(OH)D3+25(OH)D2 SERPL-MCNC: 15 NG/ML (ref 30–96)
ALBUMIN SERPL BCP-MCNC: 3.7 G/DL (ref 3.5–5.2)
ALP SERPL-CCNC: 102 U/L (ref 55–135)
ALT SERPL W/O P-5'-P-CCNC: 30 U/L (ref 10–44)
ANION GAP SERPL CALC-SCNC: 7 MMOL/L (ref 8–16)
AST SERPL-CCNC: 27 U/L (ref 10–40)
BASOPHILS # BLD AUTO: 0.07 K/UL (ref 0–0.2)
BASOPHILS NFR BLD: 0.8 % (ref 0–1.9)
BILIRUB SERPL-MCNC: 0.2 MG/DL (ref 0.1–1)
BUN SERPL-MCNC: 17 MG/DL (ref 6–20)
CALCIUM SERPL-MCNC: 9.6 MG/DL (ref 8.7–10.5)
CHLORIDE SERPL-SCNC: 103 MMOL/L (ref 95–110)
CO2 SERPL-SCNC: 31 MMOL/L (ref 23–29)
CREAT SERPL-MCNC: 1.1 MG/DL (ref 0.5–1.4)
D DIMER PPP IA.FEU-MCNC: 0.32 MG/L FEU
DIFFERENTIAL METHOD: ABNORMAL
EOSINOPHIL # BLD AUTO: 0.6 K/UL (ref 0–0.5)
EOSINOPHIL NFR BLD: 7.3 % (ref 0–8)
ERYTHROCYTE [DISTWIDTH] IN BLOOD BY AUTOMATED COUNT: 13.2 % (ref 11.5–14.5)
EST. GFR  (AFRICAN AMERICAN): >60 ML/MIN/1.73 M^2
EST. GFR  (NON AFRICAN AMERICAN): 56.3 ML/MIN/1.73 M^2
ESTIMATED AVG GLUCOSE: 140 MG/DL (ref 68–131)
GLUCOSE SERPL-MCNC: 106 MG/DL (ref 70–110)
HBA1C MFR BLD: 6.5 % (ref 4–5.6)
HCT VFR BLD AUTO: 44.7 % (ref 37–48.5)
HGB BLD-MCNC: 13.8 G/DL (ref 12–16)
IMM GRANULOCYTES # BLD AUTO: 0.05 K/UL (ref 0–0.04)
IMM GRANULOCYTES NFR BLD AUTO: 0.6 % (ref 0–0.5)
LYMPHOCYTES # BLD AUTO: 3 K/UL (ref 1–4.8)
LYMPHOCYTES NFR BLD: 35.3 % (ref 18–48)
MCH RBC QN AUTO: 28.9 PG (ref 27–31)
MCHC RBC AUTO-ENTMCNC: 30.9 G/DL (ref 32–36)
MCV RBC AUTO: 94 FL (ref 82–98)
MONOCYTES # BLD AUTO: 0.8 K/UL (ref 0.3–1)
MONOCYTES NFR BLD: 8.9 % (ref 4–15)
NEUTROPHILS # BLD AUTO: 4 K/UL (ref 1.8–7.7)
NEUTROPHILS NFR BLD: 47.1 % (ref 38–73)
NRBC BLD-RTO: 0 /100 WBC
PLATELET # BLD AUTO: 386 K/UL (ref 150–450)
PMV BLD AUTO: 9.1 FL (ref 9.2–12.9)
POTASSIUM SERPL-SCNC: 4 MMOL/L (ref 3.5–5.1)
PROT SERPL-MCNC: 7.6 G/DL (ref 6–8.4)
RBC # BLD AUTO: 4.78 M/UL (ref 4–5.4)
SODIUM SERPL-SCNC: 141 MMOL/L (ref 136–145)
TSH SERPL DL<=0.005 MIU/L-ACNC: 0.95 UIU/ML (ref 0.4–4)
WBC # BLD AUTO: 8.39 K/UL (ref 3.9–12.7)

## 2021-04-20 PROCEDURE — 85025 COMPLETE CBC W/AUTO DIFF WBC: CPT | Performed by: INTERNAL MEDICINE

## 2021-04-20 PROCEDURE — 99215 PR OFFICE/OUTPT VISIT, EST, LEVL V, 40-54 MIN: ICD-10-PCS | Mod: S$GLB,,, | Performed by: INTERNAL MEDICINE

## 2021-04-20 PROCEDURE — 36415 COLL VENOUS BLD VENIPUNCTURE: CPT | Performed by: INTERNAL MEDICINE

## 2021-04-20 PROCEDURE — 99999 PR PBB SHADOW E&M-EST. PATIENT-LVL IV: CPT | Mod: PBBFAC,,, | Performed by: INTERNAL MEDICINE

## 2021-04-20 PROCEDURE — 82306 VITAMIN D 25 HYDROXY: CPT | Performed by: INTERNAL MEDICINE

## 2021-04-20 PROCEDURE — 99999 PR PBB SHADOW E&M-EST. PATIENT-LVL IV: ICD-10-PCS | Mod: PBBFAC,,, | Performed by: INTERNAL MEDICINE

## 2021-04-20 PROCEDURE — 85379 FIBRIN DEGRADATION QUANT: CPT | Performed by: INTERNAL MEDICINE

## 2021-04-20 PROCEDURE — 84443 ASSAY THYROID STIM HORMONE: CPT | Performed by: INTERNAL MEDICINE

## 2021-04-20 PROCEDURE — 3008F BODY MASS INDEX DOCD: CPT | Mod: S$GLB,,, | Performed by: INTERNAL MEDICINE

## 2021-04-20 PROCEDURE — 99215 OFFICE O/P EST HI 40 MIN: CPT | Mod: S$GLB,,, | Performed by: INTERNAL MEDICINE

## 2021-04-20 PROCEDURE — 3008F PR BODY MASS INDEX (BMI) DOCUMENTED: ICD-10-PCS | Mod: S$GLB,,, | Performed by: INTERNAL MEDICINE

## 2021-04-20 PROCEDURE — 83036 HEMOGLOBIN GLYCOSYLATED A1C: CPT | Performed by: INTERNAL MEDICINE

## 2021-04-20 PROCEDURE — 1126F PR PAIN SEVERITY QUANTIFIED, NO PAIN PRESENT: ICD-10-PCS | Mod: S$GLB,,, | Performed by: INTERNAL MEDICINE

## 2021-04-20 PROCEDURE — 1126F AMNT PAIN NOTED NONE PRSNT: CPT | Mod: S$GLB,,, | Performed by: INTERNAL MEDICINE

## 2021-04-20 PROCEDURE — 80053 COMPREHEN METABOLIC PANEL: CPT | Performed by: INTERNAL MEDICINE

## 2021-04-20 RX ORDER — GUAIFENESIN AND DEXTROMETHORPHAN HYDROBROMIDE 600; 30 MG/1; MG/1
1 TABLET, EXTENDED RELEASE ORAL 2 TIMES DAILY
Refills: 0
Start: 2021-04-20 | End: 2022-09-14

## 2021-04-20 RX ORDER — MOMETASONE FUROATE AND FORMOTEROL FUMARATE DIHYDRATE 100; 5 UG/1; UG/1
2 AEROSOL RESPIRATORY (INHALATION) 2 TIMES DAILY
Qty: 13 G | Refills: 1 | Status: SHIPPED | OUTPATIENT
Start: 2021-04-20 | End: 2021-06-21 | Stop reason: SDUPTHER

## 2021-04-20 RX ORDER — METOPROLOL SUCCINATE 25 MG/1
25 TABLET, EXTENDED RELEASE ORAL DAILY
Qty: 30 TABLET | Refills: 2 | Status: SHIPPED | OUTPATIENT
Start: 2021-04-20 | End: 2021-06-21 | Stop reason: SDUPTHER

## 2021-04-21 ENCOUNTER — TELEPHONE (OUTPATIENT)
Dept: INTERNAL MEDICINE | Facility: CLINIC | Age: 56
End: 2021-04-21

## 2021-04-21 ENCOUNTER — PATIENT MESSAGE (OUTPATIENT)
Dept: INTERNAL MEDICINE | Facility: CLINIC | Age: 56
End: 2021-04-21

## 2021-04-21 RX ORDER — DOXYCYCLINE HYCLATE 100 MG
100 TABLET ORAL EVERY 12 HOURS
Qty: 14 TABLET | Refills: 0 | Status: SHIPPED | OUTPATIENT
Start: 2021-04-21 | End: 2021-04-28

## 2021-04-21 RX ORDER — METFORMIN HYDROCHLORIDE 750 MG/1
750 TABLET, EXTENDED RELEASE ORAL
Qty: 30 TABLET | Refills: 2 | Status: SHIPPED | OUTPATIENT
Start: 2021-04-21 | End: 2021-06-21 | Stop reason: SDUPTHER

## 2021-04-21 RX ORDER — ERGOCALCIFEROL 1.25 MG/1
50000 CAPSULE ORAL
Qty: 4 CAPSULE | Refills: 2 | Status: SHIPPED | OUTPATIENT
Start: 2021-04-21 | End: 2021-06-29

## 2021-04-23 ENCOUNTER — TELEPHONE (OUTPATIENT)
Dept: INTERNAL MEDICINE | Facility: CLINIC | Age: 56
End: 2021-04-23

## 2021-04-23 RX ORDER — ONDANSETRON 4 MG/1
4 TABLET, ORALLY DISINTEGRATING ORAL EVERY 8 HOURS PRN
Qty: 21 TABLET | Refills: 1 | Status: SHIPPED | OUTPATIENT
Start: 2021-04-23 | End: 2021-06-21

## 2021-04-26 RX ORDER — PANTOPRAZOLE SODIUM 40 MG/1
40 TABLET, DELAYED RELEASE ORAL 2 TIMES DAILY
Qty: 180 TABLET | Refills: 3 | OUTPATIENT
Start: 2021-04-26 | End: 2021-10-15 | Stop reason: SDUPTHER

## 2021-04-29 ENCOUNTER — HOSPITAL ENCOUNTER (OUTPATIENT)
Dept: RADIOLOGY | Facility: HOSPITAL | Age: 56
Discharge: HOME | End: 2021-04-29
Attending: FAMILY MEDICINE
Payer: MEDICARE

## 2021-04-29 ENCOUNTER — HOSPITAL ENCOUNTER (OUTPATIENT)
Dept: CARDIOLOGY | Facility: HOSPITAL | Age: 56
Discharge: HOME | End: 2021-04-29
Attending: INTERNAL MEDICINE
Payer: MEDICARE

## 2021-04-29 VITALS
WEIGHT: 242 LBS | BODY MASS INDEX: 35.55 KG/M2 | BODY MASS INDEX: 36.68 KG/M2 | HEIGHT: 69 IN | WEIGHT: 240 LBS | HEART RATE: 72 BPM | HEIGHT: 68 IN | DIASTOLIC BLOOD PRESSURE: 86 MMHG | SYSTOLIC BLOOD PRESSURE: 138 MMHG

## 2021-04-29 DIAGNOSIS — Z12.31 ENCOUNTER FOR SCREENING MAMMOGRAM FOR MALIGNANT NEOPLASM OF BREAST: ICD-10-CM

## 2021-04-29 DIAGNOSIS — R06.02 SOB (SHORTNESS OF BREATH): ICD-10-CM

## 2021-04-29 DIAGNOSIS — R94.31 ABNORMAL EKG: ICD-10-CM

## 2021-04-29 LAB
ASCENDING AORTA: 3.24 CM
AV INDEX (PROSTH): 0.73
AV MEAN GRADIENT: 4 MMHG
AV PEAK GRADIENT: 8 MMHG
AV VALVE AREA: 2.54 CM2
AV VELOCITY RATIO: 0.7
BSA FOR ECHO PROCEDURE: 2.29 M2
CV ECHO LV RWT: 0.48 CM
DOP CALC AO PEAK VEL: 1.38 M/S
DOP CALC AO VTI: 28.24 CM
DOP CALC LVOT AREA: 3.5 CM2
DOP CALC LVOT DIAMETER: 2.11 CM
DOP CALC LVOT PEAK VEL: 0.96 M/S
DOP CALC LVOT STROKE VOLUME: 71.65 CM3
DOP CALCLVOT PEAK VEL VTI: 20.5 CM
E WAVE DECELERATION TIME: 357.96 MSEC
E/A RATIO: 0.79
E/E' RATIO: 7.73 M/S
ECHO LV POSTERIOR WALL: 1.15 CM (ref 0.6–1.1)
EJECTION FRACTION: 50 %
FRACTIONAL SHORTENING: 21 % (ref 28–44)
INTERVENTRICULAR SEPTUM: 1.28 CM (ref 0.6–1.1)
LA MAJOR: 4.92 CM
LA MINOR: 5.22 CM
LA WIDTH: 4.02 CM
LEFT ATRIUM SIZE: 4.71 CM
LEFT ATRIUM VOLUME INDEX MOD: 28.5 ML/M2
LEFT ATRIUM VOLUME INDEX: 36.7 ML/M2
LEFT ATRIUM VOLUME MOD: 63.3 CM3
LEFT ATRIUM VOLUME: 81.53 CM3
LEFT INTERNAL DIMENSION IN SYSTOLE: 3.83 CM (ref 2.1–4)
LEFT VENTRICLE DIASTOLIC VOLUME INDEX: 49.49 ML/M2
LEFT VENTRICLE DIASTOLIC VOLUME: 109.86 ML
LEFT VENTRICLE MASS INDEX: 102 G/M2
LEFT VENTRICLE SYSTOLIC VOLUME INDEX: 28.5 ML/M2
LEFT VENTRICLE SYSTOLIC VOLUME: 63.22 ML
LEFT VENTRICULAR INTERNAL DIMENSION IN DIASTOLE: 4.84 CM (ref 3.5–6)
LEFT VENTRICULAR MASS: 225.96 G
LV LATERAL E/E' RATIO: 5.8 M/S
LV SEPTAL E/E' RATIO: 11.6 M/S
MV A" WAVE DURATION": 11.13 MSEC
MV PEAK A VEL: 0.73 M/S
MV PEAK E VEL: 0.58 M/S
MV STENOSIS PRESSURE HALF TIME: 103.81 MS
MV VALVE AREA P 1/2 METHOD: 2.12 CM2
PISA TR MAX VEL: 1.16 M/S
PULM VEIN S/D RATIO: 1.56
PV PEAK D VEL: 0.45 M/S
PV PEAK S VEL: 0.7 M/S
RA MAJOR: 3.52 CM
RA PRESSURE: 3 MMHG
RA WIDTH: 2.92 CM
RIGHT VENTRICULAR END-DIASTOLIC DIMENSION: 3.27 CM
SINUS: 2.78 CM
STJ: 2.59 CM
TDI LATERAL: 0.1 M/S
TDI SEPTAL: 0.05 M/S
TDI: 0.08 M/S
TR MAX PG: 5 MMHG
TRICUSPID ANNULAR PLANE SYSTOLIC EXCURSION: 1.78 CM
TV REST PULMONARY ARTERY PRESSURE: 8 MMHG

## 2021-04-29 PROCEDURE — 77063 MAMMO DIGITAL SCREENING BILAT WITH TOMO: ICD-10-PCS | Mod: 26,,, | Performed by: RADIOLOGY

## 2021-04-29 PROCEDURE — 77067 MAMMO DIGITAL SCREENING BILAT WITH TOMO: ICD-10-PCS | Mod: 26,,, | Performed by: RADIOLOGY

## 2021-04-29 PROCEDURE — 93306 ECHO (CUPID ONLY): ICD-10-PCS | Mod: 26,,, | Performed by: INTERNAL MEDICINE

## 2021-04-29 PROCEDURE — 77063 BREAST TOMOSYNTHESIS BI: CPT | Mod: 26,,, | Performed by: RADIOLOGY

## 2021-04-29 PROCEDURE — 77067 SCR MAMMO BI INCL CAD: CPT | Mod: TC

## 2021-04-29 PROCEDURE — 93306 TTE W/DOPPLER COMPLETE: CPT

## 2021-04-29 PROCEDURE — 93306 TTE W/DOPPLER COMPLETE: CPT | Mod: 26,,, | Performed by: INTERNAL MEDICINE

## 2021-04-29 PROCEDURE — 77067 SCR MAMMO BI INCL CAD: CPT | Mod: 26,,, | Performed by: RADIOLOGY

## 2021-05-03 ENCOUNTER — OFFICE VISIT (OUTPATIENT)
Dept: INTERNAL MEDICINE | Facility: CLINIC | Age: 56
End: 2021-05-03
Payer: MEDICARE

## 2021-05-03 ENCOUNTER — HOSPITAL ENCOUNTER (OUTPATIENT)
Dept: RADIOLOGY | Facility: HOSPITAL | Age: 56
Discharge: HOME | End: 2021-05-03
Attending: INTERNAL MEDICINE
Payer: MEDICARE

## 2021-05-03 ENCOUNTER — TELEPHONE (OUTPATIENT)
Dept: INTERNAL MEDICINE | Facility: CLINIC | Age: 56
End: 2021-05-03

## 2021-05-03 VITALS
SYSTOLIC BLOOD PRESSURE: 116 MMHG | BODY MASS INDEX: 35.08 KG/M2 | OXYGEN SATURATION: 93 % | WEIGHT: 237.56 LBS | HEART RATE: 71 BPM | TEMPERATURE: 100 F | DIASTOLIC BLOOD PRESSURE: 81 MMHG

## 2021-05-03 DIAGNOSIS — G93.31 POSTVIRAL FATIGUE SYNDROME: ICD-10-CM

## 2021-05-03 DIAGNOSIS — R73.03 PREDIABETES: ICD-10-CM

## 2021-05-03 DIAGNOSIS — R09.02 HYPOXEMIA: ICD-10-CM

## 2021-05-03 DIAGNOSIS — Z86.16 HISTORY OF COVID-19: Primary | ICD-10-CM

## 2021-05-03 DIAGNOSIS — R05.9 COUGH: ICD-10-CM

## 2021-05-03 DIAGNOSIS — R07.89 ATYPICAL CHEST PAIN: ICD-10-CM

## 2021-05-03 DIAGNOSIS — R06.02 SHORTNESS OF BREATH: ICD-10-CM

## 2021-05-03 DIAGNOSIS — N39.0 RECURRENT UTI: ICD-10-CM

## 2021-05-03 DIAGNOSIS — R06.02 SOB (SHORTNESS OF BREATH): ICD-10-CM

## 2021-05-03 DIAGNOSIS — R94.31 ABNORMAL EKG: ICD-10-CM

## 2021-05-03 LAB
BILIRUB UR QL STRIP: NEGATIVE
CLARITY UR REFRACT.AUTO: CLEAR
COLOR UR AUTO: YELLOW
GLUCOSE UR QL STRIP: NEGATIVE
HGB UR QL STRIP: NEGATIVE
KETONES UR QL STRIP: NEGATIVE
LEUKOCYTE ESTERASE UR QL STRIP: NEGATIVE
NITRITE UR QL STRIP: NEGATIVE
PH UR STRIP: 5 [PH] (ref 5–8)
PROT UR QL STRIP: NEGATIVE
SP GR UR STRIP: 1.01 (ref 1–1.03)
URN SPEC COLLECT METH UR: NORMAL

## 2021-05-03 PROCEDURE — 99999 PR PBB SHADOW E&M-EST. PATIENT-LVL III: CPT | Mod: PBBFAC,,, | Performed by: INTERNAL MEDICINE

## 2021-05-03 PROCEDURE — 99999 PR PBB SHADOW E&M-EST. PATIENT-LVL III: ICD-10-PCS | Mod: PBBFAC,,, | Performed by: INTERNAL MEDICINE

## 2021-05-03 PROCEDURE — 71250 CT CHEST WITHOUT CONTRAST: ICD-10-PCS | Mod: 26,,, | Performed by: RADIOLOGY

## 2021-05-03 PROCEDURE — 3008F PR BODY MASS INDEX (BMI) DOCUMENTED: ICD-10-PCS | Mod: S$GLB,,, | Performed by: INTERNAL MEDICINE

## 2021-05-03 PROCEDURE — 3008F BODY MASS INDEX DOCD: CPT | Mod: S$GLB,,, | Performed by: INTERNAL MEDICINE

## 2021-05-03 PROCEDURE — 1125F PR PAIN SEVERITY QUANTIFIED, PAIN PRESENT: ICD-10-PCS | Mod: S$GLB,,, | Performed by: INTERNAL MEDICINE

## 2021-05-03 PROCEDURE — 81003 URINALYSIS AUTO W/O SCOPE: CPT | Performed by: INTERNAL MEDICINE

## 2021-05-03 PROCEDURE — 99214 PR OFFICE/OUTPT VISIT, EST, LEVL IV, 30-39 MIN: ICD-10-PCS | Mod: S$GLB,,, | Performed by: INTERNAL MEDICINE

## 2021-05-03 PROCEDURE — 71250 CT THORAX DX C-: CPT | Mod: TC

## 2021-05-03 PROCEDURE — 71250 CT THORAX DX C-: CPT | Mod: 26,,, | Performed by: RADIOLOGY

## 2021-05-03 PROCEDURE — 99214 OFFICE O/P EST MOD 30 MIN: CPT | Mod: S$GLB,,, | Performed by: INTERNAL MEDICINE

## 2021-05-03 PROCEDURE — 1125F AMNT PAIN NOTED PAIN PRSNT: CPT | Mod: S$GLB,,, | Performed by: INTERNAL MEDICINE

## 2021-05-05 ENCOUNTER — OFFICE VISIT (OUTPATIENT)
Dept: CARDIOLOGY | Facility: CLINIC | Age: 56
End: 2021-05-05
Payer: MEDICARE

## 2021-05-05 VITALS
SYSTOLIC BLOOD PRESSURE: 118 MMHG | HEIGHT: 69 IN | WEIGHT: 239.88 LBS | HEART RATE: 76 BPM | OXYGEN SATURATION: 96 % | BODY MASS INDEX: 35.53 KG/M2 | DIASTOLIC BLOOD PRESSURE: 80 MMHG

## 2021-05-05 DIAGNOSIS — E66.01 SEVERE OBESITY (BMI 35.0-35.9 WITH COMORBIDITY): ICD-10-CM

## 2021-05-05 DIAGNOSIS — R07.9 CHEST PAIN, UNSPECIFIED TYPE: Primary | ICD-10-CM

## 2021-05-05 DIAGNOSIS — R73.03 PREDIABETES: ICD-10-CM

## 2021-05-05 PROCEDURE — 99999 PR PBB SHADOW E&M-EST. PATIENT-LVL III: ICD-10-PCS | Mod: PBBFAC,,, | Performed by: INTERNAL MEDICINE

## 2021-05-05 PROCEDURE — 99204 PR OFFICE/OUTPT VISIT, NEW, LEVL IV, 45-59 MIN: ICD-10-PCS | Mod: S$GLB,,, | Performed by: INTERNAL MEDICINE

## 2021-05-05 PROCEDURE — 1125F PR PAIN SEVERITY QUANTIFIED, PAIN PRESENT: ICD-10-PCS | Mod: S$GLB,,, | Performed by: INTERNAL MEDICINE

## 2021-05-05 PROCEDURE — 3008F PR BODY MASS INDEX (BMI) DOCUMENTED: ICD-10-PCS | Mod: S$GLB,,, | Performed by: INTERNAL MEDICINE

## 2021-05-05 PROCEDURE — 99204 OFFICE O/P NEW MOD 45 MIN: CPT | Mod: S$GLB,,, | Performed by: INTERNAL MEDICINE

## 2021-05-05 PROCEDURE — 1125F AMNT PAIN NOTED PAIN PRSNT: CPT | Mod: S$GLB,,, | Performed by: INTERNAL MEDICINE

## 2021-05-05 PROCEDURE — 3008F BODY MASS INDEX DOCD: CPT | Mod: S$GLB,,, | Performed by: INTERNAL MEDICINE

## 2021-05-05 PROCEDURE — 99999 PR PBB SHADOW E&M-EST. PATIENT-LVL III: CPT | Mod: PBBFAC,,, | Performed by: INTERNAL MEDICINE

## 2021-05-10 ENCOUNTER — PATIENT MESSAGE (OUTPATIENT)
Dept: RESEARCH | Facility: HOSPITAL | Age: 56
End: 2021-05-10

## 2021-05-10 ENCOUNTER — OFFICE VISIT (OUTPATIENT)
Dept: PSYCHIATRY | Facility: CLINIC | Age: 56
End: 2021-05-10
Payer: MEDICARE

## 2021-05-10 ENCOUNTER — TELEPHONE (OUTPATIENT)
Dept: INTERNAL MEDICINE | Facility: CLINIC | Age: 56
End: 2021-05-10

## 2021-05-10 DIAGNOSIS — F41.0 GENERALIZED ANXIETY DISORDER WITH PANIC ATTACKS: ICD-10-CM

## 2021-05-10 DIAGNOSIS — F43.10 POST TRAUMATIC STRESS DISORDER (PTSD): Primary | ICD-10-CM

## 2021-05-10 DIAGNOSIS — F41.1 GENERALIZED ANXIETY DISORDER WITH PANIC ATTACKS: ICD-10-CM

## 2021-05-10 PROCEDURE — 90837 PSYTX W PT 60 MINUTES: CPT | Mod: S$GLB,,, | Performed by: SOCIAL WORKER

## 2021-05-10 PROCEDURE — 90837 PR PSYCHOTHERAPY W/PATIENT, 60 MIN: ICD-10-PCS | Mod: S$GLB,,, | Performed by: SOCIAL WORKER

## 2021-05-11 LAB — FUNGUS SPEC CULT: ABNORMAL

## 2021-05-13 ENCOUNTER — PATIENT MESSAGE (OUTPATIENT)
Dept: INTERNAL MEDICINE | Facility: CLINIC | Age: 56
End: 2021-05-13

## 2021-05-20 ENCOUNTER — HOSPITAL ENCOUNTER (OUTPATIENT)
Dept: CARDIOLOGY | Facility: HOSPITAL | Age: 56
Discharge: HOME | End: 2021-05-20
Attending: INTERNAL MEDICINE
Payer: MEDICARE

## 2021-05-20 VITALS — HEIGHT: 69 IN | BODY MASS INDEX: 35.4 KG/M2 | WEIGHT: 239 LBS

## 2021-05-20 DIAGNOSIS — R73.03 PREDIABETES: ICD-10-CM

## 2021-05-20 DIAGNOSIS — R07.9 CHEST PAIN, UNSPECIFIED TYPE: ICD-10-CM

## 2021-05-20 DIAGNOSIS — E66.01 SEVERE OBESITY (BMI 35.0-35.9 WITH COMORBIDITY): ICD-10-CM

## 2021-05-20 LAB
CFR FLOW - ANTERIOR: 2.21
CFR FLOW - INFERIOR: 2.07
CFR FLOW - LATERAL: 1.83
CFR FLOW - MAX: 3.63
CFR FLOW - MIN: 1.4
CFR FLOW - SEPTAL: 2.53
CFR FLOW - WHOLE HEART: 2.16
CV PHARM DOSE: 60 MG
CV STRESS BASE HR: 73 BPM
DIASTOLIC BLOOD PRESSURE: 71 MMHG
END DIASTOLIC INDEX-HIGH: 170 ML/M2
END SYSTOLIC INDEX-HIGH: 70 ML/M2
NUC REST DIASTOLIC VOLUME INDEX: 59
NUC REST EJECTION FRACTION: 58
NUC REST SYSTOLIC VOLUME INDEX: 29
NUC STRESS DIASTOLIC VOLUME INDEX: 69
NUC STRESS EJECTION FRACTION: 57 %
NUC STRESS SYSTOLIC VOLUME INDEX: 29
OHS CV CPX 85 PERCENT MAX PREDICTED HEART RATE MALE: 133
OHS CV CPX MAX PREDICTED HEART RATE: 157
OHS CV CPX PATIENT IS FEMALE: 1
OHS CV CPX PATIENT IS MALE: 0
OHS CV CPX PEAK DIASTOLIC BLOOD PRESSURE: 74 MMHG
OHS CV CPX PEAK HEAR RATE: 71 BPM
OHS CV CPX PEAK RATE PRESSURE PRODUCT: 8946
OHS CV CPX PEAK SYSTOLIC BLOOD PRESSURE: 126 MMHG
OHS CV CPX PERCENT MAX PREDICTED HEART RATE ACHIEVED: 45
OHS CV CPX RATE PRESSURE PRODUCT PRESENTING: 7592
REST FLOW - ANTERIOR: 0.59 CC/MIN/G
REST FLOW - INFERIOR: 0.6 CC/MIN/G
REST FLOW - LATERAL: 0.73 CC/MIN/G
REST FLOW - MAX: 0.89 CC/MIN/G
REST FLOW - MIN: 0.18 CC/MIN/G
REST FLOW - SEPTAL: 0.47 CC/MIN/G
REST FLOW - WHOLE HEART: 0.6 CC/MIN/G
RETIRED EF AND QEF - SEE NOTES: 51 %
STRESS FLOW - ANTERIOR: 1.27 CC/MIN/G
STRESS FLOW - INFERIOR: 1.22 CC/MIN/G
STRESS FLOW - LATERAL: 1.33 CC/MIN/G
STRESS FLOW - MAX: 1.82 CC/MIN/G
STRESS FLOW - MIN: 0.53 CC/MIN/G
STRESS FLOW - SEPTAL: 1.15 CC/MIN/G
STRESS FLOW - WHOLE HEART: 1.24 CC/MIN/G
SYSTOLIC BLOOD PRESSURE: 104 MMHG

## 2021-05-20 PROCEDURE — 93016 CV STRESS TEST SUPVJ ONLY: CPT | Mod: ,,, | Performed by: INTERNAL MEDICINE

## 2021-05-20 PROCEDURE — 78431 CARDIAC PET SCAN STRESS (CUPID ONLY): ICD-10-PCS | Mod: 26,,, | Performed by: INTERNAL MEDICINE

## 2021-05-20 PROCEDURE — 78434 AQMBF PET REST & RX STRESS: CPT | Mod: 26,,, | Performed by: INTERNAL MEDICINE

## 2021-05-20 PROCEDURE — 63600175 PHARM REV CODE 636 W HCPCS: Performed by: INTERNAL MEDICINE

## 2021-05-20 PROCEDURE — 93018 CV STRESS TEST I&R ONLY: CPT | Mod: ,,, | Performed by: INTERNAL MEDICINE

## 2021-05-20 PROCEDURE — 93016 CARDIAC PET SCAN STRESS (CUPID ONLY): ICD-10-PCS | Mod: ,,, | Performed by: INTERNAL MEDICINE

## 2021-05-20 PROCEDURE — 78431 MYOCRD IMG PET RST&STRS CT: CPT | Mod: 26,,, | Performed by: INTERNAL MEDICINE

## 2021-05-20 PROCEDURE — 78434 CARDIAC PET SCAN STRESS (CUPID ONLY): ICD-10-PCS | Mod: 26,,, | Performed by: INTERNAL MEDICINE

## 2021-05-20 PROCEDURE — 93018 CARDIAC PET SCAN STRESS (CUPID ONLY): ICD-10-PCS | Mod: ,,, | Performed by: INTERNAL MEDICINE

## 2021-05-20 PROCEDURE — 78434 AQMBF PET REST & RX STRESS: CPT

## 2021-05-20 RX ORDER — AMINOPHYLLINE 25 MG/ML
75 INJECTION, SOLUTION INTRAVENOUS ONCE
Status: COMPLETED | OUTPATIENT
Start: 2021-05-20 | End: 2021-05-20

## 2021-05-20 RX ORDER — DIPYRIDAMOLE 5 MG/ML
60 INJECTION INTRAVENOUS ONCE
Status: COMPLETED | OUTPATIENT
Start: 2021-05-20 | End: 2021-05-20

## 2021-05-20 RX ADMIN — AMINOPHYLLINE 75 MG: 25 INJECTION, SOLUTION INTRAVENOUS at 08:05

## 2021-05-20 RX ADMIN — DIPYRIDAMOLE 60 MG: 5 INJECTION INTRAVENOUS at 08:05

## 2021-05-24 ENCOUNTER — TELEPHONE (OUTPATIENT)
Dept: INTERNAL MEDICINE | Facility: CLINIC | Age: 56
End: 2021-05-24

## 2021-05-26 ENCOUNTER — TELEPHONE (OUTPATIENT)
Dept: RADIOLOGY | Facility: HOSPITAL | Age: 56
End: 2021-05-26

## 2021-05-27 ENCOUNTER — HOSPITAL ENCOUNTER (OUTPATIENT)
Dept: RADIOLOGY | Facility: HOSPITAL | Age: 56
Discharge: HOME | End: 2021-05-27
Attending: UROLOGY
Payer: MEDICARE

## 2021-05-27 DIAGNOSIS — N30.00 ACUTE CYSTITIS WITHOUT HEMATURIA: Primary | ICD-10-CM

## 2021-05-27 DIAGNOSIS — N20.0 LEFT RENAL STONE: ICD-10-CM

## 2021-05-27 PROCEDURE — 76770 US EXAM ABDO BACK WALL COMP: CPT | Mod: TC

## 2021-05-27 PROCEDURE — 76770 US EXAM ABDO BACK WALL COMP: CPT | Mod: 26,,, | Performed by: RADIOLOGY

## 2021-05-27 PROCEDURE — 76770 US RETROPERITONEAL COMPLETE: ICD-10-PCS | Mod: 26,,, | Performed by: RADIOLOGY

## 2021-05-28 ENCOUNTER — OFFICE VISIT (OUTPATIENT)
Dept: UROLOGY | Facility: CLINIC | Age: 56
End: 2021-05-28
Payer: MEDICARE

## 2021-05-28 VITALS
DIASTOLIC BLOOD PRESSURE: 74 MMHG | BODY MASS INDEX: 35.49 KG/M2 | SYSTOLIC BLOOD PRESSURE: 124 MMHG | HEART RATE: 71 BPM | WEIGHT: 240.31 LBS

## 2021-05-28 DIAGNOSIS — N20.0 LEFT RENAL STONE: ICD-10-CM

## 2021-05-28 DIAGNOSIS — N30.00 ACUTE CYSTITIS WITHOUT HEMATURIA: Primary | ICD-10-CM

## 2021-05-28 LAB
BILIRUB SERPL-MCNC: NORMAL MG/DL
BLOOD URINE, POC: NORMAL
CLARITY, POC UA: CLEAR
COLOR, POC UA: YELLOW
GLUCOSE UR QL STRIP: NORMAL
KETONES UR QL STRIP: NORMAL
LEUKOCYTE ESTERASE URINE, POC: NORMAL
NITRITE, POC UA: NORMAL
PH, POC UA: 5
PROTEIN, POC: NORMAL
SPECIFIC GRAVITY, POC UA: 1.02
UROBILINOGEN, POC UA: NORMAL

## 2021-05-28 PROCEDURE — 1126F PR PAIN SEVERITY QUANTIFIED, NO PAIN PRESENT: ICD-10-PCS | Mod: S$GLB,,, | Performed by: UROLOGY

## 2021-05-28 PROCEDURE — 81002 URINALYSIS NONAUTO W/O SCOPE: CPT | Mod: S$GLB,,, | Performed by: UROLOGY

## 2021-05-28 PROCEDURE — 99213 OFFICE O/P EST LOW 20 MIN: CPT | Mod: 25,S$GLB,, | Performed by: UROLOGY

## 2021-05-28 PROCEDURE — 99999 PR PBB SHADOW E&M-EST. PATIENT-LVL III: ICD-10-PCS | Mod: PBBFAC,,, | Performed by: UROLOGY

## 2021-05-28 PROCEDURE — 3008F PR BODY MASS INDEX (BMI) DOCUMENTED: ICD-10-PCS | Mod: S$GLB,,, | Performed by: UROLOGY

## 2021-05-28 PROCEDURE — 1126F AMNT PAIN NOTED NONE PRSNT: CPT | Mod: S$GLB,,, | Performed by: UROLOGY

## 2021-05-28 PROCEDURE — 99213 PR OFFICE/OUTPT VISIT, EST, LEVL III, 20-29 MIN: ICD-10-PCS | Mod: 25,S$GLB,, | Performed by: UROLOGY

## 2021-05-28 PROCEDURE — 81002 POCT URINE DIPSTICK WITHOUT MICROSCOPE: ICD-10-PCS | Mod: S$GLB,,, | Performed by: UROLOGY

## 2021-05-28 PROCEDURE — 99999 PR PBB SHADOW E&M-EST. PATIENT-LVL III: CPT | Mod: PBBFAC,,, | Performed by: UROLOGY

## 2021-05-28 PROCEDURE — 3008F BODY MASS INDEX DOCD: CPT | Mod: S$GLB,,, | Performed by: UROLOGY

## 2021-06-01 ENCOUNTER — OFFICE VISIT (OUTPATIENT)
Dept: PSYCHIATRY | Facility: CLINIC | Age: 56
End: 2021-06-01
Payer: MEDICARE

## 2021-06-01 DIAGNOSIS — F41.0 GENERALIZED ANXIETY DISORDER WITH PANIC ATTACKS: ICD-10-CM

## 2021-06-01 DIAGNOSIS — F41.1 GENERALIZED ANXIETY DISORDER WITH PANIC ATTACKS: ICD-10-CM

## 2021-06-01 DIAGNOSIS — F43.10 PTSD (POST-TRAUMATIC STRESS DISORDER): Primary | ICD-10-CM

## 2021-06-01 PROCEDURE — 90837 PR PSYCHOTHERAPY W/PATIENT, 60 MIN: ICD-10-PCS | Mod: S$GLB,,, | Performed by: SOCIAL WORKER

## 2021-06-01 PROCEDURE — 90837 PSYTX W PT 60 MINUTES: CPT | Mod: S$GLB,,, | Performed by: SOCIAL WORKER

## 2021-06-21 ENCOUNTER — OFFICE VISIT (OUTPATIENT)
Dept: INTERNAL MEDICINE | Facility: CLINIC | Age: 56
End: 2021-06-21
Payer: MEDICARE

## 2021-06-21 VITALS
BODY MASS INDEX: 35.14 KG/M2 | TEMPERATURE: 99 F | DIASTOLIC BLOOD PRESSURE: 84 MMHG | SYSTOLIC BLOOD PRESSURE: 139 MMHG | WEIGHT: 238 LBS | HEART RATE: 68 BPM

## 2021-06-21 DIAGNOSIS — R11.0 NAUSEA IN ADULT PATIENT: ICD-10-CM

## 2021-06-21 DIAGNOSIS — Z86.16 HISTORY OF COVID-19: Primary | ICD-10-CM

## 2021-06-21 DIAGNOSIS — M25.511 CHRONIC RIGHT SHOULDER PAIN: ICD-10-CM

## 2021-06-21 DIAGNOSIS — R73.03 PREDIABETES: ICD-10-CM

## 2021-06-21 DIAGNOSIS — G89.29 CHRONIC RIGHT SHOULDER PAIN: ICD-10-CM

## 2021-06-21 DIAGNOSIS — G93.31 POSTVIRAL FATIGUE SYNDROME: ICD-10-CM

## 2021-06-21 DIAGNOSIS — G90.1 DYSAUTONOMIA: ICD-10-CM

## 2021-06-21 DIAGNOSIS — J45.30 MILD PERSISTENT ASTHMA WITHOUT COMPLICATION: ICD-10-CM

## 2021-06-21 DIAGNOSIS — L50.9 URTICARIAL RASH: ICD-10-CM

## 2021-06-21 PROCEDURE — 3008F BODY MASS INDEX DOCD: CPT | Mod: S$GLB,,, | Performed by: INTERNAL MEDICINE

## 2021-06-21 PROCEDURE — 3008F PR BODY MASS INDEX (BMI) DOCUMENTED: ICD-10-PCS | Mod: S$GLB,,, | Performed by: INTERNAL MEDICINE

## 2021-06-21 PROCEDURE — 99999 PR PBB SHADOW E&M-EST. PATIENT-LVL III: CPT | Mod: PBBFAC,,, | Performed by: INTERNAL MEDICINE

## 2021-06-21 PROCEDURE — 1125F AMNT PAIN NOTED PAIN PRSNT: CPT | Mod: S$GLB,,, | Performed by: INTERNAL MEDICINE

## 2021-06-21 PROCEDURE — 1125F PR PAIN SEVERITY QUANTIFIED, PAIN PRESENT: ICD-10-PCS | Mod: S$GLB,,, | Performed by: INTERNAL MEDICINE

## 2021-06-21 PROCEDURE — 99214 OFFICE O/P EST MOD 30 MIN: CPT | Mod: S$GLB,,, | Performed by: INTERNAL MEDICINE

## 2021-06-21 PROCEDURE — 99214 PR OFFICE/OUTPT VISIT, EST, LEVL IV, 30-39 MIN: ICD-10-PCS | Mod: S$GLB,,, | Performed by: INTERNAL MEDICINE

## 2021-06-21 PROCEDURE — 99999 PR PBB SHADOW E&M-EST. PATIENT-LVL III: ICD-10-PCS | Mod: PBBFAC,,, | Performed by: INTERNAL MEDICINE

## 2021-06-21 RX ORDER — METOPROLOL SUCCINATE 25 MG/1
25 TABLET, EXTENDED RELEASE ORAL DAILY
Qty: 90 TABLET | Refills: 2 | Status: SHIPPED | OUTPATIENT
Start: 2021-06-21 | End: 2022-09-14

## 2021-06-21 RX ORDER — METFORMIN HYDROCHLORIDE 750 MG/1
750 TABLET, EXTENDED RELEASE ORAL
Qty: 90 TABLET | Refills: 2 | Status: SHIPPED | OUTPATIENT
Start: 2021-06-21 | End: 2022-09-14

## 2021-06-21 RX ORDER — PROMETHAZINE HYDROCHLORIDE 25 MG/1
25 TABLET ORAL EVERY 6 HOURS PRN
Qty: 28 TABLET | Refills: 2 | Status: SHIPPED | OUTPATIENT
Start: 2021-06-21 | End: 2021-06-21 | Stop reason: SDUPTHER

## 2021-06-21 RX ORDER — MOMETASONE FUROATE AND FORMOTEROL FUMARATE DIHYDRATE 100; 5 UG/1; UG/1
2 AEROSOL RESPIRATORY (INHALATION) 2 TIMES DAILY
Qty: 3 INHALER | Refills: 2 | Status: SHIPPED | OUTPATIENT
Start: 2021-06-21 | End: 2021-07-01

## 2021-06-21 RX ORDER — PROMETHAZINE HYDROCHLORIDE 25 MG/1
25 TABLET ORAL EVERY 6 HOURS PRN
Qty: 60 TABLET | Refills: 1 | Status: SHIPPED | OUTPATIENT
Start: 2021-06-21 | End: 2021-08-29

## 2021-06-21 RX ORDER — ONDANSETRON HYDROCHLORIDE 8 MG/1
8 TABLET, FILM COATED ORAL EVERY 8 HOURS PRN
Qty: 28 TABLET | Refills: 3 | Status: SHIPPED | OUTPATIENT
Start: 2021-06-21 | End: 2021-06-21 | Stop reason: SDUPTHER

## 2021-06-21 RX ORDER — ONDANSETRON HYDROCHLORIDE 8 MG/1
8 TABLET, FILM COATED ORAL EVERY 8 HOURS PRN
Qty: 60 TABLET | Refills: 3 | Status: SHIPPED | OUTPATIENT
Start: 2021-06-21 | End: 2021-10-15 | Stop reason: SDUPTHER

## 2021-06-24 ENCOUNTER — LAB VISIT (OUTPATIENT)
Dept: LAB | Facility: HOSPITAL | Age: 56
End: 2021-06-24
Attending: INTERNAL MEDICINE
Payer: MEDICARE

## 2021-06-24 ENCOUNTER — OFFICE VISIT (OUTPATIENT)
Dept: INTERNAL MEDICINE | Facility: CLINIC | Age: 56
End: 2021-06-24
Payer: MEDICARE

## 2021-06-24 VITALS
TEMPERATURE: 98 F | BODY MASS INDEX: 35.98 KG/M2 | DIASTOLIC BLOOD PRESSURE: 58 MMHG | WEIGHT: 242.94 LBS | HEIGHT: 69 IN | OXYGEN SATURATION: 96 % | SYSTOLIC BLOOD PRESSURE: 116 MMHG | HEART RATE: 66 BPM

## 2021-06-24 DIAGNOSIS — Z01.818 PREOP EXAMINATION: ICD-10-CM

## 2021-06-24 DIAGNOSIS — G89.29 CHRONIC RIGHT SHOULDER PAIN: ICD-10-CM

## 2021-06-24 DIAGNOSIS — R73.03 PREDIABETES: ICD-10-CM

## 2021-06-24 DIAGNOSIS — M25.511 CHRONIC RIGHT SHOULDER PAIN: ICD-10-CM

## 2021-06-24 DIAGNOSIS — J45.20 MILD INTERMITTENT ASTHMA WITHOUT COMPLICATION: ICD-10-CM

## 2021-06-24 DIAGNOSIS — Z01.818 PREOP EXAMINATION: Primary | ICD-10-CM

## 2021-06-24 LAB
BASOPHILS # BLD AUTO: 0.04 K/UL (ref 0–0.2)
BASOPHILS NFR BLD: 0.8 % (ref 0–1.9)
DIFFERENTIAL METHOD: ABNORMAL
EOSINOPHIL # BLD AUTO: 0.3 K/UL (ref 0–0.5)
EOSINOPHIL NFR BLD: 6.9 % (ref 0–8)
ERYTHROCYTE [DISTWIDTH] IN BLOOD BY AUTOMATED COUNT: 12.7 % (ref 11.5–14.5)
HCT VFR BLD AUTO: 43.5 % (ref 37–48.5)
HGB BLD-MCNC: 13.3 G/DL (ref 12–16)
IMM GRANULOCYTES # BLD AUTO: 0.01 K/UL (ref 0–0.04)
IMM GRANULOCYTES NFR BLD AUTO: 0.2 % (ref 0–0.5)
LYMPHOCYTES # BLD AUTO: 1.4 K/UL (ref 1–4.8)
LYMPHOCYTES NFR BLD: 27.9 % (ref 18–48)
MCH RBC QN AUTO: 28.6 PG (ref 27–31)
MCHC RBC AUTO-ENTMCNC: 30.6 G/DL (ref 32–36)
MCV RBC AUTO: 94 FL (ref 82–98)
MONOCYTES # BLD AUTO: 0.5 K/UL (ref 0.3–1)
MONOCYTES NFR BLD: 10.6 % (ref 4–15)
NEUTROPHILS # BLD AUTO: 2.6 K/UL (ref 1.8–7.7)
NEUTROPHILS NFR BLD: 53.6 % (ref 38–73)
NRBC BLD-RTO: 0 /100 WBC
PLATELET # BLD AUTO: 221 K/UL (ref 150–450)
PMV BLD AUTO: 9.8 FL (ref 9.2–12.9)
RBC # BLD AUTO: 4.65 M/UL (ref 4–5.4)
WBC # BLD AUTO: 4.91 K/UL (ref 3.9–12.7)

## 2021-06-24 PROCEDURE — 99214 PR OFFICE/OUTPT VISIT, EST, LEVL IV, 30-39 MIN: ICD-10-PCS | Mod: S$GLB,,, | Performed by: INTERNAL MEDICINE

## 2021-06-24 PROCEDURE — 3008F BODY MASS INDEX DOCD: CPT | Mod: S$GLB,,, | Performed by: INTERNAL MEDICINE

## 2021-06-24 PROCEDURE — 1125F AMNT PAIN NOTED PAIN PRSNT: CPT | Mod: S$GLB,,, | Performed by: INTERNAL MEDICINE

## 2021-06-24 PROCEDURE — 93010 EKG 12-LEAD: ICD-10-PCS | Mod: S$GLB,,, | Performed by: INTERNAL MEDICINE

## 2021-06-24 PROCEDURE — 36415 COLL VENOUS BLD VENIPUNCTURE: CPT | Mod: PO | Performed by: INTERNAL MEDICINE

## 2021-06-24 PROCEDURE — 93005 ELECTROCARDIOGRAM TRACING: CPT

## 2021-06-24 PROCEDURE — 93010 ELECTROCARDIOGRAM REPORT: CPT | Mod: S$GLB,,, | Performed by: INTERNAL MEDICINE

## 2021-06-24 PROCEDURE — 85025 COMPLETE CBC W/AUTO DIFF WBC: CPT | Performed by: INTERNAL MEDICINE

## 2021-06-24 PROCEDURE — 1125F PR PAIN SEVERITY QUANTIFIED, PAIN PRESENT: ICD-10-PCS | Mod: S$GLB,,, | Performed by: INTERNAL MEDICINE

## 2021-06-24 PROCEDURE — 80048 BASIC METABOLIC PNL TOTAL CA: CPT | Performed by: INTERNAL MEDICINE

## 2021-06-24 PROCEDURE — 3008F PR BODY MASS INDEX (BMI) DOCUMENTED: ICD-10-PCS | Mod: S$GLB,,, | Performed by: INTERNAL MEDICINE

## 2021-06-24 PROCEDURE — 99999 PR PBB SHADOW E&M-EST. PATIENT-LVL V: CPT | Mod: PBBFAC,,, | Performed by: INTERNAL MEDICINE

## 2021-06-24 PROCEDURE — 99999 PR PBB SHADOW E&M-EST. PATIENT-LVL V: ICD-10-PCS | Mod: PBBFAC,,, | Performed by: INTERNAL MEDICINE

## 2021-06-24 PROCEDURE — 99214 OFFICE O/P EST MOD 30 MIN: CPT | Mod: S$GLB,,, | Performed by: INTERNAL MEDICINE

## 2021-06-25 ENCOUNTER — TELEPHONE (OUTPATIENT)
Dept: INTERNAL MEDICINE | Facility: CLINIC | Age: 56
End: 2021-06-25

## 2021-06-25 LAB
ANION GAP SERPL CALC-SCNC: 8 MMOL/L (ref 8–16)
BUN SERPL-MCNC: 15 MG/DL (ref 6–20)
CALCIUM SERPL-MCNC: 10 MG/DL (ref 8.7–10.5)
CHLORIDE SERPL-SCNC: 103 MMOL/L (ref 95–110)
CO2 SERPL-SCNC: 28 MMOL/L (ref 23–29)
CREAT SERPL-MCNC: 1 MG/DL (ref 0.5–1.4)
EST. GFR  (AFRICAN AMERICAN): >60 ML/MIN/1.73 M^2
EST. GFR  (NON AFRICAN AMERICAN): >60 ML/MIN/1.73 M^2
GLUCOSE SERPL-MCNC: 155 MG/DL (ref 70–110)
POTASSIUM SERPL-SCNC: 3.9 MMOL/L (ref 3.5–5.1)
SODIUM SERPL-SCNC: 139 MMOL/L (ref 136–145)

## 2021-07-23 ENCOUNTER — PATIENT MESSAGE (OUTPATIENT)
Dept: PSYCHIATRY | Facility: CLINIC | Age: 56
End: 2021-07-23

## 2021-07-23 ENCOUNTER — OFFICE VISIT (OUTPATIENT)
Dept: PSYCHIATRY | Facility: CLINIC | Age: 56
End: 2021-07-23
Payer: MEDICARE

## 2021-07-23 ENCOUNTER — TELEPHONE (OUTPATIENT)
Dept: PSYCHIATRY | Facility: CLINIC | Age: 56
End: 2021-07-23

## 2021-07-23 DIAGNOSIS — F41.1 GENERALIZED ANXIETY DISORDER: ICD-10-CM

## 2021-07-23 DIAGNOSIS — F32.1 CURRENT MODERATE EPISODE OF MAJOR DEPRESSIVE DISORDER, UNSPECIFIED WHETHER RECURRENT: Primary | ICD-10-CM

## 2021-07-23 PROCEDURE — 3044F PR MOST RECENT HEMOGLOBIN A1C LEVEL <7.0%: ICD-10-PCS | Mod: S$GLB,,, | Performed by: SOCIAL WORKER

## 2021-07-23 PROCEDURE — 90837 PSYTX W PT 60 MINUTES: CPT | Mod: S$GLB,,, | Performed by: SOCIAL WORKER

## 2021-07-23 PROCEDURE — 3044F HG A1C LEVEL LT 7.0%: CPT | Mod: S$GLB,,, | Performed by: SOCIAL WORKER

## 2021-07-23 PROCEDURE — 90837 PR PSYCHOTHERAPY W/PATIENT, 60 MIN: ICD-10-PCS | Mod: S$GLB,,, | Performed by: SOCIAL WORKER

## 2021-08-04 ENCOUNTER — PATIENT MESSAGE (OUTPATIENT)
Dept: PSYCHIATRY | Facility: CLINIC | Age: 56
End: 2021-08-04

## 2021-08-09 ENCOUNTER — OFFICE VISIT (OUTPATIENT)
Dept: PSYCHIATRY | Facility: CLINIC | Age: 56
End: 2021-08-09
Payer: MEDICARE

## 2021-08-09 DIAGNOSIS — F32.1 CURRENT MODERATE EPISODE OF MAJOR DEPRESSIVE DISORDER, UNSPECIFIED WHETHER RECURRENT: Primary | ICD-10-CM

## 2021-08-09 DIAGNOSIS — F41.1 GENERALIZED ANXIETY DISORDER: ICD-10-CM

## 2021-08-09 PROCEDURE — 1159F PR MEDICATION LIST DOCUMENTED IN MEDICAL RECORD: ICD-10-PCS | Mod: S$GLB,,, | Performed by: SOCIAL WORKER

## 2021-08-09 PROCEDURE — 3044F PR MOST RECENT HEMOGLOBIN A1C LEVEL <7.0%: ICD-10-PCS | Mod: S$GLB,,, | Performed by: SOCIAL WORKER

## 2021-08-09 PROCEDURE — 90837 PSYTX W PT 60 MINUTES: CPT | Mod: S$GLB,,, | Performed by: SOCIAL WORKER

## 2021-08-09 PROCEDURE — 3044F HG A1C LEVEL LT 7.0%: CPT | Mod: S$GLB,,, | Performed by: SOCIAL WORKER

## 2021-08-09 PROCEDURE — 90837 PR PSYCHOTHERAPY W/PATIENT, 60 MIN: ICD-10-PCS | Mod: S$GLB,,, | Performed by: SOCIAL WORKER

## 2021-08-09 PROCEDURE — 1159F MED LIST DOCD IN RCRD: CPT | Mod: S$GLB,,, | Performed by: SOCIAL WORKER

## 2021-08-18 ENCOUNTER — PATIENT MESSAGE (OUTPATIENT)
Dept: PSYCHIATRY | Facility: CLINIC | Age: 56
End: 2021-08-18

## 2021-10-12 ENCOUNTER — HOSPITAL ENCOUNTER (OUTPATIENT)
Dept: RADIOLOGY | Facility: HOSPITAL | Age: 56
Discharge: HOME | End: 2021-10-12
Attending: ORTHOPAEDIC SURGERY
Payer: MEDICARE

## 2021-10-12 DIAGNOSIS — M54.2 CERVICAL PAIN: ICD-10-CM

## 2021-10-12 PROCEDURE — 72125 CT NECK SPINE W/O DYE: CPT | Mod: TC,PO

## 2021-10-12 PROCEDURE — 72125 CT CERVICAL SPINE WITHOUT CONTRAST: ICD-10-PCS | Mod: 26,,, | Performed by: RADIOLOGY

## 2021-10-12 PROCEDURE — 72125 CT NECK SPINE W/O DYE: CPT | Mod: 26,,, | Performed by: RADIOLOGY

## 2021-10-15 ENCOUNTER — OFFICE VISIT (OUTPATIENT)
Dept: INTERNAL MEDICINE | Facility: CLINIC | Age: 56
End: 2021-10-15
Payer: MEDICARE

## 2021-10-15 ENCOUNTER — LAB VISIT (OUTPATIENT)
Dept: LAB | Facility: HOSPITAL | Age: 56
End: 2021-10-15
Attending: FAMILY MEDICINE
Payer: MEDICARE

## 2021-10-15 VITALS
BODY MASS INDEX: 35.85 KG/M2 | OXYGEN SATURATION: 95 % | HEIGHT: 68 IN | SYSTOLIC BLOOD PRESSURE: 118 MMHG | WEIGHT: 236.56 LBS | DIASTOLIC BLOOD PRESSURE: 80 MMHG | HEART RATE: 66 BPM

## 2021-10-15 DIAGNOSIS — E55.9 VITAMIN D DEFICIENCY: ICD-10-CM

## 2021-10-15 DIAGNOSIS — R30.0 DYSURIA: ICD-10-CM

## 2021-10-15 DIAGNOSIS — R73.03 PREDIABETES: ICD-10-CM

## 2021-10-15 DIAGNOSIS — R73.03 PREDIABETES: Primary | ICD-10-CM

## 2021-10-15 DIAGNOSIS — Z12.11 COLON CANCER SCREENING: ICD-10-CM

## 2021-10-15 DIAGNOSIS — Z13.6 SCREENING FOR CARDIOVASCULAR CONDITION: ICD-10-CM

## 2021-10-15 DIAGNOSIS — J45.20 MILD INTERMITTENT ASTHMA WITHOUT COMPLICATION: ICD-10-CM

## 2021-10-15 DIAGNOSIS — G93.31 POSTVIRAL FATIGUE SYNDROME: ICD-10-CM

## 2021-10-15 LAB
25(OH)D3+25(OH)D2 SERPL-MCNC: 23 NG/ML (ref 30–96)
ALBUMIN SERPL BCP-MCNC: 3.9 G/DL (ref 3.5–5.2)
ALP SERPL-CCNC: 79 U/L (ref 55–135)
ALT SERPL W/O P-5'-P-CCNC: 24 U/L (ref 10–44)
ANION GAP SERPL CALC-SCNC: 15 MMOL/L (ref 8–16)
AST SERPL-CCNC: 25 U/L (ref 10–40)
BASOPHILS # BLD AUTO: 0.08 K/UL (ref 0–0.2)
BASOPHILS NFR BLD: 1.1 % (ref 0–1.9)
BILIRUB SERPL-MCNC: 0.4 MG/DL (ref 0.1–1)
BILIRUB SERPL-MCNC: NORMAL MG/DL
BLOOD URINE, POC: NEGATIVE
BUN SERPL-MCNC: 19 MG/DL (ref 6–20)
CALCIUM SERPL-MCNC: 9.8 MG/DL (ref 8.7–10.5)
CHLORIDE SERPL-SCNC: 100 MMOL/L (ref 95–110)
CHOLEST SERPL-MCNC: 208 MG/DL (ref 120–199)
CHOLEST/HDLC SERPL: 3.4 {RATIO} (ref 2–5)
CLARITY, POC UA: CLEAR
CO2 SERPL-SCNC: 26 MMOL/L (ref 23–29)
COLOR, POC UA: YELLOW
CREAT SERPL-MCNC: 1 MG/DL (ref 0.5–1.4)
DIFFERENTIAL METHOD: ABNORMAL
EOSINOPHIL # BLD AUTO: 0.6 K/UL (ref 0–0.5)
EOSINOPHIL NFR BLD: 7.5 % (ref 0–8)
ERYTHROCYTE [DISTWIDTH] IN BLOOD BY AUTOMATED COUNT: 13.2 % (ref 11.5–14.5)
EST. GFR  (AFRICAN AMERICAN): >60 ML/MIN/1.73 M^2
EST. GFR  (NON AFRICAN AMERICAN): >60 ML/MIN/1.73 M^2
ESTIMATED AVG GLUCOSE: 117 MG/DL (ref 68–131)
GLUCOSE SERPL-MCNC: 80 MG/DL (ref 70–110)
GLUCOSE UR QL STRIP: NORMAL
HBA1C MFR BLD: 5.7 % (ref 4–5.6)
HCT VFR BLD AUTO: 46.7 % (ref 37–48.5)
HDLC SERPL-MCNC: 61 MG/DL (ref 40–75)
HDLC SERPL: 29.3 % (ref 20–50)
HGB BLD-MCNC: 14.6 G/DL (ref 12–16)
IMM GRANULOCYTES # BLD AUTO: 0.02 K/UL (ref 0–0.04)
IMM GRANULOCYTES NFR BLD AUTO: 0.3 % (ref 0–0.5)
KETONES UR QL STRIP: NEGATIVE
LDLC SERPL CALC-MCNC: 123.6 MG/DL (ref 63–159)
LEUKOCYTE ESTERASE URINE, POC: NORMAL
LYMPHOCYTES # BLD AUTO: 2.1 K/UL (ref 1–4.8)
LYMPHOCYTES NFR BLD: 27.9 % (ref 18–48)
MCH RBC QN AUTO: 29 PG (ref 27–31)
MCHC RBC AUTO-ENTMCNC: 31.3 G/DL (ref 32–36)
MCV RBC AUTO: 93 FL (ref 82–98)
MONOCYTES # BLD AUTO: 0.6 K/UL (ref 0.3–1)
MONOCYTES NFR BLD: 8.3 % (ref 4–15)
NEUTROPHILS # BLD AUTO: 4.1 K/UL (ref 1.8–7.7)
NEUTROPHILS NFR BLD: 54.9 % (ref 38–73)
NITRITE, POC UA: NEGATIVE
NONHDLC SERPL-MCNC: 147 MG/DL
NRBC BLD-RTO: 0 /100 WBC
PH, POC UA: 6
PLATELET # BLD AUTO: 236 K/UL (ref 150–450)
PMV BLD AUTO: 9.7 FL (ref 9.2–12.9)
POTASSIUM SERPL-SCNC: 4.6 MMOL/L (ref 3.5–5.1)
PROT SERPL-MCNC: 7.2 G/DL (ref 6–8.4)
PROTEIN, POC: NORMAL
RBC # BLD AUTO: 5.04 M/UL (ref 4–5.4)
SODIUM SERPL-SCNC: 141 MMOL/L (ref 136–145)
SPECIFIC GRAVITY, POC UA: 1.01
TRIGL SERPL-MCNC: 117 MG/DL (ref 30–150)
TSH SERPL DL<=0.005 MIU/L-ACNC: 1.3 UIU/ML (ref 0.4–4)
UROBILINOGEN, POC UA: NORMAL
WBC # BLD AUTO: 7.46 K/UL (ref 3.9–12.7)

## 2021-10-15 PROCEDURE — 81002 POCT URINE DIPSTICK WITHOUT MICROSCOPE: ICD-10-PCS | Mod: S$GLB,,, | Performed by: FAMILY MEDICINE

## 2021-10-15 PROCEDURE — 3044F PR MOST RECENT HEMOGLOBIN A1C LEVEL <7.0%: ICD-10-PCS | Mod: S$GLB,,, | Performed by: FAMILY MEDICINE

## 2021-10-15 PROCEDURE — 83036 HEMOGLOBIN GLYCOSYLATED A1C: CPT | Performed by: FAMILY MEDICINE

## 2021-10-15 PROCEDURE — 1159F MED LIST DOCD IN RCRD: CPT | Mod: S$GLB,,, | Performed by: FAMILY MEDICINE

## 2021-10-15 PROCEDURE — 80061 LIPID PANEL: CPT | Performed by: FAMILY MEDICINE

## 2021-10-15 PROCEDURE — 3074F PR MOST RECENT SYSTOLIC BLOOD PRESSURE < 130 MM HG: ICD-10-PCS | Mod: S$GLB,,, | Performed by: FAMILY MEDICINE

## 2021-10-15 PROCEDURE — 99214 OFFICE O/P EST MOD 30 MIN: CPT | Mod: S$GLB,,, | Performed by: FAMILY MEDICINE

## 2021-10-15 PROCEDURE — 1159F PR MEDICATION LIST DOCUMENTED IN MEDICAL RECORD: ICD-10-PCS | Mod: S$GLB,,, | Performed by: FAMILY MEDICINE

## 2021-10-15 PROCEDURE — 3008F PR BODY MASS INDEX (BMI) DOCUMENTED: ICD-10-PCS | Mod: S$GLB,,, | Performed by: FAMILY MEDICINE

## 2021-10-15 PROCEDURE — 3044F HG A1C LEVEL LT 7.0%: CPT | Mod: S$GLB,,, | Performed by: FAMILY MEDICINE

## 2021-10-15 PROCEDURE — 3008F BODY MASS INDEX DOCD: CPT | Mod: S$GLB,,, | Performed by: FAMILY MEDICINE

## 2021-10-15 PROCEDURE — 81002 URINALYSIS NONAUTO W/O SCOPE: CPT | Mod: S$GLB,,, | Performed by: FAMILY MEDICINE

## 2021-10-15 PROCEDURE — 36415 COLL VENOUS BLD VENIPUNCTURE: CPT | Mod: PO | Performed by: FAMILY MEDICINE

## 2021-10-15 PROCEDURE — 99999 PR PBB SHADOW E&M-EST. PATIENT-LVL V: CPT | Mod: PBBFAC,,, | Performed by: FAMILY MEDICINE

## 2021-10-15 PROCEDURE — 99999 PR PBB SHADOW E&M-EST. PATIENT-LVL V: ICD-10-PCS | Mod: PBBFAC,,, | Performed by: FAMILY MEDICINE

## 2021-10-15 PROCEDURE — 82306 VITAMIN D 25 HYDROXY: CPT | Performed by: FAMILY MEDICINE

## 2021-10-15 PROCEDURE — 3079F PR MOST RECENT DIASTOLIC BLOOD PRESSURE 80-89 MM HG: ICD-10-PCS | Mod: S$GLB,,, | Performed by: FAMILY MEDICINE

## 2021-10-15 PROCEDURE — 80053 COMPREHEN METABOLIC PANEL: CPT | Performed by: FAMILY MEDICINE

## 2021-10-15 PROCEDURE — 3079F DIAST BP 80-89 MM HG: CPT | Mod: S$GLB,,, | Performed by: FAMILY MEDICINE

## 2021-10-15 PROCEDURE — 3074F SYST BP LT 130 MM HG: CPT | Mod: S$GLB,,, | Performed by: FAMILY MEDICINE

## 2021-10-15 PROCEDURE — 84443 ASSAY THYROID STIM HORMONE: CPT | Performed by: FAMILY MEDICINE

## 2021-10-15 PROCEDURE — 1160F PR REVIEW ALL MEDS BY PRESCRIBER/CLIN PHARMACIST DOCUMENTED: ICD-10-PCS | Mod: S$GLB,,, | Performed by: FAMILY MEDICINE

## 2021-10-15 PROCEDURE — 85025 COMPLETE CBC W/AUTO DIFF WBC: CPT | Performed by: FAMILY MEDICINE

## 2021-10-15 PROCEDURE — 1160F RVW MEDS BY RX/DR IN RCRD: CPT | Mod: S$GLB,,, | Performed by: FAMILY MEDICINE

## 2021-10-15 PROCEDURE — 99214 PR OFFICE/OUTPT VISIT, EST, LEVL IV, 30-39 MIN: ICD-10-PCS | Mod: S$GLB,,, | Performed by: FAMILY MEDICINE

## 2021-10-15 RX ORDER — IBUPROFEN 600 MG/1
600 TABLET ORAL 3 TIMES DAILY PRN
Qty: 240 TABLET | Refills: 3 | Status: SHIPPED | OUTPATIENT
Start: 2021-10-15 | End: 2023-10-06

## 2021-10-15 RX ORDER — METHOCARBAMOL 500 MG/1
TABLET, FILM COATED ORAL DAILY
COMMUNITY
End: 2023-10-06

## 2021-10-15 RX ORDER — CEFDINIR 300 MG/1
300 CAPSULE ORAL 2 TIMES DAILY
Qty: 14 CAPSULE | Refills: 0 | Status: SHIPPED | OUTPATIENT
Start: 2021-10-15 | End: 2021-10-22

## 2021-10-15 RX ORDER — ONDANSETRON HYDROCHLORIDE 8 MG/1
8 TABLET, FILM COATED ORAL EVERY 8 HOURS PRN
Qty: 60 TABLET | Refills: 3 | Status: SHIPPED | OUTPATIENT
Start: 2021-10-15 | End: 2023-02-20 | Stop reason: SDUPTHER

## 2021-10-15 RX ORDER — LANCETS
EACH MISCELLANEOUS
Qty: 100 EACH | Refills: 3 | Status: SHIPPED | OUTPATIENT
Start: 2021-10-15

## 2021-10-15 RX ORDER — TRAZODONE HYDROCHLORIDE 150 MG/1
150 TABLET ORAL NIGHTLY
Qty: 90 TABLET | Refills: 3 | Status: SHIPPED | OUTPATIENT
Start: 2021-10-15 | End: 2022-09-14 | Stop reason: SDUPTHER

## 2021-10-15 RX ORDER — TRAMADOL HYDROCHLORIDE 50 MG/1
TABLET ORAL
COMMUNITY
Start: 2021-10-09 | End: 2022-09-14

## 2021-10-15 RX ORDER — PANTOPRAZOLE SODIUM 40 MG/1
40 TABLET, DELAYED RELEASE ORAL 2 TIMES DAILY
Qty: 180 TABLET | Refills: 3 | Status: SHIPPED | OUTPATIENT
Start: 2021-10-15 | End: 2022-09-14 | Stop reason: SDUPTHER

## 2021-10-15 RX ORDER — ESCITALOPRAM OXALATE 20 MG/1
20 TABLET ORAL DAILY
Qty: 90 TABLET | Refills: 3 | Status: SHIPPED | OUTPATIENT
Start: 2021-10-15 | End: 2022-09-14 | Stop reason: SDUPTHER

## 2021-10-15 RX ORDER — INSULIN PUMP SYRINGE, 3 ML
EACH MISCELLANEOUS
Qty: 1 EACH | Refills: 0 | Status: SHIPPED | OUTPATIENT
Start: 2021-10-15

## 2021-10-18 ENCOUNTER — PATIENT MESSAGE (OUTPATIENT)
Dept: UROLOGY | Facility: CLINIC | Age: 56
End: 2021-10-18
Payer: MEDICARE

## 2021-10-18 ENCOUNTER — TELEPHONE (OUTPATIENT)
Dept: UROLOGY | Facility: CLINIC | Age: 56
End: 2021-10-18

## 2021-10-18 ENCOUNTER — PATIENT MESSAGE (OUTPATIENT)
Dept: ENDOSCOPY | Facility: HOSPITAL | Age: 56
End: 2021-10-18
Payer: MEDICARE

## 2021-10-18 RX ORDER — SODIUM, POTASSIUM,MAG SULFATES 17.5-3.13G
1 SOLUTION, RECONSTITUTED, ORAL ORAL DAILY
Qty: 1 KIT | Refills: 0 | Status: SHIPPED | OUTPATIENT
Start: 2021-10-18 | End: 2021-10-20

## 2021-11-29 ENCOUNTER — HOSPITAL ENCOUNTER (OUTPATIENT)
Dept: RADIOLOGY | Facility: HOSPITAL | Age: 56
Discharge: HOME | End: 2021-11-29
Attending: UROLOGY
Payer: MEDICARE

## 2021-11-29 ENCOUNTER — TELEPHONE (OUTPATIENT)
Dept: UROLOGY | Facility: CLINIC | Age: 56
End: 2021-11-29
Payer: MEDICARE

## 2021-11-29 DIAGNOSIS — N20.0 LEFT RENAL STONE: ICD-10-CM

## 2021-11-29 DIAGNOSIS — N20.0 LEFT RENAL STONE: Primary | ICD-10-CM

## 2021-11-29 PROCEDURE — 76770 US RETROPERITONEAL COMPLETE: ICD-10-PCS | Mod: 26,,, | Performed by: RADIOLOGY

## 2021-11-29 PROCEDURE — 76770 US EXAM ABDO BACK WALL COMP: CPT | Mod: TC

## 2021-11-29 PROCEDURE — 76770 US EXAM ABDO BACK WALL COMP: CPT | Mod: 26,,, | Performed by: RADIOLOGY

## 2021-11-30 ENCOUNTER — TELEPHONE (OUTPATIENT)
Dept: UROLOGY | Facility: CLINIC | Age: 56
End: 2021-11-30
Payer: MEDICARE

## 2022-02-17 ENCOUNTER — PATIENT MESSAGE (OUTPATIENT)
Dept: ENDOSCOPY | Facility: HOSPITAL | Age: 57
End: 2022-02-17
Payer: MEDICARE

## 2022-04-01 DIAGNOSIS — R11.0 NAUSEA IN ADULT PATIENT: ICD-10-CM

## 2022-04-01 RX ORDER — PROMETHAZINE HYDROCHLORIDE 25 MG/1
TABLET ORAL
Qty: 28 TABLET | Refills: 2 | Status: SHIPPED | OUTPATIENT
Start: 2022-04-01 | End: 2022-09-14 | Stop reason: SDUPTHER

## 2022-05-02 ENCOUNTER — PATIENT MESSAGE (OUTPATIENT)
Dept: ADMINISTRATIVE | Facility: HOSPITAL | Age: 57
End: 2022-05-02
Payer: MEDICARE

## 2022-07-14 ENCOUNTER — PATIENT OUTREACH (OUTPATIENT)
Dept: ADMINISTRATIVE | Facility: HOSPITAL | Age: 57
End: 2022-07-14
Payer: MEDICARE

## 2022-07-14 NOTE — PROGRESS NOTES
Working Cervical Cancer Screening Report:     Spoke with patient about cervical cancer screening. Patient stated she was advised per her OBGYN-Dr. Sonia Calzada to return for pap in 5 years.       Health Maintenance Updated  Immunizations Updated  Care Everywhere Updated  [facility] Searched-None found  [facility] Searched-None found  [facility] Searched-None found

## 2022-07-22 ENCOUNTER — PATIENT OUTREACH (OUTPATIENT)
Dept: ADMINISTRATIVE | Facility: HOSPITAL | Age: 57
End: 2022-07-22
Payer: MEDICARE

## 2022-07-22 DIAGNOSIS — R73.03 PREDIABETES: ICD-10-CM

## 2022-07-22 RX ORDER — METFORMIN HYDROCHLORIDE 750 MG/1
750 TABLET, EXTENDED RELEASE ORAL
Qty: 90 TABLET | Refills: 2 | OUTPATIENT
Start: 2022-07-22 | End: 2023-07-22

## 2022-07-22 NOTE — PROGRESS NOTES
Working [employer] Med Adherence.     Per med card pt is on metformin.  Snap shot-med dispense history indicates last filled 90 days on 3/21/22.  Confirmed with pharmacy, last refill and pt does not have any refills available.    Per chart review pt is not diabetic, she has pre diabetes.    LM for pt to see why not refilled, can't tolerate, doesn't have refill or what.  Also offered to schedule follow up.

## 2022-08-04 ENCOUNTER — PATIENT MESSAGE (OUTPATIENT)
Dept: ADMINISTRATIVE | Facility: HOSPITAL | Age: 57
End: 2022-08-04
Payer: MEDICARE

## 2022-09-14 ENCOUNTER — OFFICE VISIT (OUTPATIENT)
Dept: INTERNAL MEDICINE | Facility: CLINIC | Age: 57
End: 2022-09-14
Payer: MEDICARE

## 2022-09-14 VITALS
WEIGHT: 238 LBS | TEMPERATURE: 98 F | OXYGEN SATURATION: 96 % | SYSTOLIC BLOOD PRESSURE: 122 MMHG | BODY MASS INDEX: 36.07 KG/M2 | HEART RATE: 90 BPM | DIASTOLIC BLOOD PRESSURE: 82 MMHG | HEIGHT: 68 IN

## 2022-09-14 VITALS
WEIGHT: 239 LBS | DIASTOLIC BLOOD PRESSURE: 82 MMHG | OXYGEN SATURATION: 96 % | SYSTOLIC BLOOD PRESSURE: 122 MMHG | HEART RATE: 90 BPM | RESPIRATION RATE: 18 BRPM | HEIGHT: 68 IN | TEMPERATURE: 98 F | BODY MASS INDEX: 36.22 KG/M2

## 2022-09-14 DIAGNOSIS — J45.20 MILD INTERMITTENT ASTHMA WITHOUT COMPLICATION: ICD-10-CM

## 2022-09-14 DIAGNOSIS — Z12.11 COLON CANCER SCREENING: ICD-10-CM

## 2022-09-14 DIAGNOSIS — G89.29 CHRONIC PAIN OF BOTH SHOULDERS: ICD-10-CM

## 2022-09-14 DIAGNOSIS — R73.03 PREDIABETES: ICD-10-CM

## 2022-09-14 DIAGNOSIS — E55.9 VITAMIN D DEFICIENCY: ICD-10-CM

## 2022-09-14 DIAGNOSIS — G93.31 POSTVIRAL FATIGUE SYNDROME: ICD-10-CM

## 2022-09-14 DIAGNOSIS — Z00.00 ROUTINE ADULT HEALTH MAINTENANCE: ICD-10-CM

## 2022-09-14 DIAGNOSIS — Z13.6 SCREENING FOR CARDIOVASCULAR CONDITION: ICD-10-CM

## 2022-09-14 DIAGNOSIS — F32.4 MAJOR DEPRESSIVE DISORDER IN PARTIAL REMISSION, UNSPECIFIED WHETHER RECURRENT: ICD-10-CM

## 2022-09-14 DIAGNOSIS — Z00.00 ENCOUNTER FOR PREVENTIVE HEALTH EXAMINATION: Primary | ICD-10-CM

## 2022-09-14 DIAGNOSIS — M25.511 CHRONIC PAIN OF BOTH SHOULDERS: ICD-10-CM

## 2022-09-14 DIAGNOSIS — Z00.00 ENCOUNTER FOR MEDICARE ANNUAL WELLNESS EXAM: ICD-10-CM

## 2022-09-14 DIAGNOSIS — R11.0 NAUSEA IN ADULT PATIENT: ICD-10-CM

## 2022-09-14 DIAGNOSIS — R73.03 PREDIABETES: Primary | ICD-10-CM

## 2022-09-14 DIAGNOSIS — G90.1 DYSAUTONOMIA: ICD-10-CM

## 2022-09-14 DIAGNOSIS — M25.512 CHRONIC PAIN OF BOTH SHOULDERS: ICD-10-CM

## 2022-09-14 DIAGNOSIS — Z12.31 ENCOUNTER FOR SCREENING MAMMOGRAM FOR MALIGNANT NEOPLASM OF BREAST: ICD-10-CM

## 2022-09-14 DIAGNOSIS — E66.01 MORBID (SEVERE) OBESITY DUE TO EXCESS CALORIES: ICD-10-CM

## 2022-09-14 PROCEDURE — 3079F DIAST BP 80-89 MM HG: CPT | Mod: CPTII,S$GLB,, | Performed by: NURSE PRACTITIONER

## 2022-09-14 PROCEDURE — 3079F PR MOST RECENT DIASTOLIC BLOOD PRESSURE 80-89 MM HG: ICD-10-PCS | Mod: CPTII,S$GLB,, | Performed by: NURSE PRACTITIONER

## 2022-09-14 PROCEDURE — 99999 PR PBB SHADOW E&M-EST. PATIENT-LVL V: ICD-10-PCS | Mod: PBBFAC,,, | Performed by: NURSE PRACTITIONER

## 2022-09-14 PROCEDURE — 3008F PR BODY MASS INDEX (BMI) DOCUMENTED: ICD-10-PCS | Mod: CPTII,S$GLB,, | Performed by: NURSE PRACTITIONER

## 2022-09-14 PROCEDURE — 99499 UNLISTED E&M SERVICE: CPT | Mod: S$GLB,,, | Performed by: NURSE PRACTITIONER

## 2022-09-14 PROCEDURE — 3079F PR MOST RECENT DIASTOLIC BLOOD PRESSURE 80-89 MM HG: ICD-10-PCS | Mod: CPTII,S$GLB,, | Performed by: FAMILY MEDICINE

## 2022-09-14 PROCEDURE — G0439 PPPS, SUBSEQ VISIT: HCPCS | Mod: S$GLB,,, | Performed by: NURSE PRACTITIONER

## 2022-09-14 PROCEDURE — 3008F BODY MASS INDEX DOCD: CPT | Mod: CPTII,S$GLB,, | Performed by: FAMILY MEDICINE

## 2022-09-14 PROCEDURE — 99999 PR PBB SHADOW E&M-EST. PATIENT-LVL V: ICD-10-PCS | Mod: PBBFAC,,, | Performed by: FAMILY MEDICINE

## 2022-09-14 PROCEDURE — 99999 PR PBB SHADOW E&M-EST. PATIENT-LVL V: CPT | Mod: PBBFAC,,, | Performed by: FAMILY MEDICINE

## 2022-09-14 PROCEDURE — 99999 PR PBB SHADOW E&M-EST. PATIENT-LVL V: CPT | Mod: PBBFAC,,, | Performed by: NURSE PRACTITIONER

## 2022-09-14 PROCEDURE — 3074F PR MOST RECENT SYSTOLIC BLOOD PRESSURE < 130 MM HG: ICD-10-PCS | Mod: CPTII,S$GLB,, | Performed by: NURSE PRACTITIONER

## 2022-09-14 PROCEDURE — 3074F SYST BP LT 130 MM HG: CPT | Mod: CPTII,S$GLB,, | Performed by: FAMILY MEDICINE

## 2022-09-14 PROCEDURE — 3079F DIAST BP 80-89 MM HG: CPT | Mod: CPTII,S$GLB,, | Performed by: FAMILY MEDICINE

## 2022-09-14 PROCEDURE — 3074F PR MOST RECENT SYSTOLIC BLOOD PRESSURE < 130 MM HG: ICD-10-PCS | Mod: CPTII,S$GLB,, | Performed by: FAMILY MEDICINE

## 2022-09-14 PROCEDURE — 3074F SYST BP LT 130 MM HG: CPT | Mod: CPTII,S$GLB,, | Performed by: NURSE PRACTITIONER

## 2022-09-14 PROCEDURE — 1159F PR MEDICATION LIST DOCUMENTED IN MEDICAL RECORD: ICD-10-PCS | Mod: CPTII,S$GLB,, | Performed by: FAMILY MEDICINE

## 2022-09-14 PROCEDURE — 1159F MED LIST DOCD IN RCRD: CPT | Mod: CPTII,S$GLB,, | Performed by: FAMILY MEDICINE

## 2022-09-14 PROCEDURE — 3008F PR BODY MASS INDEX (BMI) DOCUMENTED: ICD-10-PCS | Mod: CPTII,S$GLB,, | Performed by: FAMILY MEDICINE

## 2022-09-14 PROCEDURE — 99214 OFFICE O/P EST MOD 30 MIN: CPT | Mod: S$GLB,,, | Performed by: FAMILY MEDICINE

## 2022-09-14 PROCEDURE — 99499 RISK ADDL DX/OHS AUDIT: ICD-10-PCS | Mod: S$GLB,,, | Performed by: NURSE PRACTITIONER

## 2022-09-14 PROCEDURE — 99214 PR OFFICE/OUTPT VISIT, EST, LEVL IV, 30-39 MIN: ICD-10-PCS | Mod: S$GLB,,, | Performed by: FAMILY MEDICINE

## 2022-09-14 PROCEDURE — G0439 PR MEDICARE ANNUAL WELLNESS SUBSEQUENT VISIT: ICD-10-PCS | Mod: S$GLB,,, | Performed by: NURSE PRACTITIONER

## 2022-09-14 PROCEDURE — 3008F BODY MASS INDEX DOCD: CPT | Mod: CPTII,S$GLB,, | Performed by: NURSE PRACTITIONER

## 2022-09-14 RX ORDER — NYSTATIN 100000 U/G
CREAM TOPICAL
Qty: 90 EACH | Refills: 3 | Status: SHIPPED | OUTPATIENT
Start: 2022-09-14

## 2022-09-14 RX ORDER — ALBUTEROL SULFATE 90 UG/1
POWDER, METERED RESPIRATORY (INHALATION)
Qty: 3 EACH | Refills: 3 | Status: SHIPPED | OUTPATIENT
Start: 2022-09-14 | End: 2022-12-12

## 2022-09-14 RX ORDER — BENZONATATE 200 MG/1
200 CAPSULE ORAL 3 TIMES DAILY PRN
Qty: 30 CAPSULE | Refills: 1 | Status: SHIPPED | OUTPATIENT
Start: 2022-09-14 | End: 2022-09-14 | Stop reason: SDUPTHER

## 2022-09-14 RX ORDER — BENZONATATE 200 MG/1
200 CAPSULE ORAL 3 TIMES DAILY PRN
Qty: 180 CAPSULE | Refills: 3 | Status: SHIPPED | OUTPATIENT
Start: 2022-09-14 | End: 2023-02-20 | Stop reason: SDUPTHER

## 2022-09-14 RX ORDER — TRAZODONE HYDROCHLORIDE 150 MG/1
150 TABLET ORAL NIGHTLY
Qty: 90 TABLET | Refills: 3 | Status: SHIPPED | OUTPATIENT
Start: 2022-09-14 | End: 2023-10-06 | Stop reason: SDUPTHER

## 2022-09-14 RX ORDER — IPRATROPIUM BROMIDE 0.5 MG/2.5ML
500 SOLUTION RESPIRATORY (INHALATION)
Qty: 1 EACH | Refills: 6 | Status: SHIPPED | OUTPATIENT
Start: 2022-09-14 | End: 2023-12-05

## 2022-09-14 RX ORDER — PANTOPRAZOLE SODIUM 40 MG/1
40 TABLET, DELAYED RELEASE ORAL 2 TIMES DAILY
Qty: 180 TABLET | Refills: 3 | Status: SHIPPED | OUTPATIENT
Start: 2022-09-14 | End: 2022-09-14

## 2022-09-14 RX ORDER — ESCITALOPRAM OXALATE 20 MG/1
20 TABLET ORAL DAILY
Qty: 90 TABLET | Refills: 3 | Status: SHIPPED | OUTPATIENT
Start: 2022-09-14 | End: 2023-10-06 | Stop reason: SDUPTHER

## 2022-09-14 RX ORDER — ERGOCALCIFEROL 1.25 MG/1
50000 CAPSULE ORAL
Qty: 12 CAPSULE | Refills: 3 | Status: SHIPPED | OUTPATIENT
Start: 2022-09-14 | End: 2023-10-06 | Stop reason: SDUPTHER

## 2022-09-14 RX ORDER — PROMETHAZINE HYDROCHLORIDE 25 MG/1
TABLET ORAL
Qty: 90 TABLET | Refills: 3 | Status: SHIPPED | OUTPATIENT
Start: 2022-09-14 | End: 2023-04-26

## 2022-09-14 RX ORDER — NYSTATIN 100000 [USP'U]/G
POWDER TOPICAL
Qty: 180 G | Refills: 3 | Status: SHIPPED | OUTPATIENT
Start: 2022-09-14

## 2022-09-14 RX ORDER — AZELASTINE 1 MG/ML
1 SPRAY, METERED NASAL 2 TIMES DAILY
Qty: 30 ML | Refills: 5 | Status: SHIPPED | OUTPATIENT
Start: 2022-09-14

## 2022-09-14 NOTE — PROGRESS NOTES
Subjective:      Patient ID: Luann Mcgovern is a 56 y.o. female.    Chief Complaint: Annual Exam      Patient here for routine follow up. Doing fairly well, no new problems today.     Review of Systems   Constitutional:  Negative for activity change and appetite change.   Respiratory:  Positive for cough (occasional chronic). Negative for shortness of breath.    Cardiovascular:  Negative for chest pain and leg swelling.   Gastrointestinal:  Positive for nausea (occasiona chronic). Negative for abdominal pain.   Past Medical History:   Diagnosis Date    Allergy     seasonal     Anxiety     Arthritis     back    Asthma     seasonal    Chronic shoulder pain     Depression     GERD (gastroesophageal reflux disease)     Insomnia     Migraine     Nephrolithiasis     Pneumonia     PONV (postoperative nausea and vomiting)           Past Surgical History:   Procedure Laterality Date    CERVICAL FUSION  09/14/2020    C3-6    CYSTOSCOPY W/ URETERAL STENT PLACEMENT Left 3/31/2021    Procedure: CYSTOSCOPY, WITH URETERAL STENT INSERTION;  Surgeon: Dexter Sadler MD;  Location: [facility];  Service: Urology;  Laterality: Left;    CYSTOURETEROSCOPY WITH RETROGRADE PYELOGRAPHY AND INSERTION OF STENT INTO URETER Right 1/21/2021    Procedure: CYSTOURETEROSCOPY, WITH RETROGRADE PYELOGRAM AND URETERAL STENT INSERTION;  Surgeon: Dexter Sadler MD;  Location: [facility] OR;  Service: Urology;  Laterality: Right;    KNEE SURGERY Left     age 13 - injury - no tears, just cleaned it up     LUMBAR FUSION  2013    L5-S1    LUMBAR NERVE STIMLATOR INSERTION  10/2018    TONSILLECTOMY      TUBAL LIGATION  01/19/1990    URETEROSCOPIC REMOVAL OF URETERIC CALCULUS Left 4/7/2021    Procedure: REMOVAL, CALCULUS, URETER, URETEROSCOPIC;  Surgeon: Dexter Sadler MD;  Location: [facility] OR;  Service: Urology;  Laterality: Left;    VAGINAL DELIVERY      2 births      Family History   Problem Relation Age of Onset    Thyroid disease Mother      Heart disease Father     COPD Father     Supraventricular tachycardia Sister     Heart disease Brother         smoking /stents - 40's     Social History     Socioeconomic History    Marital status:     Number of children: 2   Occupational History    Occupation: RN - ED or surgery     Comment: [employer]     Occupation: CDL - for [employer]   Tobacco Use    Smoking status: Never    Smokeless tobacco: Never   Substance and Sexual Activity    Alcohol use: Never    Drug use: No    Sexual activity: Yes     Partners: Male     Social Determinants of Health     Financial Resource Strain: Low Risk     Difficulty of Paying Living Expenses: Not hard at all   Food Insecurity: No Food Insecurity    Worried About Running Out of Food in the Last Year: Never true    Ran Out of Food in the Last Year: Never true   Transportation Needs: No Transportation Needs    Lack of Transportation (Medical): No    Lack of Transportation (Non-Medical): No   Physical Activity: Sufficiently Active    Days of Exercise per Week: 7 days    Minutes of Exercise per Session: 30 min   Stress: No Stress Concern Present    Feeling of Stress : Only a little   Social Connections: Socially Integrated    Frequency of Communication with Friends and Family: More than three times a week    Frequency of Social Gatherings with Friends and Family: More than three times a week    Attends Orthodoxy Services: More than 4 times per year    Active Member of Clubs or Organizations: Yes    Attends Club or Organization Meetings: Never    Marital Status:    Housing Stability: Unknown    Unable to Pay for Housing in the Last Year: No    Unstable Housing in the Last Year: No     Review of patient's allergies indicates:   Allergen Reactions    Meloxicam Anaphylaxis     Chest and neck pain .  Decrease O2    Iodinated contrast media      Pt states that she is not allergic [place] dye, but is only allergic to the older dyes.    Iodine and iodide containing  "products      Injectables ???     Loperamide-simethicone     Metoclopramide hcl      reglan     Diltiazem hcl Rash and Swelling       Objective:       /82 (BP Location: Left arm, Patient Position: Sitting, BP Method: Large (Manual))   Pulse 90   Temp 98 °F (36.7 °C) (Tympanic)   Ht 5' 8" (1.727 m)   Wt 108 kg (238 lb)   LMP 11/15/2002   SpO2 96%   BMI 36.19 kg/m²   Physical Exam  Vitals reviewed.   Constitutional:       General: She is not in acute distress.     Appearance: Normal appearance. She is well-developed. She is not ill-appearing or diaphoretic.   HENT:      Head: Normocephalic and atraumatic.      Right Ear: Hearing, tympanic membrane, ear canal and external ear normal.      Left Ear: Hearing, tympanic membrane, ear canal and external ear normal.      Nose: Nose normal.      Mouth/Throat:      Pharynx: Uvula midline. No oropharyngeal exudate.   Eyes:      Conjunctiva/sclera: Conjunctivae normal.      Pupils: Pupils are equal, round, and reactive to light.   Neck:      Thyroid: No thyromegaly.      Trachea: No tracheal deviation.   Cardiovascular:      Rate and Rhythm: Normal rate and regular rhythm.      Heart sounds: Normal heart sounds. No murmur heard.  Pulmonary:      Effort: Pulmonary effort is normal. No respiratory distress.      Breath sounds: Normal breath sounds.   Abdominal:      General: Bowel sounds are normal.      Palpations: Abdomen is soft.      Tenderness: There is no abdominal tenderness. There is no guarding.      Hernia: No hernia is present.   Musculoskeletal:         General: Normal range of motion.      Cervical back: Normal range of motion and neck supple.   Lymphadenopathy:      Cervical: No cervical adenopathy.   Skin:     General: Skin is warm and dry.      Capillary Refill: Capillary refill takes less than 2 seconds.   Neurological:      General: No focal deficit present.      Mental Status: She is alert and oriented to person, place, and time.   Psychiatric:    "      Mood and Affect: Mood normal.         Behavior: Behavior normal.         Thought Content: Thought content normal.         Judgment: Judgment normal.     Assessment:     1. Prediabetes    2. Mild intermittent asthma without complication    3. Postviral fatigue syndrome    4. Vitamin D deficiency    5. Screening for cardiovascular condition    6. Routine adult health maintenance    7. Nausea in adult patient    8. Encounter for screening mammogram for malignant neoplasm of breast      Plan:   Prediabetes  -     Comprehensive Metabolic Panel; Future; Expected date: 09/14/2022  -     Hemoglobin A1C; Future; Expected date: 09/14/2022  -     CBC Auto Differential; Future; Expected date: 09/14/2022  -     Lipid Panel; Future; Expected date: 09/14/2022  -     TSH; Future; Expected date: 09/14/2022    Mild intermittent asthma without complication  -     ipratropium (ATROVENT) 0.02 % nebulizer solution; Take 2.5 mLs (500 mcg total) by nebulization as needed for Wheezing. Rescue  Dispense: 1 each; Refill: 6    Postviral fatigue syndrome    Vitamin D deficiency  -     Vitamin D; Future; Expected date: 09/14/2022  -     ergocalciferol (ERGOCALCIFEROL) 50,000 unit Cap; Take 1 capsule (50,000 Units total) by mouth every 7 days.  Dispense: 12 capsule; Refill: 3    Screening for cardiovascular condition  -     Hemoglobin A1C; Future; Expected date: 09/14/2022  -     Lipid Panel; Future; Expected date: 09/14/2022    Routine adult health maintenance  -     Ambulatory referral/consult to Gynecology; Future; Expected date: 09/21/2022    Nausea in adult patient  -     promethazine (PHENERGAN) 25 MG tablet; TAKE 1 TABLET EVERY 6 HOURS AS NEEDED FOR NAUSEA *THANK YOU* Strength: 25 mg  Dispense: 90 tablet; Refill: 3    Encounter for screening mammogram for malignant neoplasm of breast  -     Mammo Digital Screening Bilat w/ Carlos; Future; Expected date: 09/14/2022    Other orders  -     traZODone (DESYREL) 150 MG tablet; Take 1 tablet  (150 mg total) by mouth every evening.  Dispense: 90 tablet; Refill: 3  -     Discontinue: pantoprazole (PROTONIX) 40 MG tablet; Take 1 tablet (40 mg total) by mouth 2 (two) times daily.  Dispense: 180 tablet; Refill: 3  -     nystatin (MYCOSTATIN) cream; APPLY TO AFFECTED AREA TWICE DAILY *THANK YOU* Strength: 100,000 unit/gram  Dispense: 90 each; Refill: 3  -     nystatin (MYCOSTATIN) powder; APPLY TO AFFECTED AREA TWICE DAILY *THANK YOU*  Dispense: 180 g; Refill: 3  -     benzonatate (TESSALON) 200 MG capsule; Take 1 capsule (200 mg total) by mouth 3 (three) times daily as needed for Cough.  Dispense: 180 capsule; Refill: 3  -     EScitalopram oxalate (LEXAPRO) 20 MG tablet; Take 1 tablet (20 mg total) by mouth once daily.  Dispense: 90 tablet; Refill: 3  -     PROAIR RESPICLICK 90 mcg/actuation inhaler; INHALE 2 PUFFS BY MOUTH EVERY 4 HOURS AS NEEDED Strength: 90 mcg/actuation  Dispense: 3 each; Refill: 3  -     azelastine (ASTELIN) 137 mcg (0.1 %) nasal spray; 1 spray (137 mcg total) by Nasal route 2 (two) times daily.  Dispense: 30 mL; Refill: 5      Medication List with Changes/Refills   Current Medications    ASPIRIN-ACETAMINOPHEN-CAFFEINE 250-250-65 MG (EXCEDRIN MIGRAINE) 250-250-65 MG PER TABLET    Take 1 tablet by mouth as needed.     BLOOD SUGAR DIAGNOSTIC STRP    Use to check glucose daily    BLOOD-GLUCOSE METER KIT    Use as instructed    CETIRIZINE (ZYRTEC) 10 MG TABLET    Take 10 mg by mouth every evening.    DIPHENHYDRAMINE (BENADRYL) 25 MG CAPSULE    Take 25 mg by mouth nightly as needed for Itching.    DOCUSATE SODIUM (COLACE) 100 MG CAPSULE    Take 1 capsule (100 mg total) by mouth 2 (two) times daily.    GABAPENTIN (NEURONTIN) 600 MG TABLET    Take 1 tablet (600 mg total) by mouth 2 (two) times daily.    HYDROCODONE-ACETAMINOPHEN (NORCO)  MG PER TABLET    TAKE 1 TABLET BY MOUTH THREE TIMES DAILY AS NEEDED FOR CHRONIC PAIN    IBUPROFEN (ADVIL,MOTRIN) 600 MG TABLET    Take 1 tablet (600 mg  total) by mouth 3 (three) times daily as needed for Pain.    LANCETS MISC    Use to check glucose daily    METHOCARBAMOL (ROBAXIN) 500 MG TAB        NARCAN 4 MG/ACTUATION SPRY    CALL 911. ADMINISTER A SINGLE SPRAY INTRANASALLY INTO ONE NOSTRIL UPON SIGNS OF OPIOID OVERDOSE. MAY REPEAT AFTER 3 MINUTES IF NO RESPONSE.    ONDANSETRON (ZOFRAN) 8 MG TABLET    Take 1 tablet (8 mg total) by mouth every 8 (eight) hours as needed for Nausea.    TIZANIDINE 4 MG CAP    Take 4 mg by mouth as needed.    Changed and/or Refilled Medications    Modified Medication Previous Medication    AZELASTINE (ASTELIN) 137 MCG (0.1 %) NASAL SPRAY azelastine (ASTELIN) 137 mcg (0.1 %) nasal spray       1 spray (137 mcg total) by Nasal route 2 (two) times daily.    1 spray (137 mcg total) by Nasal route 2 (two) times daily.    BENZONATATE (TESSALON) 200 MG CAPSULE benzonatate (TESSALON) 200 MG capsule       Take 1 capsule (200 mg total) by mouth 3 (three) times daily as needed for Cough.    Take 1 capsule (200 mg total) by mouth 3 (three) times daily as needed.    ERGOCALCIFEROL (ERGOCALCIFEROL) 50,000 UNIT CAP ergocalciferol (ERGOCALCIFEROL) 50,000 unit Cap       Take 1 capsule (50,000 Units total) by mouth every 7 days.    TAKE 1 CAPSULE BY MOUTH ONCE WEEKLY    ESCITALOPRAM OXALATE (LEXAPRO) 20 MG TABLET EScitalopram oxalate (LEXAPRO) 20 MG tablet       Take 1 tablet (20 mg total) by mouth once daily.    Take 1 tablet (20 mg total) by mouth once daily.    IPRATROPIUM (ATROVENT) 0.02 % NEBULIZER SOLUTION ipratropium (ATROVENT) 0.02 % nebulizer solution       Take 2.5 mLs (500 mcg total) by nebulization as needed for Wheezing. Rescue    Take 2.5 mLs (500 mcg total) by nebulization 4 (four) times daily. Rescue    NYSTATIN (MYCOSTATIN) CREAM nystatin (MYCOSTATIN) cream       APPLY TO AFFECTED AREA TWICE DAILY *THANK YOU* Strength: 100,000 unit/gram    APPLY TO AFFECTED AREA TWICE DAILY *THANK YOU*    NYSTATIN (MYCOSTATIN) POWDER nystatin  (MYCOSTATIN) powder       APPLY TO AFFECTED AREA TWICE DAILY *THANK YOU*    APPLY TO AFFECTED AREA TWICE DAILY *THANK YOU*    PROAIR RESPICLICK 90 MCG/ACTUATION INHALER PROAIR RESPICLICK 90 mcg/actuation inhaler       INHALE 2 PUFFS BY MOUTH EVERY 4 HOURS AS NEEDED Strength: 90 mcg/actuation    INHALE 2 PUFFS BY MOUTH EVERY 4 HOURS AS NEEDED    PROMETHAZINE (PHENERGAN) 25 MG TABLET promethazine (PHENERGAN) 25 MG tablet       TAKE 1 TABLET EVERY 6 HOURS AS NEEDED FOR NAUSEA *THANK YOU* Strength: 25 mg    TAKE 1 TABLET EVERY 6 HOURS AS NEEDED FOR NAUSEA *THANK YOU*    TRAZODONE (DESYREL) 150 MG TABLET traZODone (DESYREL) 150 MG tablet       Take 1 tablet (150 mg total) by mouth every evening.    Take 1 tablet (150 mg total) by mouth every evening.   Discontinued Medications    PANTOPRAZOLE (PROTONIX) 40 MG TABLET    Take 1 tablet (40 mg total) by mouth 2 (two) times daily.

## 2022-09-15 ENCOUNTER — LAB VISIT (OUTPATIENT)
Dept: LAB | Facility: HOSPITAL | Age: 57
End: 2022-09-15
Attending: FAMILY MEDICINE
Payer: MEDICARE

## 2022-09-15 DIAGNOSIS — E55.9 VITAMIN D DEFICIENCY: ICD-10-CM

## 2022-09-15 DIAGNOSIS — R73.03 PREDIABETES: ICD-10-CM

## 2022-09-15 DIAGNOSIS — Z13.6 SCREENING FOR CARDIOVASCULAR CONDITION: ICD-10-CM

## 2022-09-15 PROBLEM — F32.4 MAJOR DEPRESSION IN PARTIAL REMISSION: Status: ACTIVE | Noted: 2022-09-15

## 2022-09-15 PROBLEM — G90.1 DYSAUTONOMIA: Status: ACTIVE | Noted: 2022-09-15

## 2022-09-15 PROBLEM — U07.1 COVID-19 VIRUS DETECTED: Status: RESOLVED | Noted: 2021-01-22 | Resolved: 2022-09-15

## 2022-09-15 LAB
25(OH)D3+25(OH)D2 SERPL-MCNC: 27 NG/ML (ref 30–96)
ALBUMIN SERPL BCP-MCNC: 4 G/DL (ref 3.5–5.2)
ALP SERPL-CCNC: 87 U/L (ref 55–135)
ALT SERPL W/O P-5'-P-CCNC: 31 U/L (ref 10–44)
ANION GAP SERPL CALC-SCNC: 4 MMOL/L (ref 8–16)
AST SERPL-CCNC: 31 U/L (ref 10–40)
BASOPHILS # BLD AUTO: 0.07 K/UL (ref 0–0.2)
BASOPHILS NFR BLD: 1 % (ref 0–1.9)
BILIRUB SERPL-MCNC: 0.4 MG/DL (ref 0.1–1)
BUN SERPL-MCNC: 26 MG/DL (ref 6–20)
CALCIUM SERPL-MCNC: 9.7 MG/DL (ref 8.7–10.5)
CHLORIDE SERPL-SCNC: 103 MMOL/L (ref 95–110)
CHOLEST SERPL-MCNC: 235 MG/DL (ref 120–199)
CHOLEST/HDLC SERPL: 4.1 {RATIO} (ref 2–5)
CO2 SERPL-SCNC: 27 MMOL/L (ref 23–29)
CREAT SERPL-MCNC: 1.2 MG/DL (ref 0.5–1.4)
DIFFERENTIAL METHOD: NORMAL
EOSINOPHIL # BLD AUTO: 0.5 K/UL (ref 0–0.5)
EOSINOPHIL NFR BLD: 7.2 % (ref 0–8)
ERYTHROCYTE [DISTWIDTH] IN BLOOD BY AUTOMATED COUNT: 12.9 % (ref 11.5–14.5)
EST. GFR  (NO RACE VARIABLE): 52.8 ML/MIN/1.73 M^2
ESTIMATED AVG GLUCOSE: 123 MG/DL (ref 68–131)
GLUCOSE SERPL-MCNC: 84 MG/DL (ref 70–110)
HBA1C MFR BLD: 5.9 % (ref 4–5.6)
HCT VFR BLD AUTO: 44.9 % (ref 37–48.5)
HDLC SERPL-MCNC: 58 MG/DL (ref 40–75)
HDLC SERPL: 24.7 % (ref 20–50)
HGB BLD-MCNC: 14.6 G/DL (ref 12–16)
IMM GRANULOCYTES # BLD AUTO: 0.03 K/UL (ref 0–0.04)
IMM GRANULOCYTES NFR BLD AUTO: 0.4 % (ref 0–0.5)
LDLC SERPL CALC-MCNC: 157.6 MG/DL (ref 63–159)
LYMPHOCYTES # BLD AUTO: 2 K/UL (ref 1–4.8)
LYMPHOCYTES NFR BLD: 28 % (ref 18–48)
MCH RBC QN AUTO: 30.2 PG (ref 27–31)
MCHC RBC AUTO-ENTMCNC: 32.5 G/DL (ref 32–36)
MCV RBC AUTO: 93 FL (ref 82–98)
MONOCYTES # BLD AUTO: 0.6 K/UL (ref 0.3–1)
MONOCYTES NFR BLD: 7.9 % (ref 4–15)
NEUTROPHILS # BLD AUTO: 4 K/UL (ref 1.8–7.7)
NEUTROPHILS NFR BLD: 55.5 % (ref 38–73)
NONHDLC SERPL-MCNC: 177 MG/DL
NRBC BLD-RTO: 0 /100 WBC
PLATELET # BLD AUTO: 226 K/UL (ref 150–450)
PMV BLD AUTO: 9.5 FL (ref 9.2–12.9)
POTASSIUM SERPL-SCNC: 4.4 MMOL/L (ref 3.5–5.1)
PROT SERPL-MCNC: 7.2 G/DL (ref 6–8.4)
RBC # BLD AUTO: 4.84 M/UL (ref 4–5.4)
SODIUM SERPL-SCNC: 134 MMOL/L (ref 136–145)
TRIGL SERPL-MCNC: 97 MG/DL (ref 30–150)
TSH SERPL DL<=0.005 MIU/L-ACNC: 2.33 UIU/ML (ref 0.4–4)
WBC # BLD AUTO: 7.18 K/UL (ref 3.9–12.7)

## 2022-09-15 PROCEDURE — 80061 LIPID PANEL: CPT | Performed by: FAMILY MEDICINE

## 2022-09-15 PROCEDURE — 82306 VITAMIN D 25 HYDROXY: CPT | Performed by: FAMILY MEDICINE

## 2022-09-15 PROCEDURE — 84443 ASSAY THYROID STIM HORMONE: CPT | Performed by: FAMILY MEDICINE

## 2022-09-15 PROCEDURE — 36415 COLL VENOUS BLD VENIPUNCTURE: CPT | Mod: PO | Performed by: FAMILY MEDICINE

## 2022-09-15 PROCEDURE — 83036 HEMOGLOBIN GLYCOSYLATED A1C: CPT | Performed by: FAMILY MEDICINE

## 2022-09-15 PROCEDURE — 85025 COMPLETE CBC W/AUTO DIFF WBC: CPT | Performed by: FAMILY MEDICINE

## 2022-09-15 PROCEDURE — 80053 COMPREHEN METABOLIC PANEL: CPT | Performed by: FAMILY MEDICINE

## 2022-09-15 NOTE — PATIENT INSTRUCTIONS
Counseling and Referral of Other Preventative  (Italic type indicates deductible and co-insurance are waived)    Patient Name: Luann Mcgovern  Today's Date: 9/15/2022    Health Maintenance       Date Due Completion Date    Shingles Vaccine (2 of 3) 04/28/2016 3/3/2016    Colorectal Cancer Screening 11/19/2020 11/19/2015    Cervical Cancer Screening 10/25/2021 10/25/2018    Mammogram 04/29/2022 4/29/2021    Influenza Vaccine (1) 09/01/2022 9/24/2021    Lipid Panel 10/15/2022 10/15/2021    TETANUS VACCINE 05/30/2027 5/30/2017    Pneumococcal Vaccines (Age 0-64) (3 - PPSV23 or PCV20) 11/13/2029 5/30/2017        Orders Placed This Encounter   Procedures    Cologuard Screening (Multitarget Stool DNA)     The following information is provided to all patients.  This information is to help you find resources for any of the problems found today that may be affecting your health:                Living healthy guide: www.[facility].[place].gov      Understanding Diabetes: www.diabetes.org      Eating healthy: www.cdc.gov/healthyweight      CDC home safety checklist: www.cdc.gov/steadi/patient.html      Agency on Aging: www.goea.[place].gov      Alcoholics anonymous (AA): www.aa.org      Physical Activity: www.pawan.nih.gov/ua4hwmd      Tobacco use: www.quitwithusla.org

## 2022-09-15 NOTE — PROGRESS NOTES
"  Luann Mcgovern presented for a  Medicare AWV and comprehensive Health Risk Assessment today. The following components were reviewed and updated:    Medical history  Family History  Social history  Allergies and Current Medications  Health Risk Assessment  Health Maintenance  Care Team         ** See Completed Assessments for Annual Wellness Visit within the encounter summary.**         The following assessments were completed:  Living Situation  CAGE  Depression Screening  Timed Get Up and Go  Whisper Test  Cognitive Function Screening    Nutrition Screening  ADL Screening  PAQ Screening        Vitals:    09/14/22 1008   BP: 122/82   Pulse: 90   Resp: 18   Temp: 98 °F (36.7 °C)   TempSrc: Temporal   SpO2: 96%   Weight: 108.4 kg (238 lb 15.7 oz)   Height: 5' 8" (1.727 m)     Body mass index is 36.34 kg/m².  Physical Exam  Constitutional:       Appearance: Normal appearance. She is well-developed. She is not ill-appearing, toxic-appearing or diaphoretic.   HENT:      Head: Normocephalic and atraumatic.      Right Ear: External ear normal.      Left Ear: External ear normal.      Nose: Nose normal.      Mouth/Throat:      Mouth: Mucous membranes are moist.      Pharynx: No posterior oropharyngeal erythema.   Eyes:      General: No scleral icterus.        Right eye: No discharge.         Left eye: No discharge.      Conjunctiva/sclera: Conjunctivae normal.      Pupils: Pupils are equal, round, and reactive to light.   Neck:      Thyroid: No thyromegaly.      Trachea: No tracheal deviation.   Cardiovascular:      Rate and Rhythm: Normal rate and regular rhythm.      Heart sounds: Normal heart sounds. No murmur heard.    No friction rub. No gallop.   Pulmonary:      Effort: Pulmonary effort is normal. No respiratory distress.      Breath sounds: Normal breath sounds. No stridor. No wheezing, rhonchi or rales.   Chest:      Chest wall: No tenderness.   Abdominal:      General: Bowel sounds are normal. There is no " distension.      Palpations: Abdomen is soft. There is no mass.      Tenderness: There is no abdominal tenderness. There is no guarding or rebound.      Hernia: No hernia is present.   Musculoskeletal:         General: No tenderness. Normal range of motion.      Cervical back: Normal range of motion and neck supple. No edema. Normal range of motion.   Lymphadenopathy:      Head:      Right side of head: No tonsillar adenopathy.      Left side of head: No tonsillar adenopathy.      Cervical: No cervical adenopathy.      Upper Body:      Right upper body: No supraclavicular adenopathy.      Left upper body: No supraclavicular adenopathy.   Skin:     General: Skin is warm and dry.      Findings: No lesion or rash.   Neurological:      Mental Status: She is alert and oriented to person, place, and time.      Deep Tendon Reflexes: Reflexes are normal and symmetric.   Psychiatric:         Mood and Affect: Mood normal.         Behavior: Behavior normal.             Diagnoses and health risks identified today and associated recommendations/orders:    1. Encounter for preventive health examination  Screenings performed, as noted above.  Personal preventative testing needs reviewed.      2. Encounter for Medicare annual wellness exam  Monitored/treated on meds, continue the same tx   - Ambulatory Referral/Consult to Enhanced Annual Wellness Visit (eAWV)    3. Colon cancer screening  Monitored/stable, treated on meds, continue the same tx, requested an alternative to the colonoscopy  - Cologuard Screening (Multitarget Stool DNA); Future  - Cologuard Screening (Multitarget Stool DNA)    4. Morbid (severe) obesity due to excess calories  Monitored/stable, continues to try to lose weight, stays active despite her pain,  continue the same tx     5. Dysautonomia  Monitored/stable, no complaints today, treated on meds, continue the same tx     6. Mild intermittent asthma without complication  Monitored/stable, treated with inhalers,  no wheezing or sob today , continue the same tx , follow up with pcp    7. Prediabetes  Monitored/stable, treated with diet, continue the same tx , follow up with pcp    8. Major depressive disorder in partial remission, unspecified whether recurrent  Monitored/stable, treated with Lexapro, continue the same tx , no SI/HI/hallucinations reported, follow up with pcp    9. Chronic pain of both shoulders  Monitored/stable,treated with Norco,  continue the same tx , follow up with pcp, discussed Sammy    Takes opioids, not abusing them, no illicit drug use, cares for her mother      Provided Luann with a 5-10 year written screening schedule and personal prevention plan. Recommendations were developed using the USPSTF age appropriate recommendations. Education, counseling, and referrals were provided as needed. After Visit Summary printed and given to patient which includes a list of additional screenings\tests needed.    No follow-ups on file.    Sandeep Asher, NP    I offered to discuss advanced care planning, including how to pick a person who would make decisions for you if you were unable to make them for yourself, called a health care power of , and what kind of decisions you might make such as use of life sustaining treatments such as ventilators and tube feeding when faced with a life limiting illness recorded on a living will that they will need to know. (How you want to be cared for as you near the end of your natural life)     X Patient is interested in learning more about how to make advanced directives.  I provided them paperwork and offered to discuss this with them.

## 2022-12-02 ENCOUNTER — PATIENT OUTREACH (OUTPATIENT)
Dept: ADMINISTRATIVE | Facility: HOSPITAL | Age: 57
End: 2022-12-02
Payer: MEDICARE

## 2022-12-02 NOTE — PROGRESS NOTES
Working eye exam report; Called and spoke with patient. Scheduled dm eye exam  and mammogram appointments 1/11/2023

## 2023-01-06 ENCOUNTER — OFFICE VISIT (OUTPATIENT)
Dept: OPHTHALMOLOGY | Facility: CLINIC | Age: 58
End: 2023-01-06
Payer: MEDICARE

## 2023-01-06 ENCOUNTER — PATIENT MESSAGE (OUTPATIENT)
Dept: OPHTHALMOLOGY | Facility: CLINIC | Age: 58
End: 2023-01-06

## 2023-01-06 DIAGNOSIS — Z01.00 ENCOUNTER FOR EYE EXAM: Primary | ICD-10-CM

## 2023-01-06 DIAGNOSIS — H52.7 REFRACTIVE ERRORS: ICD-10-CM

## 2023-01-06 PROCEDURE — 1159F MED LIST DOCD IN RCRD: CPT | Mod: CPTII,S$GLB,, | Performed by: OPTOMETRIST

## 2023-01-06 PROCEDURE — 92004 COMPRE OPH EXAM NEW PT 1/>: CPT | Mod: S$GLB,,, | Performed by: OPTOMETRIST

## 2023-01-06 PROCEDURE — 92015 PR REFRACTION: ICD-10-PCS | Mod: S$GLB,,, | Performed by: OPTOMETRIST

## 2023-01-06 PROCEDURE — 92015 DETERMINE REFRACTIVE STATE: CPT | Mod: S$GLB,,, | Performed by: OPTOMETRIST

## 2023-01-06 PROCEDURE — 92004 PR EYE EXAM, NEW PATIENT,COMPREHESV: ICD-10-PCS | Mod: S$GLB,,, | Performed by: OPTOMETRIST

## 2023-01-06 PROCEDURE — 99999 PR PBB SHADOW E&M-EST. PATIENT-LVL III: ICD-10-PCS | Mod: PBBFAC,,, | Performed by: OPTOMETRIST

## 2023-01-06 PROCEDURE — 99999 PR PBB SHADOW E&M-EST. PATIENT-LVL III: CPT | Mod: PBBFAC,,, | Performed by: OPTOMETRIST

## 2023-01-06 PROCEDURE — 1159F PR MEDICATION LIST DOCUMENTED IN MEDICAL RECORD: ICD-10-PCS | Mod: CPTII,S$GLB,, | Performed by: OPTOMETRIST

## 2023-01-06 NOTE — PROGRESS NOTES
HPI    NIDDM exam pre diabetic.  Patient lost glasses  Decrease near visual acuity  Watery eyes  New patient last eye exam 06/2021  Update glasses RX.  Last edited by Eugenia Crystal MA on 1/6/2023  9:49 AM.            Assessment /Plan     For exam results, see Encounter Report.    Encounter for eye exam    Refractive errors      History of Chorioretinopathy    Dispense Final Rx for glasses.  RTC 1 year  Discussed above and answered questions.                      Current every day smoker

## 2023-01-25 ENCOUNTER — PATIENT MESSAGE (OUTPATIENT)
Dept: ADMINISTRATIVE | Facility: HOSPITAL | Age: 58
End: 2023-01-25
Payer: MEDICARE

## 2023-02-06 ENCOUNTER — TELEPHONE (OUTPATIENT)
Dept: INTERNAL MEDICINE | Facility: CLINIC | Age: 58
End: 2023-02-06
Payer: MEDICARE

## 2023-02-06 NOTE — TELEPHONE ENCOUNTER
----- Message from Elsa Campos sent at 2/6/2023 11:17 AM CST -----  Contact: Luann from [employer]  [facility] call Luann back at [phone]. States they recv'd rx for diabetic testing supplies however pt does not have a diabetic dx, only pre diagnosis. She also saif the frequency for testing not noted. Pls call back to clarify. Thanks DB

## 2023-02-20 ENCOUNTER — HOSPITAL ENCOUNTER (OUTPATIENT)
Dept: RADIOLOGY | Facility: HOSPITAL | Age: 58
Discharge: HOME | End: 2023-02-20
Attending: FAMILY MEDICINE
Payer: MEDICARE

## 2023-02-20 ENCOUNTER — TELEPHONE (OUTPATIENT)
Dept: INTERNAL MEDICINE | Facility: CLINIC | Age: 58
End: 2023-02-20

## 2023-02-20 ENCOUNTER — PATIENT MESSAGE (OUTPATIENT)
Dept: INTERNAL MEDICINE | Facility: CLINIC | Age: 58
End: 2023-02-20

## 2023-02-20 ENCOUNTER — OFFICE VISIT (OUTPATIENT)
Dept: INTERNAL MEDICINE | Facility: CLINIC | Age: 58
End: 2023-02-20
Payer: MEDICARE

## 2023-02-20 VITALS
OXYGEN SATURATION: 95 % | WEIGHT: 228.19 LBS | SYSTOLIC BLOOD PRESSURE: 162 MMHG | HEART RATE: 95 BPM | BODY MASS INDEX: 34.59 KG/M2 | TEMPERATURE: 99 F | HEIGHT: 68 IN | DIASTOLIC BLOOD PRESSURE: 100 MMHG

## 2023-02-20 DIAGNOSIS — R11.0 NAUSEA IN ADULT PATIENT: ICD-10-CM

## 2023-02-20 DIAGNOSIS — J45.20 MILD INTERMITTENT ASTHMA WITHOUT COMPLICATION: ICD-10-CM

## 2023-02-20 DIAGNOSIS — R10.9 LEFT FLANK PAIN: ICD-10-CM

## 2023-02-20 DIAGNOSIS — M54.50 LUMBAR BACK PAIN: ICD-10-CM

## 2023-02-20 DIAGNOSIS — R05.9 COUGH, UNSPECIFIED TYPE: ICD-10-CM

## 2023-02-20 DIAGNOSIS — F32.4 MAJOR DEPRESSIVE DISORDER IN PARTIAL REMISSION, UNSPECIFIED WHETHER RECURRENT: ICD-10-CM

## 2023-02-20 DIAGNOSIS — R73.03 PREDIABETES: Primary | ICD-10-CM

## 2023-02-20 DIAGNOSIS — R10.9 ABDOMINAL PAIN, UNSPECIFIED ABDOMINAL LOCATION: ICD-10-CM

## 2023-02-20 DIAGNOSIS — E66.01 MORBID (SEVERE) OBESITY DUE TO EXCESS CALORIES: ICD-10-CM

## 2023-02-20 DIAGNOSIS — E78.5 HYPERLIPIDEMIA, UNSPECIFIED HYPERLIPIDEMIA TYPE: ICD-10-CM

## 2023-02-20 PROBLEM — G90.1 DYSAUTONOMIA: Status: RESOLVED | Noted: 2022-09-15 | Resolved: 2023-02-20

## 2023-02-20 LAB
BILIRUB SERPL-MCNC: NEGATIVE MG/DL
BLOOD URINE, POC: NEGATIVE
CLARITY, POC UA: CLEAR
COLOR, POC UA: YELLOW
GLUCOSE UR QL STRIP: NORMAL
KETONES UR QL STRIP: NEGATIVE
LEUKOCYTE ESTERASE URINE, POC: NEGATIVE
NITRITE, POC UA: NEGATIVE
PH, POC UA: 6
PROTEIN, POC: NORMAL
SPECIFIC GRAVITY, POC UA: 1.01
UROBILINOGEN, POC UA: NORMAL

## 2023-02-20 PROCEDURE — 93005 ELECTROCARDIOGRAM TRACING: CPT

## 2023-02-20 PROCEDURE — 3077F SYST BP >= 140 MM HG: CPT | Mod: CPTII,S$GLB,, | Performed by: FAMILY MEDICINE

## 2023-02-20 PROCEDURE — 3080F PR MOST RECENT DIASTOLIC BLOOD PRESSURE >= 90 MM HG: ICD-10-PCS | Mod: CPTII,S$GLB,, | Performed by: FAMILY MEDICINE

## 2023-02-20 PROCEDURE — 99214 PR OFFICE/OUTPT VISIT, EST, LEVL IV, 30-39 MIN: ICD-10-PCS | Mod: S$GLB,,, | Performed by: FAMILY MEDICINE

## 2023-02-20 PROCEDURE — 3077F PR MOST RECENT SYSTOLIC BLOOD PRESSURE >= 140 MM HG: ICD-10-PCS | Mod: CPTII,S$GLB,, | Performed by: FAMILY MEDICINE

## 2023-02-20 PROCEDURE — 71046 X-RAY EXAM CHEST 2 VIEWS: CPT | Mod: TC,PO

## 2023-02-20 PROCEDURE — 74019 XR ABDOMEN FLAT AND ERECT: ICD-10-PCS | Mod: 26,,, | Performed by: RADIOLOGY

## 2023-02-20 PROCEDURE — 74019 RADEX ABDOMEN 2 VIEWS: CPT | Mod: TC,PO

## 2023-02-20 PROCEDURE — 99999 PR PBB SHADOW E&M-EST. PATIENT-LVL V: ICD-10-PCS | Mod: PBBFAC,,, | Performed by: FAMILY MEDICINE

## 2023-02-20 PROCEDURE — 81002 POCT URINE DIPSTICK WITHOUT MICROSCOPE: ICD-10-PCS | Mod: S$GLB,,, | Performed by: FAMILY MEDICINE

## 2023-02-20 PROCEDURE — 93010 EKG 12-LEAD: ICD-10-PCS | Mod: S$GLB,,, | Performed by: INTERNAL MEDICINE

## 2023-02-20 PROCEDURE — 1159F MED LIST DOCD IN RCRD: CPT | Mod: CPTII,S$GLB,, | Performed by: FAMILY MEDICINE

## 2023-02-20 PROCEDURE — 87086 URINE CULTURE/COLONY COUNT: CPT | Performed by: FAMILY MEDICINE

## 2023-02-20 PROCEDURE — 99999 PR PBB SHADOW E&M-EST. PATIENT-LVL V: CPT | Mod: PBBFAC,,, | Performed by: FAMILY MEDICINE

## 2023-02-20 PROCEDURE — 87088 URINE BACTERIA CULTURE: CPT | Performed by: FAMILY MEDICINE

## 2023-02-20 PROCEDURE — 3008F PR BODY MASS INDEX (BMI) DOCUMENTED: ICD-10-PCS | Mod: CPTII,S$GLB,, | Performed by: FAMILY MEDICINE

## 2023-02-20 PROCEDURE — 87077 CULTURE AEROBIC IDENTIFY: CPT | Performed by: FAMILY MEDICINE

## 2023-02-20 PROCEDURE — 71046 XR CHEST PA AND LATERAL: ICD-10-PCS | Mod: 26,,, | Performed by: RADIOLOGY

## 2023-02-20 PROCEDURE — 71046 X-RAY EXAM CHEST 2 VIEWS: CPT | Mod: 26,,, | Performed by: RADIOLOGY

## 2023-02-20 PROCEDURE — 87186 SC STD MICRODIL/AGAR DIL: CPT | Performed by: FAMILY MEDICINE

## 2023-02-20 PROCEDURE — 81002 URINALYSIS NONAUTO W/O SCOPE: CPT | Mod: S$GLB,,, | Performed by: FAMILY MEDICINE

## 2023-02-20 PROCEDURE — 99214 OFFICE O/P EST MOD 30 MIN: CPT | Mod: S$GLB,,, | Performed by: FAMILY MEDICINE

## 2023-02-20 PROCEDURE — 3080F DIAST BP >= 90 MM HG: CPT | Mod: CPTII,S$GLB,, | Performed by: FAMILY MEDICINE

## 2023-02-20 PROCEDURE — 1159F PR MEDICATION LIST DOCUMENTED IN MEDICAL RECORD: ICD-10-PCS | Mod: CPTII,S$GLB,, | Performed by: FAMILY MEDICINE

## 2023-02-20 PROCEDURE — 93010 ELECTROCARDIOGRAM REPORT: CPT | Mod: S$GLB,,, | Performed by: INTERNAL MEDICINE

## 2023-02-20 PROCEDURE — 3008F BODY MASS INDEX DOCD: CPT | Mod: CPTII,S$GLB,, | Performed by: FAMILY MEDICINE

## 2023-02-20 PROCEDURE — 74019 RADEX ABDOMEN 2 VIEWS: CPT | Mod: 26,,, | Performed by: RADIOLOGY

## 2023-02-20 RX ORDER — DOXYCYCLINE 100 MG/1
100 CAPSULE ORAL 2 TIMES DAILY
Qty: 20 CAPSULE | Refills: 0 | Status: SHIPPED | OUTPATIENT
Start: 2023-02-20 | End: 2023-10-06

## 2023-02-20 RX ORDER — ONDANSETRON HYDROCHLORIDE 8 MG/1
8 TABLET, FILM COATED ORAL EVERY 8 HOURS PRN
Qty: 60 TABLET | Refills: 3 | Status: SHIPPED | OUTPATIENT
Start: 2023-02-20 | End: 2023-10-06 | Stop reason: SDUPTHER

## 2023-02-20 RX ORDER — BENZONATATE 200 MG/1
200 CAPSULE ORAL 3 TIMES DAILY PRN
Qty: 180 CAPSULE | Refills: 3 | Status: SHIPPED | OUTPATIENT
Start: 2023-02-20 | End: 2023-10-06 | Stop reason: SDUPTHER

## 2023-02-20 RX ORDER — LOVASTATIN 40 MG/1
40 TABLET ORAL DAILY
COMMUNITY
Start: 2023-02-02

## 2023-02-20 RX ORDER — PREGABALIN 50 MG/1
50 CAPSULE ORAL 2 TIMES DAILY
COMMUNITY
Start: 2023-02-06 | End: 2023-10-06

## 2023-02-20 RX ORDER — PANTOPRAZOLE SODIUM 40 MG/1
40 TABLET, DELAYED RELEASE ORAL 2 TIMES DAILY
COMMUNITY
Start: 2023-02-02 | End: 2023-10-06 | Stop reason: SDUPTHER

## 2023-02-20 NOTE — TELEPHONE ENCOUNTER
----- Message from Bri Freedman MD sent at 2/20/2023  3:02 PM CST -----  Please schedule for CT to rule out stone

## 2023-02-20 NOTE — PROGRESS NOTES
Subjective:      Patient ID: Luann Mcgovern is a 57 y.o. female.    Chief Complaint: Hip Pain      Patient reports severe pain in left flank, wrapping around into left upper abdomen.  She has had this for about 2 weeks now, no constipation or other GI side effects, no change in urination.  Does not feel like a kidney stone which she has had in the past.  Also having severe coughing which is productive, has had this for several weeks now.  Blood pressure elevated today which she attributes to pain.  Requesting refill on Zofran which she takes for her chronic nausea  Due for routine labs  Needing EKG requested by her spinal specialist who is planning injection    Hip Pain   Review of Systems   Constitutional:  Negative for activity change, appetite change and fever.   HENT:  Negative for congestion.    Respiratory:  Positive for cough and shortness of breath.    Gastrointestinal:  Positive for abdominal pain (Left upper quadrant).   Musculoskeletal:  Positive for arthralgias.   Past Medical History:   Diagnosis Date    Allergy     seasonal     Anxiety     Arthritis     back    Asthma     seasonal    Chronic shoulder pain     Depression     GERD (gastroesophageal reflux disease)     Insomnia     Migraine     Nephrolithiasis     Pneumonia     PONV (postoperative nausea and vomiting)           Past Surgical History:   Procedure Laterality Date    CERVICAL FUSION  09/14/2020    C3-6    CYSTOSCOPY W/ URETERAL STENT PLACEMENT Left 3/31/2021    Procedure: CYSTOSCOPY, WITH URETERAL STENT INSERTION;  Surgeon: Dexter Sadler MD;  Location: [facility] OR;  Service: Urology;  Laterality: Left;    CYSTOURETEROSCOPY WITH RETROGRADE PYELOGRAPHY AND INSERTION OF STENT INTO URETER Right 1/21/2021    Procedure: CYSTOURETEROSCOPY, WITH RETROGRADE PYELOGRAM AND URETERAL STENT INSERTION;  Surgeon: Dexter Sadler MD;  Location: [facility] OR;  Service: Urology;  Laterality: Right;    KNEE SURGERY Left     age 13 -  injury - no tears, just cleaned it up     LUMBAR FUSION  2013    L5-S1    LUMBAR NERVE STIMLATOR INSERTION  10/2018    TONSILLECTOMY      TUBAL LIGATION  01/19/1990    URETEROSCOPIC REMOVAL OF URETERIC CALCULUS Left 4/7/2021    Procedure: REMOVAL, CALCULUS, URETER, URETEROSCOPIC;  Surgeon: Dexter Sadler MD;  Location: 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 [employer]   Tobacco Use    Smoking status: Never    Smokeless tobacco: Never   Substance and Sexual Activity    Alcohol use: Never    Drug use: No    Sexual activity: Yes     Partners: Male     Social Determinants of Health     Financial Resource Strain: Low Risk     Difficulty of Paying Living Expenses: Not hard at all   Food Insecurity: No Food Insecurity    Worried About Running Out of Food in the Last Year: Never true    Ran Out of Food in the Last Year: Never true   Transportation Needs: No Transportation Needs    Lack of Transportation (Medical): No    Lack of Transportation (Non-Medical): No   Physical Activity: Sufficiently Active    Days of Exercise per Week: 7 days    Minutes of Exercise per Session: 30 min   Stress: No Stress Concern Present    Feeling of Stress : Only a little   Social Connections: Socially Integrated    Frequency of Communication with Friends and Family: More than three times a week    Frequency of Social Gatherings with Friends and Family: More than three times a week    Attends Advent Services: More than 4 times per year     "Active Member of Clubs or Organizations: Yes    Attends Club or Organization Meetings: Never    Marital Status:    Housing Stability: Unknown    Unable to Pay for Housing in the Last Year: No    Unstable Housing in the Last Year: No     Review of patient's allergies indicates:   Allergen Reactions    Meloxicam Anaphylaxis     Chest and neck pain .  Decrease O2    Iodinated contrast media      Pt states that she is not allergic omn[place] dye, but is only allergic to the older dyes.    Iodine and iodide containing products      Injectables ???     Loperamide-simethicone     Metoclopramide hcl      reglan     Diltiazem hcl Rash and Swelling       Objective:       BP (!) 162/100 (BP Location: Right arm, Patient Position: Sitting, BP Method: Large (Manual))   Pulse 95   Temp 98.8 °F (37.1 °C) (Tympanic)   Ht 5' 8" (1.727 m)   Wt 103.5 kg (228 lb 2.8 oz)   LMP 11/15/2002   SpO2 95%   BMI 34.69 kg/m²   Physical Exam  Constitutional:       General: She is not in acute distress.     Appearance: Normal appearance. She is well-developed. She is not ill-appearing or diaphoretic.   Cardiovascular:      Rate and Rhythm: Normal rate and regular rhythm.      Heart sounds: Normal heart sounds.   Pulmonary:      Effort: Pulmonary effort is normal.      Breath sounds: Normal breath sounds.      Comments: Frequent bronchospastic cough  Abdominal:      Palpations: Abdomen is soft.      Tenderness: There is abdominal tenderness (Left upper quadrant). There is left CVA tenderness and guarding (Left upper quadrant).   Neurological:      Mental Status: She is alert and oriented to person, place, and time.   Psychiatric:         Mood and Affect: Mood normal.         Behavior: Behavior normal.         Thought Content: Thought content normal.         Judgment: Judgment normal.     Assessment:     1. Prediabetes    2. Nausea in adult patient    3. Mild intermittent asthma without complication    4. Hyperlipidemia, " unspecified hyperlipidemia type    5. Left flank pain    6. Lumbar back pain    7. Cough, unspecified type    8. Morbid (severe) obesity due to excess calories    9. Major depressive disorder in partial remission, unspecified whether recurrent      Plan:   Prediabetes  -     Comprehensive Metabolic Panel; Future; Expected date: 02/20/2023  -     Hemoglobin A1C; Future; Expected date: 02/20/2023  -     CBC Auto Differential; Future; Expected date: 02/20/2023  -     Lipid Panel; Future; Expected date: 02/20/2023    Nausea in adult patient  -     ondansetron (ZOFRAN) 8 MG tablet; Take 1 tablet (8 mg total) by mouth every 8 (eight) hours as needed for Nausea.  Dispense: 60 tablet; Refill: 3    Mild intermittent asthma without complication    Hyperlipidemia, unspecified hyperlipidemia type  -     Lipid Panel; Future; Expected date: 02/20/2023    Left flank pain  -     X-Ray Abdomen Flat And Erect; Future; Expected date: 02/20/2023  -     POCT urine dipstick without microscope  -     Urine culture; Future; Expected date: 02/20/2023    Lumbar back pain  -     IN OFFICE EKG 12-LEAD (to Muse)    Cough, unspecified type  -     X-Ray Chest PA And Lateral; Future; Expected date: 02/20/2023    Morbid (severe) obesity due to excess calories    Major depressive disorder in partial remission, unspecified whether recurrent    Other orders  -     benzonatate (TESSALON) 200 MG capsule; Take 1 capsule (200 mg total) by mouth 3 (three) times daily as needed for Cough.  Dispense: 180 capsule; Refill: 3    Urine dip does not indicate infection, will follow culture results.  Continue all other current medications.       Medication List with Changes/Refills   Current Medications    ALBUTEROL (PROVENTIL/VENTOLIN HFA) 90 MCG/ACTUATION INHALER    Inhale 2 puffs into the lungs every 4 (four) hours as needed for Wheezing.    ASPIRIN-ACETAMINOPHEN-CAFFEINE 250-250-65 MG (EXCEDRIN MIGRAINE) 250-250-65 MG PER TABLET    Take 1 tablet by mouth as  needed.     AZELASTINE (ASTELIN) 137 MCG (0.1 %) NASAL SPRAY    1 spray (137 mcg total) by Nasal route 2 (two) times daily.    BLOOD SUGAR DIAGNOSTIC STRP    Use to check glucose daily    BLOOD-GLUCOSE METER KIT    Use as instructed    CETIRIZINE (ZYRTEC) 10 MG TABLET    Take 10 mg by mouth every evening.    DIPHENHYDRAMINE (BENADRYL) 25 MG CAPSULE    Take 25 mg by mouth nightly as needed for Itching.    DOCUSATE SODIUM (COLACE) 100 MG CAPSULE    Take 1 capsule (100 mg total) by mouth 2 (two) times daily.    ERGOCALCIFEROL (ERGOCALCIFEROL) 50,000 UNIT CAP    Take 1 capsule (50,000 Units total) by mouth every 7 days.    ESCITALOPRAM OXALATE (LEXAPRO) 20 MG TABLET    Take 1 tablet (20 mg total) by mouth once daily.    GABAPENTIN (NEURONTIN) 600 MG TABLET    Take 1 tablet (600 mg total) by mouth 2 (two) times daily.    HYDROCODONE-ACETAMINOPHEN (NORCO)  MG PER TABLET    TAKE 1 TABLET BY MOUTH THREE TIMES DAILY AS NEEDED FOR CHRONIC PAIN    IBUPROFEN (ADVIL,MOTRIN) 600 MG TABLET    Take 1 tablet (600 mg total) by mouth 3 (three) times daily as needed for Pain.    IPRATROPIUM (ATROVENT) 0.02 % NEBULIZER SOLUTION    Take 2.5 mLs (500 mcg total) by nebulization as needed for Wheezing. Rescue    LANCETS MISC    Use to check glucose daily    LOVASTATIN (MEVACOR) 40 MG TABLET    Take 40 mg by mouth once daily.    METHOCARBAMOL (ROBAXIN) 500 MG TAB    once daily.    NARCAN 4 MG/ACTUATION SPRY    CALL 911. ADMINISTER A SINGLE SPRAY INTRANASALLY INTO ONE NOSTRIL UPON SIGNS OF OPIOID OVERDOSE. MAY REPEAT AFTER 3 MINUTES IF NO RESPONSE.    NYSTATIN (MYCOSTATIN) CREAM    APPLY TO AFFECTED AREA TWICE DAILY *THANK YOU* Strength: 100,000 unit/gram    NYSTATIN (MYCOSTATIN) POWDER    APPLY TO AFFECTED AREA TWICE DAILY *THANK YOU*    PANTOPRAZOLE (PROTONIX) 40 MG TABLET    Take 40 mg by mouth 2 (two) times daily.    PREGABALIN (LYRICA) 50 MG CAPSULE    Take 50 mg by mouth 2 (two) times daily.    PROMETHAZINE (PHENERGAN) 25 MG  TABLET    TAKE 1 TABLET EVERY 6 HOURS AS NEEDED FOR NAUSEA *THANK YOU* Strength: 25 mg    TIZANIDINE 4 MG CAP    Take 4 mg by mouth as needed.     TRAZODONE (DESYREL) 150 MG TABLET    Take 1 tablet (150 mg total) by mouth every evening.   Changed and/or Refilled Medications    Modified Medication Previous Medication    BENZONATATE (TESSALON) 200 MG CAPSULE benzonatate (TESSALON) 200 MG capsule       Take 1 capsule (200 mg total) by mouth 3 (three) times daily as needed for Cough.    Take 1 capsule (200 mg total) by mouth 3 (three) times daily as needed for Cough.    ONDANSETRON (ZOFRAN) 8 MG TABLET ondansetron (ZOFRAN) 8 MG tablet       Take 1 tablet (8 mg total) by mouth every 8 (eight) hours as needed for Nausea.    Take 1 tablet (8 mg total) by mouth every 8 (eight) hours as needed for Nausea.

## 2023-02-27 LAB — BACTERIA UR CULT: ABNORMAL

## 2023-02-27 RX ORDER — NITROFURANTOIN 25; 75 MG/1; MG/1
100 CAPSULE ORAL 2 TIMES DAILY
Qty: 14 CAPSULE | Refills: 0 | Status: SHIPPED | OUTPATIENT
Start: 2023-02-27 | End: 2023-10-06

## 2023-03-08 ENCOUNTER — HOSPITAL ENCOUNTER (OUTPATIENT)
Dept: RADIOLOGY | Facility: HOSPITAL | Age: 58
Discharge: HOME | End: 2023-03-08
Attending: FAMILY MEDICINE
Payer: MEDICARE

## 2023-03-08 DIAGNOSIS — R10.9 ABDOMINAL PAIN, UNSPECIFIED ABDOMINAL LOCATION: ICD-10-CM

## 2023-03-08 PROCEDURE — 74176 CT ABDOMEN PELVIS WITHOUT CONTRAST: ICD-10-PCS | Mod: 26,,, | Performed by: RADIOLOGY

## 2023-03-08 PROCEDURE — 74176 CT ABD & PELVIS W/O CONTRAST: CPT | Mod: 26,,, | Performed by: RADIOLOGY

## 2023-03-08 PROCEDURE — 74176 CT ABD & PELVIS W/O CONTRAST: CPT | Mod: TC

## 2023-04-11 ENCOUNTER — TELEPHONE (OUTPATIENT)
Dept: INTERNAL MEDICINE | Facility: CLINIC | Age: 58
End: 2023-04-11
Payer: MEDICARE

## 2023-04-11 NOTE — TELEPHONE ENCOUNTER
Could not get in touch with  the number given. ----- Message from Marcos Lara sent at 4/11/2023  3:44 PM CDT -----  Contact: [phone]  [facility] requesting a call back in regards to a diabetic eye exam report. Please call back at [phone]. Thanks kD

## 2023-04-19 ENCOUNTER — PATIENT MESSAGE (OUTPATIENT)
Dept: ADMINISTRATIVE | Facility: HOSPITAL | Age: 58
End: 2023-04-19
Payer: MEDICARE

## 2023-05-22 ENCOUNTER — TELEPHONE (OUTPATIENT)
Dept: ADMINISTRATIVE | Facility: HOSPITAL | Age: 58
End: 2023-05-22
Payer: MEDICARE

## 2023-05-22 ENCOUNTER — PATIENT MESSAGE (OUTPATIENT)
Dept: ADMINISTRATIVE | Facility: HOSPITAL | Age: 58
End: 2023-05-22
Payer: MEDICARE

## 2023-07-03 ENCOUNTER — PES CALL (OUTPATIENT)
Dept: ADMINISTRATIVE | Facility: CLINIC | Age: 58
End: 2023-07-03
Payer: MEDICARE

## 2023-07-07 ENCOUNTER — PATIENT MESSAGE (OUTPATIENT)
Dept: INFECTIOUS DISEASES | Facility: CLINIC | Age: 58
End: 2023-07-07
Payer: MEDICARE

## 2023-09-01 DIAGNOSIS — R73.03 PREDIABETES: ICD-10-CM

## 2023-09-01 DIAGNOSIS — E66.01 SEVERE OBESITY (BMI 35.0-35.9 WITH COMORBIDITY): Primary | ICD-10-CM

## 2023-09-01 NOTE — TELEPHONE ENCOUNTER
Care Due:                  Date            Visit Type   Department     Provider  --------------------------------------------------------------------------------                                MYCHART                              FOLLOWUP/OF  [facility] INTERNAL  Last Visit: 02-      FICE VISIT   MEDICINE       Bri Freedman  Next Visit: None Scheduled  None         None Found                                                            Last  Test          Frequency    Reason                     Performed    Due Date  --------------------------------------------------------------------------------    HBA1C.......  6 months...  metFORMIN................  02- 08-    [facility] Embedded Care Due Messages. Reference number: [id number].   9/01/2023 2:14:25 PM CDT

## 2023-09-02 RX ORDER — METFORMIN HYDROCHLORIDE 750 MG/1
750 TABLET, EXTENDED RELEASE ORAL
Qty: 90 TABLET | Refills: 0 | Status: SHIPPED | OUTPATIENT
Start: 2023-09-02 | End: 2023-10-06 | Stop reason: SDUPTHER

## 2023-09-02 NOTE — TELEPHONE ENCOUNTER
Refill Routing Note   Medication(s) are not appropriate for processing by [facility] for the following reason(s):      Required labs outdated: a1c    [facility] action(s):    Care Due:  Labs due   Pended labs utilizing BestPracticeAdvisor (BPA) tab due to extra training from LPN          Appointments  past 12m or future 3m with PCP    Date Provider   Last Visit   2/20/2023 Bri Freedman MD   Next Visit   Visit date not found Bri Freedman MD   ED visits in past 90 days: 0        Note composed:8:40 PM 09/01/2023

## 2023-09-05 NOTE — TELEPHONE ENCOUNTER
Spoke with pt verbalized understanding that approved script ( Metformin ) have been sent to [facility] pharmacy by Dr. Freedman

## 2023-10-06 ENCOUNTER — TELEPHONE (OUTPATIENT)
Dept: FAMILY MEDICINE | Facility: CLINIC | Age: 58
End: 2023-10-06
Payer: MEDICARE

## 2023-10-06 ENCOUNTER — LAB VISIT (OUTPATIENT)
Dept: LAB | Facility: HOSPITAL | Age: 58
End: 2023-10-06
Attending: FAMILY MEDICINE
Payer: MEDICARE

## 2023-10-06 ENCOUNTER — OFFICE VISIT (OUTPATIENT)
Dept: FAMILY MEDICINE | Facility: CLINIC | Age: 58
End: 2023-10-06
Payer: MEDICARE

## 2023-10-06 VITALS
BODY MASS INDEX: 34.91 KG/M2 | SYSTOLIC BLOOD PRESSURE: 130 MMHG | HEART RATE: 86 BPM | OXYGEN SATURATION: 95 % | DIASTOLIC BLOOD PRESSURE: 88 MMHG | HEIGHT: 68 IN | WEIGHT: 230.31 LBS

## 2023-10-06 DIAGNOSIS — R11.0 NAUSEA IN ADULT PATIENT: ICD-10-CM

## 2023-10-06 DIAGNOSIS — Z13.220 ENCOUNTER FOR LIPID SCREENING FOR CARDIOVASCULAR DISEASE: ICD-10-CM

## 2023-10-06 DIAGNOSIS — E55.9 VITAMIN D DEFICIENCY: ICD-10-CM

## 2023-10-06 DIAGNOSIS — Z79.899 ENCOUNTER FOR LONG-TERM (CURRENT) USE OF MEDICATIONS: ICD-10-CM

## 2023-10-06 DIAGNOSIS — E11.65 TYPE 2 DIABETES MELLITUS WITH HYPERGLYCEMIA, WITHOUT LONG-TERM CURRENT USE OF INSULIN: Primary | ICD-10-CM

## 2023-10-06 DIAGNOSIS — Z13.6 ENCOUNTER FOR LIPID SCREENING FOR CARDIOVASCULAR DISEASE: ICD-10-CM

## 2023-10-06 DIAGNOSIS — R05.3 POST-COVID CHRONIC COUGH: ICD-10-CM

## 2023-10-06 DIAGNOSIS — U09.9 POST-COVID CHRONIC COUGH: ICD-10-CM

## 2023-10-06 DIAGNOSIS — E11.65 TYPE 2 DIABETES MELLITUS WITH HYPERGLYCEMIA, WITHOUT LONG-TERM CURRENT USE OF INSULIN: ICD-10-CM

## 2023-10-06 DIAGNOSIS — K21.00 GASTROESOPHAGEAL REFLUX DISEASE WITH ESOPHAGITIS WITHOUT HEMORRHAGE: ICD-10-CM

## 2023-10-06 DIAGNOSIS — L30.4 INTERTRIGO: ICD-10-CM

## 2023-10-06 DIAGNOSIS — Z00.00 ENCOUNTER FOR MEDICAL EXAMINATION TO ESTABLISH CARE: ICD-10-CM

## 2023-10-06 DIAGNOSIS — Z12.11 COLON CANCER SCREENING: ICD-10-CM

## 2023-10-06 DIAGNOSIS — F43.10 PTSD (POST-TRAUMATIC STRESS DISORDER): ICD-10-CM

## 2023-10-06 DIAGNOSIS — Z12.31 SCREENING MAMMOGRAM FOR HIGH-RISK PATIENT: Primary | ICD-10-CM

## 2023-10-06 LAB
ALBUMIN SERPL BCP-MCNC: 4.1 G/DL (ref 3.5–5.2)
ALP SERPL-CCNC: 90 U/L (ref 55–135)
ALT SERPL W/O P-5'-P-CCNC: 24 U/L (ref 10–44)
ANION GAP SERPL CALC-SCNC: 12 MMOL/L (ref 8–16)
AST SERPL-CCNC: 19 U/L (ref 10–40)
BILIRUB SERPL-MCNC: 0.4 MG/DL (ref 0.1–1)
BUN SERPL-MCNC: 24 MG/DL (ref 6–20)
CALCIUM SERPL-MCNC: 10.2 MG/DL (ref 8.7–10.5)
CHLORIDE SERPL-SCNC: 101 MMOL/L (ref 95–110)
CHOLEST SERPL-MCNC: 198 MG/DL (ref 120–199)
CHOLEST/HDLC SERPL: 3.1 {RATIO} (ref 2–5)
CO2 SERPL-SCNC: 26 MMOL/L (ref 23–29)
CREAT SERPL-MCNC: 0.8 MG/DL (ref 0.5–1.4)
ERYTHROCYTE [DISTWIDTH] IN BLOOD BY AUTOMATED COUNT: 13.2 % (ref 11.5–14.5)
EST. GFR  (NO RACE VARIABLE): >60 ML/MIN/1.73 M^2
ESTIMATED AVG GLUCOSE: 120 MG/DL (ref 68–131)
GLUCOSE SERPL-MCNC: 100 MG/DL (ref 70–110)
HBA1C MFR BLD: 5.8 % (ref 4–5.6)
HCT VFR BLD AUTO: 45.1 % (ref 37–48.5)
HDLC SERPL-MCNC: 63 MG/DL (ref 40–75)
HDLC SERPL: 31.8 % (ref 20–50)
HGB BLD-MCNC: 14.6 G/DL (ref 12–16)
LDLC SERPL CALC-MCNC: 115.6 MG/DL (ref 63–159)
MCH RBC QN AUTO: 30.5 PG (ref 27–31)
MCHC RBC AUTO-ENTMCNC: 32.4 G/DL (ref 32–36)
MCV RBC AUTO: 94 FL (ref 82–98)
NONHDLC SERPL-MCNC: 135 MG/DL
PLATELET # BLD AUTO: 273 K/UL (ref 150–450)
PMV BLD AUTO: 9.9 FL (ref 9.2–12.9)
POTASSIUM SERPL-SCNC: 4.2 MMOL/L (ref 3.5–5.1)
PROT SERPL-MCNC: 7.6 G/DL (ref 6–8.4)
RBC # BLD AUTO: 4.79 M/UL (ref 4–5.4)
SODIUM SERPL-SCNC: 139 MMOL/L (ref 136–145)
TRIGL SERPL-MCNC: 97 MG/DL (ref 30–150)
TSH SERPL DL<=0.005 MIU/L-ACNC: 0.26 UIU/ML (ref 0.4–4)
WBC # BLD AUTO: 8.92 K/UL (ref 3.9–12.7)

## 2023-10-06 PROCEDURE — 84443 ASSAY THYROID STIM HORMONE: CPT | Performed by: FAMILY MEDICINE

## 2023-10-06 PROCEDURE — 1160F PR REVIEW ALL MEDS BY PRESCRIBER/CLIN PHARMACIST DOCUMENTED: ICD-10-PCS | Mod: CPTII,S$GLB,, | Performed by: FAMILY MEDICINE

## 2023-10-06 PROCEDURE — 1160F RVW MEDS BY RX/DR IN RCRD: CPT | Mod: CPTII,S$GLB,, | Performed by: FAMILY MEDICINE

## 2023-10-06 PROCEDURE — 36415 COLL VENOUS BLD VENIPUNCTURE: CPT | Mod: PO | Performed by: FAMILY MEDICINE

## 2023-10-06 PROCEDURE — 3079F PR MOST RECENT DIASTOLIC BLOOD PRESSURE 80-89 MM HG: ICD-10-PCS | Mod: CPTII,S$GLB,, | Performed by: FAMILY MEDICINE

## 2023-10-06 PROCEDURE — 99999 PR PBB SHADOW E&M-EST. PATIENT-LVL V: CPT | Mod: PBBFAC,,, | Performed by: FAMILY MEDICINE

## 2023-10-06 PROCEDURE — 3044F PR MOST RECENT HEMOGLOBIN A1C LEVEL <7.0%: ICD-10-PCS | Mod: CPTII,S$GLB,, | Performed by: FAMILY MEDICINE

## 2023-10-06 PROCEDURE — 80061 LIPID PANEL: CPT | Performed by: FAMILY MEDICINE

## 2023-10-06 PROCEDURE — 3008F PR BODY MASS INDEX (BMI) DOCUMENTED: ICD-10-PCS | Mod: CPTII,S$GLB,, | Performed by: FAMILY MEDICINE

## 2023-10-06 PROCEDURE — 99215 PR OFFICE/OUTPT VISIT, EST, LEVL V, 40-54 MIN: ICD-10-PCS | Mod: S$GLB,,, | Performed by: FAMILY MEDICINE

## 2023-10-06 PROCEDURE — 84439 ASSAY OF FREE THYROXINE: CPT | Performed by: FAMILY MEDICINE

## 2023-10-06 PROCEDURE — 83036 HEMOGLOBIN GLYCOSYLATED A1C: CPT | Performed by: FAMILY MEDICINE

## 2023-10-06 PROCEDURE — 99999 PR PBB SHADOW E&M-EST. PATIENT-LVL V: ICD-10-PCS | Mod: PBBFAC,,, | Performed by: FAMILY MEDICINE

## 2023-10-06 PROCEDURE — 99215 OFFICE O/P EST HI 40 MIN: CPT | Mod: S$GLB,,, | Performed by: FAMILY MEDICINE

## 2023-10-06 PROCEDURE — 3008F BODY MASS INDEX DOCD: CPT | Mod: CPTII,S$GLB,, | Performed by: FAMILY MEDICINE

## 2023-10-06 PROCEDURE — 1159F PR MEDICATION LIST DOCUMENTED IN MEDICAL RECORD: ICD-10-PCS | Mod: CPTII,S$GLB,, | Performed by: FAMILY MEDICINE

## 2023-10-06 PROCEDURE — 85027 COMPLETE CBC AUTOMATED: CPT | Performed by: FAMILY MEDICINE

## 2023-10-06 PROCEDURE — 3079F DIAST BP 80-89 MM HG: CPT | Mod: CPTII,S$GLB,, | Performed by: FAMILY MEDICINE

## 2023-10-06 PROCEDURE — 3075F PR MOST RECENT SYSTOLIC BLOOD PRESS GE 130-139MM HG: ICD-10-PCS | Mod: CPTII,S$GLB,, | Performed by: FAMILY MEDICINE

## 2023-10-06 PROCEDURE — 3044F HG A1C LEVEL LT 7.0%: CPT | Mod: CPTII,S$GLB,, | Performed by: FAMILY MEDICINE

## 2023-10-06 PROCEDURE — 3075F SYST BP GE 130 - 139MM HG: CPT | Mod: CPTII,S$GLB,, | Performed by: FAMILY MEDICINE

## 2023-10-06 PROCEDURE — 80053 COMPREHEN METABOLIC PANEL: CPT | Performed by: FAMILY MEDICINE

## 2023-10-06 PROCEDURE — 1159F MED LIST DOCD IN RCRD: CPT | Mod: CPTII,S$GLB,, | Performed by: FAMILY MEDICINE

## 2023-10-06 RX ORDER — PROMETHAZINE HYDROCHLORIDE 25 MG/1
TABLET ORAL
Qty: 90 TABLET | Refills: 3 | Status: SHIPPED | OUTPATIENT
Start: 2023-10-06

## 2023-10-06 RX ORDER — ALBUTEROL SULFATE 90 UG/1
2 POWDER, METERED RESPIRATORY (INHALATION) EVERY 4 HOURS PRN
Qty: 3 EACH | Refills: 4 | Status: SHIPPED | OUTPATIENT
Start: 2023-10-06

## 2023-10-06 RX ORDER — ERGOCALCIFEROL 1.25 MG/1
50000 CAPSULE ORAL
Qty: 12 CAPSULE | Refills: 3 | Status: SHIPPED | OUTPATIENT
Start: 2023-10-06

## 2023-10-06 RX ORDER — FLUCONAZOLE 150 MG/1
150 TABLET ORAL
Qty: 3 TABLET | Refills: 0 | Status: SHIPPED | OUTPATIENT
Start: 2023-10-06 | End: 2023-10-13

## 2023-10-06 RX ORDER — ESCITALOPRAM OXALATE 20 MG/1
20 TABLET ORAL DAILY
Qty: 90 TABLET | Refills: 4 | Status: SHIPPED | OUTPATIENT
Start: 2023-10-06 | End: 2023-11-20

## 2023-10-06 RX ORDER — TIRZEPATIDE 2.5 MG/.5ML
2.5 INJECTION, SOLUTION SUBCUTANEOUS
Qty: 4 PEN | Refills: 0 | Status: SHIPPED | OUTPATIENT
Start: 2023-10-06 | End: 2023-10-26

## 2023-10-06 RX ORDER — NYSTATIN AND TRIAMCINOLONE ACETONIDE 100000; 1 [USP'U]/G; MG/G
CREAM TOPICAL 3 TIMES DAILY
COMMUNITY
Start: 2023-08-10

## 2023-10-06 RX ORDER — FLUCONAZOLE 200 MG/1
200 TABLET ORAL
COMMUNITY
Start: 2023-06-16 | End: 2023-10-06

## 2023-10-06 RX ORDER — BENZONATATE 200 MG/1
200 CAPSULE ORAL 3 TIMES DAILY PRN
Qty: 180 CAPSULE | Refills: 3 | Status: SHIPPED | OUTPATIENT
Start: 2023-10-06

## 2023-10-06 RX ORDER — METFORMIN HYDROCHLORIDE 750 MG/1
750 TABLET, EXTENDED RELEASE ORAL
Qty: 90 TABLET | Refills: 1 | Status: SHIPPED | OUTPATIENT
Start: 2023-10-06 | End: 2023-10-06

## 2023-10-06 RX ORDER — TRIAMCINOLONE ACETONIDE 1 MG/G
OINTMENT TOPICAL 2 TIMES DAILY
Qty: 454 G | Refills: 0 | Status: SHIPPED | OUTPATIENT
Start: 2023-10-06

## 2023-10-06 RX ORDER — ONDANSETRON HYDROCHLORIDE 8 MG/1
8 TABLET, FILM COATED ORAL EVERY 8 HOURS PRN
Qty: 60 TABLET | Refills: 3 | Status: SHIPPED | OUTPATIENT
Start: 2023-10-06

## 2023-10-06 RX ORDER — TRAZODONE HYDROCHLORIDE 150 MG/1
150 TABLET ORAL NIGHTLY
Qty: 90 TABLET | Refills: 3 | Status: SHIPPED | OUTPATIENT
Start: 2023-10-06 | End: 2023-11-20

## 2023-10-06 RX ORDER — VERAPAMIL HYDROCHLORIDE 80 MG/1
TABLET ORAL
COMMUNITY
Start: 2023-07-27

## 2023-10-06 RX ORDER — PANTOPRAZOLE SODIUM 40 MG/1
40 TABLET, DELAYED RELEASE ORAL 2 TIMES DAILY
Qty: 180 TABLET | Refills: 1 | Status: SHIPPED | OUTPATIENT
Start: 2023-10-06

## 2023-10-06 RX ORDER — FUROSEMIDE 40 MG/1
40 TABLET ORAL 2 TIMES DAILY
COMMUNITY
Start: 2023-05-22

## 2023-10-06 NOTE — PROGRESS NOTES
PLAN:      Problem List Items Addressed This Visit       PTSD (post-traumatic stress disorder) (Chronic)     Continue current medications. Please be advised of condition course.  SNRI/SSRI is first-line treatment for this condition.  Please be advised of the risk of discontinuing this medication without tapering/contacting MD.  Patient has been advised of side effects, and all questions were answered.  Patient voiced understanding.  Patient will follow up routinely and notify us if having any side effects or worsening or persistent symptoms.  ER precautions were given. Antidepressant/Antianxiety Medication Initiation:  Patient informed of risks, benefits, and potential side effects of medication and accepts informed consent.  Common side effects include nausea, fatigue, headache, insomnia, etc see medication insert for complete side effect profile.  Most importantly be advised of the possibility of new or worsening suicidal thoughts/depression/anxiety etcetera.  Please be advised to stop the medication immediately and seek urgent treatment if this occurs.  Therefore please do not to abruptly discontinue medication without physician guidance except in cases of sudden onset or worsening of SI.            Relevant Medications    traZODone (DESYREL) 150 MG tablet    EScitalopram oxalate (LEXAPRO) 20 MG tablet    Encounter for long-term (current) use of medications (Chronic)     Complete history and physical was completed today.  Complete and thorough medication reconciliation was performed.  Discussed risks and benefits of medications.  Advised patient on orders and health maintenance.  We discussed old records and old labs if available.  Will request any records not available through epic.  Continue current medications listed on your summary sheet.           Relevant Orders    CBC Without Differential    Comprehensive Metabolic Panel    TSH    Hemoglobin A1C    Lipid Panel    GABAPENTIN LEVEL    Type 2 diabetes  mellitus with hyperglycemia, without long-term current use of insulin - Primary (Chronic)     Start MOUNJARO.  Stop metformin.  We will plan to monitor hemoglobin A1c at designated intervals 3 to 6 months.  I recommend ongoing Education for diabetic diet and exercise protocol.  We will continue to monitor for side effects.    Please be advised of symptoms to monitor for and to notify me immediately if persistent or worsening.  Follow up with Ophthalmology/Optometry and Podiatry at least annually.           Relevant Medications    tirzepatide (MOUNJARO) 2.5 mg/0.5 mL PnIj    Other Relevant Orders    Hemoglobin A1C    Intertrigo (Chronic)     Refill triamcinolone and nystatin.  Request patient follow-up with Dermatology if no improvement.Discussed condition course and signs and symptoms to expect.  Patient advised take anti-inflammatories and or Tylenol for pain or fever.  ER precautions.  Call MD or follow-up to clinic if not improving or worsening symptoms.           Relevant Medications    nystatin-triamcinolone (MYCOLOG II) cream    triamcinolone acetonide 0.1% (KENALOG) 0.1 % ointment    fluconazole (DIFLUCAN) 150 MG Tab    Vitamin D deficiency (Chronic)     Continue vitamin-D supplement.  Monitor level.         Relevant Medications    ergocalciferol (ERGOCALCIFEROL) 50,000 unit Cap    Other Relevant Orders    Vitamin D    Nausea in adult patient     Follow-up with GI.  Refill medications.         Relevant Medications    promethazine (PHENERGAN) 25 MG tablet    ondansetron (ZOFRAN) 8 MG tablet    Post-COVID chronic cough     Continue inhalers and refill on Tessalon Perles.  Follow-up/establish care with Pulmonary if no improvement.         Relevant Medications    benzonatate (TESSALON) 200 MG capsule    PROAIR RESPICLICK 90 mcg/actuation inhaler    Gastroesophageal reflux disease with esophagitis without hemorrhage     GERD RECOMMENDATIONS  Please be advised of condition course.  - Take PPI in the morning 30-60  minutes before breakfast  - I recommend ongoing Education for lifestyle modifications to help control/reduce reflux/abdominal pain including: avoid large meals, avoid eating within 2-3 hours of bedtime (avoid late night eating & lying down soon after eating), elevate head of bed if nocturnal symptoms are present, smoking cessation (if current smoker), & weight loss (if overweight).   - please be advised to avoid known foods which trigger reflux symptoms & to minimize/avoid high-fat foods, chocolate, caffeine, citrus, alcohol, & tomato products.  - Advised to avoid/limit use of NSAID's, since they can cause GI upset, bleeding, and/or ulcers. If needed, take with food.          Relevant Medications    pantoprazole (PROTONIX) 40 MG tablet     Other Visit Diagnoses       Encounter for medical examination to establish care        Relevant Orders    CBC Without Differential    Comprehensive Metabolic Panel    TSH    Hemoglobin A1C    Lipid Panel    Encounter for lipid screening for cardiovascular disease        Relevant Orders    Lipid Panel    Colon cancer screening        Relevant Orders    Cologuard Screening (Multitarget Stool DNA)        Discussed risk and benefits of Cologuard screening modality for colon cancer.  Patient agrees to proceed.  Future Appointments       Date Provider Specialty Appt Notes    10/9/2023 Mary Lane PA-C Family Medicine pap    10/11/2023  Radiology Encounter for screening mammogram for malignant neoplasm of breast [Z12.31]           Medication Management for assessment above:   Medication List with Changes/Refills   New Medications    FLUCONAZOLE (DIFLUCAN) 150 MG TAB    Take 1 tablet (150 mg total) by mouth every 72 hours. for 3 doses    PROAIR RESPICLICK 90 MCG/ACTUATION INHALER    Inhale 2 puffs into the lungs every 4 (four) hours as needed for Wheezing or Shortness of Breath. Rescue    TIRZEPATIDE (MOUNJARO) 2.5 MG/0.5 ML PNIJ    Inject 2.5 mg into the skin every 7 days. Starter  dose.  Notify me if tolerating the medication at three weeks to increase to 5 milligrams weekly.    TRIAMCINOLONE ACETONIDE 0.1% (KENALOG) 0.1 % OINTMENT    Apply topically 2 (two) times daily.   Current Medications    ALBUTEROL (PROVENTIL/VENTOLIN HFA) 90 MCG/ACTUATION INHALER    Inhale 2 puffs into the lungs every 4 (four) hours as needed for Wheezing.    ASPIRIN-ACETAMINOPHEN-CAFFEINE 250-250-65 MG (EXCEDRIN MIGRAINE) 250-250-65 MG PER TABLET    Take 1 tablet by mouth as needed.     AZELASTINE (ASTELIN) 137 MCG (0.1 %) NASAL SPRAY    1 spray (137 mcg total) by Nasal route 2 (two) times daily.    BLOOD SUGAR DIAGNOSTIC STRP    Use to check glucose daily    BLOOD-GLUCOSE METER KIT    Use as instructed    CETIRIZINE (ZYRTEC) 10 MG TABLET    Take 10 mg by mouth every evening.    DIPHENHYDRAMINE (BENADRYL) 25 MG CAPSULE    Take 25 mg by mouth nightly as needed for Itching.    DOCUSATE SODIUM (COLACE) 100 MG CAPSULE    Take 1 capsule (100 mg total) by mouth 2 (two) times daily.    FUROSEMIDE (LASIX) 40 MG TABLET    Take 40 mg by mouth 2 (two) times daily.    GABAPENTIN (NEURONTIN) 600 MG TABLET    Take 1 tablet (600 mg total) by mouth 2 (two) times daily.    HYDROCODONE-ACETAMINOPHEN (NORCO)  MG PER TABLET    TAKE 1 TABLET BY MOUTH THREE TIMES DAILY AS NEEDED FOR CHRONIC PAIN    IPRATROPIUM (ATROVENT) 0.02 % NEBULIZER SOLUTION    Take 2.5 mLs (500 mcg total) by nebulization as needed for Wheezing. Rescue    LANCETS MISC    Use to check glucose daily    LOVASTATIN (MEVACOR) 40 MG TABLET    Take 40 mg by mouth once daily.    NARCAN 4 MG/ACTUATION SPRY    CALL 911. ADMINISTER A SINGLE SPRAY INTRANASALLY INTO ONE NOSTRIL UPON SIGNS OF OPIOID OVERDOSE. MAY REPEAT AFTER 3 MINUTES IF NO RESPONSE.    NYSTATIN (MYCOSTATIN) CREAM    APPLY TO AFFECTED AREA TWICE DAILY *THANK YOU* Strength: 100,000 unit/gram    NYSTATIN (MYCOSTATIN) POWDER    APPLY TO AFFECTED AREA TWICE DAILY *THANK YOU*    NYSTATIN-TRIAMCINOLONE (MYCOLOG  II) CREAM    Apply topically 3 (three) times daily.    TIZANIDINE 4 MG CAP    Take 4 mg by mouth as needed.     VERAPAMIL (CALAN) 80 MG TABLET    Take by mouth.   Changed and/or Refilled Medications    Modified Medication Previous Medication    BENZONATATE (TESSALON) 200 MG CAPSULE benzonatate (TESSALON) 200 MG capsule       Take 1 capsule (200 mg total) by mouth 3 (three) times daily as needed for Cough.    Take 1 capsule (200 mg total) by mouth 3 (three) times daily as needed for Cough.    ERGOCALCIFEROL (ERGOCALCIFEROL) 50,000 UNIT CAP ergocalciferol (ERGOCALCIFEROL) 50,000 unit Cap       Take 1 capsule (50,000 Units total) by mouth every 7 days.    Take 1 capsule (50,000 Units total) by mouth every 7 days.    ESCITALOPRAM OXALATE (LEXAPRO) 20 MG TABLET EScitalopram oxalate (LEXAPRO) 20 MG tablet       Take 1 tablet (20 mg total) by mouth once daily.    Take 1 tablet (20 mg total) by mouth once daily.    ONDANSETRON (ZOFRAN) 8 MG TABLET ondansetron (ZOFRAN) 8 MG tablet       Take 1 tablet (8 mg total) by mouth every 8 (eight) hours as needed for Nausea.    Take 1 tablet (8 mg total) by mouth every 8 (eight) hours as needed for Nausea.    PANTOPRAZOLE (PROTONIX) 40 MG TABLET pantoprazole (PROTONIX) 40 MG tablet       Take 1 tablet (40 mg total) by mouth 2 (two) times daily.    Take 40 mg by mouth 2 (two) times daily.    PROMETHAZINE (PHENERGAN) 25 MG TABLET promethazine (PHENERGAN) 25 MG tablet       TAKE 1 TABLET EVERY 6 HOURS AS NEEDED FOR NAUSEA *THANK YOU*  ** MAY CAUSE DROWSINESS **    TAKE 1 TABLET EVERY 6 HOURS AS NEEDED FOR NAUSEA *THANK YOU*  ** MAY CAUSE DROWSINESS **    TRAZODONE (DESYREL) 150 MG TABLET traZODone (DESYREL) 150 MG tablet       Take 1 tablet (150 mg total) by mouth every evening.    Take 1 tablet (150 mg total) by mouth every evening.   Discontinued Medications    DOXYCYCLINE (VIBRAMYCIN) 100 MG CAP    Take 1 capsule (100 mg total) by mouth 2 (two) times daily.    FLUCONAZOLE (DIFLUCAN)  200 MG TAB    Take 200 mg by mouth.    IBUPROFEN (ADVIL,MOTRIN) 600 MG TABLET    Take 1 tablet (600 mg total) by mouth 3 (three) times daily as needed for Pain.    METFORMIN (GLUCOPHAGE-XR) 750 MG ER 24HR TABLET    Take 1 tablet (750 mg total) by mouth daily with breakfast. For diabetes    METHOCARBAMOL (ROBAXIN) 500 MG TAB    once daily.    NITROFURANTOIN, MACROCRYSTAL-MONOHYDRATE, (MACROBID) 100 MG CAPSULE    Take 1 capsule (100 mg total) by mouth 2 (two) times daily.    PREGABALIN (LYRICA) 50 MG CAPSULE    Take 50 mg by mouth 2 (two) times daily.       Fred Zambrano M.D.  ==========================================================================  Subjective:   Patient ID: Luann Mcgovern is a 57 y.o. female.  has a past medical history of Allergy, Anxiety, Arthritis, Asthma, Chronic shoulder pain, Depression, GERD (gastroesophageal reflux disease), Insomnia, Migraine, Nephrolithiasis, Pneumonia, PONV (postoperative nausea and vomiting), and Type 2 diabetes mellitus with hyperglycemia, without long-term current use of insulin (10/6/2023).   Chief Complaint: Establish Care      Problem List Items Addressed This Visit       PTSD (post-traumatic stress disorder) (Chronic)    Overview     Chronic.  Stable.  Patient reports that she is doing well on trazodone 150 milligrams at bedtime and Lexapro 20 milligrams daily.  Reports compliance.  No side effects reported.  Symptoms are intermittently controlled.  Denies any SI HI or hallucinations.         Current Assessment & Plan     Continue current medications. Please be advised of condition course.  SNRI/SSRI is first-line treatment for this condition.  Please be advised of the risk of discontinuing this medication without tapering/contacting MD.  Patient has been advised of side effects, and all questions were answered.  Patient voiced understanding.  Patient will follow up routinely and notify us if having any side effects or worsening or persistent symptoms.  ER  precautions were given. Antidepressant/Antianxiety Medication Initiation:  Patient informed of risks, benefits, and potential side effects of medication and accepts informed consent.  Common side effects include nausea, fatigue, headache, insomnia, etc see medication insert for complete side effect profile.  Most importantly be advised of the possibility of new or worsening suicidal thoughts/depression/anxiety etcetera.  Please be advised to stop the medication immediately and seek urgent treatment if this occurs.  Therefore please do not to abruptly discontinue medication without physician guidance except in cases of sudden onset or worsening of SI.            Encounter for long-term (current) use of medications (Chronic)    Overview     October 2023: Reviewed labs.  CHRONIC. Stable. Compliant with medications for managed conditions. See medication list. No SE reported.   Routine lab analysis is being monitored. Refills were addressed.  Lab Results   Component Value Date    WBC 9.21 02/20/2023    HGB 14.1 02/20/2023    HCT 46.5 02/20/2023    MCV 95 02/20/2023     02/20/2023       Chemistry        Component Value Date/Time     02/20/2023 0928    K 4.2 02/20/2023 0928     02/20/2023 0928    CO2 26 02/20/2023 0928    BUN 15 02/20/2023 0928    CREATININE 0.9 02/20/2023 0928    GLU 86 02/20/2023 0928        Component Value Date/Time    CALCIUM 9.8 02/20/2023 0928    ALKPHOS 93 02/20/2023 0928    AST 25 02/20/2023 0928    ALT 27 02/20/2023 0928    BILITOT 0.3 02/20/2023 0928    ESTGFRAFRICA >60.0 10/15/2021 0843    EGFRNONAA >60.0 10/15/2021 0843          Lab Results   Component Value Date    TSH 2.332 09/15/2022    J5QIHUH 8.0 07/10/2018            Current Assessment & Plan     Complete history and physical was completed today.  Complete and thorough medication reconciliation was performed.  Discussed risks and benefits of medications.  Advised patient on orders and health maintenance.  We discussed  "old records and old labs if available.  Will request any records not available through epic.  Continue current medications listed on your summary sheet.           Type 2 diabetes mellitus with hyperglycemia, without long-term current use of insulin - Primary (Chronic)    Overview     October 2023:  Patient has been on metformin.  She reports intolerance to this medication.  She would like to try alternative medication.  Patient denies any history of thyroid cancer, pancreatitis, MEN syndrome.  Diabetes Management Status    Statin: Taking  ACE/ARB: Not taking    Screening or Prevention Patient's value Goal Complete/Controlled?   HgA1C Testing and Control   Lab Results   Component Value Date    HGBA1C 5.7 (H) 02/20/2023      Annually/Less than 8% Yes   Lipid profile : 05/08/2023 Annually Yes   LDL control Lab Results   Component Value Date    LDLCALC 86.6 02/20/2023    Annually/Less than 100 mg/dl  Yes   Nephropathy screening No results found for: "LABMICR"  Lab Results   Component Value Date    PROTEINUA Negative 11/29/2021     No results found for: "UTPCR"   Annually No   Blood pressure BP Readings from Last 1 Encounters:   10/06/23 130/88    Less than 140/90 Yes   Dilated retinal exam Most Recent Eye Exam Date: Not Found Annually No   Foot exam   Most Recent Foot Exam Date: Not Found Annually No               Current Assessment & Plan     Start MOUNJARO.  Stop metformin.  We will plan to monitor hemoglobin A1c at designated intervals 3 to 6 months.  I recommend ongoing Education for diabetic diet and exercise protocol.  We will continue to monitor for side effects.    Please be advised of symptoms to monitor for and to notify me immediately if persistent or worsening.  Follow up with Ophthalmology/Optometry and Podiatry at least annually.           Intertrigo (Chronic)    Overview     Chronic.  Patient states that she developed this condition after COVID.  She uses triamcinolone and nystatin for treatment.  " Requesting refills.  Does not follow with Dermatology.         Current Assessment & Plan     Refill triamcinolone and nystatin.  Request patient follow-up with Dermatology if no improvement.Discussed condition course and signs and symptoms to expect.  Patient advised take anti-inflammatories and or Tylenol for pain or fever.  ER precautions.  Call MD or follow-up to clinic if not improving or worsening symptoms.           Vitamin D deficiency (Chronic)    Overview     Chronic. Vit d deficiency. Takes vitamin d supplement. No SE reported. Fatigue is slightly improved.   Lab Results   Component Value Date    [id number] 27 (L) 09/15/2022    [id number] 23 (L) 10/15/2021    [id number] 15 (L) 04/20/2021               Current Assessment & Plan     Continue vitamin-D supplement.  Monitor level.         Nausea in adult patient    Overview     Chronic.  Intermittent control.  Patient uses Phenergan and Zofran as needed.         Current Assessment & Plan     Follow-up with GI.  Refill medications.         Post-COVID chronic cough    Overview     Patient had a bad case of COVID.  Continues to have chronic cough.  Patient uses inhalers.  And Tessalon Perles.         Current Assessment & Plan     Continue inhalers and refill on Tessalon Perles.  Follow-up/establish care with Pulmonary if no improvement.         Gastroesophageal reflux disease with esophagitis without hemorrhage    Overview     Chronic.  Stable.  Patient reports compliance with pantoprazole.  No side effects reported.  Symptoms are controlled.         Current Assessment & Plan     GERD RECOMMENDATIONS  Please be advised of condition course.  - Take PPI in the morning 30-60 minutes before breakfast  - I recommend ongoing Education for lifestyle modifications to help control/reduce reflux/abdominal pain including: avoid large meals, avoid eating within 2-3 hours of bedtime (avoid late night eating & lying down soon after eating), elevate head of bed if  nocturnal symptoms are present, smoking cessation (if current smoker), & weight loss (if overweight).   - please be advised to avoid known foods which trigger reflux symptoms & to minimize/avoid high-fat foods, chocolate, caffeine, citrus, alcohol, & tomato products.  - Advised to avoid/limit use of NSAID's, since they can cause GI upset, bleeding, and/or ulcers. If needed, take with food.           Other Visit Diagnoses       Encounter for medical examination to establish care        Encounter for lipid screening for cardiovascular disease        Colon cancer screening                 Review of patient's allergies indicates:   Allergen Reactions    Meloxicam Anaphylaxis     Chest and neck pain .  Decrease O2    Iodinated contrast media      Pt states that she is not allergic [place] dye, but is only allergic to the older dyes.    Iodine and iodide containing products      Injectables ???     Loperamide-simethicone     Metoclopramide hcl      reglan     Diltiazem hcl Rash and Swelling     Current Outpatient Medications   Medication Instructions    albuterol (PROVENTIL/VENTOLIN HFA) 90 mcg/actuation inhaler 2 puffs, Inhalation, Every 4 hours PRN    aspirin-acetaminophen-caffeine 250-250-65 mg (EXCEDRIN MIGRAINE) 250-250-65 mg per tablet 1 tablet, Oral, As needed (PRN)    azelastine (ASTELIN) 137 mcg, Nasal, 2 times daily    benzonatate (TESSALON) 200 mg, Oral, 3 times daily PRN    blood sugar diagnostic Strp Use to check glucose daily    blood-glucose meter kit Use as instructed    cetirizine (ZYRTEC) 10 mg, Oral, Nightly    diphenhydrAMINE (BENADRYL) 25 mg, Oral, Nightly PRN    docusate sodium (COLACE) 100 mg, Oral, 2 times daily    ergocalciferol (ERGOCALCIFEROL) 50,000 Units, Oral, Every 7 days    EScitalopram oxalate (LEXAPRO) 20 mg, Oral, Daily    fluconazole (DIFLUCAN) 150 mg, Oral, Every 72 hours    furosemide (LASIX) 40 mg, Oral, 2 times daily    gabapentin (NEURONTIN) 600 mg, Oral, 2 times daily     HYDROcodone-acetaminophen (NORCO)  mg per tablet TAKE 1 TABLET BY MOUTH THREE TIMES DAILY AS NEEDED FOR CHRONIC PAIN    ipratropium (ATROVENT) 500 mcg, Nebulization, As needed (PRN), Rescue    lancets Misc Use to check glucose daily    lovastatin (MEVACOR) 40 mg, Oral, Daily    MOUNJARO 2.5 mg, Subcutaneous, Every 7 days, Starter dose.  Notify me if tolerating the medication at three weeks to increase to 5 milligrams weekly.    NARCAN 4 mg/actuation [place] CALL 911. ADMINISTER A SINGLE SPRAY INTRANASALLY INTO ONE NOSTRIL UPON SIGNS OF OPIOID OVERDOSE. MAY REPEAT AFTER 3 MINUTES IF NO RESPONSE.    nystatin (MYCOSTATIN) cream APPLY TO AFFECTED AREA TWICE DAILY *THANK YOU* Strength: 100,000 unit/gram    nystatin (MYCOSTATIN) powder APPLY TO AFFECTED AREA TWICE DAILY *THANK YOU*    nystatin-triamcinolone (MYCOLOG II) cream Topical (Top), 3 times daily    ondansetron (ZOFRAN) 8 mg, Oral, Every 8 hours PRN    pantoprazole (PROTONIX) 40 mg, Oral, 2 times daily    PROAIR RESPICLICK 90 mcg/actuation inhaler 2 puffs, Inhalation, Every 4 hours PRN, Rescue    promethazine (PHENERGAN) 25 MG tablet TAKE 1 TABLET EVERY 6 HOURS AS NEEDED FOR NAUSEA *THANK YOU*  ** MAY CAUSE DROWSINESS **    tiZANidine 4 mg, Oral, As needed (PRN)    traZODone (DESYREL) 150 mg, Oral, Nightly    triamcinolone acetonide 0.1% (KENALOG) 0.1 % ointment Topical (Top), 2 times daily    verapamiL (CALAN) 80 MG tablet Oral      I have reviewed the PMH, social history, FamilyHx, surgical history, allergies and medications documented / confirmed by the patient at the time of this visit.  Review of Systems   Constitutional:  Negative for chills, fatigue, fever and unexpected weight change.   HENT:  Negative for ear pain and sore throat.    Eyes:  Negative for redness and visual disturbance.   Respiratory:  Negative for cough and shortness of breath.    Cardiovascular:  Negative for chest pain and palpitations.   Gastrointestinal:  Negative for nausea and  "vomiting.   Genitourinary:  Negative for difficulty urinating and hematuria.   Musculoskeletal:  Negative for arthralgias and myalgias.   Skin:  Negative for rash and wound.   Neurological:  Negative for weakness and headaches.   Psychiatric/Behavioral:  Negative for sleep disturbance. The patient is not nervous/anxious.      Objective:   /88   Pulse 86   Ht 5' 8" (1.727 m)   Wt 104.5 kg (230 lb 4.8 oz)   LMP 11/15/2002   SpO2 95%   BMI 35.02 kg/m²   Physical Exam  Vitals and nursing note reviewed.   Constitutional:       General: She is not in acute distress.     Appearance: She is well-developed. She is obese. She is not ill-appearing, toxic-appearing or diaphoretic.   HENT:      Head: Normocephalic and atraumatic.      Right Ear: Hearing and external ear normal.      Left Ear: Hearing and external ear normal.      Nose: Nose normal. No rhinorrhea.   Eyes:      General: Lids are normal.      Extraocular Movements: Extraocular movements intact.      Conjunctiva/sclera: Conjunctivae normal.      Pupils: Pupils are equal, round, and reactive to light.   Cardiovascular:      Rate and Rhythm: Normal rate.      Pulses: Normal pulses.   Pulmonary:      Effort: Pulmonary effort is normal. No respiratory distress.      Breath sounds: Normal breath sounds.   Abdominal:      General: Bowel sounds are normal.      Palpations: Abdomen is soft.   Musculoskeletal:         General: Tenderness present. Normal range of motion.      Cervical back: Normal range of motion and neck supple.   Skin:     General: Skin is warm and dry.      Capillary Refill: Capillary refill takes less than 2 seconds.      Coloration: Skin is not pale.      Findings: Rash present.   Neurological:      General: No focal deficit present.      Mental Status: She is alert and oriented to person, place, and time. Mental status is at baseline. She is not disoriented.      Cranial Nerves: No cranial nerve deficit.      Motor: No weakness.      Gait: " Gait normal.   Psychiatric:         Attention and Perception: She is attentive.         Mood and Affect: Mood normal. Mood is not anxious or depressed.         Speech: Speech is not rapid and pressured or slurred.         Behavior: Behavior normal. Behavior is not agitated, aggressive or hyperactive. Behavior is cooperative.         Thought Content: Thought content normal. Thought content is not paranoid or delusional. Thought content does not include homicidal or suicidal ideation. Thought content does not include homicidal or suicidal plan.         Cognition and Memory: Memory is not impaired.         Judgment: Judgment normal.         Assessment:     1. Type 2 diabetes mellitus with hyperglycemia, without long-term current use of insulin    2. Encounter for long-term (current) use of medications    3. Intertrigo    4. Nausea in adult patient    5. Vitamin D deficiency    6. PTSD (post-traumatic stress disorder)    7. Post-COVID chronic cough    8. Gastroesophageal reflux disease with esophagitis without hemorrhage    9. Encounter for medical examination to establish care    10. Encounter for lipid screening for cardiovascular disease    11. Colon cancer screening      MDM:   High medical complexity.  Moderate to high risk.  Total time: 62 minutes.  This includes total time spent on the encounter, which includes face to face time and non-face to face time preparing to see the patient (eg, review of previous medical records, tests), Obtaining and/or reviewing separately obtained history, documenting clinical information in the electronic or other health record, independently interpreting results (not separately reported)/communicating results to the patient/family/caregiver, and/or care coordination (not separately reported).    I have Reviewed and summarized old records.  I have performed thorough medication reconciliation today and discussed risk and benefits of medications.  I have reviewed labs and discussed  with patient.  All questions were answered.  I am requesting old records and will review them once they are available.  Previous PCP Dr. Freedman  cardio Dr. Georges  kidney Dr. Sadler  Back doctor Dr. Jett Sampson-   CHRISTOPHER have signed for the following orders AND/OR meds.  Orders Placed This Encounter   Procedures    Cologuard Screening (Multitarget Stool DNA)     Standing Status:   Future     Number of Occurrences:   1     Standing Expiration Date:   12/4/2024    CBC Without Differential     Standing Status:   Future     Number of Occurrences:   1     Standing Expiration Date:   12/4/2024    Comprehensive Metabolic Panel     Standing Status:   Future     Number of Occurrences:   1     Standing Expiration Date:   12/4/2024    TSH     Standing Status:   Future     Number of Occurrences:   1     Standing Expiration Date:   12/4/2024    Hemoglobin A1C     Standing Status:   Future     Number of Occurrences:   1     Standing Expiration Date:   12/4/2024    Lipid Panel     Standing Status:   Future     Number of Occurrences:   1     Standing Expiration Date:   12/4/2024    GABAPENTIN LEVEL     Standing Status:   Future     Standing Expiration Date:   12/4/2024    Vitamin D     Standing Status:   Standing     Number of Occurrences:   99     Standing Expiration Date:   12/4/2044     Medications Ordered This Encounter   Medications    benzonatate (TESSALON) 200 MG capsule     Sig: Take 1 capsule (200 mg total) by mouth 3 (three) times daily as needed for Cough.     Dispense:  180 capsule     Refill:  3    ergocalciferol (ERGOCALCIFEROL) 50,000 unit Cap     Sig: Take 1 capsule (50,000 Units total) by mouth every 7 days.     Dispense:  12 capsule     Refill:  3    EScitalopram oxalate (LEXAPRO) 20 MG tablet     Sig: Take 1 tablet (20 mg total) by mouth once daily.     Dispense:  90 tablet     Refill:  4    fluconazole (DIFLUCAN) 150 MG Tab     Sig: Take 1 tablet (150 mg total) by mouth every 72 hours. for 3 doses     Dispense:   3 tablet     Refill:  0    ondansetron (ZOFRAN) 8 MG tablet     Sig: Take 1 tablet (8 mg total) by mouth every 8 (eight) hours as needed for Nausea.     Dispense:  60 tablet     Refill:  3    pantoprazole (PROTONIX) 40 MG tablet     Sig: Take 1 tablet (40 mg total) by mouth 2 (two) times daily.     Dispense:  180 tablet     Refill:  1    PROAIR RESPICLICK 90 mcg/actuation inhaler     Sig: Inhale 2 puffs into the lungs every 4 (four) hours as needed for Wheezing or Shortness of Breath. Rescue     Dispense:  3 each     Refill:  4    promethazine (PHENERGAN) 25 MG tablet     Sig: TAKE 1 TABLET EVERY 6 HOURS AS NEEDED FOR NAUSEA *THANK YOU*  ** MAY CAUSE DROWSINESS **     Dispense:  90 tablet     Refill:  3    tirzepatide (MOUNJARO) 2.5 mg/0.5 mL PnIj     Sig: Inject 2.5 mg into the skin every 7 days. Starter dose.  Notify me if tolerating the medication at three weeks to increase to 5 milligrams weekly.     Dispense:  4 pen      Refill:  0    traZODone (DESYREL) 150 MG tablet     Sig: Take 1 tablet (150 mg total) by mouth every evening.     Dispense:  90 tablet     Refill:  3    triamcinolone acetonide 0.1% (KENALOG) 0.1 % ointment     Sig: Apply topically 2 (two) times daily.     Dispense:  454 g     Refill:  0        Follow up in about 6 months (around 4/6/2024), or if symptoms worsen or fail to improve, for Med refills.  Future Appointments       Date Provider Specialty Appt Notes    10/9/2023 Mary Lane PA-C Family Medicine pap    10/11/2023  Radiology Encounter for screening mammogram for malignant neoplasm of breast [Z12.31]          If no improvement in symptoms or symptoms worsen, advised to call/follow-up at clinic or go to ER. Patient voiced understanding and all questions/concerns were addressed.   DISCLAIMER: This note was compiled by using a speech recognition dictation system and therefore please be aware that typographical / speech recognition errors can and do occur.  Please contact me if you see  any errors specifically.    Fred Zambrano M.D.       Office: [phone] [address] [place] [address]  FAX: [phone]

## 2023-10-06 NOTE — ASSESSMENT & PLAN NOTE
Continue current medications. Please be advised of condition course.  SNRI/SSRI is first-line treatment for this condition.  Please be advised of the risk of discontinuing this medication without tapering/contacting MD.  Patient has been advised of side effects, and all questions were answered.  Patient voiced understanding.  Patient will follow up routinely and notify us if having any side effects or worsening or persistent symptoms.  ER precautions were given. Antidepressant/Antianxiety Medication Initiation:  Patient informed of risks, benefits, and potential side effects of medication and accepts informed consent.  Common side effects include nausea, fatigue, headache, insomnia, etc see medication insert for complete side effect profile.  Most importantly be advised of the possibility of new or worsening suicidal thoughts/depression/anxiety etcetera.  Please be advised to stop the medication immediately and seek urgent treatment if this occurs.  Therefore please do not to abruptly discontinue medication without physician guidance except in cases of sudden onset or worsening of SI.

## 2023-10-06 NOTE — ASSESSMENT & PLAN NOTE
Refill triamcinolone and nystatin.  Request patient follow-up with Dermatology if no improvement.Discussed condition course and signs and symptoms to expect.  Patient advised take anti-inflammatories and or Tylenol for pain or fever.  ER precautions.  Call MD or follow-up to clinic if not improving or worsening symptoms.

## 2023-10-06 NOTE — ASSESSMENT & PLAN NOTE
Start MOUNJARO.  Stop metformin.  We will plan to monitor hemoglobin A1c at designated intervals 3 to 6 months.  I recommend ongoing Education for diabetic diet and exercise protocol.  We will continue to monitor for side effects.    Please be advised of symptoms to monitor for and to notify me immediately if persistent or worsening.  Follow up with Ophthalmology/Optometry and Podiatry at least annually.

## 2023-10-06 NOTE — PATIENT INSTRUCTIONS
Follow up in about 6 months (around 4/6/2024), or if symptoms worsen or fail to improve, for Med refills.     Dear patient,   As a result of recent federal legislation (The Federal Cures Act), you may receive lab or pathology results from your visit in your [facility] account before your physician is able to contact you. Your physician or their representative will relay the results to you with their recommendations at their soonest availability.     If no improvement in symptoms or symptoms worsen, please be advised to call MD, follow-up at clinic and/or go to ER if becomes severe.    Fred Zambrano M.D.        We Offer TELEHEALTH & Same Day Appointments!   Book your Telehealth appointment with me through my nurse or   Clinic appointments on [employer]!    [address] [place] [address]    Office: [phone]   FAX: [phone]    Check out my [employer] Page and Follow Me at: [email or web address]    Check out my website at [employer] by clicking on: [email or web address]    To Schedule appointments online, go to [employer]: [email or web address]     GLP 1 risk and benefits and common side effects of medication discussed with the patient at length.  All questions were answered.  Discussed with patient at length about potential pharmacologic options.  Patient desires consideration for MOUNJARO .  The risks, benefits and, side effects of this GLP 1 medication including nausea , constipation, diarrhea and pain injection site, etcetera.  She is aware she should not get pregnant while taking this medication and it is not approved while breastfeeding.  Patient reports no personal or family history of pancreatitis, MEN or medullary thyroid cancer.  There is potential for gallbladder issues while taking this medication if not already removed.  Patient should be advised to caution with taking this medication if having history of  thyroid cancer or biliary disease/gallstones.  Patient should be aware of risk of diabetic retinopathy if blood sugars lowered too quickly.  Patient is aware that weight loss can and will most likely occur quickly.  Discussed GLP 1 mechanism of action.

## 2023-10-06 NOTE — TELEPHONE ENCOUNTER
----- Message from Jaz Alvarez sent at 10/6/2023  3:22 PM CDT -----  Regarding: need current mammo order please  Pt came in today, as she was leaving she stopped at , they sent patient to me.    Patient was wanting to schedule mammo and pap. I scheduled her for both of her requests. Please enter mammo order so patient can have this done on Wednesday. Current order expires before then and from another provider.    Thanks

## 2023-10-06 NOTE — ASSESSMENT & PLAN NOTE
Continue inhalers and refill on Tessalon Perles.  Follow-up/establish care with Pulmonary if no improvement.

## 2023-10-07 LAB — T4 FREE SERPL-MCNC: 0.83 NG/DL (ref 0.71–1.51)

## 2023-10-08 NOTE — PROGRESS NOTES
Make follow-up lab appointment per recommendation below.  Check to see if patient has seen the results through my chart.  If not then,  #CALL THE PATIENT# to discuss results/see if they have questions and document verification of contact. Make F/U appt if needed. [phone]    #My interpretation that was sent to them through [employer]:  Luann, I have reviewed your recent blood work.     Your complete blood count is normal.    Your metabolic panel which shows your glucose, kidney function, electrolytes, and liver function is normal.   TSH test is slightly abnormal however free T4 is within normal range.  I recommend continue monitoring of thyroid levels.  Consider Endocrinology consult.  Your cholesterol is stable.    Your hemoglobin A1c is slightly elevated from previous.  Start MOUNJARO to better control your diabetes as prescribed.  This test is gold standard screening test for diabetes.  It is a measures 3 months of your average blood sugar.  =========================  Also please address any outstanding health maintenance that may be due: Shingles Vaccine(2 of 3) due on 04/28/2016  Colorectal Cancer Screening due on 11/19/2020  Cervical Cancer Screening due on 10/25/2021  Mammogram due on 04/29/2022

## 2023-10-09 ENCOUNTER — OFFICE VISIT (OUTPATIENT)
Dept: FAMILY MEDICINE | Facility: CLINIC | Age: 58
End: 2023-10-09
Payer: MEDICARE

## 2023-10-09 ENCOUNTER — HOSPITAL ENCOUNTER (OUTPATIENT)
Dept: RADIOLOGY | Facility: HOSPITAL | Age: 58
Discharge: HOME | End: 2023-10-09
Attending: FAMILY MEDICINE
Payer: MEDICARE

## 2023-10-09 VITALS
BODY MASS INDEX: 35.27 KG/M2 | HEART RATE: 85 BPM | OXYGEN SATURATION: 95 % | RESPIRATION RATE: 18 BRPM | WEIGHT: 232.69 LBS | DIASTOLIC BLOOD PRESSURE: 77 MMHG | SYSTOLIC BLOOD PRESSURE: 122 MMHG | HEIGHT: 68 IN | TEMPERATURE: 97 F

## 2023-10-09 DIAGNOSIS — Z12.31 ENCOUNTER FOR SCREENING MAMMOGRAM FOR MALIGNANT NEOPLASM OF BREAST: ICD-10-CM

## 2023-10-09 DIAGNOSIS — Z12.4 ENCOUNTER FOR PAPANICOLAOU SMEAR FOR CERVICAL CANCER SCREENING: Primary | ICD-10-CM

## 2023-10-09 PROCEDURE — 99999 PR PBB SHADOW E&M-EST. PATIENT-LVL V: ICD-10-PCS | Mod: PBBFAC,,, | Performed by: PHYSICIAN ASSISTANT

## 2023-10-09 PROCEDURE — 87624 HPV HI-RISK TYP POOLED RSLT: CPT | Performed by: PHYSICIAN ASSISTANT

## 2023-10-09 PROCEDURE — 3008F PR BODY MASS INDEX (BMI) DOCUMENTED: ICD-10-PCS | Mod: CPTII,S$GLB,, | Performed by: PHYSICIAN ASSISTANT

## 2023-10-09 PROCEDURE — 3044F PR MOST RECENT HEMOGLOBIN A1C LEVEL <7.0%: ICD-10-PCS | Mod: CPTII,S$GLB,, | Performed by: PHYSICIAN ASSISTANT

## 2023-10-09 PROCEDURE — 77063 MAMMO DIGITAL SCREENING BILAT WITH TOMO: ICD-10-PCS | Mod: 26,,, | Performed by: RADIOLOGY

## 2023-10-09 PROCEDURE — 3078F DIAST BP <80 MM HG: CPT | Mod: CPTII,S$GLB,, | Performed by: PHYSICIAN ASSISTANT

## 2023-10-09 PROCEDURE — 1159F PR MEDICATION LIST DOCUMENTED IN MEDICAL RECORD: ICD-10-PCS | Mod: CPTII,S$GLB,, | Performed by: PHYSICIAN ASSISTANT

## 2023-10-09 PROCEDURE — 77067 MAMMO DIGITAL SCREENING BILAT WITH TOMO: ICD-10-PCS | Mod: 26,,, | Performed by: RADIOLOGY

## 2023-10-09 PROCEDURE — 3074F SYST BP LT 130 MM HG: CPT | Mod: CPTII,S$GLB,, | Performed by: PHYSICIAN ASSISTANT

## 2023-10-09 PROCEDURE — 99213 OFFICE O/P EST LOW 20 MIN: CPT | Mod: 25,S$GLB,, | Performed by: PHYSICIAN ASSISTANT

## 2023-10-09 PROCEDURE — 3074F PR MOST RECENT SYSTOLIC BLOOD PRESSURE < 130 MM HG: ICD-10-PCS | Mod: CPTII,S$GLB,, | Performed by: PHYSICIAN ASSISTANT

## 2023-10-09 PROCEDURE — 1159F MED LIST DOCD IN RCRD: CPT | Mod: CPTII,S$GLB,, | Performed by: PHYSICIAN ASSISTANT

## 2023-10-09 PROCEDURE — 3078F PR MOST RECENT DIASTOLIC BLOOD PRESSURE < 80 MM HG: ICD-10-PCS | Mod: CPTII,S$GLB,, | Performed by: PHYSICIAN ASSISTANT

## 2023-10-09 PROCEDURE — 77067 SCR MAMMO BI INCL CAD: CPT | Mod: TC,PO

## 2023-10-09 PROCEDURE — 99999 PR PBB SHADOW E&M-EST. PATIENT-LVL V: CPT | Mod: PBBFAC,,, | Performed by: PHYSICIAN ASSISTANT

## 2023-10-09 PROCEDURE — 77063 BREAST TOMOSYNTHESIS BI: CPT | Mod: 26,,, | Performed by: RADIOLOGY

## 2023-10-09 PROCEDURE — 88175 CYTOPATH C/V AUTO FLUID REDO: CPT | Performed by: PHYSICIAN ASSISTANT

## 2023-10-09 PROCEDURE — 1160F PR REVIEW ALL MEDS BY PRESCRIBER/CLIN PHARMACIST DOCUMENTED: ICD-10-PCS | Mod: CPTII,S$GLB,, | Performed by: PHYSICIAN ASSISTANT

## 2023-10-09 PROCEDURE — 99213 PR OFFICE/OUTPT VISIT, EST, LEVL III, 20-29 MIN: ICD-10-PCS | Mod: 25,S$GLB,, | Performed by: PHYSICIAN ASSISTANT

## 2023-10-09 PROCEDURE — 1160F RVW MEDS BY RX/DR IN RCRD: CPT | Mod: CPTII,S$GLB,, | Performed by: PHYSICIAN ASSISTANT

## 2023-10-09 PROCEDURE — 77067 SCR MAMMO BI INCL CAD: CPT | Mod: 26,,, | Performed by: RADIOLOGY

## 2023-10-09 PROCEDURE — 3044F HG A1C LEVEL LT 7.0%: CPT | Mod: CPTII,S$GLB,, | Performed by: PHYSICIAN ASSISTANT

## 2023-10-09 PROCEDURE — 3008F BODY MASS INDEX DOCD: CPT | Mod: CPTII,S$GLB,, | Performed by: PHYSICIAN ASSISTANT

## 2023-10-09 NOTE — PROGRESS NOTES
Negative mammogram, repeat in 1 year, result released through [employer].  Please call the patient if not enrolled with my chart. [phone]   Shingles Vaccine(2 of 3) due on 04/28/2016  Colorectal Cancer Screening due on 11/19/2020  Cervical Cancer Screening due on 10/25/2021

## 2023-10-09 NOTE — PROGRESS NOTES
Assessment/Plan:    Problem List Items Addressed This Visit    None  Visit Diagnoses       Encounter for Papanicolaou smear for cervical cancer screening    -  Primary    Relevant Orders    Liquid-Based Pap Smear, Screening    HPV High Risk Genotypes, PCR            Follow up for with PCP.    Mary Lane PA-C  _____________________________________________________________________________________________________________________________________________________    CC: pap smear    HPI: Patient is in clinic today as an established patient here for pap smear. Patient's last physical exam that included a pap smear and a pelvic exam and was approximately 5 years ago. The patient's latest pap smear result was read as normal. At present she has more than 3 consecutive normal pap smear results. Her last mammogram was 2.5 years ago. The patient's latest mammogram result:normal--routine follow-up in 12 months. Denies change in menstrual cycle, dysmenorrhea, dyspareunia, genital discharge, genital ulcers, irregular/heavy menses, pelvic pain, and vulvar/vaginal symptoms. No other complaints today.     Past Medical History:   Diagnosis Date    Allergy     seasonal     Anxiety     Arthritis     back    Asthma     seasonal    Chronic shoulder pain     Depression     GERD (gastroesophageal reflux disease)     Insomnia     Migraine     Nephrolithiasis     Pneumonia     PONV (postoperative nausea and vomiting)     Type 2 diabetes mellitus with hyperglycemia, without long-term current use of insulin 10/6/2023     Past Surgical History:   Procedure Laterality Date    ADENOIDECTOMY      APPENDECTOMY      CERVICAL FUSION  09/14/2020    C3-6    CHOLECYSTECTOMY      CYSTOSCOPY W/ URETERAL STENT PLACEMENT Left 03/31/2021    Procedure: CYSTOSCOPY, WITH URETERAL STENT INSERTION;  Surgeon: Dexter Sadler MD;  Location: [facility];  Service: Urology;  Laterality: Left;    CYSTOURETEROSCOPY WITH RETROGRADE PYELOGRAPHY AND INSERTION OF  STENT INTO URETER Right 01/21/2021    Procedure: CYSTOURETEROSCOPY, WITH RETROGRADE PYELOGRAM AND URETERAL STENT INSERTION;  Surgeon: Dexter Sadler MD;  Location[facility];  Service: Urology;  Laterality: Right;    KNEE SURGERY Left     age 13 - injury - no tears, just cleaned it up     LUMBAR FUSION  2013    L5-S1    LUMBAR NERVE STIMLATOR INSERTION  10/2018    SPINE SURGERY      TONSILLECTOMY      TUBAL LIGATION  01/19/1990    URETEROSCOPIC REMOVAL OF URETERIC CALCULUS Left 04/07/2021    Procedure: REMOVAL, CALCULUS, URETER, URETEROSCOPIC;  Surgeon: Dexter Sadler MD;  Location: [facility] OR;  Service: Urology;  Laterality: Left;    VAGINAL DELIVERY      2 births      Review of patient's allergies indicates:   Allergen Reactions    Meloxicam Anaphylaxis     Chest and neck pain .  Decrease O2    Iodinated contrast media      Pt states that she is not allergic [place] dye, but is only allergic to the older dyes.    Iodine and iodide containing products      Injectables ???     Loperamide-simethicone     Metoclopramide hcl      reglan     Diltiazem hcl Rash and Swelling     Social History     Tobacco Use    Smoking status: Never    Smokeless tobacco: Never    Tobacco comments:     None, 1988 everywhere that I was or in my house-none   Substance Use Topics    Alcohol use: No    Drug use: No     Family History   Problem Relation Age of Onset    Thyroid disease Mother     Depression Mother     Hearing loss Mother     Vision loss Mother     Heart disease Father     COPD Father     Supraventricular tachycardia Sister     Heart disease Brother         smoking /stents - 40's    Heart disease Paternal Grandfather     Arthritis Brother     Hyperlipidemia Brother      Current Outpatient Medications on File Prior to Visit   Medication Sig Dispense Refill    albuterol (PROVENTIL/VENTOLIN HFA) 90 mcg/actuation inhaler Inhale 2 puffs into the lungs every 4 (four) hours as needed for Wheezing. 18 g 0     aspirin-acetaminophen-caffeine 250-250-65 mg (EXCEDRIN MIGRAINE) 250-250-65 mg per tablet Take 1 tablet by mouth as needed.       azelastine (ASTELIN) 137 mcg (0.1 %) nasal spray 1 spray (137 mcg total) by Nasal route 2 (two) times daily. 30 mL 5    benzonatate (TESSALON) 200 MG capsule Take 1 capsule (200 mg total) by mouth 3 (three) times daily as needed for Cough. 180 capsule 3    blood sugar diagnostic Strp Use to check glucose daily 100 strip 3    blood-glucose meter kit Use as instructed 1 each 0    cetirizine (ZYRTEC) 10 MG tablet Take 10 mg by mouth every evening.      diphenhydrAMINE (BENADRYL) 25 mg capsule Take 25 mg by mouth nightly as needed for Itching.      docusate sodium (COLACE) 100 MG capsule Take 1 capsule (100 mg total) by mouth 2 (two) times daily. 20 capsule 0    ergocalciferol (ERGOCALCIFEROL) 50,000 unit Cap Take 1 capsule (50,000 Units total) by mouth every 7 days. 12 capsule 3    EScitalopram oxalate (LEXAPRO) 20 MG tablet Take 1 tablet (20 mg total) by mouth once daily. 90 tablet 4    fluconazole (DIFLUCAN) 150 MG Tab Take 1 tablet (150 mg total) by mouth every 72 hours. for 3 doses 3 tablet 0    furosemide (LASIX) 40 MG tablet Take 40 mg by mouth 2 (two) times daily.      gabapentin (NEURONTIN) 600 MG tablet Take 1 tablet (600 mg total) by mouth 2 (two) times daily. 60 tablet 11    HYDROcodone-acetaminophen (NORCO)  mg per tablet TAKE 1 TABLET BY MOUTH THREE TIMES DAILY AS NEEDED FOR CHRONIC PAIN 61 tablet 0    lancets Misc Use to check glucose daily 100 each 3    lovastatin (MEVACOR) 40 MG tablet Take 40 mg by mouth once daily.      NARCAN 4 mg/actuation [place] CALL 911. ADMINISTER A SINGLE SPRAY INTRANASALLY INTO ONE NOSTRIL UPON SIGNS OF OPIOID OVERDOSE. MAY REPEAT AFTER 3 MINUTES IF NO RESPONSE.      nystatin (MYCOSTATIN) cream APPLY TO AFFECTED AREA TWICE DAILY *THANK YOU* Strength: 100,000 unit/gram 90 each 3    nystatin (MYCOSTATIN) powder APPLY TO AFFECTED AREA TWICE DAILY  *THANK YOU* 180 g 3    nystatin-triamcinolone (MYCOLOG II) cream Apply topically 3 (three) times daily.      ondansetron (ZOFRAN) 8 MG tablet Take 1 tablet (8 mg total) by mouth every 8 (eight) hours as needed for Nausea. 60 tablet 3    pantoprazole (PROTONIX) 40 MG tablet Take 1 tablet (40 mg total) by mouth 2 (two) times daily. 180 tablet 1    PROAIR RESPICLICK 90 mcg/actuation inhaler Inhale 2 puffs into the lungs every 4 (four) hours as needed for Wheezing or Shortness of Breath. Rescue 3 each 4    promethazine (PHENERGAN) 25 MG tablet TAKE 1 TABLET EVERY 6 HOURS AS NEEDED FOR NAUSEA *THANK YOU*  ** MAY CAUSE DROWSINESS ** 90 tablet 3    tirzepatide (MOUNJARO) 2.5 mg/0.5 mL PnIj Inject 2.5 mg into the skin every 7 days. Starter dose.  Notify me if tolerating the medication at three weeks to increase to 5 milligrams weekly. 4 pen 0    tiZANidine 4 mg Cap Take 4 mg by mouth as needed.       traZODone (DESYREL) 150 MG tablet Take 1 tablet (150 mg total) by mouth every evening. 90 tablet 3    triamcinolone acetonide 0.1% (KENALOG) 0.1 % ointment Apply topically 2 (two) times daily. 454 g 0    verapamiL (CALAN) 80 MG tablet Take by mouth.      ipratropium (ATROVENT) 0.02 % nebulizer solution Take 2.5 mLs (500 mcg total) by nebulization as needed for Wheezing. Rescue 1 each 6     No current facility-administered medications on file prior to visit.       Review of Systems   Constitutional:  Negative for chills, diaphoresis, fatigue and fever.   HENT:  Negative for congestion, ear pain, postnasal drip, sinus pain and sore throat.    Eyes:  Negative for pain and redness.   Respiratory:  Negative for cough, chest tightness and shortness of breath.    Cardiovascular:  Negative for chest pain and leg swelling.   Gastrointestinal:  Negative for abdominal pain, constipation, diarrhea, nausea and vomiting.   Genitourinary:  Negative for dysuria and hematuria.   Musculoskeletal:  Negative for arthralgias and joint swelling.  "  Skin:  Negative for rash.   Neurological:  Negative for dizziness, syncope and headaches.   Psychiatric/Behavioral:  Negative for dysphoric mood. The patient is not nervous/anxious.        Vitals:    10/09/23 0957   BP: 122/77   Pulse: 85   Resp: 18   Temp: 97 °F (36.1 °C)   TempSrc: Tympanic   SpO2: 95%   Weight: 105.6 kg (232 lb 11.2 oz)   Height: 5' 8" (1.727 m)       Wt Readings from Last 3 Encounters:   10/09/23 105.6 kg (232 lb 11.2 oz)   10/06/23 104.5 kg (230 lb 4.8 oz)   02/20/23 103.5 kg (228 lb 2.8 oz)       Physical Exam  Constitutional:       General: She is not in acute distress.     Appearance: She is well-developed.   HENT:      Head: Normocephalic and atraumatic.   Eyes:      Pupils: Pupils are equal, round, and reactive to light.   Cardiovascular:      Rate and Rhythm: Normal rate.   Pulmonary:      Effort: Pulmonary effort is normal.   Chest:   Breasts:     Right: No mass, nipple discharge or skin change.      Left: No mass, nipple discharge or skin change.   Abdominal:      General: There is no distension.      Tenderness: There is no abdominal tenderness.   Genitourinary:     Labia:         Right: No rash.         Left: No rash.       Vagina: Normal.      Cervix: No cervical motion tenderness, discharge or friability.      Uterus: Not tender.       Adnexa:         Right: No mass or tenderness.          Left: No mass or tenderness.     Skin:     General: Skin is warm and dry.   Psychiatric:         Mood and Affect: Mood and affect normal.       Health Maintenance   Topic Date Due    Shingles Vaccine (2 of 3) 04/28/2016    Colorectal Cancer Screening  11/19/2020    Mammogram  04/29/2022    Lipid Panel  10/06/2024    TETANUS VACCINE  05/30/2027    Hepatitis C Screening  Completed     "

## 2023-10-10 ENCOUNTER — PATIENT OUTREACH (OUTPATIENT)
Dept: ADMINISTRATIVE | Facility: HOSPITAL | Age: 58
End: 2023-10-10
Payer: MEDICARE

## 2023-10-12 LAB
FINAL PATHOLOGIC DIAGNOSIS: NORMAL
Lab: NORMAL

## 2023-10-13 LAB
HPV HR 12 DNA SPEC QL NAA+PROBE: NEGATIVE
HPV16 AG SPEC QL: NEGATIVE
HPV18 DNA SPEC QL NAA+PROBE: NEGATIVE

## 2023-10-13 NOTE — PROGRESS NOTES
Results have been released via [employer]. Please verify that these have been viewed by patient. If not, please call patient with results.     I have sent a message to them with the following interpretation (see below).    I have reviewed your recent pap smear which was normal. We will repeat this in 5 years.     Please do not hesitate to call or message with any additional questions or concerns.    Mary Lane PA-C

## 2023-10-25 LAB — NONINV COLON CA DNA+OCC BLD SCRN STL QL: POSITIVE

## 2023-10-25 NOTE — PROGRESS NOTES
++ action needed++ call the patient to find out if they would like to proceed with colonoscopy per recommendations below.  Pend order if in agreement and route back to me.  If having further questions set up follow-up appointment we can discuss in detail.  [phone]     Screening Cologuard test is POSITIVE.  Therefore follow-up colonoscopy is recommended as there is high likelihood of colon polyps that will need to be removed.  Please let me know if you are in agreement with proceeding with colonoscopy and I will place the order.  Let us know if you have any further questions or concerns.

## 2023-10-26 DIAGNOSIS — E11.65 TYPE 2 DIABETES MELLITUS WITH HYPERGLYCEMIA, WITHOUT LONG-TERM CURRENT USE OF INSULIN: Primary | ICD-10-CM

## 2023-10-26 DIAGNOSIS — R19.5 POSITIVE COLORECTAL CANCER SCREENING USING COLOGUARD TEST: ICD-10-CM

## 2023-10-26 RX ORDER — TIRZEPATIDE 2.5 MG/.5ML
5 INJECTION, SOLUTION SUBCUTANEOUS
Qty: 4 PEN | Refills: 0 | Status: CANCELLED | OUTPATIENT
Start: 2023-10-26

## 2023-10-26 RX ORDER — TIRZEPATIDE 5 MG/.5ML
5 INJECTION, SOLUTION SUBCUTANEOUS
Qty: 4 PEN | Refills: 1 | Status: SHIPPED | OUTPATIENT
Start: 2023-10-26 | End: 2024-01-03 | Stop reason: SDUPTHER

## 2023-10-26 NOTE — TELEPHONE ENCOUNTER
Called pt with lab results verbalized understanding. Patient also needs a refill on mounjaro, increase to 5mg, and needs a 3 month supply

## 2023-10-26 NOTE — TELEPHONE ENCOUNTER
No care due was identified.  [facility] Embedded Care Due Messages. Reference number: [id number].   10/26/2023 10:05:00 AM CDT

## 2023-10-26 NOTE — TELEPHONE ENCOUNTER
I have signed for the following orders AND/OR meds.  Please call the patient and ask the patient to schedule the testing AND/OR inform about any medications that were sent.      Orders Placed This Encounter   Procedures    Ambulatory referral/consult to Endo Procedure      Standing Status:   Future     Standing Expiration Date:   4/30/2024     Referral Priority:   Routine     Referral Type:   Consultation     Number of Visits Requested:   1       Medications Ordered This Encounter   Medications    tirzepatide (MOUNJARO) 5 mg/0.5 mL PnIj     Sig: Inject 5 mg into the skin every 7 days.     Dispense:  4 pen      Refill:  1

## 2023-10-27 ENCOUNTER — HOSPITAL ENCOUNTER (OUTPATIENT)
Dept: PREADMISSION TESTING | Facility: HOSPITAL | Age: 58
Discharge: HOME | End: 2023-10-27
Attending: FAMILY MEDICINE
Payer: MEDICARE

## 2023-10-27 DIAGNOSIS — R19.5 POSITIVE COLORECTAL CANCER SCREENING USING COLOGUARD TEST: Primary | ICD-10-CM

## 2023-10-30 RX ORDER — SOD SULF/POT CHLORIDE/MAG SULF 1.479 G
12 TABLET ORAL DAILY
Qty: 24 TABLET | Refills: 0 | Status: SHIPPED | OUTPATIENT
Start: 2023-10-30 | End: 2023-11-20

## 2023-11-16 ENCOUNTER — HOSPITAL ENCOUNTER (OUTPATIENT)
Facility: HOSPITAL | Age: 58
Discharge: HOME | End: 2023-11-16
Attending: INTERNAL MEDICINE | Admitting: INTERNAL MEDICINE
Payer: MEDICARE

## 2023-11-16 ENCOUNTER — ANESTHESIA (OUTPATIENT)
Dept: ENDOSCOPY | Facility: HOSPITAL | Age: 58
End: 2023-11-16
Payer: MEDICARE

## 2023-11-16 ENCOUNTER — ANESTHESIA EVENT (OUTPATIENT)
Dept: ENDOSCOPY | Facility: HOSPITAL | Age: 58
End: 2023-11-16
Payer: MEDICARE

## 2023-11-16 VITALS
RESPIRATION RATE: 18 BRPM | WEIGHT: 220 LBS | SYSTOLIC BLOOD PRESSURE: 149 MMHG | OXYGEN SATURATION: 99 % | TEMPERATURE: 98 F | BODY MASS INDEX: 33.34 KG/M2 | HEIGHT: 68 IN | HEART RATE: 82 BPM | DIASTOLIC BLOOD PRESSURE: 80 MMHG

## 2023-11-16 DIAGNOSIS — Z12.11 COLON CANCER SCREENING: ICD-10-CM

## 2023-11-16 LAB — POCT GLUCOSE: 88 MG/DL (ref 70–110)

## 2023-11-16 PROCEDURE — 25000003 PHARM REV CODE 250: Performed by: INTERNAL MEDICINE

## 2023-11-16 PROCEDURE — 88305 TISSUE EXAM BY PATHOLOGIST: ICD-10-PCS | Mod: 26,,, | Performed by: PATHOLOGY

## 2023-11-16 PROCEDURE — 45385 COLONOSCOPY W/LESION REMOVAL: CPT | Mod: PT,,, | Performed by: INTERNAL MEDICINE

## 2023-11-16 PROCEDURE — 45385 COLONOSCOPY W/LESION REMOVAL: CPT | Mod: PT | Performed by: INTERNAL MEDICINE

## 2023-11-16 PROCEDURE — 45385 PR COLONOSCOPY,REMV LESN,SNARE: ICD-10-PCS | Mod: PT,,, | Performed by: INTERNAL MEDICINE

## 2023-11-16 PROCEDURE — 27201089 HC SNARE, DISP (ANY): Performed by: INTERNAL MEDICINE

## 2023-11-16 PROCEDURE — 37000009 HC ANESTHESIA EA ADD 15 MINS: Performed by: INTERNAL MEDICINE

## 2023-11-16 PROCEDURE — 25000003 PHARM REV CODE 250: Performed by: ANESTHESIOLOGY

## 2023-11-16 PROCEDURE — 63600175 PHARM REV CODE 636 W HCPCS: Performed by: ANESTHESIOLOGY

## 2023-11-16 PROCEDURE — 88305 TISSUE EXAM BY PATHOLOGIST: CPT | Performed by: PATHOLOGY

## 2023-11-16 PROCEDURE — 37000008 HC ANESTHESIA 1ST 15 MINUTES: Performed by: INTERNAL MEDICINE

## 2023-11-16 PROCEDURE — 63600175 PHARM REV CODE 636 W HCPCS: Performed by: STUDENT IN AN ORGANIZED HEALTH CARE EDUCATION/TRAINING PROGRAM

## 2023-11-16 PROCEDURE — 88305 TISSUE EXAM BY PATHOLOGIST: CPT | Mod: 26,,, | Performed by: PATHOLOGY

## 2023-11-16 PROCEDURE — 25000003 PHARM REV CODE 250: Performed by: STUDENT IN AN ORGANIZED HEALTH CARE EDUCATION/TRAINING PROGRAM

## 2023-11-16 PROCEDURE — 82962 GLUCOSE BLOOD TEST: CPT | Performed by: INTERNAL MEDICINE

## 2023-11-16 RX ORDER — DEXTROMETHORPHAN/PSEUDOEPHED 2.5-7.5/.8
DROPS ORAL
Status: COMPLETED | OUTPATIENT
Start: 2023-11-16 | End: 2023-11-16

## 2023-11-16 RX ORDER — SODIUM CHLORIDE, SODIUM LACTATE, POTASSIUM CHLORIDE, CALCIUM CHLORIDE 600; 310; 30; 20 MG/100ML; MG/100ML; MG/100ML; MG/100ML
INJECTION, SOLUTION INTRAVENOUS CONTINUOUS PRN
Status: DISCONTINUED | OUTPATIENT
Start: 2023-11-16 | End: 2023-11-16

## 2023-11-16 RX ORDER — PROPOFOL 10 MG/ML
VIAL (ML) INTRAVENOUS
Status: DISCONTINUED | OUTPATIENT
Start: 2023-11-16 | End: 2023-11-16

## 2023-11-16 RX ORDER — LIDOCAINE HYDROCHLORIDE 10 MG/ML
INJECTION, SOLUTION EPIDURAL; INFILTRATION; INTRACAUDAL; PERINEURAL
Status: DISCONTINUED | OUTPATIENT
Start: 2023-11-16 | End: 2023-11-16

## 2023-11-16 RX ADMIN — PROPOFOL 50 MG: 10 INJECTION, EMULSION INTRAVENOUS at 01:11

## 2023-11-16 RX ADMIN — SODIUM CHLORIDE, SODIUM LACTATE, POTASSIUM CHLORIDE, AND CALCIUM CHLORIDE: 600; 310; 30; 20 INJECTION, SOLUTION INTRAVENOUS at 01:11

## 2023-11-16 RX ADMIN — PROPOFOL 100 MG: 10 INJECTION, EMULSION INTRAVENOUS at 01:11

## 2023-11-16 RX ADMIN — LIDOCAINE HYDROCHLORIDE 50 MG: 10 SOLUTION INTRAVENOUS at 01:11

## 2023-11-16 RX ADMIN — PROMETHAZINE HYDROCHLORIDE 12.5 MG: 25 INJECTION INTRAMUSCULAR; INTRAVENOUS at 12:11

## 2023-11-16 NOTE — TRANSFER OF CARE
"Anesthesia Transfer of Care Note    Patient: Luann Mcgovern    Procedure(s) Performed: Procedure(s) (LRB):  COLONOSCOPY (N/A)    Patient location: PACU    Anesthesia Type: MAC    Transport from OR: Transported from OR on room air with adequate spontaneous ventilation    Post pain: adequate analgesia    Post assessment: no apparent anesthetic complications    Post vital signs: stable    Level of consciousness: responds to stimulation and awake    Nausea/Vomiting: no nausea/vomiting    Complications: none    Transfer of care protocol was followedComments: Report given to Endoscopy PACU RN at bedside. RN given opportunity to ask questions or clarify concerns. No Concerns verbalized. RN was asked if ready to assume care of patient. RN verbally confirmed. Pt. left in stable condition. SV. Vital Signs Return to Near Baseline. No s/s of distress noted.     Last vitals: Visit Vitals  /78   Pulse 97   Temp 37.2 °C (99 °F)   Resp 20   Ht 5' 8" (1.727 m)   Wt 99.8 kg (220 lb)   LMP 11/15/2002   Breastfeeding No   BMI 33.45 kg/m²     "

## 2023-11-16 NOTE — ANESTHESIA POSTPROCEDURE EVALUATION
Anesthesia Post Evaluation    Patient: Luann Mcgovern    Procedure(s) Performed: Procedure(s) (LRB):  COLONOSCOPY (N/A)    Final Anesthesia Type: MAC      Patient location during evaluation: GI PACU  Patient participation: Yes- Able to Participate  Level of consciousness: awake and alert and oriented  Post-procedure vital signs: reviewed and stable  Pain management: adequate  Airway patency: patent  COREY mitigation strategies: Multimodal analgesia and Extubation and recovery carried out in lateral, semiupright, or other nonsupine position  PONV status at discharge: No PONV  Anesthetic complications: no      Cardiovascular status: blood pressure returned to baseline  Respiratory status: unassisted  Hydration status: euvolemic  Follow-up not needed.  Comments: Report given to GI PACU RN. Hand Off Tool Used. RN given opportunity to ask questions or clarify concerns. No Concerns verbalized. RN was asked if ready to assume care of patient. RN verbally confirmed. Pt. Left in stable condition. SV. Vital Signs Return to Near Baseline. No s/s of distress noted.         Vitals Value Taken Time   /78 11/16/23 1238   Temp 37.2 °C (99 °F) 11/16/23 1215   Pulse 97 11/16/23 1215   Resp 20 11/16/23 1215   SpO2 100 11/16/23 1354         No case tracking events are documented in the log.      Pain/Nik Score: No data recorded

## 2023-11-16 NOTE — H&P
Endoscopy History and Physical    PCP - Fred Zambrano MD  Referring Physician - Fred Zambrano MD  [address] [facility] [place]  [place] [address]      ASA - per anesthesia  Mallampati - per anesthesia  History of Anesthesia problems - no  Family history Anesthesia problems -  no   Plan of anesthesia - General    HPI  58 y.o. female    Planned Procedure: Colonoscopy  Diagnosis: colo guard +ve  Chief Complaint: Same as above      FH of colon cancer:no  Anticoagulation:no      ROS:  Constitutional: No fevers, chills, No weight loss  CV: No chest pain  Pulm: No cough, No shortness of breath  GI: see HPI    Medical History:  has a past medical history of Allergy, Anxiety, Arthritis, Asthma, Chronic shoulder pain, Depression, GERD (gastroesophageal reflux disease), Insomnia, Migraine, Nephrolithiasis, Pneumonia, PONV (postoperative nausea and vomiting), and Type 2 diabetes mellitus with hyperglycemia, without long-term current use of insulin (10/6/2023).    Surgical History:  has a past surgical history that includes Tonsillectomy; Vaginal delivery; Tubal ligation (01/19/1990); Knee surgery (Left); Lumbar nerve stimlator insertion (10/2018); Cystoureteroscopy with retrograde pyelography and insertion of stent into ureter (Right, 01/21/2021); Cervical fusion (09/14/2020); Lumbar fusion (2013); Cystoscopy w/ ureteral stent placement (Left, 03/31/2021); Ureteroscopic removal of ureteric calculus (Left, 04/07/2021); Adenoidectomy; Appendectomy; Spine surgery; and Cholecystectomy.    Family History: family history includes Arthritis in her brother; COPD in her father; Depression in her mother; Hearing loss in her mother; Heart disease in her brother, father, and paternal grandfather; Hyperlipidemia in her brother; Supraventricular tachycardia in her sister; Thyroid disease in her mother; Vision loss in her mother..    Social History:  reports that she has never smoked. She has never used smokeless tobacco. She reports that  she does not drink alcohol and does not use drugs.    Review of patient's allergies indicates:   Allergen Reactions    Meloxicam Anaphylaxis     Chest and neck pain .  Decrease O2    Iodinated contrast media      Pt states that she is not allergic [place] dye, but is only allergic to the older dyes.    Iodine and iodide containing products      Injectables ???     Loperamide-simethicone     Metoclopramide hcl      reglan     Diltiazem hcl Rash and Swelling       Medications:   Medications Prior to Admission   Medication Sig Dispense Refill Last Dose    albuterol (PROVENTIL/VENTOLIN HFA) 90 mcg/actuation inhaler Inhale 2 puffs into the lungs every 4 (four) hours as needed for Wheezing. 18 g 0 Past Month    cetirizine (ZYRTEC) 10 MG tablet Take 10 mg by mouth every evening.   11/15/2023    diphenhydrAMINE (BENADRYL) 25 mg capsule Take 25 mg by mouth nightly as needed for Itching.   Past Month    gabapentin (NEURONTIN) 600 MG tablet Take 1 tablet (600 mg total) by mouth 2 (two) times daily. 60 tablet 11 11/15/2023    HYDROcodone-acetaminophen (NORCO)  mg per tablet TAKE 1 TABLET BY MOUTH THREE TIMES DAILY AS NEEDED FOR CHRONIC PAIN 61 tablet 0 Past Week    ipratropium (ATROVENT) 0.02 % nebulizer solution Take 2.5 mLs (500 mcg total) by nebulization as needed for Wheezing. Rescue 1 each 6 Past Month    lovastatin (MEVACOR) 40 MG tablet Take 40 mg by mouth once daily.   11/15/2023    ondansetron (ZOFRAN) 8 MG tablet Take 1 tablet (8 mg total) by mouth every 8 (eight) hours as needed for Nausea. 60 tablet 3 11/15/2023    promethazine (PHENERGAN) 25 MG tablet TAKE 1 TABLET EVERY 6 HOURS AS NEEDED FOR NAUSEA *THANK YOU*  ** MAY CAUSE DROWSINESS ** 90 tablet 3 Past Week    verapamiL (CALAN) 80 MG tablet Take by mouth.   11/16/2023    aspirin-acetaminophen-caffeine 250-250-65 mg (EXCEDRIN MIGRAINE) 250-250-65 mg per tablet Take 1 tablet by mouth as needed.    More than a month    azelastine (ASTELIN) 137 mcg (0.1 %)  nasal spray 1 spray (137 mcg total) by Nasal route 2 (two) times daily. 30 mL 5     benzonatate (TESSALON) 200 MG capsule Take 1 capsule (200 mg total) by mouth 3 (three) times daily as needed for Cough. 180 capsule 3 More than a month    blood sugar diagnostic Strp Use to check glucose daily 100 strip 3     blood-glucose meter kit Use as instructed 1 each 0     docusate sodium (COLACE) 100 MG capsule Take 1 capsule (100 mg total) by mouth 2 (two) times daily. 20 capsule 0     ergocalciferol (ERGOCALCIFEROL) 50,000 unit Cap Take 1 capsule (50,000 Units total) by mouth every 7 days. 12 capsule 3     EScitalopram oxalate (LEXAPRO) 20 MG tablet Take 1 tablet (20 mg total) by mouth once daily. 90 tablet 4     furosemide (LASIX) 40 MG tablet Take 40 mg by mouth 2 (two) times daily.   More than a month    lancets Misc Use to check glucose daily 100 each 3     NARCAN 4 mg/actuation [place] CALL 911. ADMINISTER A SINGLE SPRAY INTRANASALLY INTO ONE NOSTRIL UPON SIGNS OF OPIOID OVERDOSE. MAY REPEAT AFTER 3 MINUTES IF NO RESPONSE.       nystatin (MYCOSTATIN) cream APPLY TO AFFECTED AREA TWICE DAILY *THANK YOU* Strength: 100,000 unit/gram 90 each 3     nystatin (MYCOSTATIN) powder APPLY TO AFFECTED AREA TWICE DAILY *THANK YOU* 180 g 3     nystatin-triamcinolone (MYCOLOG II) cream Apply topically 3 (three) times daily.       pantoprazole (PROTONIX) 40 MG tablet Take 1 tablet (40 mg total) by mouth 2 (two) times daily. 180 tablet 1     PROAIR RESPICLICK 90 mcg/actuation inhaler Inhale 2 puffs into the lungs every 4 (four) hours as needed for Wheezing or Shortness of Breath. Rescue 3 each 4 More than a month    sod sulf-pot chloride-mag sulf (SUTAB) 1.479-0.188- 0.225 gram tablet Take 12 tablets by mouth once daily. Take according to package instructions with indicated amount of water. 24 tablet 0     tirzepatide (MOUNJARO) 5 mg/0.5 mL PnIj Inject 5 mg into the skin every 7 days. 4 pen 1 11/6/2023    tiZANidine 4 mg Cap Take 4 mg by  mouth as needed.        traZODone (DESYREL) 150 MG tablet Take 1 tablet (150 mg total) by mouth every evening. 90 tablet 3     triamcinolone acetonide 0.1% (KENALOG) 0.1 % ointment Apply topically 2 (two) times daily. 454 g 0        Physical Exam:    Vital Signs: There were no vitals filed for this visit.    General Appearance: Well appearing in no acute distress  Abdomen: Soft, non tender, non distended with normal bowel sounds, no masses    Labs:  Lab Results   Component Value Date    WBC 8.80 10/27/2023    HGB 15.0 10/27/2023    HCT 49.4 (H) 10/27/2023     10/27/2023    CHOL 198 10/06/2023    TRIG 97 10/06/2023    HDL 63 10/06/2023    ALT 24 10/06/2023    AST 19 10/06/2023     10/06/2023    K 4.2 10/06/2023     10/06/2023    CREATININE 0.8 10/06/2023    BUN 24 (H) 10/06/2023    CO2 26 10/06/2023    TSH 0.261 (L) 10/06/2023    INR 1.0 10/27/2023    HGBA1C 5.8 (H) 10/06/2023       I have explained the risks and benefits of this endoscopic procedure to the patient including but not limited to bleeding, inflammation, infection, perforation, and death.    SEDATION PLAN: per anesthesia       History reviewed, vital signs satisfactory, cardiopulmonary status satisfactory, sedation options, risks and plans have been discussed with the patient  All their questions were answered and the patient agrees to the sedation procedures as planned and the patient is deemed an appropriate candidate for the sedation as planned.     The risks, benefits and alternatives of the procedure were discussed with the patient in detail. This discussion was had in the presence of endoscopy staff. The risks include, risks of adverse reaction to sedation requiring the use of reversal agents, bleeding requiring blood transfusion, perforation requiring surgical intervention and technical failure. Other risks include aspiration leading to respiratory distress and respiratory failure resulting in endotracheal intubation and  mechanical ventilation including death. If anesthesia is being utilized for this procedure, it is up to the anesthesiologist to determine airway safety including elective endotracheal intubation. Questions were answered, they agree to proceed. There was no language barriers.       Procedure explained to patient, informed consent obtained and placed in chart.       Alex Moy MD

## 2023-11-16 NOTE — DISCHARGE SUMMARY
[facility] - Endoscopy (Hospital)  Discharge Note  Short Stay    Procedure(s) (LRB):  COLONOSCOPY (N/A)      OUTCOME: Patient tolerated treatment/procedure well without complication and is now ready for discharge.    DISPOSITION: Home or Self Care    FINAL DIAGNOSIS:  Colon cancer screening    FOLLOWUP: With primary care provider    DISCHARGE INSTRUCTIONS:  No discharge procedures on file.      Clinical Reference Documents Added to Patient Instructions         Document    COLON POLYPS (ENGLISH)            TIME SPENT ON DISCHARGE: 20 minutes

## 2023-11-16 NOTE — ANESTHESIA PREPROCEDURE EVALUATION
11/16/2023  Luann Mcgovern is a 58 y.o., female.    Anesthesia Evaluation    I have reviewed the Patient Summary Reports.     I have reviewed the Nursing Notes. I have reviewed the NPO Status.   I have reviewed the Medications.     Review of Systems  Anesthesia Hx:    PONV.  Previous GA's without problems.  Easy mask vent, Grade 1 view with Neumann 2, also easy LMA placement. History of prior surgery of interest to airway management or planning: cervical fusion. Previous anesthesia: General, MAC       Airway issues documented on chart review include mask, easy, easy direct laryngoscopy     Denies Family Hx of Anesthesia complications.   Personal Hx of Anesthesia complications, Post-Operative Nausea/Vomiting                    Social:  Non-Smoker, No Alcohol Use       Cardiovascular:  Cardiovascular Normal                  ECG has been reviewed.                          Pulmonary:    Asthma mild                   Renal/:  Chronic Renal Disease renal calculi               Hepatic/GI:     GERD             Musculoskeletal:         Spine Disorders: lumbar            Neurological:      Headaches           Chronic Pain Syndrome                         Endocrine:  Endocrine Normal            Psych:  Psychiatric History anxiety depression            Patient Active Problem List   Diagnosis    Mild intermittent asthma without complication    Liver function abnormality    Atelectasis    Chronic pain of both shoulders    Asthma    Depression    Right ureteral stone    PTSD (post-traumatic stress disorder)    Generalized anxiety disorder with panic attacks    Left renal stone    Severe obesity (BMI 35.0-35.9 with comorbidity)    Prediabetes    Major depression in partial remission    Colon cancer screening    Type 2 diabetes mellitus with hyperglycemia, without long-term current use of insulin     Intertrigo    Vitamin D deficiency    Nausea in adult patient    Post-COVID chronic cough    Gastroesophageal reflux disease with esophagitis without hemorrhage       Past Surgical History:   Procedure Laterality Date    ADENOIDECTOMY      APPENDECTOMY      CERVICAL FUSION  09/14/2020    C3-6    CHOLECYSTECTOMY      CYSTOSCOPY W/ URETERAL STENT PLACEMENT Left 03/31/2021    Procedure: CYSTOSCOPY, WITH URETERAL STENT INSERTION;  Surgeon: Dexter Sadler MD;  Location: [facility];  Service: Urology;  Laterality: Left;    CYSTOURETEROSCOPY WITH RETROGRADE PYELOGRAPHY AND INSERTION OF STENT INTO URETER Right 01/21/2021    Procedure: CYSTOURETEROSCOPY, WITH RETROGRADE PYELOGRAM AND URETERAL STENT INSERTION;  Surgeon: Dexter Sadler MD;  Location: [facility] OR;  Service: Urology;  Laterality: Right;    KNEE SURGERY Left     age 13 - injury - no tears, just cleaned it up     LUMBAR FUSION  2013    L5-S1    LUMBAR NERVE STIMLATOR INSERTION  10/2018    SPINE SURGERY      TONSILLECTOMY      TUBAL LIGATION  01/19/1990    URETEROSCOPIC REMOVAL OF URETERIC CALCULUS Left 04/07/2021    Procedure: REMOVAL, CALCULUS, URETER, URETEROSCOPIC;  Surgeon: Dexter Sadler MD;  Location: [facility] OR;  Service: Urology;  Laterality: Left;    VAGINAL DELIVERY      2 births          Physical Exam  General:  Well nourished and Obesity       Airway/Jaw/Neck:  Airway Findings: Mouth Opening: Small, but > 3cm     Tongue: Normal      General Airway Assessment: Adult      Mallampati: III  Improves to II with phonation.  TM Distance: < 4 cm   Jaw/Neck Findings:     Neck ROM: Normal ROM          Dental:  Dental Findings: In tact      Chest/Lungs:  Chest/Lungs Findings:  Clear to auscultation, Normal Respiratory Rate       Heart/Vascular:  Heart Findings: Rate: Normal  Rhythm: Regular Rhythm  Sounds: Normal                       Mental Status:  Mental Status Findings:  Cooperative, Alert and Oriented               Anesthesia  Plan  Type of Anesthesia, risks & benefits discussed:  Anesthesia Type:  MAC    Patient's Preference:   Plan Factors:          Intra-op Monitoring Plan: standard ASA monitors  Intra-op Monitoring Plan Comments:   Post Op Pain Control Plan: per primary service following discharge from PACU  Post Op Pain Control Plan Comments:     Induction:   IV  Beta Blocker:  Patient is not currently on a Beta-Blocker (No further documentation required).       Informed Consent: Informed consent signed with the Patient and all parties understand the risks and agree with anesthesia plan.  All questions answered.  Anesthesia consent signed with patient.  ASA Score: 3     Day of Surgery Review of History & Physical: I have interviewed and examined the patient. I have reviewed the patient's H&P dated:  There are no significant changes.            Ready For Surgery From Anesthesia Perspective.           Physical Exam  General: Well nourished and Obesity    Airway:  Mallampati: III / II  Mouth Opening: Small, but > 3cm  TM Distance: < 4 cm  Tongue: Normal  Neck ROM: Normal ROM    Dental:  In tact    Chest/Lungs:  Clear to auscultation, Normal Respiratory Rate    Heart:  Rate: Normal  Rhythm: Regular Rhythm  Sounds: Normal      Anesthesia Plan  Type of Anesthesia, risks & benefits discussed:    Anesthesia Type: MAC  Intra-op Monitoring Plan: standard ASA monitors  Post Op Pain Control Plan: per primary service following discharge from PACU  Induction:  IV  Informed Consent: Informed consent signed with the Patient and all parties understand the risks and agree with anesthesia plan.  All questions answered.   ASA Score: 3  Day of Surgery Review of History & Physical: I have interviewed and examined the patient. I have reviewed the patient's H&P dated:     Ready For Surgery From Anesthesia Perspective.     .

## 2023-11-16 NOTE — [employer] PATIENT INSTRUCTIONS
Discharge Summary/Instructions after an Endoscopic Procedure  Patient Name: Luann Mcgovern  Patient MRN: [id number]  Patient YOB: 1965 Thursday, November 16, 2023 Alex Moy MD  Dear patient,  As a result of recent federal legislation (The Federal Cures Act), you may   receive lab or pathology results from your procedure in your [facility]   account before your physician is able to contact you. Your physician or   their representative will relay the results to you with their   recommendations at their soonest availability.  Thank you,  RESTRICTIONS:  During your procedure today, you received medications for sedation.  These   medications may affect your judgment, balance and coordination.  Therefore,   for 24 hours, you have the following restrictions:   - DO NOT drive a car, operate machinery, make legal/financial decisions,   sign important papers or drink alcohol.    ACTIVITY:  Today: no heavy lifting, straining or running due to procedural   sedation/anesthesia.  The following day: return to full activity including work.  DIET:  Eat and drink normally unless instructed otherwise.     TREATMENT FOR COMMON SIDE EFFECTS:  - Mild abdominal pain, nausea, belching, bloating or excessive gas:  rest,   eat lightly and use a heating pad.  - Sore Throat: treat with throat lozenges and/or gargle with warm salt   water.  - Because air was used during the procedure, expelling large amounts of air   from your rectum or belching is normal.  - If a bowel prep was taken, you may not have a bowel movement for 1-3 days.    This is normal.  SYMPTOMS TO WATCH FOR AND REPORT TO YOUR PHYSICIAN:  1. Abdominal pain or bloating, other than gas cramps.  2. Chest pain.  3. Back pain.  4. Signs of infection such as: chills or fever occurring within 24 hours   after the procedure.  5. Rectal bleeding, which would show as bright red, maroon, or black stools.   (A tablespoon of blood from the rectum is not serious,  especially if   hemorrhoids are present.)  6. Vomiting.  7. Weakness or dizziness.  GO DIRECTLY TO THE NEAREST EMERGENCY ROOM IF YOU HAVE ANY OF THE FOLLOWING:      Difficulty breathing              Chills and/or fever over 101 F   Persistent vomiting and/or vomiting blood   Severe abdominal pain   Severe chest pain   Black, tarry stools   Bleeding- more than one tablespoon   Any other symptom or condition that you feel may need urgent attention  Your doctor recommends these additional instructions:  If any biopsies were taken, your doctors clinic will contact you in 1 to 2   weeks with any results.  - Discharge patient to home (ambulatory).   - Resume previous diet.   - Continue present medications.   - Await pathology results.   - Repeat colonoscopy in 5 years for surveillance.   - Return to referring physician as previously scheduled.   - Patient has a contact number available for emergencies.  The signs and   symptoms of potential delayed complications were discussed with the   patient.  Return to normal activities tomorrow.  Written discharge   instructions were provided to the patient.  For questions, problems or results please call your physician Alex Moy MD at Work:  [phone]  If you have any questions about the above instructions, call the GI   department at [phone] or call the endoscopy unit at [phone]   from 7am until 3 pm.  [facility], EMERGENCY ROOM PHONE NUMBER:   [phone]  IF A COMPLICATION OR EMERGENCY SITUATION ARISES AND YOU ARE UNABLE TO REACH   YOUR PHYSICIAN - GO DIRECTLY TO THE EMERGENCY ROOM.  I have read or have had read to me these discharge instructions for my   procedure and have received a written copy.  I understand these   instructions and will follow-up with my physician if I have any questions.     __________________________________       _____________________________________  Nurse Signature                                           Patient/Designated   Responsible Party Signature  MD Alex Merino MD  11/16/2023 1:58:13 PM  This report has been verified and signed electronically.  Dear patient,  As a result of recent federal legislation (The Federal Cures Act), you may   receive lab or pathology results from your procedure in your [facility]   account before your physician is able to contact you. Your physician or   their representative will relay the results to you with their   recommendations at their soonest availability.  Thank you,  [employer]OVATION

## 2023-11-17 LAB
FINAL PATHOLOGIC DIAGNOSIS: NORMAL
GROSS: NORMAL
Lab: NORMAL

## 2023-11-20 ENCOUNTER — HOSPITAL ENCOUNTER (OUTPATIENT)
Dept: RADIOLOGY | Facility: HOSPITAL | Age: 58
Discharge: HOME | End: 2023-11-20
Attending: NURSE PRACTITIONER
Payer: MEDICARE

## 2023-11-20 ENCOUNTER — OFFICE VISIT (OUTPATIENT)
Dept: FAMILY MEDICINE | Facility: CLINIC | Age: 58
End: 2023-11-20
Payer: MEDICARE

## 2023-11-20 VITALS
HEIGHT: 68 IN | HEART RATE: 96 BPM | WEIGHT: 219.19 LBS | BODY MASS INDEX: 33.22 KG/M2 | DIASTOLIC BLOOD PRESSURE: 89 MMHG | RESPIRATION RATE: 18 BRPM | SYSTOLIC BLOOD PRESSURE: 130 MMHG

## 2023-11-20 DIAGNOSIS — Z01.818 PREOPERATIVE CLEARANCE: ICD-10-CM

## 2023-11-20 DIAGNOSIS — Z01.818 PREOPERATIVE CLEARANCE: Primary | ICD-10-CM

## 2023-11-20 PROBLEM — R73.03 PREDIABETES: Status: RESOLVED | Noted: 2021-05-05 | Resolved: 2023-11-20

## 2023-11-20 LAB
BACTERIA #/AREA URNS HPF: NORMAL /HPF
BILIRUB UR QL STRIP: NEGATIVE
CLARITY UR: CLEAR
COLOR UR: YELLOW
GLUCOSE UR QL STRIP: NEGATIVE
HGB UR QL STRIP: NEGATIVE
KETONES UR QL STRIP: NEGATIVE
LEUKOCYTE ESTERASE UR QL STRIP: ABNORMAL
MICROSCOPIC COMMENT: NORMAL
NITRITE UR QL STRIP: NEGATIVE
PH UR STRIP: 6.5 [PH] (ref 5–8)
PROT UR QL STRIP: NEGATIVE
RBC #/AREA URNS HPF: 0 /HPF (ref 0–4)
SP GR UR STRIP: 1.01 (ref 1–1.03)
SQUAMOUS #/AREA URNS HPF: 4 /HPF
URN SPEC COLLECT METH UR: ABNORMAL
WBC #/AREA URNS HPF: 2 /HPF (ref 0–5)

## 2023-11-20 PROCEDURE — 3079F PR MOST RECENT DIASTOLIC BLOOD PRESSURE 80-89 MM HG: ICD-10-PCS | Mod: CPTII,S$GLB,, | Performed by: NURSE PRACTITIONER

## 2023-11-20 PROCEDURE — 93005 ELECTROCARDIOGRAM TRACING: CPT | Mod: S$GLB,,, | Performed by: NURSE PRACTITIONER

## 2023-11-20 PROCEDURE — 1160F RVW MEDS BY RX/DR IN RCRD: CPT | Mod: CPTII,S$GLB,, | Performed by: NURSE PRACTITIONER

## 2023-11-20 PROCEDURE — 87081 CULTURE SCREEN ONLY: CPT | Performed by: NURSE PRACTITIONER

## 2023-11-20 PROCEDURE — 3075F PR MOST RECENT SYSTOLIC BLOOD PRESS GE 130-139MM HG: ICD-10-PCS | Mod: CPTII,S$GLB,, | Performed by: NURSE PRACTITIONER

## 2023-11-20 PROCEDURE — 3008F BODY MASS INDEX DOCD: CPT | Mod: CPTII,S$GLB,, | Performed by: NURSE PRACTITIONER

## 2023-11-20 PROCEDURE — 81000 URINALYSIS NONAUTO W/SCOPE: CPT | Mod: PO | Performed by: NURSE PRACTITIONER

## 2023-11-20 PROCEDURE — 1160F PR REVIEW ALL MEDS BY PRESCRIBER/CLIN PHARMACIST DOCUMENTED: ICD-10-PCS | Mod: CPTII,S$GLB,, | Performed by: NURSE PRACTITIONER

## 2023-11-20 PROCEDURE — 71046 XR CHEST PA AND LATERAL: ICD-10-PCS | Mod: 26,,, | Performed by: RADIOLOGY

## 2023-11-20 PROCEDURE — 99214 OFFICE O/P EST MOD 30 MIN: CPT | Mod: S$GLB,,, | Performed by: NURSE PRACTITIONER

## 2023-11-20 PROCEDURE — 93010 EKG 12-LEAD: ICD-10-PCS | Mod: S$GLB,,, | Performed by: INTERNAL MEDICINE

## 2023-11-20 PROCEDURE — 99999 PR PBB SHADOW E&M-EST. PATIENT-LVL III: CPT | Mod: PBBFAC,,, | Performed by: NURSE PRACTITIONER

## 2023-11-20 PROCEDURE — 3044F HG A1C LEVEL LT 7.0%: CPT | Mod: CPTII,S$GLB,, | Performed by: NURSE PRACTITIONER

## 2023-11-20 PROCEDURE — 93005 EKG 12-LEAD: ICD-10-PCS | Mod: S$GLB,,, | Performed by: NURSE PRACTITIONER

## 2023-11-20 PROCEDURE — 71046 X-RAY EXAM CHEST 2 VIEWS: CPT | Mod: 26,,, | Performed by: RADIOLOGY

## 2023-11-20 PROCEDURE — 1159F PR MEDICATION LIST DOCUMENTED IN MEDICAL RECORD: ICD-10-PCS | Mod: CPTII,S$GLB,, | Performed by: NURSE PRACTITIONER

## 2023-11-20 PROCEDURE — 99214 PR OFFICE/OUTPT VISIT, EST, LEVL IV, 30-39 MIN: ICD-10-PCS | Mod: S$GLB,,, | Performed by: NURSE PRACTITIONER

## 2023-11-20 PROCEDURE — 99999 PR PBB SHADOW E&M-EST. PATIENT-LVL III: ICD-10-PCS | Mod: PBBFAC,,, | Performed by: NURSE PRACTITIONER

## 2023-11-20 PROCEDURE — 3075F SYST BP GE 130 - 139MM HG: CPT | Mod: CPTII,S$GLB,, | Performed by: NURSE PRACTITIONER

## 2023-11-20 PROCEDURE — 1159F MED LIST DOCD IN RCRD: CPT | Mod: CPTII,S$GLB,, | Performed by: NURSE PRACTITIONER

## 2023-11-20 PROCEDURE — 3008F PR BODY MASS INDEX (BMI) DOCUMENTED: ICD-10-PCS | Mod: CPTII,S$GLB,, | Performed by: NURSE PRACTITIONER

## 2023-11-20 PROCEDURE — 93010 ELECTROCARDIOGRAM REPORT: CPT | Mod: S$GLB,,, | Performed by: INTERNAL MEDICINE

## 2023-11-20 PROCEDURE — 3044F PR MOST RECENT HEMOGLOBIN A1C LEVEL <7.0%: ICD-10-PCS | Mod: CPTII,S$GLB,, | Performed by: NURSE PRACTITIONER

## 2023-11-20 PROCEDURE — 3079F DIAST BP 80-89 MM HG: CPT | Mod: CPTII,S$GLB,, | Performed by: NURSE PRACTITIONER

## 2023-11-20 PROCEDURE — 71046 X-RAY EXAM CHEST 2 VIEWS: CPT | Mod: TC,PO

## 2023-11-20 RX ORDER — PEN NEEDLE, DIABETIC 30 GX3/16"
NEEDLE, DISPOSABLE MISCELLANEOUS
Status: CANCELLED | OUTPATIENT
Start: 2023-11-20

## 2023-11-20 NOTE — PROGRESS NOTES
Assessment/Plan:  Problem List Items Addressed This Visit    None  Visit Diagnoses       Preoperative clearance    -  Primary    Relevant Orders    Protime-INR (Completed)    APTT (Completed)    CBC Without Differential (Completed)    Comprehensive Metabolic Panel (Completed)    Urinalysis, Reflex to Urine Culture Urine, Clean Catch (Completed)    Sedimentation rate (Completed)    Culture, MRSA (Completed)    IN OFFICE EKG 12-LEAD (to Muse) (Completed)    X-Ray Chest PA And Lateral (Completed)    C-REACTIVE PROTEIN (Completed)    Type & Screen (Completed)          Patient has been cleared by cardiology. Chronic condition has been reviewed and remains stable.   Patient here for medical preoperative evaluation.  History and physical exam reviewed and performed.  Lab data and imaging that is available was reviewed.  Based off of this patient is CLEARED with class I risk which correlates to a 3.9 % 30-day risk of death, MI, or cardiac arrest based off of modified Shipley cardiac Risk index.  Follow up if symptoms worsen or fail to improve.  ER precautions for severe or worsening symptoms.     Roslyn Neff NP  _____________________________________________________________________________________________________________________________________________________    CC: preop clearance     HPI: Patient is a 58-year-old female who presents in clinic today as an established patient here for preop clearance.   The patient is here for a preop clearance to have surgery to have a cervical spine fusion   The physician that is performing the surgery is Dr. Jett Sampson at [facility] in [place]   The surgery is being planned for 12/13/23  The patient states that there has not been previous problems with sedation. She has had prior surgeries in the past with no adverse effects from anesthesia.   She is not currently taking a blood thinner.    She has no history of blood clots or bleeding disorders.   Patient is a nonsmoker.  No history of COREY.  No history of MI, PE, DVT, arrhythmia, CHF, and/or COPD.   She does have a history of diabetes which has been well controlled.     Lab Results   Component Value Date    HGBA1C 5.8 (H) 10/06/2023     Past Medical History:  Past Medical History:   Diagnosis Date    Allergy     seasonal     Anxiety     Arthritis     back    Asthma     seasonal    Chronic shoulder pain     Depression     GERD (gastroesophageal reflux disease)     Insomnia     Migraine     Nephrolithiasis     Pneumonia     PONV (postoperative nausea and vomiting)     Type 2 diabetes mellitus with hyperglycemia, without long-term current use of insulin 10/6/2023     Past Surgical History:   Procedure Laterality Date    ADENOIDECTOMY      APPENDECTOMY      CERVICAL FUSION  09/14/2020    C3-6    CHOLECYSTECTOMY      COLONOSCOPY N/A 11/16/2023    Procedure: COLONOSCOPY;  Surgeon: Alex Moy MD;  Location: [facility];  Service: Endoscopy;  Laterality: N/A;    CYSTOSCOPY W/ URETERAL STENT PLACEMENT Left 03/31/2021    Procedure: CYSTOSCOPY, WITH URETERAL STENT INSERTION;  Surgeon: Dexter Sadler MD;  Location: [facility] OR;  Service: Urology;  Laterality: Left;    CYSTOURETEROSCOPY WITH RETROGRADE PYELOGRAPHY AND INSERTION OF STENT INTO URETER Right 01/21/2021    Procedure: CYSTOURETEROSCOPY, WITH RETROGRADE PYELOGRAM AND URETERAL STENT INSERTION;  Surgeon: Dexter Sadler MD;  Location: [facility] OR;  Service: Urology;  Laterality: Right;    KNEE SURGERY Left     age 13 - injury - no tears, just cleaned it up     LUMBAR FUSION  2013    L5-S1    LUMBAR NERVE STIMLATOR INSERTION  10/2018    SPINE SURGERY      TONSILLECTOMY      TUBAL LIGATION  01/19/1990    URETEROSCOPIC REMOVAL OF URETERIC CALCULUS Left 04/07/2021    Procedure: REMOVAL, CALCULUS, URETER, URETEROSCOPIC;  Surgeon: Dexter Sadler MD;  Location: [facility] OR;  Service: Urology;  Laterality: Left;    VAGINAL DELIVERY      2 births      Review of patient's  allergies indicates:   Allergen Reactions    Meloxicam Anaphylaxis     Chest and neck pain .  Decrease O2    Iodinated contrast media      Pt states that she is not allergic omn[place] dye, but is only allergic to the older dyes.    Iodine and iodide containing products      Injectables ???     Loperamide-simethicone     Metoclopramide hcl      reglan     Diltiazem hcl Rash and Swelling     Social History     Tobacco Use    Smoking status: Never    Smokeless tobacco: Never    Tobacco comments:     None, 1988 everywhere that I was or in my house-none   Substance Use Topics    Alcohol use: No    Drug use: No     Family History   Problem Relation Age of Onset    Thyroid disease Mother     Depression Mother     Hearing loss Mother     Vision loss Mother     Heart disease Father     COPD Father     Supraventricular tachycardia Sister     Heart disease Brother         smoking /stents - 40's    Heart disease Paternal Grandfather     Arthritis Brother     Hyperlipidemia Brother      Current Outpatient Medications on File Prior to Visit   Medication Sig Dispense Refill    azelastine (ASTELIN) 137 mcg (0.1 %) nasal spray 1 spray (137 mcg total) by Nasal route 2 (two) times daily. 30 mL 5    benzonatate (TESSALON) 200 MG capsule Take 1 capsule (200 mg total) by mouth 3 (three) times daily as needed for Cough. 180 capsule 3    blood sugar diagnostic Strp Use to check glucose daily 100 strip 3    blood-glucose meter kit Use as instructed 1 each 0    cetirizine (ZYRTEC) 10 MG tablet Take 10 mg by mouth every evening.      ergocalciferol (ERGOCALCIFEROL) 50,000 unit Cap Take 1 capsule (50,000 Units total) by mouth every 7 days. 12 capsule 3    lancets Misc Use to check glucose daily 100 each 3    lovastatin (MEVACOR) 40 MG tablet Take 40 mg by mouth once daily.      pantoprazole (PROTONIX) 40 MG tablet Take 1 tablet (40 mg total) by mouth 2 (two) times daily. 180 tablet 1    PROAIR RESPICLICK 90 mcg/actuation inhaler Inhale 2 puffs  into the lungs every 4 (four) hours as needed for Wheezing or Shortness of Breath. Rescue 3 each 4    tirzepatide (MOUNJARO) 5 mg/0.5 mL PnIj Inject 5 mg into the skin every 7 days. 4 pen 1    triamcinolone acetonide 0.1% (KENALOG) 0.1 % ointment Apply topically 2 (two) times daily. 454 g 0    verapamiL (CALAN) 80 MG tablet Take by mouth.      albuterol (PROVENTIL/VENTOLIN HFA) 90 mcg/actuation inhaler Inhale 2 puffs into the lungs every 4 (four) hours as needed for Wheezing. (Patient not taking: Reported on 11/20/2023) 18 g 0    aspirin-acetaminophen-caffeine 250-250-65 mg (EXCEDRIN MIGRAINE) 250-250-65 mg per tablet Take 1 tablet by mouth as needed.       diphenhydrAMINE (BENADRYL) 25 mg capsule Take 25 mg by mouth nightly as needed for Itching.      docusate sodium (COLACE) 100 MG capsule Take 1 capsule (100 mg total) by mouth 2 (two) times daily. (Patient not taking: Reported on 11/20/2023) 20 capsule 0    furosemide (LASIX) 40 MG tablet Take 40 mg by mouth 2 (two) times daily.      gabapentin (NEURONTIN) 600 MG tablet Take 1 tablet (600 mg total) by mouth 2 (two) times daily. (Patient not taking: Reported on 11/20/2023) 60 tablet 11    HYDROcodone-acetaminophen (NORCO)  mg per tablet TAKE 1 TABLET BY MOUTH THREE TIMES DAILY AS NEEDED FOR CHRONIC PAIN (Patient not taking: Reported on 11/20/2023) 61 tablet 0    NARCAN 4 mg/actuation [place] CALL 911. ADMINISTER A SINGLE SPRAY INTRANASALLY INTO ONE NOSTRIL UPON SIGNS OF OPIOID OVERDOSE. MAY REPEAT AFTER 3 MINUTES IF NO RESPONSE.      nystatin (MYCOSTATIN) cream APPLY TO AFFECTED AREA TWICE DAILY *THANK YOU* Strength: 100,000 unit/gram (Patient not taking: Reported on 11/20/2023) 90 each 3    nystatin (MYCOSTATIN) powder APPLY TO AFFECTED AREA TWICE DAILY *THANK YOU* (Patient not taking: Reported on 11/20/2023) 180 g 3    nystatin-triamcinolone (MYCOLOG II) cream Apply topically 3 (three) times daily.      ondansetron (ZOFRAN) 8 MG tablet Take 1 tablet (8 mg  "total) by mouth every 8 (eight) hours as needed for Nausea. (Patient not taking: Reported on 11/20/2023) 60 tablet 3    promethazine (PHENERGAN) 25 MG tablet TAKE 1 TABLET EVERY 6 HOURS AS NEEDED FOR NAUSEA *THANK YOU*  ** MAY CAUSE DROWSINESS ** (Patient not taking: Reported on 11/20/2023) 90 tablet 3    tiZANidine 4 mg Cap Take 4 mg by mouth as needed.        No current facility-administered medications on file prior to visit.     Review of Systems   Constitutional:  Negative for appetite change, chills, fatigue and fever.   HENT:  Negative for congestion, rhinorrhea and sore throat.    Eyes:  Negative for visual disturbance.   Respiratory:  Negative for cough and shortness of breath.    Cardiovascular:  Negative for chest pain, palpitations and leg swelling.   Gastrointestinal:  Negative for abdominal pain, diarrhea and vomiting.   Genitourinary:  Negative for difficulty urinating, dysuria and hematuria.   Musculoskeletal:  Positive for arthralgias, back pain and neck pain. Negative for myalgias.   Skin:  Negative for rash and wound.   Neurological:  Negative for dizziness and headaches.   Psychiatric/Behavioral:  Negative for behavioral problems. The patient is not nervous/anxious.      Vitals:    11/20/23 0733   BP: 130/89   Pulse: 96   Resp: 18   Weight: 99.4 kg (219 lb 3.2 oz)   Height: 5' 8" (1.727 m)     Wt Readings from Last 3 Encounters:   11/20/23 99.4 kg (219 lb 3.2 oz)   11/16/23 99.8 kg (220 lb)   10/09/23 105.6 kg (232 lb 11.2 oz)     Physical Exam  Vitals reviewed.   Constitutional:       General: She is not in acute distress.     Appearance: Normal appearance. She is not ill-appearing.   HENT:      Head: Normocephalic and atraumatic.      Right Ear: External ear normal.      Left Ear: External ear normal.      Nose: Nose normal.   Eyes:      Extraocular Movements: Extraocular movements intact.      Conjunctiva/sclera: Conjunctivae normal.   Cardiovascular:      Rate and Rhythm: Normal rate.      " Heart sounds: Normal heart sounds.   Pulmonary:      Effort: Pulmonary effort is normal. No respiratory distress.      Breath sounds: Normal breath sounds.   Abdominal:      General: Abdomen is flat. There is no distension.   Musculoskeletal:         General: Normal range of motion.      Cervical back: Normal range of motion.   Skin:     General: Skin is warm and dry.      Capillary Refill: Capillary refill takes less than 2 seconds.      Coloration: Skin is not pale.      Findings: No rash.   Neurological:      General: No focal deficit present.      Mental Status: She is alert and oriented to person, place, and time. Mental status is at baseline.   Psychiatric:         Mood and Affect: Mood normal.         Speech: Speech normal. Speech is not rapid and pressured, delayed or slurred.         Behavior: Behavior normal. Behavior is not agitated, slowed, aggressive, withdrawn, hyperactive or combative. Behavior is cooperative.         Thought Content: Thought content normal.         Judgment: Judgment normal.       Health Maintenance   Topic Date Due    Shingles Vaccine (2 of 3) 04/28/2016    Lipid Panel  10/06/2024    Mammogram  10/09/2024    TETANUS VACCINE  05/30/2027    Colorectal Cancer Screening  11/16/2028    Hepatitis C Screening  Completed

## 2023-11-21 DIAGNOSIS — R93.89 ABNORMAL X-RAY: Primary | ICD-10-CM

## 2023-11-21 DIAGNOSIS — R91.1 SOLITARY PULMONARY NODULE: ICD-10-CM

## 2023-11-22 LAB — MRSA SPEC QL CULT: NORMAL

## 2023-12-04 ENCOUNTER — TELEPHONE (OUTPATIENT)
Dept: FAMILY MEDICINE | Facility: CLINIC | Age: 58
End: 2023-12-04
Payer: MEDICARE

## 2023-12-04 RX ORDER — FLUCONAZOLE 150 MG/1
150 TABLET ORAL DAILY
Qty: 1 TABLET | Refills: 0 | Status: SHIPPED | OUTPATIENT
Start: 2023-12-04 | End: 2023-12-05

## 2023-12-04 NOTE — TELEPHONE ENCOUNTER
----- Message from Jhony Foster sent at 12/4/2023 10:08 AM CST -----  Contact: Luann Kong is needing a call back in regards to addressing some concerns. Please give her a call back at [phone]

## 2023-12-05 DIAGNOSIS — J45.20 MILD INTERMITTENT ASTHMA WITHOUT COMPLICATION: ICD-10-CM

## 2023-12-05 RX ORDER — IPRATROPIUM BROMIDE 0.5 MG/2.5ML
SOLUTION RESPIRATORY (INHALATION)
Qty: 150 ML | Refills: 6 | Status: SHIPPED | OUTPATIENT
Start: 2023-12-05

## 2023-12-05 NOTE — TELEPHONE ENCOUNTER
No care due was identified.  [facility] Embedded Care Due Messages. Reference number: [id number].   12/05/2023 2:01:25 PM CST

## 2023-12-06 NOTE — TELEPHONE ENCOUNTER
Refill Routing Note   Medication(s) are not appropriate for processing by [facility] for the following reason(s):        Due for refill >6 months ago    [facility] action(s):  Defer               Appointments  past 12m or future 3m with PCP    Date Provider   Last Visit   10/6/2023 Fred Zambrano MD   Next Visit   Visit date not found Fred Zambrano MD   ED visits in past 90 days: 0        Note composed:7:34 PM 12/05/2023

## 2023-12-08 ENCOUNTER — TELEPHONE (OUTPATIENT)
Dept: FAMILY MEDICINE | Facility: CLINIC | Age: 58
End: 2023-12-08
Payer: MEDICARE

## 2023-12-08 NOTE — TELEPHONE ENCOUNTER
Attempted to call pt via telephone with the following results:   No answer, left voicemail for a call back.   Cardiologist who did the clearance is not listed and we have still not received a fax from them. This nurse has called the number four times with no ability to leave a voicemail or message. Phone just keeps ringing. Pt will need to call this cardiologist office and have them refax this clearance.

## 2023-12-11 ENCOUNTER — TELEPHONE (OUTPATIENT)
Dept: FAMILY MEDICINE | Facility: CLINIC | Age: 58
End: 2023-12-11
Payer: MEDICARE

## 2023-12-11 NOTE — TELEPHONE ENCOUNTER
----- Message from Nancy Bradford sent at 12/11/2023  8:28 AM CST -----  Type:  Patient Returning Call    Who Called:pt   Who Left Message for Patient:Maureen   Does the patient know what this is regarding?:Clearance   Would the patient rather a call back or a response via [facility]? Call   Best Call Back Number: [phone]  Additional Information:

## 2023-12-11 NOTE — TELEPHONE ENCOUNTER
Pt was inquiring about clearance status. Notified pt they were cleared and physician was notified.

## 2023-12-11 NOTE — TELEPHONE ENCOUNTER
----- Message from Nancy Bradford sent at 12/11/2023  8:31 AM CST -----  Type:  Same Day Appointment Request    Caller is requesting a same day appointment.  Caller declined first available appointment listed below.    Name of Caller:pt   When is the first available appointment?12/14  Symptoms:Clearance   Best Call Back Number: [phone]  Additional Information:

## 2023-12-26 ENCOUNTER — PATIENT MESSAGE (OUTPATIENT)
Dept: ADMINISTRATIVE | Facility: CLINIC | Age: 58
End: 2023-12-26
Payer: MEDICARE

## 2023-12-26 ENCOUNTER — PATIENT OUTREACH (OUTPATIENT)
Dept: ADMINISTRATIVE | Facility: CLINIC | Age: 58
End: 2023-12-26
Payer: MEDICARE

## 2023-12-26 NOTE — PROGRESS NOTES
C3 nurse attempted to contact Luann Mcgovern for a TCC post hospital discharge follow up call. No answer. Left voicemail with callback information. The patient does not have a scheduled HOSFU appointment. Patient is to follow up with Jett Sampson MD (orthopedic surgeon) post operatively, but C3 nurse is unable to access the appointment information or route to this provider.

## 2023-12-27 NOTE — PROGRESS NOTES
3rd attempt-C3 nurse attempted to contact Luann Mcgovern for a TCC post hospital discharge follow up call. No answer. Left voicemail with callback information, OOC#. The patient does not have a scheduled HOSFU appointment. Patient is to follow up with Jett Sampson MD (orthopedic surgeon) post operatively, but C3 nurse is unable to access the appointment information or route to this provider.

## 2024-01-02 DIAGNOSIS — E11.65 TYPE 2 DIABETES MELLITUS WITH HYPERGLYCEMIA, WITHOUT LONG-TERM CURRENT USE OF INSULIN: ICD-10-CM

## 2024-01-02 NOTE — TELEPHONE ENCOUNTER
No care due was identified.  [facility] Embedded Care Due Messages. Reference number: [id number].   1/02/2024 11:12:23 AM CST

## 2024-01-03 RX ORDER — TIRZEPATIDE 5 MG/.5ML
INJECTION, SOLUTION SUBCUTANEOUS
Qty: 12 PEN | Refills: 1 | Status: SHIPPED | OUTPATIENT
Start: 2024-01-03 | End: 2024-02-02 | Stop reason: SDUPTHER

## 2024-01-03 NOTE — TELEPHONE ENCOUNTER
Refill Routing Note   Medication(s) are not appropriate for processing by [facility] for the following reason(s):        New or recently adjusted medication    [facility] action(s):  Defer               Appointments  past 12m or future 3m with PCP    Date Provider   Last Visit   10/6/2023 Fred Zambrano MD   Next Visit   Visit date not found Fred Zambrano MD   ED visits in past 90 days: 0        Note composed:3:49 AM 01/03/2024

## 2024-02-02 DIAGNOSIS — E11.65 TYPE 2 DIABETES MELLITUS WITH HYPERGLYCEMIA, WITHOUT LONG-TERM CURRENT USE OF INSULIN: ICD-10-CM

## 2024-02-02 RX ORDER — TIRZEPATIDE 5 MG/.5ML
INJECTION, SOLUTION SUBCUTANEOUS
Qty: 12 PEN | Refills: 1 | Status: SHIPPED | OUTPATIENT
Start: 2024-02-02 | End: 2024-04-09

## 2024-03-25 ENCOUNTER — TELEPHONE (OUTPATIENT)
Dept: FAMILY MEDICINE | Facility: CLINIC | Age: 59
End: 2024-03-25
Payer: MEDICARE

## 2024-03-25 DIAGNOSIS — E11.65 TYPE 2 DIABETES MELLITUS WITH HYPERGLYCEMIA, WITHOUT LONG-TERM CURRENT USE OF INSULIN: ICD-10-CM

## 2024-03-25 NOTE — TELEPHONE ENCOUNTER
----- Message from Layla Neff sent at 3/25/2024 12:58 PM CDT -----  Contact: Luann Kong needs a call back at [phone], Regards to Tirzepatide (MOUNJARO) 5 mg/0.5 mL PnIj she asking to get the does increase and also she is having a hard time finding a pharmacy that has it in stock      [facility] [place] [address] [place] [address] [address] [place] [id number]  Phone: [phone] Fax: [phone]    Thanks  Td

## 2024-03-25 NOTE — TELEPHONE ENCOUNTER
Pt called wanting to know what to do about mounjaro not being in stock anywhere I let her know to call other pharmacies to see if it is in stock and if not she can let me know and we can schedule her to talk about alternatives

## 2024-03-27 ENCOUNTER — TELEPHONE (OUTPATIENT)
Dept: FAMILY MEDICINE | Facility: CLINIC | Age: 59
End: 2024-03-27
Payer: MEDICARE

## 2024-03-27 DIAGNOSIS — E11.65 TYPE 2 DIABETES MELLITUS WITH HYPERGLYCEMIA, WITHOUT LONG-TERM CURRENT USE OF INSULIN: Primary | ICD-10-CM

## 2024-03-27 NOTE — TELEPHONE ENCOUNTER
----- Message from Azucena Gao sent at 3/27/2024 10:52 AM CDT -----  Regarding: RX inquiry  Contact: Luann  .Type:  RX Refill Request    Who Called: Luann   Refill or New Rx: refill   RX Name and Strength:  How is the patient currently taking it? (ex. 1XDay):  Is this a 30 day or 90 day RX:  Preferred Pharmacy with phone number:  Local or Mail Order:  Ordering Provider: ARTHUR Zambrano   Would the patient rather a call back or a response via [facility]? call  Best Call Back Number: [phone]  Additional Information: meds is on back order and the patient is requesting a callback from RX: Jeff[facility] 7.5

## 2024-03-27 NOTE — TELEPHONE ENCOUNTER
Returned pt call let her know I cannot find this pharmacy she wants to change to in our system. She stated she will call me back when she gets more information

## 2024-03-27 NOTE — TELEPHONE ENCOUNTER
No care due was identified.  [facility] Embedded Care Due Messages. Reference number: [id number].   3/27/2024 11:43:48 AM CDT

## 2024-04-03 ENCOUNTER — PATIENT MESSAGE (OUTPATIENT)
Dept: PULMONOLOGY | Facility: CLINIC | Age: 59
End: 2024-04-03
Payer: MEDICARE

## 2024-04-09 ENCOUNTER — LAB VISIT (OUTPATIENT)
Dept: LAB | Facility: HOSPITAL | Age: 59
End: 2024-04-09
Attending: NURSE PRACTITIONER
Payer: MEDICARE

## 2024-04-09 ENCOUNTER — OFFICE VISIT (OUTPATIENT)
Dept: FAMILY MEDICINE | Facility: CLINIC | Age: 59
End: 2024-04-09
Payer: MEDICARE

## 2024-04-09 VITALS
BODY MASS INDEX: 31.67 KG/M2 | RESPIRATION RATE: 18 BRPM | HEART RATE: 94 BPM | WEIGHT: 209 LBS | SYSTOLIC BLOOD PRESSURE: 134 MMHG | HEIGHT: 68 IN | DIASTOLIC BLOOD PRESSURE: 88 MMHG

## 2024-04-09 DIAGNOSIS — E11.65 TYPE 2 DIABETES MELLITUS WITH HYPERGLYCEMIA, WITHOUT LONG-TERM CURRENT USE OF INSULIN: Primary | ICD-10-CM

## 2024-04-09 DIAGNOSIS — Z79.899 ENCOUNTER FOR LONG-TERM (CURRENT) USE OF MEDICATIONS: ICD-10-CM

## 2024-04-09 DIAGNOSIS — E11.65 TYPE 2 DIABETES MELLITUS WITH HYPERGLYCEMIA, WITHOUT LONG-TERM CURRENT USE OF INSULIN: ICD-10-CM

## 2024-04-09 PROBLEM — Z12.11 COLON CANCER SCREENING: Status: RESOLVED | Noted: 2023-10-06 | Resolved: 2024-04-09

## 2024-04-09 LAB
ALBUMIN SERPL BCP-MCNC: 4.2 G/DL (ref 3.5–5.2)
ALP SERPL-CCNC: 107 U/L (ref 55–135)
ALT SERPL W/O P-5'-P-CCNC: 22 U/L (ref 10–44)
ANION GAP SERPL CALC-SCNC: 7 MMOL/L (ref 8–16)
AST SERPL-CCNC: 24 U/L (ref 10–40)
BILIRUB SERPL-MCNC: 0.3 MG/DL (ref 0.1–1)
BUN SERPL-MCNC: 26 MG/DL (ref 6–20)
CALCIUM SERPL-MCNC: 9.8 MG/DL (ref 8.7–10.5)
CHLORIDE SERPL-SCNC: 104 MMOL/L (ref 95–110)
CHOLEST SERPL-MCNC: 165 MG/DL (ref 120–199)
CHOLEST/HDLC SERPL: 3.2 {RATIO} (ref 2–5)
CO2 SERPL-SCNC: 30 MMOL/L (ref 23–29)
CREAT SERPL-MCNC: 1 MG/DL (ref 0.5–1.4)
EST. GFR  (NO RACE VARIABLE): >60 ML/MIN/1.73 M^2
ESTIMATED AVG GLUCOSE: 111 MG/DL (ref 68–131)
GLUCOSE SERPL-MCNC: 85 MG/DL (ref 70–110)
HBA1C MFR BLD: 5.5 % (ref 4–5.6)
HDLC SERPL-MCNC: 51 MG/DL (ref 40–75)
HDLC SERPL: 30.9 % (ref 20–50)
LDLC SERPL CALC-MCNC: 78.4 MG/DL (ref 63–159)
NONHDLC SERPL-MCNC: 114 MG/DL
POTASSIUM SERPL-SCNC: 4.4 MMOL/L (ref 3.5–5.1)
PROT SERPL-MCNC: 7.2 G/DL (ref 6–8.4)
SODIUM SERPL-SCNC: 141 MMOL/L (ref 136–145)
TRIGL SERPL-MCNC: 178 MG/DL (ref 30–150)

## 2024-04-09 PROCEDURE — 80053 COMPREHEN METABOLIC PANEL: CPT | Performed by: NURSE PRACTITIONER

## 2024-04-09 PROCEDURE — 83036 HEMOGLOBIN GLYCOSYLATED A1C: CPT | Performed by: NURSE PRACTITIONER

## 2024-04-09 PROCEDURE — 36415 COLL VENOUS BLD VENIPUNCTURE: CPT | Mod: PO | Performed by: NURSE PRACTITIONER

## 2024-04-09 PROCEDURE — 3075F SYST BP GE 130 - 139MM HG: CPT | Mod: CPTII,S$GLB,, | Performed by: NURSE PRACTITIONER

## 2024-04-09 PROCEDURE — 99214 OFFICE O/P EST MOD 30 MIN: CPT | Mod: S$GLB,,, | Performed by: NURSE PRACTITIONER

## 2024-04-09 PROCEDURE — 1159F MED LIST DOCD IN RCRD: CPT | Mod: CPTII,S$GLB,, | Performed by: NURSE PRACTITIONER

## 2024-04-09 PROCEDURE — 99999 PR PBB SHADOW E&M-EST. PATIENT-LVL IV: CPT | Mod: PBBFAC,,, | Performed by: NURSE PRACTITIONER

## 2024-04-09 PROCEDURE — 3079F DIAST BP 80-89 MM HG: CPT | Mod: CPTII,S$GLB,, | Performed by: NURSE PRACTITIONER

## 2024-04-09 PROCEDURE — 80061 LIPID PANEL: CPT | Performed by: NURSE PRACTITIONER

## 2024-04-09 PROCEDURE — 3008F BODY MASS INDEX DOCD: CPT | Mod: CPTII,S$GLB,, | Performed by: NURSE PRACTITIONER

## 2024-04-09 NOTE — ASSESSMENT & PLAN NOTE
Medication was recently increased via [facility]. Proceed with increased dose as prescribed. Update labs. We will plan to monitor hemoglobin A1c at designated intervals 3 to 6 months.  I recommend ongoing Education for diabetic diet and exercise protocol.  We will continue to monitor for side effects.    Please be advised of symptoms to monitor for and to notify me immediately if persistent or worsening.  Follow up with Ophthalmology/Optometry and Podiatry at least annually.

## 2024-04-09 NOTE — PROGRESS NOTES
"Assessment/Plan:  Problem List Items Addressed This Visit          Endocrine    Type 2 diabetes mellitus with hyperglycemia, without long-term current use of insulin - Primary (Chronic)    Overview     April 2024: doing well with Mounjaro 5 mg weekly. She is wanting to go up to 7.5 mg. Tolerating current dose well. No SE reported.     October 2023:  Patient has been on metformin.  She reports intolerance to this medication.  She would like to try alternative medication.  Patient denies any history of thyroid cancer, pancreatitis, MEN syndrome.  Diabetes Management Status    Statin: Taking  ACE/ARB: Not taking    Screening or Prevention Patient's value Goal Complete/Controlled?   HgA1C Testing and Control   Lab Results   Component Value Date    HGBA1C 5.7 (H) 02/20/2023      Annually/Less than 8% Yes   Lipid profile : 05/08/2023 Annually Yes   LDL control Lab Results   Component Value Date    LDLCALC 86.6 02/20/2023    Annually/Less than 100 mg/dl  Yes   Nephropathy screening No results found for: "LABMICR"  Lab Results   Component Value Date    PROTEINUA Negative 11/29/2021     No results found for: "UTPCR"   Annually No   Blood pressure BP Readings from Last 1 Encounters:   10/06/23 130/88    Less than 140/90 Yes   Dilated retinal exam Most Recent Eye Exam Date: Not Found Annually No   Foot exam   Most Recent Foot Exam Date: Not Found Annually No             Current Assessment & Plan     Medication was recently increased via [employer]. Proceed with increased dose as prescribed. Update labs. We will plan to monitor hemoglobin A1c at designated intervals 3 to 6 months.  I recommend ongoing Education for diabetic diet and exercise protocol.  We will continue to monitor for side effects.    Please be advised of symptoms to monitor for and to notify me immediately if persistent or worsening.  Follow up with Ophthalmology/Optometry and Podiatry at least annually.           Relevant Orders    Comprehensive Metabolic Panel "    Hemoglobin A1C       Other    Encounter for long-term (current) use of medications    Overview     CHRONIC long-term drug therapy for managed conditions. See medication list. Reports compliance.  No side effects reported.  Routine lab work is being monitored.  Patient does need refills today. Reviewed labs.     Lab Results   Component Value Date    WBC 8.07 11/20/2023    HGB 15.1 11/20/2023    HCT 47.6 11/20/2023    MCV 94 11/20/2023     11/20/2023       Chemistry        Component Value Date/Time     11/20/2023 0832    K 3.9 11/20/2023 0832     11/20/2023 0832    CO2 25 11/20/2023 0832    BUN 23 (H) 11/20/2023 0832    CREATININE 0.9 11/20/2023 0832     (H) 11/20/2023 0832        Component Value Date/Time    CALCIUM 9.8 11/20/2023 0832    ALKPHOS 89 11/20/2023 0832    AST 26 11/20/2023 0832    ALT 35 11/20/2023 0832    BILITOT 0.3 11/20/2023 0832    ESTGFRAFRICA >60.0 10/15/2021 0843    EGFRNONAA >60.0 10/15/2021 0843        Lab Results   Component Value Date    TSH 0.261 (L) 10/06/2023    Z7KFIBB 8.0 07/10/2018    FREET4 0.83 10/06/2023          Current Assessment & Plan     Labs today. Complete history and physical was completed today.  Complete and thorough medication reconciliation was performed.  Discussed risks and benefits of medications.  Advised patient on orders and health maintenance.  We discussed old records and old labs if available.  Will request any records not available through epic.  Continue current medications listed on your summary sheet.         Relevant Orders    Comprehensive Metabolic Panel    Hemoglobin A1C    Lipid Panel     Follow up in 3 months (on 7/9/2024), or if symptoms worsen or fail to improve.  ER precautions for severe or worsening symptoms.     Roslyn Neff NP  _____________________________________________________________________________________________________________________________________________________    CC: discuss mounjaro     HPI: Patient  is a 58-year-old female who presents in clinic today as an established patient here to discuss medication. Chronic condition has been reviewed and remains stable. Further details as stated above.     Past Medical History:  Past Medical History:   Diagnosis Date    Allergy     seasonal     Anxiety     Arthritis     back    Asthma     seasonal    Chronic shoulder pain     Depression     GERD (gastroesophageal reflux disease)     Insomnia     Migraine     Nephrolithiasis     Pneumonia     PONV (postoperative nausea and vomiting)     Type 2 diabetes mellitus with hyperglycemia, without long-term current use of insulin 10/6/2023     Past Surgical History:   Procedure Laterality Date    ADENOIDECTOMY      APPENDECTOMY      CERVICAL FUSION  09/14/2020    C3-6    CHOLECYSTECTOMY      COLONOSCOPY N/A 11/16/2023    Procedure: COLONOSCOPY;  Surgeon: Alex Moy MD;  Location: [facility] ENDO;  Service: Endoscopy;  Laterality: N/A;    CYSTOSCOPY W/ URETERAL STENT PLACEMENT Left 03/31/2021    Procedure: CYSTOSCOPY, WITH URETERAL STENT INSERTION;  Surgeon: Dexter Sadler MD;  Location: [facility] OR;  Service: Urology;  Laterality: Left;    CYSTOURETEROSCOPY WITH RETROGRADE PYELOGRAPHY AND INSERTION OF STENT INTO URETER Right 01/21/2021    Procedure: CYSTOURETEROSCOPY, WITH RETROGRADE PYELOGRAM AND URETERAL STENT INSERTION;  Surgeon: Dexter Sadler MD;  Location: [facility] OR;  Service: Urology;  Laterality: Right;    KNEE SURGERY Left     age 13 - injury - no tears, just cleaned it up     LUMBAR FUSION  2013    L5-S1    LUMBAR NERVE STIMLATOR INSERTION  10/2018    SPINE SURGERY      TONSILLECTOMY      TUBAL LIGATION  01/19/1990    URETEROSCOPIC REMOVAL OF URETERIC CALCULUS Left 04/07/2021    Procedure: REMOVAL, CALCULUS, URETER, URETEROSCOPIC;  Surgeon: Dexter Sadler MD;  Location: [facility] OR;  Service: Urology;  Laterality: Left;    VAGINAL DELIVERY      2 births      Review of patient's allergies indicates:    Allergen Reactions    Meloxicam Anaphylaxis     Chest and neck pain .  Decrease O2    Iodinated contrast media      Pt states that she is not allergic omnipeg dye, but is only allergic to the older dyes.    Iodine and iodide containing products      Injectables ???     Loperamide-simethicone     Metoclopramide hcl      reglan     Diltiazem hcl Rash and Swelling     Social History     Tobacco Use    Smoking status: Never    Smokeless tobacco: Never    Tobacco comments:     None, 1988 everywhere that I was or in my house-none   Substance Use Topics    Alcohol use: No    Drug use: No     Family History   Problem Relation Age of Onset    Thyroid disease Mother     Depression Mother     Hearing loss Mother     Vision loss Mother     Heart disease Father     COPD Father     Supraventricular tachycardia Sister     Heart disease Brother         smoking /stents - 40's    Heart disease Paternal Grandfather     Arthritis Brother     Hyperlipidemia Brother      Current Outpatient Medications on File Prior to Visit   Medication Sig Dispense Refill    aspirin-acetaminophen-caffeine 250-250-65 mg (EXCEDRIN MIGRAINE) 250-250-65 mg per tablet Take 1 tablet by mouth as needed.       azelastine (ASTELIN) 137 mcg (0.1 %) nasal spray 1 spray (137 mcg total) by Nasal route 2 (two) times daily. 30 mL 5    benzonatate (TESSALON) 200 MG capsule Take 1 capsule (200 mg total) by mouth 3 (three) times daily as needed for Cough. 180 capsule 3    blood sugar diagnostic Strp Use to check glucose daily 100 strip 3    blood-glucose meter kit Use as instructed 1 each 0    cetirizine (ZYRTEC) 10 MG tablet Take 10 mg by mouth every evening.      diphenhydrAMINE (BENADRYL) 25 mg capsule Take 25 mg by mouth nightly as needed for Itching.      ergocalciferol (ERGOCALCIFEROL) 50,000 unit Cap Take 1 capsule (50,000 Units total) by mouth every 7 days. 12 capsule 3    furosemide (LASIX) 40 MG tablet Take 40 mg by mouth 2 (two) times daily.      gabapentin  (NEURONTIN) 600 MG tablet Take 1 tablet (600 mg total) by mouth 2 (two) times daily. 60 tablet 11    ipratropium (ATROVENT) 0.02 % nebulizer solution NEBULIZE CONTENTS OF 1 VIAL AS NEEDED FOR WHEEZING *THANK YOU* 150 mL 6    lancets Misc Use to check glucose daily 100 each 3    lovastatin (MEVACOR) 40 MG tablet Take 40 mg by mouth once daily.      NARCAN 4 mg/actuation [place] CALL 911. ADMINISTER A SINGLE SPRAY INTRANASALLY INTO ONE NOSTRIL UPON SIGNS OF OPIOID OVERDOSE. MAY REPEAT AFTER 3 MINUTES IF NO RESPONSE.      nystatin-triamcinolone (MYCOLOG II) cream Apply topically 3 (three) times daily.      ondansetron (ZOFRAN) 8 MG tablet Take 1 tablet (8 mg total) by mouth every 8 (eight) hours as needed for Nausea. 60 tablet 3    pantoprazole (PROTONIX) 40 MG tablet Take 1 tablet (40 mg total) by mouth 2 (two) times daily. 180 tablet 1    PROAIR RESPICLICK 90 mcg/actuation inhaler Inhale 2 puffs into the lungs every 4 (four) hours as needed for Wheezing or Shortness of Breath. Rescue 3 each 4    promethazine (PHENERGAN) 25 MG tablet TAKE 1 TABLET EVERY 6 HOURS AS NEEDED FOR NAUSEA *THANK YOU*  ** MAY CAUSE DROWSINESS ** (Patient taking differently: TAKE 1 TABLET EVERY 6 HOURS AS NEEDED FOR NAUSEA *THANK YOU*  ** MAY CAUSE DROWSINESS **) 90 tablet 3    tirzepatide 7.5 mg/0.5 mL PnIj Inject 7.5 mg into the skin every 7 days. 12 Pen 1    tiZANidine 4 mg Cap Take 4 mg by mouth as needed.       triamcinolone acetonide 0.1% (KENALOG) 0.1 % ointment Apply topically 2 (two) times daily. 454 g 0    verapamiL (CALAN) 80 MG tablet Take by mouth.      [DISCONTINUED] docusate sodium (COLACE) 100 MG capsule Take 1 capsule (100 mg total) by mouth 2 (two) times daily. (Patient not taking: Reported on 11/20/2023) 20 capsule 0    [DISCONTINUED] HYDROcodone-acetaminophen (NORCO)  mg per tablet TAKE 1 TABLET BY MOUTH THREE TIMES DAILY AS NEEDED FOR CHRONIC PAIN (Patient not taking: Reported on 11/20/2023) 61 tablet 0     "[DISCONTINUED] nystatin (MYCOSTATIN) cream APPLY TO AFFECTED AREA TWICE DAILY *THANK YOU* Strength: 100,000 unit/gram (Patient not taking: Reported on 11/20/2023) 90 each 3    [DISCONTINUED] nystatin (MYCOSTATIN) powder APPLY TO AFFECTED AREA TWICE DAILY *THANK YOU* (Patient not taking: Reported on 11/20/2023) 180 g 3    [DISCONTINUED] tirzepatide (MOUNJARO) 5 mg/0.5 mL PnIj INJECT 5 MG INTO THE SKIN EVERY 7 DAYS *THANK YOU* *REFRIGERATE* (Patient not taking: Reported on 4/9/2024) 12 Pen 1     No current facility-administered medications on file prior to visit.     Review of Systems   Constitutional:  Negative for appetite change, chills, fatigue and fever.   HENT:  Negative for congestion, rhinorrhea and sore throat.    Eyes:  Negative for visual disturbance.   Respiratory:  Negative for cough and shortness of breath.    Cardiovascular:  Negative for chest pain, palpitations and leg swelling.   Gastrointestinal:  Negative for abdominal pain, diarrhea and vomiting.   Genitourinary:  Negative for difficulty urinating, dysuria and hematuria.   Musculoskeletal:  Positive for back pain and neck pain. Negative for arthralgias and myalgias.   Skin:  Negative for rash and wound.   Neurological:  Negative for dizziness and headaches.   Psychiatric/Behavioral:  Negative for behavioral problems. The patient is not nervous/anxious.      Vitals:    04/09/24 1400 04/09/24 1427   BP: (!) 144/96 134/88   BP Location: Left arm    Patient Position: Sitting    BP Method: Medium (Automatic)    Pulse: 94    Resp: 18    Weight: 94.8 kg (209 lb)    Height: 5' 8" (1.727 m)      Wt Readings from Last 3 Encounters:   04/09/24 94.8 kg (209 lb)   11/20/23 99.4 kg (219 lb 3.2 oz)   11/16/23 99.8 kg (220 lb)     Physical Exam  Vitals reviewed.   Constitutional:       General: She is not in acute distress.     Appearance: Normal appearance. She is not ill-appearing.   HENT:      Head: Normocephalic and atraumatic.      Right Ear: External ear " normal.      Left Ear: External ear normal.      Nose: Nose normal.   Eyes:      Extraocular Movements: Extraocular movements intact.      Conjunctiva/sclera: Conjunctivae normal.   Cardiovascular:      Rate and Rhythm: Normal rate.      Heart sounds: Normal heart sounds.   Pulmonary:      Effort: Pulmonary effort is normal. No respiratory distress.      Breath sounds: Normal breath sounds.   Abdominal:      General: Abdomen is flat. There is no distension.   Musculoskeletal:         General: Normal range of motion.      Cervical back: Normal range of motion.   Skin:     General: Skin is warm and dry.      Capillary Refill: Capillary refill takes less than 2 seconds.      Coloration: Skin is not pale.      Findings: No rash.   Neurological:      General: No focal deficit present.      Mental Status: She is alert and oriented to person, place, and time. Mental status is at baseline.   Psychiatric:         Mood and Affect: Mood normal.         Speech: Speech normal. Speech is not rapid and pressured, delayed or slurred.         Behavior: Behavior normal. Behavior is not agitated, slowed, aggressive, withdrawn, hyperactive or combative. Behavior is cooperative.         Thought Content: Thought content normal.         Judgment: Judgment normal.       Health Maintenance   Topic Date Due    Shingles Vaccine (2 of 3) 04/28/2016    Lipid Panel  10/06/2024    Mammogram  10/09/2024    TETANUS VACCINE  05/30/2027    Colorectal Cancer Screening  11/16/2028    Hepatitis C Screening  Completed

## 2024-04-10 DIAGNOSIS — E11.9 TYPE 2 DIABETES MELLITUS WITHOUT COMPLICATION: ICD-10-CM

## 2024-04-16 DIAGNOSIS — K21.00 GASTROESOPHAGEAL REFLUX DISEASE WITH ESOPHAGITIS WITHOUT HEMORRHAGE: ICD-10-CM

## 2024-04-16 NOTE — TELEPHONE ENCOUNTER
No care due was identified.  [facility] Embedded Care Due Messages. Reference number: [id number].   4/16/2024 1:06:17 PM CDT

## 2024-04-17 RX ORDER — ALBUTEROL SULFATE 0.83 MG/ML
SOLUTION RESPIRATORY (INHALATION)
Qty: 180 ML | Refills: 1 | Status: SHIPPED | OUTPATIENT
Start: 2024-04-17 | End: 2024-06-04

## 2024-04-17 RX ORDER — PANTOPRAZOLE SODIUM 40 MG/1
40 TABLET, DELAYED RELEASE ORAL 2 TIMES DAILY
Qty: 180 TABLET | Refills: 1 | Status: SHIPPED | OUTPATIENT
Start: 2024-04-17

## 2024-04-17 NOTE — TELEPHONE ENCOUNTER
Refill Routing Note   Medication(s) are not appropriate for processing by [facility] for the following reason(s):        No active prescription written by provider  Outside of protocol    [facility] action(s):  Defer  Route        Medication Therapy Plan: PPI> 40mgs daily are outside of [facility] protocol      Appointments  past 12m or future 3m with PCP    Date Provider   Last Visit   10/6/2023 Fred Zambrano MD   Next Visit   Visit date not found Fred Zambrano MD   ED visits in past 90 days: 0        Note composed:11:02 PM 04/16/2024

## 2024-04-22 DIAGNOSIS — U09.9 POST-COVID CHRONIC COUGH: ICD-10-CM

## 2024-04-22 DIAGNOSIS — R05.3 POST-COVID CHRONIC COUGH: ICD-10-CM

## 2024-04-22 NOTE — TELEPHONE ENCOUNTER
No care due was identified.  [facility] Embedded Care Due Messages. Reference number: [id number].   4/22/2024 10:41:37 AM CDT

## 2024-04-23 RX ORDER — ALBUTEROL SULFATE 90 UG/1
POWDER, METERED RESPIRATORY (INHALATION)
Qty: 3 EACH | Refills: 4 | OUTPATIENT
Start: 2024-04-23

## 2024-04-23 NOTE — TELEPHONE ENCOUNTER
Refill Decision Note   Luann Mcgovern  is requesting a refill authorization.  Brief Assessment and Rationale for Refill:  Quick Discontinue     Medication Therapy Plan: E-Prescribing Status: Receipt confirmed by pharmacy (4/17/2024  8:00 AM CDT)      Comments:     Note composed:10:44 AM 04/23/2024

## 2024-04-29 DIAGNOSIS — R11.0 NAUSEA IN ADULT PATIENT: ICD-10-CM

## 2024-04-29 RX ORDER — PROMETHAZINE HYDROCHLORIDE 25 MG/1
TABLET ORAL
Qty: 90 TABLET | Refills: 0 | Status: SHIPPED | OUTPATIENT
Start: 2024-04-29 | End: 2024-05-17

## 2024-04-29 NOTE — TELEPHONE ENCOUNTER
Refill Routing Note   Medication(s) are not appropriate for processing by [facility] for the following reason(s):        Outside of protocol    [facility] action(s):  Route               Appointments  past 12m or future 3m with PCP    Date Provider   Last Visit   10/6/2023 Fred Zambrano MD   Next Visit   Visit date not found Fred Zambrano MD   ED visits in past 90 days: 0        Note composed:11:00 AM 04/29/2024

## 2024-04-29 NOTE — TELEPHONE ENCOUNTER
No care due was identified.  [facility] Embedded Care Due Messages. Reference number: [id number].   4/29/2024 9:50:30 AM CDT

## 2024-05-15 DIAGNOSIS — R11.0 NAUSEA IN ADULT PATIENT: ICD-10-CM

## 2024-05-15 RX ORDER — ONDANSETRON HYDROCHLORIDE 8 MG/1
TABLET, FILM COATED ORAL
Qty: 60 TABLET | Refills: 3 | Status: SHIPPED | OUTPATIENT
Start: 2024-05-15

## 2024-05-15 NOTE — TELEPHONE ENCOUNTER
Refill Routing Note   Medication(s) are not appropriate for processing by [facility] for the following reason(s):        Outside of protocol    [facility] action(s):  Route               Appointments  past 12m or future 3m with PCP    Date Provider   Last Visit   10/6/2023 Fred Zambrano MD   Next Visit   Visit date not found Fred Zambrano MD   ED visits in past 90 days: 0        Note composed:2:23 PM 05/15/2024

## 2024-05-15 NOTE — TELEPHONE ENCOUNTER
No care due was identified.  [facility] Embedded Care Due Messages. Reference number: [id number].   5/15/2024 2:01:16 PM CDT

## 2024-05-17 DIAGNOSIS — R11.0 NAUSEA IN ADULT PATIENT: ICD-10-CM

## 2024-05-17 RX ORDER — PROMETHAZINE HYDROCHLORIDE 25 MG/1
TABLET ORAL
Qty: 90 TABLET | Refills: 0 | Status: SHIPPED | OUTPATIENT
Start: 2024-05-17 | End: 2024-06-07

## 2024-05-17 NOTE — TELEPHONE ENCOUNTER
No care due was identified.  [facility] Embedded Care Due Messages. Reference number: [id number].   5/17/2024 11:33:14 AM CDT

## 2024-05-18 ENCOUNTER — PATIENT MESSAGE (OUTPATIENT)
Dept: FAMILY MEDICINE | Facility: CLINIC | Age: 59
End: 2024-05-18
Payer: MEDICARE

## 2024-06-04 RX ORDER — ALBUTEROL SULFATE 0.83 MG/ML
SOLUTION RESPIRATORY (INHALATION)
Qty: 180 ML | Refills: 1 | Status: SHIPPED | OUTPATIENT
Start: 2024-06-04

## 2024-06-04 NOTE — TELEPHONE ENCOUNTER
No care due was identified.  Health [facility] Embedded Care Due Messages. Reference number: [id number].   6/04/2024 10:08:29 AM CDT

## 2024-06-04 NOTE — TELEPHONE ENCOUNTER
Refill Routing Note   Medication(s) are not appropriate for processing by [facility] for the following reason(s):        New or recently adjusted medication    [facility] action(s):  Defer               Appointments  past 12m or future 3m with PCP    Date Provider   Last Visit   10/6/2023 Fred Zambrano MD   Next Visit   Visit date not found Fred Zambrano MD   ED visits in past 90 days: 0        Note composed:11:46 AM 06/04/2024

## 2024-06-07 DIAGNOSIS — R11.0 NAUSEA IN ADULT PATIENT: ICD-10-CM

## 2024-06-07 RX ORDER — PROMETHAZINE HYDROCHLORIDE 25 MG/1
TABLET ORAL
Qty: 90 TABLET | Refills: 0 | Status: SHIPPED | OUTPATIENT
Start: 2024-06-07

## 2024-06-07 NOTE — TELEPHONE ENCOUNTER
No care due was identified.  [facility] Embedded Care Due Messages. Reference number: [id number].   6/07/2024 10:28:42 AM CDT

## 2024-06-07 NOTE — TELEPHONE ENCOUNTER
Refill Routing Note   Medication(s) are not appropriate for processing by [facility] for the following reason(s):        Outside of protocol    [facility] action(s):  Route               Appointments  past 12m or future 3m with PCP    Date Provider   Last Visit   10/6/2023 Fred Zambrano MD   Next Visit   Visit date not found Fred Zambrano MD   ED visits in past 90 days: 0        Note composed:10:29 AM 06/07/2024

## 2024-06-24 DIAGNOSIS — E11.65 TYPE 2 DIABETES MELLITUS WITH HYPERGLYCEMIA, WITHOUT LONG-TERM CURRENT USE OF INSULIN: ICD-10-CM

## 2024-06-24 RX ORDER — TIRZEPATIDE 7.5 MG/.5ML
7.5 INJECTION, SOLUTION SUBCUTANEOUS
Qty: 12 PEN | Refills: 1 | Status: SHIPPED | OUTPATIENT
Start: 2024-06-24

## 2024-06-24 NOTE — TELEPHONE ENCOUNTER
Refill Routing Note   Medication(s) are not appropriate for processing by [facility] for the following reason(s):        New or recently adjusted medication:     [facility] action(s):  Defer      Medication Therapy Plan:         Appointments  past 12m or future 3m with PCP    Date Provider   Last Visit   10/6/2023 Fred Zambrano MD   Next Visit   Visit date not found Fred Zambrano MD   ED visits in past 90 days: 0        Note composed:11:27 AM 06/24/2024

## 2024-06-24 NOTE — TELEPHONE ENCOUNTER
No care due was identified.  [facility] Embedded Care Due Messages. Reference number: [id number].   6/24/2024 8:23:09 AM CDT

## 2024-07-16 ENCOUNTER — TELEPHONE (OUTPATIENT)
Dept: ADMINISTRATIVE | Facility: CLINIC | Age: 59
End: 2024-07-16
Payer: MEDICARE

## 2024-07-17 ENCOUNTER — OFFICE VISIT (OUTPATIENT)
Dept: FAMILY MEDICINE | Facility: CLINIC | Age: 59
End: 2024-07-17
Payer: MEDICARE

## 2024-07-17 ENCOUNTER — PATIENT MESSAGE (OUTPATIENT)
Dept: FAMILY MEDICINE | Facility: CLINIC | Age: 59
End: 2024-07-17

## 2024-07-17 VITALS
SYSTOLIC BLOOD PRESSURE: 106 MMHG | HEIGHT: 68 IN | DIASTOLIC BLOOD PRESSURE: 76 MMHG | HEART RATE: 87 BPM | OXYGEN SATURATION: 97 % | WEIGHT: 196 LBS | BODY MASS INDEX: 29.7 KG/M2

## 2024-07-17 DIAGNOSIS — I70.0 AORTIC ATHEROSCLEROSIS: ICD-10-CM

## 2024-07-17 DIAGNOSIS — J45.909 ASTHMA, UNSPECIFIED ASTHMA SEVERITY, UNSPECIFIED WHETHER COMPLICATED, UNSPECIFIED WHETHER PERSISTENT: ICD-10-CM

## 2024-07-17 DIAGNOSIS — L30.4 INTERTRIGO: Chronic | ICD-10-CM

## 2024-07-17 DIAGNOSIS — F41.1 GENERALIZED ANXIETY DISORDER WITH PANIC ATTACKS: ICD-10-CM

## 2024-07-17 DIAGNOSIS — Z00.00 ENCOUNTER FOR MEDICARE ANNUAL WELLNESS EXAM: Primary | ICD-10-CM

## 2024-07-17 DIAGNOSIS — F32.4 MAJOR DEPRESSIVE DISORDER IN PARTIAL REMISSION, UNSPECIFIED WHETHER RECURRENT: ICD-10-CM

## 2024-07-17 DIAGNOSIS — E55.9 VITAMIN D DEFICIENCY: Chronic | ICD-10-CM

## 2024-07-17 DIAGNOSIS — Z00.00 ENCOUNTER FOR PREVENTIVE HEALTH EXAMINATION: ICD-10-CM

## 2024-07-17 DIAGNOSIS — F43.10 PTSD (POST-TRAUMATIC STRESS DISORDER): Chronic | ICD-10-CM

## 2024-07-17 DIAGNOSIS — E11.65 TYPE 2 DIABETES MELLITUS WITH HYPERGLYCEMIA, WITHOUT LONG-TERM CURRENT USE OF INSULIN: Chronic | ICD-10-CM

## 2024-07-17 DIAGNOSIS — G89.29 CHRONIC PAIN OF BOTH SHOULDERS: ICD-10-CM

## 2024-07-17 DIAGNOSIS — J45.20 MILD INTERMITTENT ASTHMA WITHOUT COMPLICATION: ICD-10-CM

## 2024-07-17 DIAGNOSIS — F41.0 GENERALIZED ANXIETY DISORDER WITH PANIC ATTACKS: ICD-10-CM

## 2024-07-17 DIAGNOSIS — R94.5 LIVER FUNCTION ABNORMALITY: ICD-10-CM

## 2024-07-17 DIAGNOSIS — G95.9 DISEASE OF SPINAL CORD: ICD-10-CM

## 2024-07-17 DIAGNOSIS — E05.90 SUBCLINICAL HYPERTHYROIDISM: ICD-10-CM

## 2024-07-17 DIAGNOSIS — E66.01 SEVERE OBESITY (BMI 35.0-35.9 WITH COMORBIDITY): ICD-10-CM

## 2024-07-17 DIAGNOSIS — M25.511 CHRONIC PAIN OF BOTH SHOULDERS: ICD-10-CM

## 2024-07-17 DIAGNOSIS — M25.512 CHRONIC PAIN OF BOTH SHOULDERS: ICD-10-CM

## 2024-07-17 DIAGNOSIS — K21.00 GASTROESOPHAGEAL REFLUX DISEASE WITH ESOPHAGITIS WITHOUT HEMORRHAGE: ICD-10-CM

## 2024-07-17 PROBLEM — J98.11 ATELECTASIS: Status: RESOLVED | Noted: 2018-12-10 | Resolved: 2024-07-17

## 2024-07-17 PROBLEM — F32.A DEPRESSION: Status: RESOLVED | Noted: 2021-01-22 | Resolved: 2024-07-17

## 2024-07-17 PROBLEM — N20.0 LEFT RENAL STONE: Status: RESOLVED | Noted: 2021-04-07 | Resolved: 2024-07-17

## 2024-07-17 PROBLEM — N20.1 RIGHT URETERAL STONE: Status: RESOLVED | Noted: 2021-02-10 | Resolved: 2024-07-17

## 2024-07-17 LAB
ALBUMIN/CREAT UR: 11.3 UG/MG (ref 0–30)
CREAT UR-MCNC: 141 MG/DL (ref 15–325)
MICROALBUMIN UR DL<=1MG/L-MCNC: 16 UG/ML

## 2024-07-17 PROCEDURE — 99999 PR PBB SHADOW E&M-EST. PATIENT-LVL V: CPT | Mod: PBBFAC,,, | Performed by: NURSE PRACTITIONER

## 2024-07-17 PROCEDURE — 1159F MED LIST DOCD IN RCRD: CPT | Mod: CPTII,S$GLB,, | Performed by: NURSE PRACTITIONER

## 2024-07-17 PROCEDURE — 3074F SYST BP LT 130 MM HG: CPT | Mod: CPTII,S$GLB,, | Performed by: NURSE PRACTITIONER

## 2024-07-17 PROCEDURE — 3044F HG A1C LEVEL LT 7.0%: CPT | Mod: CPTII,S$GLB,, | Performed by: NURSE PRACTITIONER

## 2024-07-17 PROCEDURE — 82043 UR ALBUMIN QUANTITATIVE: CPT | Performed by: NURSE PRACTITIONER

## 2024-07-17 PROCEDURE — G0439 PPPS, SUBSEQ VISIT: HCPCS | Mod: S$GLB,,, | Performed by: NURSE PRACTITIONER

## 2024-07-17 PROCEDURE — 3078F DIAST BP <80 MM HG: CPT | Mod: CPTII,S$GLB,, | Performed by: NURSE PRACTITIONER

## 2024-07-17 RX ORDER — TIRZEPATIDE 7.5 MG/.5ML
7.5 INJECTION, SOLUTION SUBCUTANEOUS
Qty: 12 PEN | Refills: 1 | Status: SHIPPED | OUTPATIENT
Start: 2024-07-17

## 2024-07-17 RX ORDER — DIAZEPAM 10 MG/1
10 TABLET ORAL
COMMUNITY
Start: 2024-05-30

## 2024-07-17 NOTE — PROGRESS NOTES
"  Luann Mcgovern presented for a follow-up Medicare AWV today. The following components were reviewed and updated:    Medical history  Family History  Social history  Allergies and Current Medications  Health Risk Assessment  Health Maintenance  Care Team    **See Completed Assessments for Annual Wellness visit with in the encounter summary    The following assessments were completed:  Depression Screening  Cognitive function Screening  Timed Get Up Test  Whisper Test    Opioid documentation:    Patient does not have a current opioid prescription.        Vitals:    07/17/24 1034   BP: 106/76   Pulse: 87   SpO2: 97%   Weight: 88.9 kg (196 lb)   Height: 5' 8" (1.727 m)     Body mass index is 29.8 kg/m².     Physical Exam  Constitutional:       General: She is not in acute distress.     Appearance: Normal appearance. She is not ill-appearing.   HENT:      Head: Normocephalic and atraumatic.      Right Ear: External ear normal.      Left Ear: External ear normal.      Nose: Nose normal.      Mouth/Throat:      Mouth: Mucous membranes are moist.   Eyes:      Extraocular Movements: Extraocular movements intact.      Conjunctiva/sclera: Conjunctivae normal.   Cardiovascular:      Rate and Rhythm: Normal rate.      Heart sounds: Normal heart sounds.   Pulmonary:      Effort: Pulmonary effort is normal. No respiratory distress.   Musculoskeletal:         General: Normal range of motion.      Cervical back: Normal range of motion.   Feet:      Right foot:      Protective Sensation: 10 sites tested.  10 sites sensed.      Skin integrity: Callus and dry skin present. No ulcer, blister, skin breakdown, erythema or warmth.      Toenail Condition: Right toenails are normal.      Left foot:      Protective Sensation: 7 sites tested.  10 sites sensed.      Skin integrity: Callus and dry skin present. No ulcer, blister, skin breakdown, erythema or warmth.      Toenail Condition: Left toenails are normal.   Skin:     General: Skin is " warm and dry.      Capillary Refill: Capillary refill takes less than 2 seconds.   Neurological:      General: No focal deficit present.      Mental Status: She is alert and oriented to person, place, and time. Mental status is at baseline.   Psychiatric:         Mood and Affect: Mood normal.         Behavior: Behavior normal.         Thought Content: Thought content normal.         Judgment: Judgment normal.       Diagnoses and health risks identified today and associated recommendations/orders:  1. Encounter for Medicare annual wellness exam  Complete history and physical was completed today.  Complete and thorough medication reconciliation was performed.  Discussed risks and benefits of medications.  Advised patient on orders and health maintenance.  We discussed old records and old labs if available.  Will request any records not available through epic.  Continue current medications listed on your summary sheet.    Advised of due health maintenance- eye exam due     2. Type 2 diabetes mellitus with hyperglycemia, without long-term current use of insulin  Chronic. Stable.  Continue current medications and plan of care per PCP and specialists   Urine micro due. Ordered.  Foot exam completed today.   Advised eye exam due  Decreased sensation to Left foot on foot exam- she reports this is chronic and related to her back issues, has neuro stimulator    Diabetes Medications               tirzepatide (MOUNJARO) 7.5 mg/0.5 mL PnIj Inject 7.5 mg into the skin every 7 days.     Lab Results   Component Value Date    HGBA1C 5.5 04/09/2024     - Microalbumin/creatinine urine ratio  - tirzepatide (MOUNJARO) 7.5 mg/0.5 mL PnIj; Inject 7.5 mg into the skin every 7 days.  Dispense: 12 Pen; Refill: 1    3. PTSD (post-traumatic stress disorder)  Chronic. Stable.  Continue current medications and plan of care per PCP and specialists     4. Intertrigo  Chronic. Stable. Intermittent flares. Uses topical creams as needed. Does not see  dermatology   Continue current medications and plan of care per PCP and specialists     5. Vitamin D deficiency  Chronic. Stable.  Continue current medications and plan of care per PCP and specialists     Lab Results   Component Value Date    WTOGCXYA48MG 30 10/09/2023    VLJGDOOO65TZ 27 (L) 09/15/2022    CASHAEVL86TC 23 (L) 10/15/2021     6. Mild intermittent asthma without complication  Chronic. Stable.  Continue current medications and plan of care per PCP and specialists   Continue inhaler    7. Chronic pain of both shoulders  Chronic. Stable.  Continue current medications and plan of care per PCP and specialists   Followed by pain management    8. Asthma, unspecified asthma severity, unspecified whether complicated, unspecified whether persistent  Chronic. Stable.  Continue current medications and plan of care per PCP and specialists   Continue inhaler     9. Generalized anxiety disorder with panic attacks  Chronic. Stable.  Continue current medications and plan of care per PCP and specialists     10. Subclinical hyperthyroidism  Chronic. Stable.  Continue current medications and plan of care per PCP and specialists   Check TSH Today  Lab Results   Component Value Date    TSH 0.261 (L) 10/06/2023    B8LILHH 8.0 07/10/2018    FREET4 0.83 10/06/2023     - TSH; Future    11. Gastroesophageal reflux disease with esophagitis without hemorrhage  Chronic. Stable.  Continue current medications and plan of care per PCP and specialists   Continue Protonix    12. Major depressive disorder in partial remission, unspecified whether recurrent  Chronic. Stable.  Continue current medications and plan of care per PCP and specialists     13. Severe obesity (BMI 35.0-35.9 with comorbidity)  Chronic. Stable.  Continue current medications and plan of care per PCP and specialists   Encourage increased physical activity, healthy diet choices, and weight loss for prevention of progression of comorbid conditions.     Wt Readings from  Last 3 Encounters:   07/17/24 1034 88.9 kg (196 lb)   04/09/24 1400 94.8 kg (209 lb)   11/20/23 0733 99.4 kg (219 lb 3.2 oz)     14. Disease of spinal cord  Chronic. Stable.  Continue current medications and plan of care per PCP and specialists   Followed by pain specialist; has neuro stimulator  Reports numbness to left lower extremity     15. Aortic atherosclerosis  Chronic. Stable.  Continue current medications and plan of care per PCP and specialists   Continue statin  Followed by cardiology     Provided Luann with a 5-10 year written screening schedule and personal prevention plan. Recommendations were developed using the USPSTF age appropriate recommendations. Education, counseling, and referrals were provided as needed.  After Visit Summary printed and given to patient which includes a list of additional screenings\tests needed.    Follow up in about 1 year (around 7/17/2025), or if symptoms worsen or fail to improve, for visit in clinic as needed.    Roslyn Neff NP    I offered to discuss advanced care planning, including how to pick a person who would make decisions for you if you were unable to make them for yourself, called a health care power of , and what kind of decisions you might make such as use of life sustaining treatments such as ventilators and tube feeding when faced with a life limiting illness recorded on a living will that they will need to know. (How you want to be cared for as you near the end of your natural life)     X Patient is interested in learning more about how to make advanced directives.  I provided them paperwork and offered to discuss this with them.

## 2024-07-17 NOTE — PATIENT INSTRUCTIONS
Counseling and Referral of Other Preventative  (Italic type indicates deductible and co-insurance are waived)    Patient Name: Luann Mcgovern  Today's Date: 7/17/2024    Health Maintenance       Date Due Completion Date    Diabetes Urine Screening Never done ---    Shingles Vaccine (2 of 3) 04/28/2016 3/3/2016    Eye Exam 01/06/2024 1/6/2023    Mammogram 10/09/2024 10/9/2023    Influenza Vaccine (1) 09/01/2024 11/20/2023    Hemoglobin A1c 10/09/2024 4/9/2024    Low Dose Statin 04/09/2025 4/9/2024    Lipid Panel 06/20/2025 6/20/2024    Foot Exam 07/17/2025 7/17/2024 (Done)    Override on 7/17/2024: Done    TETANUS VACCINE 05/30/2027 5/30/2017    Cervical Cancer Screening 10/09/2028 10/9/2023    Colorectal Cancer Screening 11/16/2028 11/16/2023    Pneumococcal Vaccines (Age 0-64) (3 of 3 - PPSV23 or PCV20) 11/13/2029 5/30/2017        Orders Placed This Encounter   Procedures    Microalbumin/creatinine urine ratio    TSH     The following information is provided to all patients.  This information is to help you find resources for any of the problems found today that may be affecting your health:                  Living healthy guide: www.[facility].[place].gov      Understanding Diabetes: www.diabetes.org      Eating healthy: www.cdc.gov/healthyweight      CDC home safety checklist: www.cdc.gov/steadi/patient.html      Agency on Aging: www.goea.[place].gov      Alcoholics anonymous (AA): www.aa.org      Physical Activity: www.pawan.nih.gov/jy1pzuu      Tobacco use: www.quitwithusla.org

## 2024-07-18 NOTE — PROGRESS NOTES
Please CALL patient with results and Document verification.   [phone]     URINE PROTEIN CREATININE RATIO NORMAL-The urine protein/creatinine screen for protein in the urine was normal.  Repeat this annually.

## 2024-07-22 ENCOUNTER — OFFICE VISIT (OUTPATIENT)
Dept: OPHTHALMOLOGY | Facility: CLINIC | Age: 59
End: 2024-07-22
Payer: MEDICARE

## 2024-07-22 ENCOUNTER — PATIENT MESSAGE (OUTPATIENT)
Dept: OPHTHALMOLOGY | Facility: CLINIC | Age: 59
End: 2024-07-22

## 2024-07-22 DIAGNOSIS — H52.7 REFRACTIVE ERRORS: ICD-10-CM

## 2024-07-22 DIAGNOSIS — E11.65 TYPE 2 DIABETES MELLITUS WITH HYPERGLYCEMIA, WITHOUT LONG-TERM CURRENT USE OF INSULIN: Primary | Chronic | ICD-10-CM

## 2024-07-22 PROCEDURE — 2023F DILAT RTA XM W/O RTNOPTHY: CPT | Mod: CPTII,S$GLB,, | Performed by: OPTOMETRIST

## 2024-07-22 PROCEDURE — 92014 COMPRE OPH EXAM EST PT 1/>: CPT | Mod: S$GLB,,, | Performed by: OPTOMETRIST

## 2024-07-22 PROCEDURE — 3044F HG A1C LEVEL LT 7.0%: CPT | Mod: CPTII,S$GLB,, | Performed by: OPTOMETRIST

## 2024-07-22 PROCEDURE — 99999 PR PBB SHADOW E&M-EST. PATIENT-LVL III: CPT | Mod: PBBFAC,,, | Performed by: OPTOMETRIST

## 2024-07-22 PROCEDURE — 1159F MED LIST DOCD IN RCRD: CPT | Mod: CPTII,S$GLB,, | Performed by: OPTOMETRIST

## 2024-07-22 PROCEDURE — 3061F NEG MICROALBUMINURIA REV: CPT | Mod: CPTII,S$GLB,, | Performed by: OPTOMETRIST

## 2024-07-22 PROCEDURE — 92015 DETERMINE REFRACTIVE STATE: CPT | Mod: S$GLB,,, | Performed by: OPTOMETRIST

## 2024-07-22 PROCEDURE — 3066F NEPHROPATHY DOC TX: CPT | Mod: CPTII,S$GLB,, | Performed by: OPTOMETRIST

## 2024-07-22 NOTE — PROGRESS NOTES
SUBJECTIVE  Luann Mcgovern is 58 y.o. female  Corrected distance visual acuity was 20/20 in the right eye and 20/20 in the left eye. Corrected near visual acuity was J3 in the right eye and J4 in the left eye.   Chief Complaint   Patient presents with    Annual Exam     Pt reports for annual eye exam: States she notices vision is slightly weaker in the left compared to the right.  Lab Results       Component                Value               Date                       HGBA1C                   5.5                 04/09/2024                     HPI     Annual Exam     Additional comments: Pt reports for annual eye exam: States she notices   vision is slightly weaker in the left compared to the right.  Lab Results       Component                Value               Date                       HGBA1C                   5.5                 04/09/2024                      Comments    History of Chorioretinopathy          Last edited by Rhett Aguirre, OD on 7/22/2024  9:16 AM.         Assessment /Plan :  1. Type 2 diabetes mellitus with hyperglycemia, without long-term current use of insulin   No Background Diabetic Retinopathy  Strict BG control, f/u w/ PCP, and annual DFE  Stressed importance of DM control to preserve vision      2. Refractive errors   Dispense Final Rx for glasses.  RTC 1 year  Discussed above and answered questions.

## 2024-07-24 ENCOUNTER — E-VISIT (OUTPATIENT)
Dept: FAMILY MEDICINE | Facility: CLINIC | Age: 59
End: 2024-07-24
Payer: MEDICARE

## 2024-07-24 DIAGNOSIS — R21 RASH AND NONSPECIFIC SKIN ERUPTION: Primary | ICD-10-CM

## 2024-07-24 RX ORDER — TRIAMCINOLONE ACETONIDE 1 MG/G
OINTMENT TOPICAL 2 TIMES DAILY
Qty: 30 G | Refills: 0 | Status: SHIPPED | OUTPATIENT
Start: 2024-07-24

## 2024-07-24 RX ORDER — PREDNISONE 20 MG/1
20 TABLET ORAL DAILY
Qty: 5 TABLET | Refills: 0 | Status: SHIPPED | OUTPATIENT
Start: 2024-07-24 | End: 2024-07-29

## 2024-07-24 NOTE — PROGRESS NOTES
Patient ID: Luann Mcgovern is a 58 y.o. female.    Chief Complaint: Rash (Entered automatically based on patient selection in [employer].)    The patient initiated a request through [employer] on 7/24/2024 for evaluation and management with a chief complaint of Rash (Entered automatically based on patient selection in [employer].)     I evaluated the questionnaire submission on 7/24/24.    Ohs Peq Evisit Rash    7/24/2024  1:37 PM CDT - Filed by Patient   Do you agree to participate in an E-Visit? Yes   If you have any of the following symptoms, please present to your local emergency room or call 911:  I acknowledge   What is the main issue you would like addressed today? Rash left arm   How would you describe your skin problem? Rash   When did your symptoms first appear? 7/22/2024   Where is it located?  Arm(s);  Other   Does it itch? Yes   Does it hurt? No   Is there discharge or drainage? No   Is there bleeding? No   Describe the character Raised   Describe the color Red   Has it changed over time? No change   Frequency of skin problem Always there   Duration of the skin problem (how long does it stay when it is present) Never goes away   I have had a new exposure to No new exposures   I have had a new exposure to No new exposures   What have you used to treat the skin problem? Cortizone cream   If you have used anything for treatment, has it helped the symptoms? No   Other generalized symptoms that you associate with the rash No other symptoms   Provide any additional information you feel is important.    At least one photo is required for treatment to be provided. You can upload a maximum of three photos of the affected area.     Are you able to take your vital signs? Yes   Systolic Blood Pressure: 132   Diastolic Blood Pressure: 72   Weight: 194   Height: 68   Pulse: 78   Temperature: 98.4   Respiration rate: 16   Pulse Oxygen: 98          Active Problem List with Overview Notes    Diagnosis Date Noted    Disease of  spinal cord 07/17/2024    Encounter for long-term (current) use of medications 04/09/2024     CHRONIC long-term drug therapy for managed conditions. See medication list. Reports compliance.  No side effects reported.  Routine lab work is being monitored.  Patient does need refills today. Reviewed labs.     Lab Results   Component Value Date    WBC 8.07 11/20/2023    HGB 15.1 11/20/2023    HCT 47.6 11/20/2023    MCV 94 11/20/2023     11/20/2023         Chemistry        Component Value Date/Time     11/20/2023 0832    K 3.9 11/20/2023 0832     11/20/2023 0832    CO2 25 11/20/2023 0832    BUN 23 (H) 11/20/2023 0832    CREATININE 0.9 11/20/2023 0832     (H) 11/20/2023 0832        Component Value Date/Time    CALCIUM 9.8 11/20/2023 0832    ALKPHOS 89 11/20/2023 0832    AST 26 11/20/2023 0832    ALT 35 11/20/2023 0832    BILITOT 0.3 11/20/2023 0832    ESTGFRAFRICA >60.0 10/15/2021 0843    EGFRNONAA >60.0 10/15/2021 0843        Lab Results   Component Value Date    TSH 0.261 (L) 10/06/2023    H3WPCLP 8.0 07/10/2018    FREET4 0.83 10/06/2023         Type 2 diabetes mellitus with hyperglycemia, without long-term current use of insulin 10/06/2023     April 2024: doing well with Mounjaro 5 mg weekly. She is wanting to go up to 7.5 mg. Tolerating current dose well. No SE reported.     October 2023:  Patient has been on metformin.  She reports intolerance to this medication.  She would like to try alternative medication.  Patient denies any history of thyroid cancer, pancreatitis, MEN syndrome.  Diabetes Management Status    Statin: Taking  ACE/ARB: Not taking    Screening or Prevention Patient's value Goal Complete/Controlled?   HgA1C Testing and Control   Lab Results   Component Value Date    HGBA1C 5.7 (H) 02/20/2023      Annually/Less than 8% Yes   Lipid profile : 05/08/2023 Annually Yes   LDL control Lab Results   Component Value Date    LDLCALC 86.6 02/20/2023    Annually/Less than 100 mg/dl  Yes  "  Nephropathy screening No results found for: "LABMICR"  Lab Results   Component Value Date    PROTEINUA Negative 11/29/2021     No results found for: "UTPCR"   Annually No   Blood pressure BP Readings from Last 1 Encounters:   10/06/23 130/88    Less than 140/90 Yes   Dilated retinal exam Most Recent Eye Exam Date: Not Found Annually No   Foot exam   Most Recent Foot Exam Date: Not Found Annually No            Intertrigo 10/06/2023     Chronic.  Patient states that she developed this condition after COVID.  She uses triamcinolone and nystatin for treatment.  Requesting refills.  Does not follow with Dermatology.      Vitamin D deficiency 10/06/2023     Chronic. Vit d deficiency. Takes vitamin d supplement. No SE reported. Fatigue is slightly improved.   Lab Results   Component Value Date    FNGSQMSL44YL 27 (L) 09/15/2022    SSLJBWLY91CU 23 (L) 10/15/2021    CFFQPFSE72QG 15 (L) 04/20/2021            Nausea in adult patient 10/06/2023     Chronic.  Intermittent control.  Patient uses Phenergan and Zofran as needed.      Post-COVID chronic cough 10/06/2023     Patient had a bad case of COVID.  Continues to have chronic cough.  Patient uses inhalers.  And Tessalon Perles.      Gastroesophageal reflux disease with esophagitis without hemorrhage 10/06/2023     Chronic.  Stable.  Patient reports compliance with pantoprazole.  No side effects reported.  Symptoms are controlled.      Major depression in partial remission 09/15/2022    Severe obesity (BMI 35.0-35.9 with comorbidity) 05/05/2021    PTSD (post-traumatic stress disorder) 03/01/2021     Chronic.  Stable.  Patient reports that she is doing well on trazodone 150 milligrams at bedtime and Lexapro 20 milligrams daily.  Reports compliance.  No side effects reported.  Symptoms are intermittently controlled.  Denies any SI HI or hallucinations.      Generalized anxiety disorder with panic attacks 03/01/2021    Asthma 01/22/2021    Chronic pain of both shoulders " 2019    Mild intermittent asthma without complication 10/12/2017     Usually only flares when sick        Recent Labs Obtained:  No visits with results within 7 Day(s) from this visit.   Latest known visit with results is:   Lab Visit on 2024   Component Date Value Ref Range Status    TSH 2024 0.760  0.400 - 4.000 uIU/mL Final     Encounter Diagnosis   Name Primary?    Rash and nonspecific skin eruption Yes      No orders of the defined types were placed in this encounter.     Medications Ordered This Encounter   Medications    predniSONE (DELTASONE) 20 MG tablet     Sig: Take 1 tablet (20 mg total) by mouth once daily. for 5 days     Dispense:  5 tablet     Refill:  0    triamcinolone acetonide 0.1% (KENALOG) 0.1 % ointment     Sig: Apply topically 2 (two) times daily.     Dispense:  30 g     Refill:  0        E-Visit Time Trackin minutes

## 2024-07-30 DIAGNOSIS — R11.0 NAUSEA IN ADULT PATIENT: ICD-10-CM

## 2024-07-30 RX ORDER — PROMETHAZINE HYDROCHLORIDE 25 MG/1
TABLET ORAL
Qty: 90 TABLET | Refills: 0 | Status: SHIPPED | OUTPATIENT
Start: 2024-07-30

## 2024-08-26 ENCOUNTER — TELEPHONE (OUTPATIENT)
Dept: ORTHOPEDICS | Facility: CLINIC | Age: 59
End: 2024-08-26
Payer: MEDICARE

## 2024-08-26 NOTE — TELEPHONE ENCOUNTER
Tried reaching pt 3times to see if she could make 2:40 appt today with Dr. Kasper in [place] as that is the only opening today. No answer either time LVM

## 2024-08-26 NOTE — TELEPHONE ENCOUNTER
----- Message from Bonifacio Lozano sent at 8/26/2024 11:48 AM CDT -----  Contact: pancho  Type:  Same Day Appointment Request    Caller is requesting a same day appointment.  Caller declined first available appointment listed below.    Name of Caller:pancho  When is the first available appointment?unknown  Symptoms:trigger finger  Best Call Back Number:[phone]  Additional Information:

## 2024-09-11 ENCOUNTER — PATIENT MESSAGE (OUTPATIENT)
Dept: FAMILY MEDICINE | Facility: CLINIC | Age: 59
End: 2024-09-11
Payer: MEDICARE

## 2024-09-20 ENCOUNTER — HOSPITAL ENCOUNTER (OUTPATIENT)
Dept: RADIOLOGY | Facility: HOSPITAL | Age: 59
Discharge: HOME | End: 2024-09-20
Attending: NURSE PRACTITIONER
Payer: MEDICARE

## 2024-09-20 ENCOUNTER — OFFICE VISIT (OUTPATIENT)
Dept: FAMILY MEDICINE | Facility: CLINIC | Age: 59
End: 2024-09-20
Payer: MEDICARE

## 2024-09-20 VITALS
HEIGHT: 68 IN | BODY MASS INDEX: 28.62 KG/M2 | TEMPERATURE: 98 F | OXYGEN SATURATION: 97 % | HEART RATE: 77 BPM | DIASTOLIC BLOOD PRESSURE: 76 MMHG | SYSTOLIC BLOOD PRESSURE: 120 MMHG | WEIGHT: 188.88 LBS | RESPIRATION RATE: 16 BRPM

## 2024-09-20 DIAGNOSIS — S61.239A: ICD-10-CM

## 2024-09-20 DIAGNOSIS — S61.239A: Primary | ICD-10-CM

## 2024-09-20 DIAGNOSIS — S90.861A INSECT BITE OF RIGHT FOOT, INITIAL ENCOUNTER: ICD-10-CM

## 2024-09-20 DIAGNOSIS — K21.00 GASTROESOPHAGEAL REFLUX DISEASE WITH ESOPHAGITIS WITHOUT HEMORRHAGE: ICD-10-CM
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
## 2024-09-20 DIAGNOSIS — R11.0 NAUSEA IN ADULT PATIENT: ICD-10-CM

## 2024-09-20 DIAGNOSIS — J45.909 ASTHMA, UNSPECIFIED ASTHMA SEVERITY, UNSPECIFIED WHETHER COMPLICATED, UNSPECIFIED WHETHER PERSISTENT: ICD-10-CM

## 2024-09-20 PROCEDURE — 99999 PR PBB SHADOW E&M-EST. PATIENT-LVL V: CPT | Mod: PBBFAC,,, | Performed by: NURSE PRACTITIONER

## 2024-09-20 PROCEDURE — 73130 X-RAY EXAM OF HAND: CPT | Mod: 26,RT,, | Performed by: RADIOLOGY

## 2024-09-20 PROCEDURE — 73130 X-RAY EXAM OF HAND: CPT | Mod: TC,PO,RT

## 2024-09-20 RX ORDER — PROMETHAZINE HYDROCHLORIDE 25 MG/1
TABLET ORAL
Qty: 90 TABLET | Refills: 0 | Status: SHIPPED | OUTPATIENT
Start: 2024-09-20

## 2024-09-20 RX ORDER — MUPIROCIN 20 MG/G
OINTMENT TOPICAL 3 TIMES DAILY
Qty: 22 G | Refills: 0 | Status: SHIPPED | OUTPATIENT
Start: 2024-09-20

## 2024-09-20 RX ORDER — ALBUTEROL SULFATE 0.83 MG/ML
2.5 SOLUTION RESPIRATORY (INHALATION) EVERY 6 HOURS PRN
Qty: 180 ML | Refills: 1 | Status: SHIPPED | OUTPATIENT
Start: 2024-09-20

## 2024-09-20 RX ORDER — DOXYCYCLINE 100 MG/1
100 CAPSULE ORAL 2 TIMES DAILY
Qty: 20 CAPSULE | Refills: 0 | Status: SHIPPED | OUTPATIENT
Start: 2024-09-20

## 2024-09-20 NOTE — TELEPHONE ENCOUNTER
Care Due:                  Date            Visit Type   Department     Provider  --------------------------------------------------------------------------------                                             [facility] FAMILY  Last Visit: 10-      [facility]          KYMBERLY Zambrano                               -                              PRIMARY      [facility] FAM[facility]  Next Visit: 10-      CARE ([facility])   MEDICINE       Fred Zambrano                                                            Last  Test          Frequency    Reason                     Performed    Due Date  --------------------------------------------------------------------------------    Vitamin D...  12 months..  ergocalciferol...........  10-   10-    [facility] Embedded Care Due Messages. Reference number: [id number].   9/20/2024 4:03:29 PM CDT

## 2024-09-20 NOTE — PROGRESS NOTES
Assessment/Plan:  Problem List Items Addressed This Visit          Pulmonary    Asthma    Overview     Chronic. Intermittent control. She is requesting refill on albuterol neb solution. She has been around dust. Denies current cough, wheezing, SOB.          Current Assessment & Plan     Albuterol neb solution refilled. Avoid triggers.          Relevant Medications    albuterol (PROVENTIL) 2.5 mg /3 mL (0.083 %) nebulizer solution       GI    Nausea in adult patient    Overview     Chronic.  Intermittent control.  Patient uses Phenergan and Zofran as needed.          Current Assessment & Plan     Follow-up with GI.  Refill medications.         Relevant Medications    promethazine (PHENERGAN) 25 MG tablet     Other Visit Diagnoses       Puncture wound of finger without foreign body    -  Primary    Relevant Medications    DIPH,PERTUSS(ACEL),TET VAC(PF)(ADULT)(ADACEL)(TDaP) (Completed) (Start on 9/20/2024  9:00 AM)    Other Relevant Orders    X-Ray Hand Complete Right    Insect bite of right foot, initial encounter        Relevant Medications    doxycycline (VIBRAMYCIN) 100 MG Cap    mupirocin (BACTROBAN) 2 % ointment        Start oral ABX as prescribed  Apply topical ABX to wounds/bites  Keep areas clean and dry  Will obtain xray right hand  Update Tdap w/ recent nail injury   Medications refilled as requested   Follow up if symptoms worsen or fail to improve.  ER precautions for severe or worsening symptoms.     Roslyn Neff NP  _____________________________________________________________________________________________________________________________________________________    History of Present Illness    CHIEF COMPLAINT:  Ms. Mcgovern presents today for multiple injuries and insect bites.    HAND INJURIES:  She sustained a hand injury about 1.5 weeks ago while cleaning ceiling vents with a . Despite wearing gloves, her fingernails were damaged. She treated the affected nails by soaking them in  O'Keeffe's liquid, and most have healed except for the pinky finger. The pinky finger was further injured by a be nail either yesterday or the day before, penetrating the side of the finger. There has been no redness, warmth, drainage, fever, chills. She has been using OTC treatments and Mepiplex bandages. She also sustained a burn on her hand on September 11th, which is still painful, especially when impacted, but appears to be healing. She has been using OTC treatments for the burn. Her last tetanus vaccine was in 2017.     INSECT BITES:  She experienced black ant bites on her feet on July 10th when exiting her car in an area with tall grass. Some of the bites are purulent with one particular bite larger than others and puss filled. Area is pruritic. She has been using an antibiotic spray on the affected areas.    RESPIRATORY ISSUES:  She requests refills for Phenergan and albuterol for nebulizer. She reports her breathing is generally good but mentions experiencing increased respiratory symptoms due to dust exposure from cleaning and outdoor activities such as mowing the lawn. Denies current cough, wheezing, SOB, chest pain.     Past Medical History:  Past Medical History:   Diagnosis Date    Allergy     seasonal     Anxiety     Arthritis     back    Asthma     seasonal    Chronic shoulder pain     Depression     GERD (gastroesophageal reflux disease)     Insomnia     Migraine     Nephrolithiasis     Pneumonia     PONV (postoperative nausea and vomiting)     Type 2 diabetes mellitus with hyperglycemia, without long-term current use of insulin 10/6/2023     Past Surgical History:   Procedure Laterality Date    ADENOIDECTOMY      APPENDECTOMY      CERVICAL FUSION  09/14/2020    C3-6    CHOLECYSTECTOMY      COLONOSCOPY N/A 11/16/2023    Procedure: COLONOSCOPY;  Surgeon: Alex Moy MD;  Location: [facility];  Service: Endoscopy;  Laterality: N/A;    CYSTOSCOPY W/ URETERAL STENT PLACEMENT Left 03/31/2021     Procedure: CYSTOSCOPY, WITH URETERAL STENT INSERTION;  Surgeon: Dexter Sadler MD;  Location: [facility] OR;  Service: Urology;  Laterality: Left;    CYSTOURETEROSCOPY WITH RETROGRADE PYELOGRAPHY AND INSERTION OF STENT INTO URETER Right 01/21/2021    Procedure: CYSTOURETEROSCOPY, WITH RETROGRADE PYELOGRAM AND URETERAL STENT INSERTION;  Surgeon: Dexter Sadler MD;  Location: [facility] OR;  Service: Urology;  Laterality: Right;    KNEE SURGERY Left     age 13 - injury - no tears, just cleaned it up     LUMBAR FUSION  2013    L5-S1    LUMBAR NERVE STIMLATOR INSERTION  10/2018    SPINE SURGERY      TONSILLECTOMY      TUBAL LIGATION  01/19/1990    URETEROSCOPIC REMOVAL OF URETERIC CALCULUS Left 04/07/2021    Procedure: REMOVAL, CALCULUS, URETER, URETEROSCOPIC;  Surgeon: Dexter Sadler MD;  Location: [facility] OR;  Service: Urology;  Laterality: Left;    VAGINAL DELIVERY      2 births      Review of patient's allergies indicates:   Allergen Reactions    Meloxicam Anaphylaxis     Chest and neck pain .  Decrease O2    Iodinated contrast media      Pt states that she is not allergic omn[place] dye, but is only allergic to the older dyes.    Iodine and iodide containing products      Injectables ???     Loperamide-simethicone     Metoclopramide hcl      reglan     Diltiazem hcl Rash and Swelling     Social History     Tobacco Use    Smoking status: Never    Smokeless tobacco: Never    Tobacco comments:     None, 1988 everywhere that I was or in my house-none   Substance Use Topics    Alcohol use: No    Drug use: No     Family History   Problem Relation Name Age of Onset    Thyroid disease Mother Meme Nicole     Depression Mother Meme Nicole     Hearing loss Mother Meme Nicole     Vision loss Mother Meme Nicole     Heart disease Father Vivek     COPD Father Vivek     Supraventricular tachycardia Sister      Heart disease Brother Brother         smoking /stents - 40's    Heart disease Paternal Grandfather  Trung Risa     Arthritis Brother Trung     Hyperlipidemia Brother Trung      Current Outpatient Medications on File Prior to Visit   Medication Sig Dispense Refill    aspirin-acetaminophen-caffeine 250-250-65 mg (EXCEDRIN MIGRAINE) 250-250-65 mg per tablet Take 1 tablet by mouth as needed.       azelastine (ASTELIN) 137 mcg (0.1 %) nasal spray 1 spray (137 mcg total) by Nasal route 2 (two) times daily. 30 mL 5    benzonatate (TESSALON) 200 MG capsule Take 1 capsule (200 mg total) by mouth 3 (three) times daily as needed for Cough. 180 capsule 3    blood sugar diagnostic Strp Use to check glucose daily 100 strip 3    blood-glucose meter kit Use as instructed 1 each 0    cetirizine (ZYRTEC) 10 MG tablet Take 10 mg by mouth every evening.      diazePAM (VALIUM) 10 MG Tab Take 10 mg by mouth.      diphenhydrAMINE (BENADRYL) 25 mg capsule Take 25 mg by mouth nightly as needed for Itching.      ergocalciferol (ERGOCALCIFEROL) 50,000 unit Cap Take 1 capsule (50,000 Units total) by mouth every 7 days. 12 capsule 3    furosemide (LASIX) 40 MG tablet Take 40 mg by mouth 2 (two) times daily.      gabapentin (NEURONTIN) 600 MG tablet Take 1 tablet (600 mg total) by mouth 2 (two) times daily. 60 tablet 11    ipratropium (ATROVENT) 0.02 % nebulizer solution NEBULIZE CONTENTS OF 1 VIAL AS NEEDED FOR WHEEZING *THANK YOU* 150 mL 6    lancets Misc Use to check glucose daily 100 each 3    lovastatin (MEVACOR) 40 MG tablet Take 40 mg by mouth once daily.      NARCAN 4 mg/actuation [place] CALL 911. ADMINISTER A SINGLE SPRAY INTRANASALLY INTO ONE NOSTRIL UPON SIGNS OF OPIOID OVERDOSE. MAY REPEAT AFTER 3 MINUTES IF NO RESPONSE.      nystatin-triamcinolone (MYCOLOG II) cream Apply topically 3 (three) times daily.      ondansetron (ZOFRAN) 8 MG tablet TAKE 1 TABLET EVERY 8 HOURS AS NEEDED FOR NAUSEA AND VOMITING *THANK YOU* ** MAY CAUSE DROWSINESS ** 60 tablet 3    pantoprazole (PROTONIX) 40 MG tablet TAKE 1 TABLET TWICE DAILY *THANK YOU* 180  "tablet 1    tirzepatide (MOUNJARO) 7.5 mg/0.5 mL PnIj Inject 7.5 mg into the skin every 7 days. 12 Pen 1    tiZANidine 4 mg Cap Take 4 mg by mouth as needed.       triamcinolone acetonide 0.1% (KENALOG) 0.1 % ointment Apply topically 2 (two) times daily. 30 g 0    verapamiL (CALAN) 80 MG tablet Take by mouth.      [DISCONTINUED] albuterol (PROVENTIL) 2.5 mg /3 mL (0.083 %) nebulizer solution NEBULIZE CONTENTS OF 1 VIAL EVERY 4 TO 6 HOURS AS NEEDED FOR WHEEZING FOR 10 DAYS *THANK YOU* 180 mL 1    [DISCONTINUED] promethazine (PHENERGAN) 25 MG tablet TAKE 1 TABLET BY MOUTH EVERY 6 HOURS AS NEEDED 90 tablet 0     No current facility-administered medications on file prior to visit.     Review of Systems   Constitutional:  Negative for appetite change, chills, fatigue and fever.   HENT:  Negative for congestion, rhinorrhea and sore throat.    Eyes:  Negative for visual disturbance.   Respiratory:  Negative for cough and shortness of breath.    Cardiovascular:  Negative for chest pain, palpitations and leg swelling.   Gastrointestinal:  Negative for abdominal pain, diarrhea and vomiting.   Genitourinary:  Negative for difficulty urinating, dysuria and hematuria.   Musculoskeletal:  Negative for arthralgias and myalgias.   Skin:  Positive for wound. Negative for rash.   Neurological:  Negative for dizziness and headaches.   Psychiatric/Behavioral:  Negative for behavioral problems. The patient is not nervous/anxious.      Vitals:    09/20/24 0833   BP: 120/76   Pulse: 77   Resp: 16   Temp: 98.1 °F (36.7 °C)   TempSrc: Oral   SpO2: 97%   Weight: 85.7 kg (188 lb 14.4 oz)   Height: 5' 8" (1.727 m)     Wt Readings from Last 3 Encounters:   09/20/24 85.7 kg (188 lb 14.4 oz)   07/17/24 88.9 kg (196 lb)   04/09/24 94.8 kg (209 lb)     Physical Exam  Vitals reviewed.   Constitutional:       General: She is not in acute distress.     Appearance: Normal appearance. She is not ill-appearing.   HENT:      Head: Normocephalic and " atraumatic.      Right Ear: External ear normal.      Left Ear: External ear normal.      Nose: Nose normal.   Eyes:      Extraocular Movements: Extraocular movements intact.      Conjunctiva/sclera: Conjunctivae normal.   Cardiovascular:      Rate and Rhythm: Normal rate.      Heart sounds: Normal heart sounds.   Pulmonary:      Effort: Pulmonary effort is normal. No respiratory distress.      Breath sounds: Normal breath sounds.   Abdominal:      General: Abdomen is flat. There is no distension.   Musculoskeletal:         General: Normal range of motion.      Cervical back: Normal range of motion.   Skin:     General: Skin is warm and dry.      Capillary Refill: Capillary refill takes less than 2 seconds.      Coloration: Skin is not pale.      Findings: Abscess (purulent insect bite to right foot - see photo) and wound (+small, penpoint puncture wound to right pinky finger laterally. no obvious nail damage. no drainage, redness, warmth.) present. No rash.   Neurological:      General: No focal deficit present.      Mental Status: She is alert and oriented to person, place, and time. Mental status is at baseline.   Psychiatric:         Mood and Affect: Mood normal.         Speech: Speech normal. Speech is not rapid and pressured, delayed or slurred.         Behavior: Behavior normal. Behavior is not agitated, slowed, aggressive, withdrawn, hyperactive or combative. Behavior is cooperative.         Thought Content: Thought content normal.         Judgment: Judgment normal.               Health Maintenance   Topic Date Due    Shingles Vaccine (2 of 3) 04/28/2016    Mammogram  10/09/2024    Hemoglobin A1c  10/09/2024    Low Dose Statin  04/09/2025    Lipid Panel  06/20/2025    Foot Exam  07/17/2025    Eye Exam  07/22/2025    Colorectal Cancer Screening  11/16/2028    TETANUS VACCINE  09/20/2034    Hepatitis C Screening  Completed     DISCLAIMER: This note was compiled by using a speech recognition dictation system  and therefore please be aware that typographical / speech recognition errors can and do occur.  Please contact me if you see any errors specifically.  Consent was obtained for [employer] recording system prior to the visit.

## 2024-09-21 NOTE — TELEPHONE ENCOUNTER
Refill Routing Note   Medication(s) are not appropriate for processing by [facility] for the following reason(s):        Outside of protocol    [facility] action(s):  Defer        Medication Therapy Plan: Total daily dose outside of [facility] protocol:      Appointments  past 12m or future 3m with PCP    Date Provider   Last Visit   Visit date not found Fred Zambrano MD   Next Visit   10/18/2024 Fred Zambrano MD   ED visits in past 90 days: 0        Note composed:12:22 PM 09/21/2024

## 2024-09-22 RX ORDER — PANTOPRAZOLE SODIUM 40 MG/1
TABLET, DELAYED RELEASE ORAL
Qty: 180 TABLET | Refills: 1 | Status: SHIPPED | OUTPATIENT
Start: 2024-09-22

## 2024-10-11 ENCOUNTER — TELEPHONE (OUTPATIENT)
Dept: FAMILY MEDICINE | Facility: CLINIC | Age: 59
End: 2024-10-11
Payer: MEDICARE

## 2024-10-11 DIAGNOSIS — Z12.31 BREAST CANCER SCREENING BY MAMMOGRAM: Primary | ICD-10-CM

## 2024-10-11 NOTE — TELEPHONE ENCOUNTER
----- Message from Eric sent at 10/11/2024  9:51 AM CDT -----  Contact: Luann  Contact: First and Last Name if other than the patient involved:  Luann   Requests an order for:  mammogram  Reason for request: annual  Callback expected: yes  [phone]  Best time to call back: anytime  Thanks   Am

## 2024-10-16 DIAGNOSIS — E11.9 TYPE 2 DIABETES MELLITUS WITHOUT COMPLICATION: ICD-10-CM

## 2024-10-23 ENCOUNTER — OFFICE VISIT (OUTPATIENT)
Dept: FAMILY MEDICINE | Facility: CLINIC | Age: 59
End: 2024-10-23
Payer: MEDICARE

## 2024-10-23 ENCOUNTER — HOSPITAL ENCOUNTER (OUTPATIENT)
Dept: RADIOLOGY | Facility: HOSPITAL | Age: 59
Discharge: HOME | End: 2024-10-23
Attending: FAMILY MEDICINE
Payer: MEDICARE

## 2024-10-23 ENCOUNTER — HOSPITAL ENCOUNTER (OUTPATIENT)
Dept: RADIOLOGY | Facility: HOSPITAL | Age: 59
Discharge: HOME | End: 2024-10-23
Attending: NURSE PRACTITIONER
Payer: MEDICARE

## 2024-10-23 ENCOUNTER — TELEPHONE (OUTPATIENT)
Dept: FAMILY MEDICINE | Facility: CLINIC | Age: 59
End: 2024-10-23
Payer: MEDICARE

## 2024-10-23 VITALS
BODY MASS INDEX: 27.71 KG/M2 | TEMPERATURE: 98 F | DIASTOLIC BLOOD PRESSURE: 60 MMHG | HEIGHT: 68 IN | RESPIRATION RATE: 16 BRPM | WEIGHT: 182.81 LBS | SYSTOLIC BLOOD PRESSURE: 102 MMHG | HEART RATE: 85 BPM | OXYGEN SATURATION: 96 %
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
## 2024-10-23 DIAGNOSIS — E55.9 VITAMIN D DEFICIENCY: Chronic | ICD-10-CM

## 2024-10-23 DIAGNOSIS — S09.90XA HEAD TRAUMA, INITIAL ENCOUNTER: ICD-10-CM

## 2024-10-23 DIAGNOSIS — M25.512 ACUTE PAIN OF LEFT SHOULDER: ICD-10-CM

## 2024-10-23 DIAGNOSIS — M79.642 LEFT HAND PAIN: ICD-10-CM

## 2024-10-23 DIAGNOSIS — Z12.31 BREAST CANCER SCREENING BY MAMMOGRAM: ICD-10-CM

## 2024-10-23 DIAGNOSIS — R93.89 ABNORMAL CT OF THE CHEST: ICD-10-CM

## 2024-10-23 DIAGNOSIS — Z79.899 ENCOUNTER FOR LONG-TERM (CURRENT) USE OF MEDICATIONS: ICD-10-CM

## 2024-10-23 DIAGNOSIS — R91.1 SOLITARY PULMONARY NODULE: ICD-10-CM

## 2024-10-23 DIAGNOSIS — J32.9 CHRONIC SINUSITIS, UNSPECIFIED LOCATION: Primary | ICD-10-CM

## 2024-10-23 DIAGNOSIS — E11.65 TYPE 2 DIABETES MELLITUS WITH HYPERGLYCEMIA, WITHOUT LONG-TERM CURRENT USE OF INSULIN: Chronic | ICD-10-CM

## 2024-10-23 DIAGNOSIS — R93.89 ABNORMAL X-RAY: ICD-10-CM

## 2024-10-23 PROCEDURE — 73130 X-RAY EXAM OF HAND: CPT | Mod: TC,PO,LT

## 2024-10-23 PROCEDURE — 99999 PR PBB SHADOW E&M-EST. PATIENT-LVL V: CPT | Mod: PBBFAC,,, | Performed by: NURSE PRACTITIONER

## 2024-10-23 PROCEDURE — 71250 CT THORAX DX C-: CPT | Mod: TC

## 2024-10-23 PROCEDURE — 73030 X-RAY EXAM OF SHOULDER: CPT | Mod: TC,PO,LT

## 2024-10-23 PROCEDURE — 77063 BREAST TOMOSYNTHESIS BI: CPT | Mod: 26,,, | Performed by: RADIOLOGY

## 2024-10-23 PROCEDURE — 77067 SCR MAMMO BI INCL CAD: CPT | Mod: 26,,, | Performed by: RADIOLOGY

## 2024-10-23 PROCEDURE — 70450 CT HEAD/BRAIN W/O DYE: CPT | Mod: TC

## 2024-10-23 PROCEDURE — 73130 X-RAY EXAM OF HAND: CPT | Mod: 26,LT,, | Performed by: RADIOLOGY

## 2024-10-23 PROCEDURE — 77063 BREAST TOMOSYNTHESIS BI: CPT | Mod: TC,PO

## 2024-10-23 PROCEDURE — 71250 CT THORAX DX C-: CPT | Mod: 26,,, | Performed by: RADIOLOGY

## 2024-10-23 PROCEDURE — 70450 CT HEAD/BRAIN W/O DYE: CPT | Mod: 26,,, | Performed by: RADIOLOGY

## 2024-10-23 PROCEDURE — 73030 X-RAY EXAM OF SHOULDER: CPT | Mod: 26,LT,, | Performed by: RADIOLOGY

## 2024-10-23 PROCEDURE — 77067 SCR MAMMO BI INCL CAD: CPT | Mod: TC,PO

## 2024-10-23 RX ORDER — TIRZEPATIDE 7.5 MG/.5ML
7.5 INJECTION, SOLUTION SUBCUTANEOUS
Qty: 12 PEN | Refills: 1 | Status: SHIPPED | OUTPATIENT
Start: 2024-10-23

## 2024-10-23 NOTE — PROGRESS NOTES
Assessment/Plan:  Problem List Items Addressed This Visit          Psychiatric    Encounter for long-term (current) use of medications    Overview     CHRONIC long-term drug therapy for managed conditions. See medication list. Reports compliance.  No side effects reported.  Routine lab work is being monitored.  Patient does need refills today. Reviewed labs.      Lab Results   Component Value Date    WBC 8.07 11/20/2023    HGB 15.1 11/20/2023    HCT 47.6 11/20/2023    MCV 94 11/20/2023     11/20/2023         Chemistry        Component Value Date/Time     11/20/2023 0832    K 3.9 11/20/2023 0832     11/20/2023 0832    CO2 25 11/20/2023 0832    BUN 23 (H) 11/20/2023 0832    CREATININE 0.9 11/20/2023 0832     (H) 11/20/2023 0832        Component Value Date/Time    CALCIUM 9.8 11/20/2023 0832    ALKPHOS 89 11/20/2023 0832    AST 26 11/20/2023 0832    ALT 35 11/20/2023 0832    BILITOT 0.3 11/20/2023 0832    ESTGFRAFRICA >60.0 10/15/2021 0843    EGFRNONAA >60.0 10/15/2021 0843        Lab Results   Component Value Date    TSH 0.261 (L) 10/06/2023    N1DROOH 8.0 07/10/2018    FREET4 0.83 10/06/2023            Current Assessment & Plan     Labs today. Complete history and physical was completed today.  Complete and thorough medication reconciliation was performed.  Discussed risks and benefits of medications.  Advised patient on orders and health maintenance.  We discussed old records and old labs if available.  Will request any records not available through epic.  Continue current medications listed on your summary sheet.         Relevant Orders    CBC Without Differential (Completed)    Comprehensive Metabolic Panel (Completed)    Hemoglobin A1C (Completed)    TSH (Completed)    Lipid Panel (Completed)    Vitamin D (Completed)       Endocrine    Type 2 diabetes mellitus with hyperglycemia, without long-term current use of insulin (Chronic)    Overview     October 2024: doing well with mounjaro 7.5  "mg weekly. She would like to stay on current regimen. No SE reported.     April 2024: doing well with Mounjaro 5 mg weekly. She is wanting to go up to 7.5 mg. Tolerating current dose well. No SE reported.     October 2023:  Patient has been on metformin.  She reports intolerance to this medication.  She would like to try alternative medication.  Patient denies any history of thyroid cancer, pancreatitis, MEN syndrome.  Diabetes Management Status    Statin: Taking  ACE/ARB: Not taking    Screening or Prevention Patient's value Goal Complete/Controlled?   HgA1C Testing and Control   Lab Results   Component Value Date    HGBA1C 5.7 (H) 02/20/2023      Annually/Less than 8% Yes   Lipid profile : 05/08/2023 Annually Yes   LDL control Lab Results   Component Value Date    LDLCALC 86.6 02/20/2023    Annually/Less than 100 mg/dl  Yes   Nephropathy screening No results found for: "LABMICR"  Lab Results   Component Value Date    PROTEINUA Negative 11/29/2021     No results found for: "UTPCR"   Annually No   Blood pressure BP Readings from Last 1 Encounters:   10/06/23 130/88    Less than 140/90 Yes   Dilated retinal exam Most Recent Eye Exam Date: Not Found Annually No   Foot exam   Most Recent Foot Exam Date: Not Found Annually No               Current Assessment & Plan     Update A1C. Refill Mounjaro. Plan to monitor hemoglobin A1C at designated intervals 3-6 months.  I recommend ongoing Education for diabetic diet and exercise protocol. Follow up with Ophthalmology/Optometry and Podiatry at least annually.          Relevant Medications    tirzepatide (MOUNJARO) 7.5 mg/0.5 mL PnIj    Other Relevant Orders    Hemoglobin A1C (Completed)    Vitamin D deficiency (Chronic)    Overview     Chronic. Vit d deficiency. Takes vitamin d supplement. No SE reported. Fatigue is slightly improved.     Lab Results   Component Value Date    ENHMOGMF51SP 27 (L) 09/15/2022    GGASZXVC71FV 23 (L) 10/15/2021    PIBLLJKX02DW 15 (L) 04/20/2021    "            Current Assessment & Plan     Continue vitamin-D supplement.  Monitor level.         Relevant Orders    Vitamin D (Completed)       Other    Abnormal CT of the chest    Overview     CT Chest Without Contrast  Narrative & Impression  EXAMINATION:  CT CHEST WITHOUT CONTRAST     CLINICAL HISTORY:  Cough;Shortness of breath; Cough     TECHNIQUE:  Using low dose technique the chest was surveyed from above the pulmonary apices through the posterior costophrenic angles.  Data was reconstructed for multiplanar images in axial, sagittal and coronal planes and for maximal intensity projection images in the the axial, sagittal and coronal planes.     All CT scans at this facility use dose modulation, iterative reconstruction and/or weight based dosing when appropriate to reduce radiation dose to as low as reasonably achievable.     Xray dose: DLP = 688.06 mGy-cm.     COMPARISON:  Chest radiograph: 02/04/2021.     Renal stone CT: 01/21/2021.     CT chest: 11/28/2018.     FINDINGS:  Devices: The patient is status post anterior fusion at the cervicothoracic junction of the spine with hardware incompletely included.  Stimulating leads are present in the midthoracic spine.     Base of Neck: No significant abnormality.     Aorta: Left-sided aortic arch with 3 arterial branches. The aorta maintains normal caliber, contour and course. There is no calcification of the thoracic aorta.  There is  no coronary artery calcification.     Heart/pericardium: Normal size.  No pericardial fluid or calcification.     Pulmonary vasculature: Pulmonary arteries distribute normally.  There are four pulmonary veins that return to the left atrium.     Sole/Mediastinum: Numerous prominent mediastinal nodes, the largest is a pre carinal node that measures 0.9 cm (axial series 2, image 45).  No mediastinal cece enlargement identified.  Hilar contours appear unremarkable on these nonenhanced images.     Esophagus, diaphragm and upper abdomen:      -Esophagus appears unremarkable.     -Right hemidiaphragm is elevated.  Fluoroscopic sniff test performed in December 2018 revealed no paradoxical motion arguing against phrenic nerve paresis and favoring eventration.     -Postop changes cholecystectomy.     Thoracic soft tissues: Normal. Both breasts are present.     Bones: No acute fracture. No suspicious lytic or sclerotic lesion.  Postsurgical changes of anterior spinal fusion in the superior thoracic vertebra.  Spinal stimulator in stable position     Airways: Patent.     Lungs:     -As noted above, elevation of the right hemidiaphragm results in compressive effects in the right middle and lower lobes.  This could account for or contribute to the patient's shortness of breath.  Subsegmental bandlike opacity in the lingula is compatible with cicatricial atelectasis and dates back to renal stone CT of 01/21/2021.     -Subcentimeter pulmonary nodules again identified:     --0.3 cm micronodule in the superior basal segment of the right upper lobe (axial series 4, image 238), unchanged from prior exam.     --0.7 cm solid nodule in the apicoposterior segment of the left upper lobe (axial series 4, image 132), previously measured 0.4 cm.     --No new lesions identified.     Ground-glass opacity in the medial basal segment of the right lower lobe adjacent to bulky thoracic spine osteophyte (axial series 4, image 311) is a common finding in patients with adjacent thoracic osteophytes.  It is unlikely to be clinically significant.  No surveillance recommended..     Pleura: Oblique fissures are incomplete, a normal congenital variant.  No pleural fluid or thickening.No pleural calcification or hyalinized plaque.     Impression:     Stable solid subcentimeter pulmonary nodules.  Subsegmental atelectasis the right lung base.  No new lesions identified.     No acute abnormalities in this patient with shortness of breath and cough.     Electronically signed by resident:  Jossie Carreon  Date:                                            05/03/2021  Time:                                           15:15     Electronically signed by:Melisa Vigil MD  Date:                                            05/03/2021  Time:                                           16:04            Current Assessment & Plan     Update CT scan of the chest.           Other Visit Diagnoses       Fall, initial encounter    -  Primary    Head trauma, initial encounter        Relevant Orders    CT Head Without Contrast (Completed)    Left hand pain        Relevant Orders    X-Ray Hand 3 view Left (Completed)    Acute pain of left shoulder        Relevant Orders    X-Ray Shoulder 2 or More Views Left (Completed)        Obtain imaging today  Update labs  Update CT chest  Chronic condition has been reviewed and remains stable  Follow up if symptoms worsen or fail to improve.  ER precautions for severe or worsening symptoms.     Roslyn Neff, CHERYL  _____________________________________________________________________________________________________________________________________________________    CC: fall    HPI: Patient is a 58-year-old female who originally presents in clinic today as an established patient here for her annual wellness. However, she notes that she has had a recent fall. She reports multiple injuries from a fall a couple days ago. She reports that while pulling down a ladder for the attic, the ladder fell on top of her. The ladder hit her directly in the head. She reports an indention on her head. She did fall to the ground. She has had left hand pain and left shoulder pain. Hand injury includes bruising, swelling, and pain with certain movements, complicated by a history of hand surgery on the left hand in the past. Shoulder pain presents with limited range of motion and pain upon movement. Even with movements such as reaching back to put on her seat belt. Head injury involves bruising and  tenderness to the scalp. There was no loss of consciousness. She does have headaches. There is no dizziness, confusion, weakness, numbness, tingling, gait problems. She did not seek medical evaluation for these injuries.     RESPIRATORY SYMPTOMS:  She reports a productive cough with 'nasty' sputum from her chest, distinct from sinus-related discharge. She has a history off complications after having covid 2021. She had a CT chest in the past which was abnormal. She has not had any follow up CT imaging. There is no fever, chills, chest pain, SOB, wheezing.     WEIGHT MANAGEMENT:  She reports significant weight loss progress, from approximately 240 lbs one year ago to currently maintaining between 178 to 184 lbs. She notes daily fluctuations depending on time of day and scale used. Her goal is to reach 175 lbs. She attributes her success to learning proper eating habits and expresses confidence in maintaining her weight loss.    MEDICATIONS:  She reports compliance with her current medication regimen. Chronic condition has been reviewed. Further details as stated above. She is fasting and would like to update labs today.     Past Medical History:  Past Medical History:   Diagnosis Date    Allergy     seasonal     Anxiety     Arthritis     back    Asthma     seasonal    Chronic shoulder pain     Depression     GERD (gastroesophageal reflux disease)     Insomnia     Migraine     Nephrolithiasis     Pneumonia     PONV (postoperative nausea and vomiting)     Type 2 diabetes mellitus with hyperglycemia, without long-term current use of insulin 10/6/2023     Past Surgical History:   Procedure Laterality Date    ADENOIDECTOMY      APPENDECTOMY      CERVICAL FUSION  09/14/2020    C3-6    CHOLECYSTECTOMY      COLONOSCOPY N/A 11/16/2023    Procedure: COLONOSCOPY;  Surgeon: Alex Moy MD;  Location: [facility];  Service: Endoscopy;  Laterality: N/A;    CYSTOSCOPY W/ URETERAL STENT PLACEMENT Left 03/31/2021     Procedure: CYSTOSCOPY, WITH URETERAL STENT INSERTION;  Surgeon: Dexter Sadler MD;  Location: [facility];  Service: Urology;  Laterality: Left;    CYSTOURETEROSCOPY WITH RETROGRADE PYELOGRAPHY AND INSERTION OF STENT INTO URETER Right 01/21/2021    Procedure: CYSTOURETEROSCOPY, WITH RETROGRADE PYELOGRAM AND URETERAL STENT INSERTION;  Surgeon: Dexter Sadelr MD;  Location: [facility] OR;  Service: Urology;  Laterality: Right;    KNEE SURGERY Left     age 13 - injury - no tears, just cleaned it up     LUMBAR FUSION  2013    L5-S1    LUMBAR NERVE STIMLATOR INSERTION  10/2018    SPINE SURGERY      TONSILLECTOMY      TUBAL LIGATION  01/19/1990    URETEROSCOPIC REMOVAL OF URETERIC CALCULUS Left 04/07/2021    Procedure: REMOVAL, CALCULUS, URETER, URETEROSCOPIC;  Surgeon: Dexter Sadler MD;  Location: [facility] OR;  Service: Urology;  Laterality: Left;    VAGINAL DELIVERY      2 births      Review of patient's allergies indicates:   Allergen Reactions    Meloxicam Anaphylaxis     Chest and neck pain .  Decrease O2    Iodinated contrast media      Pt states that she is not allergic [place] dye, but is only allergic to the older dyes.    Iodine and iodide containing products      Injectables ???     Loperamide-simethicone     Metoclopramide hcl      reglan     Diltiazem hcl Rash and Swelling     Social History     Tobacco Use    Smoking status: Never    Smokeless tobacco: Never    Tobacco comments:     None, 1988 everywhere that I was or in my house-none   Substance Use Topics    Alcohol use: No    Drug use: No     Family History   Problem Relation Name Age of Onset    Thyroid disease Mother Meme Nicole     Depression Mother Meme Nicole     Hearing loss Mother Meme Nicole     Vision loss Mother Meme Nicole     Heart disease Father Vivek     COPD Father Vivek     Supraventricular tachycardia Sister      Heart disease Brother Brother         smoking /stents - 40's    Heart disease Paternal Grandfather  Trung Lord     Arthritis Brother Trung     Hyperlipidemia Brother Trung      Current Outpatient Medications on File Prior to Visit   Medication Sig Dispense Refill    albuterol (PROVENTIL) 2.5 mg /3 mL (0.083 %) nebulizer solution Take 3 mLs (2.5 mg total) by nebulization every 6 (six) hours as needed for Wheezing or Shortness of Breath. Rescue 180 mL 1    aspirin-acetaminophen-caffeine 250-250-65 mg (EXCEDRIN MIGRAINE) 250-250-65 mg per tablet Take 1 tablet by mouth as needed.       azelastine (ASTELIN) 137 mcg (0.1 %) nasal spray 1 spray (137 mcg total) by Nasal route 2 (two) times daily. 30 mL 5    benzonatate (TESSALON) 200 MG capsule Take 1 capsule (200 mg total) by mouth 3 (three) times daily as needed for Cough. 180 capsule 3    blood sugar diagnostic Strp Use to check glucose daily 100 strip 3    blood-glucose meter kit Use as instructed 1 each 0    cetirizine (ZYRTEC) 10 MG tablet Take 10 mg by mouth every evening.      diazePAM (VALIUM) 10 MG Tab Take 10 mg by mouth.      diphenhydrAMINE (BENADRYL) 25 mg capsule Take 25 mg by mouth nightly as needed for Itching.      ergocalciferol (ERGOCALCIFEROL) 50,000 unit Cap Take 1 capsule (50,000 Units total) by mouth every 7 days. 12 capsule 3    furosemide (LASIX) 40 MG tablet Take 40 mg by mouth 2 (two) times daily.      gabapentin (NEURONTIN) 600 MG tablet Take 1 tablet (600 mg total) by mouth 2 (two) times daily. 60 tablet 11    ipratropium (ATROVENT) 0.02 % nebulizer solution NEBULIZE CONTENTS OF 1 VIAL AS NEEDED FOR WHEEZING *THANK YOU* 150 mL 6    lancets Misc Use to check glucose daily 100 each 3    lovastatin (MEVACOR) 40 MG tablet Take 40 mg by mouth once daily.      mupirocin (BACTROBAN) 2 % ointment Apply topically 3 (three) times daily. 22 g 0    NARCAN 4 mg/actuation [place] CALL 911. ADMINISTER A SINGLE SPRAY INTRANASALLY INTO ONE NOSTRIL UPON SIGNS OF OPIOID OVERDOSE. MAY REPEAT AFTER 3 MINUTES IF NO RESPONSE.      nystatin-triamcinolone (MYCOLOG II)  "cream Apply topically 3 (three) times daily.      ondansetron (ZOFRAN) 8 MG tablet TAKE 1 TABLET EVERY 8 HOURS AS NEEDED FOR NAUSEA AND VOMITING *THANK YOU* ** MAY CAUSE DROWSINESS ** 60 tablet 3    pantoprazole (PROTONIX) 40 MG tablet TAKE 1 TABLET TWICE DAILY FOR ACID REFLUX *THANK YOU* 180 tablet 1    promethazine (PHENERGAN) 25 MG tablet TAKE 1 TABLET BY MOUTH EVERY 6 HOURS AS NEEDED 90 tablet 0    tiZANidine 4 mg Cap Take 4 mg by mouth as needed.       triamcinolone acetonide 0.1% (KENALOG) 0.1 % ointment Apply topically 2 (two) times daily. 30 g 0    verapamiL (CALAN) 80 MG tablet Take by mouth.       No current facility-administered medications on file prior to visit.     Review of Systems   Constitutional:  Positive for activity change. Negative for appetite change, chills, fatigue and fever.   HENT:  Negative for congestion, rhinorrhea and sore throat.    Eyes:  Negative for visual disturbance.   Respiratory:  Negative for cough and shortness of breath.    Cardiovascular:  Negative for chest pain, palpitations and leg swelling.   Gastrointestinal:  Negative for abdominal pain, diarrhea and vomiting.   Genitourinary:  Negative for difficulty urinating, dysuria and hematuria.   Musculoskeletal:  Positive for arthralgias and joint swelling. Negative for myalgias.   Skin:  Negative for rash and wound.   Neurological:  Positive for headaches. Negative for dizziness.   Hematological:  Bruises/bleeds easily.   Psychiatric/Behavioral:  Negative for behavioral problems. The patient is not nervous/anxious.      Vitals:    10/23/24 0830   BP: 102/60   Pulse: 85   Resp: 16   Temp: 98.1 °F (36.7 °C)   TempSrc: Oral   SpO2: 96%   Weight: 82.9 kg (182 lb 12.8 oz)   Height: 5' 8" (1.727 m)     Wt Readings from Last 3 Encounters:   10/23/24 82.9 kg (182 lb 12.8 oz)   09/20/24 85.7 kg (188 lb 14.4 oz)   07/17/24 88.9 kg (196 lb)     Physical Exam  Vitals reviewed.   Constitutional:       General: She is not in acute " distress.     Appearance: Normal appearance. She is not ill-appearing.   HENT:      Head: Normocephalic and atraumatic.      Comments: +scalp tenderness; there is some bruising to her head - see photo below      Right Ear: External ear normal.      Left Ear: External ear normal.      Nose: Nose normal.   Eyes:      Extraocular Movements: Extraocular movements intact.      Conjunctiva/sclera: Conjunctivae normal.   Cardiovascular:      Rate and Rhythm: Normal rate.      Heart sounds: Normal heart sounds.   Pulmonary:      Effort: Pulmonary effort is normal. No respiratory distress.      Breath sounds: Normal breath sounds.   Abdominal:      General: Abdomen is flat. There is no distension.   Musculoskeletal:      Right shoulder: No swelling, deformity, tenderness or bony tenderness. Normal range of motion.      Left shoulder: Tenderness present. No swelling, deformity or bony tenderness. Decreased range of motion.      Right hand: No swelling, deformity, tenderness or bony tenderness. Normal range of motion. Normal strength. Normal sensation. Normal capillary refill.      Left hand: Swelling, tenderness and bony tenderness present. No deformity. Decreased range of motion. Normal strength. Normal sensation. Normal capillary refill.      Cervical back: Normal range of motion.   Skin:     General: Skin is warm and dry.      Capillary Refill: Capillary refill takes less than 2 seconds.      Coloration: Skin is not pale.      Findings: No rash.   Neurological:      General: No focal deficit present.      Mental Status: She is alert and oriented to person, place, and time. Mental status is at baseline.      GCS: GCS eye subscore is 4. GCS verbal subscore is 5. GCS motor subscore is 6.      Sensory: Sensation is intact.      Motor: Motor function is intact. No weakness, tremor or seizure activity.      Coordination: Coordination is intact. Coordination normal.      Gait: Gait is intact. Gait normal.   Psychiatric:          Mood and Affect: Mood normal.         Speech: Speech normal. Speech is not rapid and pressured, delayed or slurred.         Behavior: Behavior normal. Behavior is not agitated, slowed, aggressive, withdrawn, hyperactive or combative. Behavior is cooperative.         Thought Content: Thought content normal.         Judgment: Judgment normal.             Health Maintenance   Topic Date Due    Shingles Vaccine (2 of 3) 04/28/2016    Hemoglobin A1c  04/23/2025    Foot Exam  07/17/2025    Eye Exam  07/22/2025    Mammogram  10/23/2025    Lipid Panel  10/23/2025    Low Dose Statin  10/24/2025    Colorectal Cancer Screening  11/16/2028    TETANUS VACCINE  09/20/2034    Hepatitis C Screening  Completed     DISCLAIMER: This note was compiled by using a speech recognition dictation system and therefore please be aware that typographical / speech recognition errors can and do occur.  Please contact me if you see any errors specifically.  Consent was obtained for [employer] recording system prior to the visit.     Visit today included increased complexity associated with the care of the episodic problem - see above- addressed and managing the longitudinal care of the patient due to the serious and/or complex managed problem(s) - see above.

## 2024-10-23 NOTE — TELEPHONE ENCOUNTER
----- Message from Denia sent at 10/23/2024  9:32 AM CDT -----  Pt mentioned on her way out that she wants to know what you think about the swollen lymph nodes that she has right now call the pt if you have any questions at [phone]

## 2024-10-23 NOTE — TELEPHONE ENCOUNTER
----- Message from Bry sent at 10/23/2024 11:22 AM CDT -----  Contact: antonina/[facility]  Antonina is calling to find out if the patient will need a prior authorization to get her CT scan today. Please give her a call back at [phone]

## 2024-10-24 ENCOUNTER — TELEPHONE (OUTPATIENT)
Dept: RADIOLOGY | Facility: HOSPITAL | Age: 59
End: 2024-10-24
Payer: MEDICARE

## 2024-10-24 ENCOUNTER — OFFICE VISIT (OUTPATIENT)
Dept: OTOLARYNGOLOGY | Facility: CLINIC | Age: 59
End: 2024-10-24
Payer: MEDICARE

## 2024-10-24 DIAGNOSIS — M54.2 TENDERNESS OF NECK: Primary | ICD-10-CM

## 2024-10-24 DIAGNOSIS — J04.0 LARYNGITIS: ICD-10-CM

## 2024-10-24 PROBLEM — R93.89 ABNORMAL CT OF THE CHEST: Status: ACTIVE | Noted: 2024-10-24

## 2024-10-24 PROCEDURE — 99999 PR PBB SHADOW E&M-EST. PATIENT-LVL III: CPT | Mod: PBBFAC,,, | Performed by: STUDENT IN AN ORGANIZED HEALTH CARE EDUCATION/TRAINING PROGRAM

## 2024-10-24 RX ORDER — METHYLPREDNISOLONE 4 MG/1
TABLET ORAL
Qty: 21 TABLET | Refills: 0 | Status: SHIPPED | OUTPATIENT
Start: 2024-10-24

## 2024-10-24 RX ORDER — METHYLPREDNISOLONE 4 MG/1
TABLET ORAL
Qty: 21 TABLET | Refills: 0 | Status: SHIPPED | OUTPATIENT
Start: 2024-10-24 | End: 2024-10-24

## 2024-10-24 NOTE — PROGRESS NOTES
Chief complaint:    Chief Complaint   Patient presents with    Sinus Problem     Pt states that she has been having trouble with her sinuses since last week and she had a CT of her chest she also has nodules in her neck and they hurt and she is hoarse            Referring Provider:  Self, Aaareferral  No address on file    History of present illness:     Ms. Mcgovern is a 58 y.o. presenting for evaluation of hoarseness, neck tenderness.     Onset: years ago, worse recently last week    Associated sensation of neck fullness and tenderness on the right for last couple days    The patient denies neck mass, odynophagia, dysphagia, otalgia, unusual bleeding, or unintentional weight loss.     She did hit her nose last week. She does still have some tenderness around the nose and eyes.     States she self-reduced the nasal bones. Had CT head without evidence of fracture or sinusitis.      History      Past Medical History:   Past Medical History:   Diagnosis Date    Allergy     seasonal     Anxiety     Arthritis     back    Asthma     seasonal    Chronic shoulder pain     Depression     GERD (gastroesophageal reflux disease)     Insomnia     Migraine     Nephrolithiasis     Pneumonia     PONV (postoperative nausea and vomiting)     Type 2 diabetes mellitus with hyperglycemia, without long-term current use of insulin 10/6/2023         Past Surgical History:  Past Surgical History:   Procedure Laterality Date    ADENOIDECTOMY      APPENDECTOMY      CERVICAL FUSION  09/14/2020    C3-6    CHOLECYSTECTOMY      COLONOSCOPY N/A 11/16/2023    Procedure: COLONOSCOPY;  Surgeon: Alex Moy MD;  Location: [facility] ENDO;  Service: Endoscopy;  Laterality: N/A;    CYSTOSCOPY W/ URETERAL STENT PLACEMENT Left 03/31/2021    Procedure: CYSTOSCOPY, WITH URETERAL STENT INSERTION;  Surgeon: Dexter Sadler MD;  Location: [facility] OR;  Service: Urology;  Laterality: Left;    CYSTOURETEROSCOPY WITH RETROGRADE PYELOGRAPHY AND INSERTION  OF STENT INTO URETER Right 01/21/2021    Procedure: CYSTOURETEROSCOPY, WITH RETROGRADE PYELOGRAM AND URETERAL STENT INSERTION;  Surgeon: Dexter Sadler MD;  Location: [facility] OR;  Service: Urology;  Laterality: Right;    KNEE SURGERY Left     age 13 - injury - no tears, just cleaned it up     LUMBAR FUSION  2013    L5-S1    LUMBAR NERVE STIMLATOR INSERTION  10/2018    SPINE SURGERY      TONSILLECTOMY      TUBAL LIGATION  01/19/1990    URETEROSCOPIC REMOVAL OF URETERIC CALCULUS Left 04/07/2021    Procedure: REMOVAL, CALCULUS, URETER, URETEROSCOPIC;  Surgeon: Dexter Sadler MD;  Location: [facility] OR;  Service: Urology;  Laterality: Left;    VAGINAL DELIVERY      2 births          Medications: Medication list reviewed. She  has a current medication list which includes the following prescription(s): albuterol, aspirin-acetaminophen-caffeine 250-250-65 mg, azelastine, benzonatate, blood sugar diagnostic, blood-glucose meter, cetirizine, diazepam, diphenhydramine, ergocalciferol, furosemide, gabapentin, ipratropium, lancets, lovastatin, mupirocin, narcan, nystatin-triamcinolone, ondansetron, pantoprazole, promethazine, mounjaro, tizanidine, triamcinolone acetonide 0.1%, verapamil, and methylprednisolone.     Allergies:   Review of patient's allergies indicates:   Allergen Reactions    Meloxicam Anaphylaxis     Chest and neck pain .  Decrease O2    Iodinated contrast media      Pt states that she is not allergic omnipeg dye, but is only allergic to the older dyes.    Iodine and iodide containing products      Injectables ???     Loperamide-simethicone     Metoclopramide hcl      reglan     Diltiazem hcl Rash and Swelling         Family history: family history includes Arthritis in her brother; COPD in her father; Depression in her mother; Hearing loss in her mother; Heart disease in her brother, father, and paternal grandfather; Hyperlipidemia in her brother; Supraventricular tachycardia in her sister; Thyroid  disease in her mother; Vision loss in her mother.         Social History          Alcohol use:  reports no history of alcohol use.            Tobacco:  reports that she has never smoked. She has never used smokeless tobacco.         Physical Examination      Vitals: Last menstrual period 11/15/2002.      General: Well developed, well nourished, well hydrated.    Voice: mild dysphonia, no dysarthria      Head/Face: Normocephalic, atraumatic. No scars or lesions. Facial musculature equal.     Eyes: No scleral icterus or conjunctival hemorrhage. EOMI. PERRLA.     Ears:     Right ear: No gross deformity. EAC is clear of debris and erythema. TM are intact with a pneumatized middle ear. No signs of retraction, fluid or infection.      Left ear: No gross deformity. EAC is clear of debris and erythema. TM are intact with a pneumatized middle ear. No signs of retraction, fluid or infection.      Nose: No gross deformity or lesions. No purulent discharge. No significant NSD.      Mouth/Oropharynx: Lips without any lesions. No mucosal lesions within the oropharynx. No tonsillar exudate or lesions. Pharyngeal walls symmetrical. Uvula midline. Tongue midline without lesions.     Neck: soft fullness of submental space without mass or lesions, mildly tender to palpation, Trachea midline. No masses. No thyromegaly or nodules palpated.     Lymphatic: No lymphadenopathy in the neck.     Extremities: No cyanosis. Warm and well-perfused.     Skin: No scars or lesions on face or neck.      Neurologic: Moving all extremities without gross abnormality.CN II-XII grossly intact. House-Brackmann 1/6. No signs of nystagmus.          Data reviewed      Review of records:      I reviewed records from the referring provider's office visits, including the history, workup, and/or treatment of this problem thus far.        Procedures:    Procedure -Transnasal fiberoptic laryngoscopy     Surgeon: Kendrick Toth M.D. .      Anesthesia: topical 0.05%  oxymetazoline with 4% lidocaine      Complications: None.     Description of Procedure: With the patient in the sitting position, topical lidocaine and oxymetazoline was applied to the nose. The scope was passed through the nose. Examination was carried out of the nose, nasopharynx, oropharynx, hypopharynx, and larynx with findings as noted above. Scope was removed. The patient tolerated the procedure well.      Findings: No masses or lesions in the nose, nasopharynx, oropharynx, hypopharynx, or larynx. Vocal fold abduction and adduction is normal. No pooling of secretions in the piriform sinuses, penetration, or aspiration.          Assessment/Plan:    1. Tenderness of neck    2. Laryngitis        -    Laryngitis - Luann has laryngitis secondary to recent URI. Treatment for this is conservative with saline gargles, adequate hydration, and rest. The voice may not return to normal for up to 3 weeks. If the voice is still hoarse after three weeks, then a repeat laryngoscopy is indicated.    She elected for medrol dose pack.     F/u 2 weeks if not resolved, consider US if persistent neck tenderness at that time            Kendrick Toth MD  [facility]   [facility]  [address]  [place] [address]  P: [phone]  F: [phone]

## 2024-10-24 NOTE — ASSESSMENT & PLAN NOTE
Update A1C. Refill Mounjaro. Plan to monitor hemoglobin A1C at designated intervals 3-6 months.  I recommend ongoing Education for diabetic diet and exercise protocol. Follow up with Ophthalmology/Optometry and Podiatry at least annually.

## 2024-10-25 NOTE — PROGRESS NOTES
++CALL patient with results and Document verification.  Schedule follow-up if needed.  [phone]    Shingles Vaccine(2 of 3) due on 04/28/2016  Influenza Vaccine(1) due on 09/01/2024  COVID-19 Vaccine(7 - 2024-25 season) due on 09/01/2024    ABNORMAL LAB RESULT:MAMMOGRAM:There is an abnormality on the mammogram involving the right breast(s) that needs further evaluation to determine if it is significant or not.  Please arrange to have a diagnostic mammogram performed.

## 2024-10-28 ENCOUNTER — HOSPITAL ENCOUNTER (OUTPATIENT)
Dept: RADIOLOGY | Facility: HOSPITAL | Age: 59
Discharge: HOME | End: 2024-10-28
Attending: FAMILY MEDICINE
Payer: MEDICARE

## 2024-10-28 DIAGNOSIS — R92.8 ABNORMAL MAMMOGRAM: Primary | ICD-10-CM

## 2024-10-28 DIAGNOSIS — R92.8 ABNORMAL MAMMOGRAM: ICD-10-CM

## 2024-10-28 PROCEDURE — 76642 ULTRASOUND BREAST LIMITED: CPT | Mod: 26,RT,, | Performed by: RADIOLOGY

## 2024-10-28 PROCEDURE — 77065 DX MAMMO INCL CAD UNI: CPT | Mod: 26,RT,, | Performed by: RADIOLOGY

## 2024-10-28 PROCEDURE — 77061 BREAST TOMOSYNTHESIS UNI: CPT | Mod: 26,RT,, | Performed by: RADIOLOGY

## 2024-10-28 PROCEDURE — 76642 ULTRASOUND BREAST LIMITED: CPT | Mod: TC,PO,RT

## 2024-10-28 PROCEDURE — 77065 DX MAMMO INCL CAD UNI: CPT | Mod: TC,PO,RT

## 2024-11-06 DIAGNOSIS — R11.0 NAUSEA IN ADULT PATIENT: ICD-10-CM

## 2024-11-06 RX ORDER — PROMETHAZINE HYDROCHLORIDE 25 MG/1
TABLET ORAL
Qty: 60 TABLET | Refills: 2 | Status: SHIPPED | OUTPATIENT
Start: 2024-11-06

## 2024-11-06 RX ORDER — TIZANIDINE 4 MG/1
TABLET ORAL
Qty: 270 TABLET | Refills: 0 | Status: SHIPPED | OUTPATIENT
Start: 2024-11-06

## 2024-11-06 NOTE — TELEPHONE ENCOUNTER
Refill Routing Note   Medication(s) are not appropriate for processing by [facility] for the following reason(s):        Outside of protocol    [facility] action(s):  Route               Appointments  past 12m or future 3m with PCP    Date Provider   Last Visit   10/6/2023 Fred Zambrano MD   Next Visit   Visit date not found Fred Zambrano MD   ED visits in past 90 days: 0        Note composed:3:39 PM 11/06/2024

## 2024-11-25 ENCOUNTER — OFFICE VISIT (OUTPATIENT)
Dept: SURGERY | Facility: CLINIC | Age: 59
End: 2024-11-25
Payer: MEDICARE

## 2024-11-25 VITALS
BODY MASS INDEX: 27.79 KG/M2 | DIASTOLIC BLOOD PRESSURE: 87 MMHG | SYSTOLIC BLOOD PRESSURE: 134 MMHG | WEIGHT: 182.75 LBS | HEART RATE: 91 BPM

## 2024-11-25 DIAGNOSIS — R92.8 ABNORMAL MAMMOGRAM: ICD-10-CM

## 2024-11-25 DIAGNOSIS — R92.8 ABNORMAL MAMMOGRAM OF RIGHT BREAST: ICD-10-CM

## 2024-11-25 DIAGNOSIS — D22.9 ATYPICAL MOLE: Primary | ICD-10-CM

## 2024-11-25 PROCEDURE — 99999 PR PBB SHADOW E&M-EST. PATIENT-LVL IV: CPT | Mod: PBBFAC,,, | Performed by: SURGERY

## 2024-11-25 PROCEDURE — 99204 OFFICE O/P NEW MOD 45 MIN: CPT | Mod: 25,S$GLB,, | Performed by: SURGERY

## 2024-11-25 PROCEDURE — 1159F MED LIST DOCD IN RCRD: CPT | Mod: CPTII,S$GLB,, | Performed by: SURGERY

## 2024-11-25 PROCEDURE — 88305 TISSUE EXAM BY PATHOLOGIST: CPT | Performed by: PATHOLOGY

## 2024-11-25 PROCEDURE — 3061F NEG MICROALBUMINURIA REV: CPT | Mod: CPTII,S$GLB,, | Performed by: SURGERY

## 2024-11-25 PROCEDURE — 3044F HG A1C LEVEL LT 7.0%: CPT | Mod: CPTII,S$GLB,, | Performed by: SURGERY

## 2024-11-25 PROCEDURE — 88305 TISSUE EXAM BY PATHOLOGIST: CPT | Mod: 26,,, | Performed by: PATHOLOGY

## 2024-11-25 PROCEDURE — 3008F BODY MASS INDEX DOCD: CPT | Mod: CPTII,S$GLB,, | Performed by: SURGERY

## 2024-11-25 PROCEDURE — 3079F DIAST BP 80-89 MM HG: CPT | Mod: CPTII,S$GLB,, | Performed by: SURGERY

## 2024-11-25 PROCEDURE — 3075F SYST BP GE 130 - 139MM HG: CPT | Mod: CPTII,S$GLB,, | Performed by: SURGERY

## 2024-11-25 PROCEDURE — 11104 PUNCH BX SKIN SINGLE LESION: CPT | Mod: S$GLB,,, | Performed by: SURGERY

## 2024-11-25 PROCEDURE — 3066F NEPHROPATHY DOC TX: CPT | Mod: CPTII,S$GLB,, | Performed by: SURGERY

## 2024-11-27 ENCOUNTER — PATIENT MESSAGE (OUTPATIENT)
Dept: SURGERY | Facility: CLINIC | Age: 59
End: 2024-11-27
Payer: MEDICARE

## 2024-11-27 LAB
FINAL PATHOLOGIC DIAGNOSIS: NORMAL
GROSS: NORMAL
Lab: NORMAL
MICROSCOPIC EXAM: NORMAL

## 2024-12-02 NOTE — PROGRESS NOTES
History & Physical    Subjective     History of Present Illness:  Patient is a 59 y.o. female referred for abnormal mammogram of the right breast.  She reports mole growth on her left breast but otherwise denies any breast mass nipple discharge, breast pain.  No family history of breast or ovarian cancer.  Menarche age 12,  1st at 23, menopause at 44.     Chief Complaint   Patient presents with    Consult     Abnormal mammogram       Review of patient's allergies indicates:   Allergen Reactions    Meloxicam Anaphylaxis     Chest and neck pain .  Decrease O2    Iodinated contrast media      Pt states that she is not allergic omnipeg dye, but is only allergic to the older dyes.    Iodine and iodide containing products      Injectables ???     Loperamide-simethicone     Metoclopramide hcl      reglan     Diltiazem hcl Rash and Swelling       Current Outpatient Medications   Medication Sig Dispense Refill    albuterol (PROVENTIL) 2.5 mg /3 mL (0.083 %) nebulizer solution Take 3 mLs (2.5 mg total) by nebulization every 6 (six) hours as needed for Wheezing or Shortness of Breath. Rescue 180 mL 1    aspirin-acetaminophen-caffeine 250-250-65 mg (EXCEDRIN MIGRAINE) 250-250-65 mg per tablet Take 1 tablet by mouth as needed.       azelastine (ASTELIN) 137 mcg (0.1 %) nasal spray 1 spray (137 mcg total) by Nasal route 2 (two) times daily. 30 mL 5    benzonatate (TESSALON) 200 MG capsule Take 1 capsule (200 mg total) by mouth 3 (three) times daily as needed for Cough. 180 capsule 3    blood sugar diagnostic Strp Use to check glucose daily 100 strip 3    blood-glucose meter kit Use as instructed 1 each 0    cetirizine (ZYRTEC) 10 MG tablet Take 10 mg by mouth every evening.      diazePAM (VALIUM) 10 MG Tab Take 10 mg by mouth.      diphenhydrAMINE (BENADRYL) 25 mg capsule Take 25 mg by mouth nightly as needed for Itching.      ergocalciferol (ERGOCALCIFEROL) 50,000 unit Cap Take 1 capsule (50,000 Units total) by mouth every 7  days. 12 capsule 3    furosemide (LASIX) 40 MG tablet Take 40 mg by mouth 2 (two) times daily.      ipratropium (ATROVENT) 0.02 % nebulizer solution NEBULIZE CONTENTS OF 1 VIAL AS NEEDED FOR WHEEZING *THANK YOU* 150 mL 6    lancets Misc Use to check glucose daily 100 each 3    lovastatin (MEVACOR) 40 MG tablet Take 40 mg by mouth once daily.      mupirocin (BACTROBAN) 2 % ointment Apply topically 3 (three) times daily. 22 g 0    NARCAN 4 mg/actuation [place] CALL 911. ADMINISTER A SINGLE SPRAY INTRANASALLY INTO ONE NOSTRIL UPON SIGNS OF OPIOID OVERDOSE. MAY REPEAT AFTER 3 MINUTES IF NO RESPONSE.      nystatin-triamcinolone (MYCOLOG II) cream Apply topically 3 (three) times daily.      ondansetron (ZOFRAN) 8 MG tablet TAKE 1 TABLET EVERY 8 HOURS AS NEEDED FOR NAUSEA AND VOMITING *THANK YOU* ** MAY CAUSE DROWSINESS ** 60 tablet 3    pantoprazole (PROTONIX) 40 MG tablet TAKE 1 TABLET TWICE DAILY FOR ACID REFLUX *THANK YOU* 180 tablet 1    promethazine (PHENERGAN) 25 MG tablet TAKE 1 TABLET EVERY 6 HOURS AS NEEDED FOR NAUSEA & VOMITING *THANK YOU* 60 tablet 2    tirzepatide (MOUNJARO) 7.5 mg/0.5 mL PnIj Inject 7.5 mg into the skin every 7 days. 12 Pen 1    tiZANidine (ZANAFLEX) 4 MG tablet TAKE 1 TABLET 3 TIMES A DAY AS NEEDED FOR MUSCLE SPASMS *THANK YOU* 270 tablet 0    triamcinolone acetonide 0.1% (KENALOG) 0.1 % ointment Apply topically 2 (two) times daily. 30 g 0    verapamiL (CALAN) 80 MG tablet Take by mouth.      tiZANidine 4 mg Cap Take 4 mg by mouth as needed.        No current facility-administered medications for this visit.       Past Medical History:   Diagnosis Date    Allergy     seasonal     Anxiety     Arthritis     back    Asthma     seasonal    Chronic shoulder pain     Depression     GERD (gastroesophageal reflux disease)     Insomnia     Migraine     Nephrolithiasis     Pneumonia     PONV (postoperative nausea and vomiting)     Type 2 diabetes mellitus with hyperglycemia, without long-term current use  of insulin 10/6/2023     Past Surgical History:   Procedure Laterality Date    ADENOIDECTOMY      APPENDECTOMY      CERVICAL FUSION  09/14/2020    C3-6    CHOLECYSTECTOMY      COLONOSCOPY N/A 11/16/2023    Procedure: COLONOSCOPY;  Surgeon: Alex Moy MD;  Location: [facility] ENDO;  Service: Endoscopy;  Laterality: N/A;    CYSTOSCOPY W/ URETERAL STENT PLACEMENT Left 03/31/2021    Procedure: CYSTOSCOPY, WITH URETERAL STENT INSERTION;  Surgeon: Dexter Sadler MD;  Location: [facility] OR;  Service: Urology;  Laterality: Left;    CYSTOURETEROSCOPY WITH RETROGRADE PYELOGRAPHY AND INSERTION OF STENT INTO URETER Right 01/21/2021    Procedure: CYSTOURETEROSCOPY, WITH RETROGRADE PYELOGRAM AND URETERAL STENT INSERTION;  Surgeon: Dexter Sadler MD;  Location: [facility] OR;  Service: Urology;  Laterality: Right;    KNEE SURGERY Left     age 13 - injury - no tears, just cleaned it up     LUMBAR FUSION  2013    L5-S1    LUMBAR NERVE STIMLATOR INSERTION  10/2018    SPINE SURGERY      TONSILLECTOMY      TUBAL LIGATION  01/19/1990    URETEROSCOPIC REMOVAL OF URETERIC CALCULUS Left 04/07/2021    Procedure: REMOVAL, CALCULUS, URETER, URETEROSCOPIC;  Surgeon: Dexter Sadler MD;  Location: [facility] OR;  Service: Urology;  Laterality: Left;    VAGINAL DELIVERY      2 births      Family History   Problem Relation Name Age of Onset    Thyroid disease Mother Meme Nicole     Depression Mother Meme Nicole     Hearing loss Mother Meme Nicole     Vision loss Mother Meme Nicole     Heart disease Father Vivek     COPD Father Vivek     Supraventricular tachycardia Sister      Heart disease Brother Brother         smoking /stents - 40's    Heart disease Paternal Grandfather Trung Lord     Arthritis Brother Trung     Hyperlipidemia Brother Trung      Social History     Tobacco Use    Smoking status: Never    Smokeless tobacco: Never    Tobacco comments:     None, 1988 everywhere that I was or in my house-none    Substance Use Topics    Alcohol use: No    Drug use: No        Review of Systems:  Review of Systems   Constitutional:  Negative for activity change, chills, fatigue, fever and unexpected weight change.   HENT:  Negative for hearing loss, rhinorrhea and trouble swallowing.    Eyes:  Negative for discharge and visual disturbance.   Respiratory:  Negative for cough, chest tightness, shortness of breath, wheezing and stridor.    Cardiovascular:  Negative for chest pain, palpitations and leg swelling.   Gastrointestinal:  Negative for abdominal distention, abdominal pain, blood in stool, constipation, diarrhea, nausea and vomiting.   Endocrine: Negative for polydipsia and polyuria.   Genitourinary:  Negative for difficulty urinating, dysuria, frequency, hematuria, menstrual problem and urgency.   Musculoskeletal:  Negative for arthralgias, joint swelling and neck pain.   Skin:  Negative for color change, pallor, rash and wound.   Neurological:  Negative for weakness and headaches.   Hematological:  Does not bruise/bleed easily.   Psychiatric/Behavioral:  Negative for confusion and dysphoric mood.           Objective     Vital Signs (Most Recent)  Pulse: 91 (11/25/24 1009)  BP: 134/87 (11/25/24 1009)     82.9 kg (182 lb 12.2 oz)     Physical Exam:  Physical Exam  Vitals reviewed.   Constitutional:       Appearance: She is well-developed.   HENT:      Head: Normocephalic and atraumatic.   Cardiovascular:      Rate and Rhythm: Normal rate and regular rhythm.   Pulmonary:      Effort: Pulmonary effort is normal.      Breath sounds: Normal breath sounds.   Chest:   Breasts:     Right: No swelling, bleeding, inverted nipple, mass, nipple discharge, skin change or tenderness.      Left: Skin change present. No swelling, bleeding, inverted nipple, mass, nipple discharge or tenderness.       Abdominal:      General: Bowel sounds are normal. There is no distension.      Palpations: Abdomen is soft.      Tenderness: There is no  abdominal tenderness.   Musculoskeletal:      Cervical back: Neck supple.   Skin:     General: Skin is warm and dry.   Neurological:      Mental Status: She is alert and oriented to person, place, and time.         Diagnostic Results:  Result:   Mammo Digital Diagnostic Right with Carlos  US Breast Right Limited     History:  Patient is 58 y.o. and is seen for abnormal mammogram.     Films Compared:  Compared to: 10/23/2024 Mammo Digital Screening Bilat w/ Carlos, 10/09/2023 Mammo Digital Screening Bilat w/ Carlos, 04/29/2021 Mammo Digital Screening Bilat w/ Carlos, and 06/27/2018 MAMMO DIGITAL SCREENING BILAT     Findings:  This procedure was performed using tomosynthesis. Computer-aided detection was utilized in the interpretation of this examination.        Mammo Digital Diagnostic Right with Carlos  There are scattered areas of fibroglandular density.  There is a focal asymmetry seen in the upper central region of the right breast.      US Breast Right Limited  There is a vague hyperechoic area of probable fat necrosis seen in the upper central region of the right breast at 12 o'clock.      Impression:  Right  Focal Asymmetry: Right breast focal asymmetry at the upper central position. Assessment: 3 - Probably benign. Short Interval Follow-Up in 6 Months is recommended.      BI-RADS Category:   Overall: 3 - Probably Benign        Recommendation:  Short interval follow-up is recommended in 6 months.           Your estimated lifetime risk of breast cancer (to age 85) based on Tyrer-Cuzick risk assessment model is 4.68%.  According to the American Cancer Society, patients with a lifetime breast cancer risk of 20% or higher might benefit from supplemental screening tests, such as screening breast MRI.          Assessment and Plan       59-year-old female with abnormal mammogram of her right breast BI-RADS 3 probably benign-asymmetry, left breast mole growth    PLAN:    Reviewed mammogram with patient right breast BI-RADS  3  Normal clinical breast exam  Will schedule for six-month interval breast imaging and breast exam  Biopsy left breast mole due to increased growth  Follow up for suture removal      Procedure:  Sterile prep, local anesthetic injected, punch biopsy with excision of mole, 3-0 nylon placed in an interrupted fashion, bacitracin and Band-Aid applied.  Patient tolerated procedure well

## 2024-12-04 ENCOUNTER — OFFICE VISIT (OUTPATIENT)
Dept: SURGERY | Facility: CLINIC | Age: 59
End: 2024-12-04
Payer: MEDICARE

## 2024-12-04 ENCOUNTER — OFFICE VISIT (OUTPATIENT)
Dept: URGENT CARE | Facility: CLINIC | Age: 59
End: 2024-12-04
Payer: MEDICARE

## 2024-12-04 VITALS — HEIGHT: 68 IN | BODY MASS INDEX: 27.06 KG/M2 | WEIGHT: 178.56 LBS

## 2024-12-04 VITALS
HEART RATE: 83 BPM | RESPIRATION RATE: 18 BRPM | OXYGEN SATURATION: 98 % | SYSTOLIC BLOOD PRESSURE: 146 MMHG | DIASTOLIC BLOOD PRESSURE: 89 MMHG | TEMPERATURE: 99 F

## 2024-12-04 DIAGNOSIS — R05.3 POST-COVID CHRONIC COUGH: ICD-10-CM

## 2024-12-04 DIAGNOSIS — R92.8 ABNORMAL MAMMOGRAM: Primary | ICD-10-CM

## 2024-12-04 DIAGNOSIS — D22.9 ATYPICAL MOLE: ICD-10-CM

## 2024-12-04 DIAGNOSIS — R09.81 NASAL SINUS CONGESTION: ICD-10-CM

## 2024-12-04 DIAGNOSIS — B96.89 ACUTE BACTERIAL BRONCHITIS: ICD-10-CM

## 2024-12-04 DIAGNOSIS — J20.8 ACUTE BACTERIAL BRONCHITIS: ICD-10-CM

## 2024-12-04 DIAGNOSIS — R05.9 COUGH, UNSPECIFIED TYPE: Primary | ICD-10-CM

## 2024-12-04 DIAGNOSIS — U09.9 POST-COVID CHRONIC COUGH: ICD-10-CM

## 2024-12-04 LAB
CTP QC/QA: YES
CTP QC/QA: YES
POC MOLECULAR INFLUENZA A AGN: NEGATIVE
POC MOLECULAR INFLUENZA B AGN: NEGATIVE
SARS-COV-2 AG RESP QL IA.RAPID: NEGATIVE

## 2024-12-04 PROCEDURE — 3044F HG A1C LEVEL LT 7.0%: CPT | Mod: CPTII,S$GLB,, | Performed by: SURGERY

## 2024-12-04 PROCEDURE — 3061F NEG MICROALBUMINURIA REV: CPT | Mod: CPTII,S$GLB,, | Performed by: SURGERY

## 2024-12-04 PROCEDURE — 99204 OFFICE O/P NEW MOD 45 MIN: CPT | Mod: 25,S$GLB,, | Performed by: NURSE PRACTITIONER

## 2024-12-04 PROCEDURE — 87811 SARS-COV-2 COVID19 W/OPTIC: CPT | Mod: QW,S$GLB,, | Performed by: NURSE PRACTITIONER

## 2024-12-04 PROCEDURE — 99999 PR PBB SHADOW E&M-EST. PATIENT-LVL III: CPT | Mod: PBBFAC,,, | Performed by: SURGERY

## 2024-12-04 PROCEDURE — 99024 POSTOP FOLLOW-UP VISIT: CPT | Mod: S$GLB,,, | Performed by: SURGERY

## 2024-12-04 PROCEDURE — 71046 X-RAY EXAM CHEST 2 VIEWS: CPT | Mod: S$GLB,,, | Performed by: RADIOLOGY

## 2024-12-04 PROCEDURE — 3066F NEPHROPATHY DOC TX: CPT | Mod: CPTII,S$GLB,, | Performed by: SURGERY

## 2024-12-04 PROCEDURE — 96372 THER/PROPH/DIAG INJ SC/IM: CPT | Mod: S$GLB,,, | Performed by: NURSE PRACTITIONER

## 2024-12-04 PROCEDURE — 87502 INFLUENZA DNA AMP PROBE: CPT | Mod: QW,S$GLB,, | Performed by: NURSE PRACTITIONER

## 2024-12-04 RX ORDER — CEFTRIAXONE 1 G/1
1 INJECTION, POWDER, FOR SOLUTION INTRAMUSCULAR; INTRAVENOUS
Status: COMPLETED | OUTPATIENT
Start: 2024-12-04 | End: 2024-12-04

## 2024-12-04 RX ORDER — LIDOCAINE HYDROCHLORIDE 10 MG/ML
2.1 INJECTION, SOLUTION INFILTRATION; PERINEURAL
Status: COMPLETED | OUTPATIENT
Start: 2024-12-04 | End: 2024-12-04

## 2024-12-04 RX ADMIN — LIDOCAINE HYDROCHLORIDE 2.1 ML: 10 INJECTION, SOLUTION INFILTRATION; PERINEURAL at 03:12

## 2024-12-04 RX ADMIN — CEFTRIAXONE 1 G: 1 INJECTION, POWDER, FOR SOLUTION INTRAMUSCULAR; INTRAVENOUS at 03:12

## 2024-12-04 NOTE — PATIENT INSTRUCTIONS
Acute Bronchitis Discharge Instructions, Adult      What care is needed at home?   Ask your doctor what you need to do when you go home. Make sure you ask questions if you do not understand what the doctor says.  Do not smoke or be in smoke-filled places. Avoid other things that may cause breathing problems like fumes, pollution, dust, and other common allergens.  Drink lots of water, juice, or broth to replace fluids lost in runny nose and fever.  If you have medicines to take when you are feeling short of breath, be sure to carry them with you. Then, you can take them when needed.  If you smoke, stop.  Take warm, steamy showers to help soothe the cough.  Use a cool mist humidifier. This may make it easier to breathe.  Use hard candy or cough drops to soothe sore throat and cough.  Wash your hands often. This will help prevent you from spreading germs to others.    Urgent Care Discharge Information    If you have been discharged from the clinic prior to your point of care test results being completed, please make sure to check your [facility] account.  If there is a change in treatment, we will communicate with you through here.  If your test is positive, and medications are ordered, these will be sent to your preferred pharmacy.   If your test is negative, no further steps needed. If you do not hear from us or have questions, please call the clinic.        - You must understand that you have received an Urgent Care treatment only and that you may be released before all of your medical problems are known or treated.   - You, the patient, will arrange for follow up care as instructed with your primary care provider or recommended specialist.   -  Please arrange follow up with your primary medical clinic as soon as possible.   - If your condition worsens or fails to improve we recommend that you receive another evaluation at the ER immediately or contact your PCP to discuss your concerns, or return here.   - Please do  not drive or make any important decisions for 24 hours if you have received any pain medications, sedatives or mood altering drugs during your visit.    WE CANNOT RULE OUT ALL POSSIBLE CAUSES OF YOUR SYMPTOMS IN THE URGENT CARE SETTING.  PLEASE GO TO THE ER IF YOU FEELS YOUR CONDITION IS WORSENING OR YOU WOULD LIKE EMERGENT EVALUATION.  GO TO THE EMERGENCY DEPARTMENT FOR ANY NEW OR WORSENING SYMPTOMS INCLUDING:  WORSENING ABDOMINAL PAIN, DARK/BLACK/BLOODY BOWEL MOVEMENTS, VOMITING BLOOD, HARD ABDOMEN, FEVER, CHEST PAIN, SHORTNESS OF BREATH, LOSS OF CONSCIOUSNESS, OR ANY OTHER CONCERN.

## 2024-12-04 NOTE — PROGRESS NOTES
Subjective:      Patient ID: Luann Mcgovern is a 59 y.o. female.    Vitals:  oral temperature is 98.6 °F (37 °C). Her blood pressure is 146/89 (abnormal) and her pulse is 83. Her respiration is 18 and oxygen saturation is 98%.     Chief Complaint: Cough (Nasal congestion - Entered by patient)    Pt presents today with cough and congestion x's 2 days. Pt has no known exposure to covid, flu or strep. Pt has taken OTC cough suppresants and albuterol nebulizer treatment with no relief.    Cough  This is a new problem. The current episode started in the past 7 days. The problem has been unchanged. The problem occurs constantly. The cough is Non-productive. Associated symptoms include nasal congestion. Nothing aggravates the symptoms. She has tried OTC cough suppressant and leukotriene antagonists for the symptoms. The treatment provided mild relief.       Respiratory:  Positive for cough.       Objective:     Physical Exam   Constitutional: She is oriented to person, place, and time. She appears well-developed. She is cooperative.  Non-toxic appearance. She does not appear ill. No distress.   HENT:   Head: Normocephalic and atraumatic.   Ears:   Right Ear: Hearing, tympanic membrane, external ear and ear canal normal.   Left Ear: Hearing, tympanic membrane, external ear and ear canal normal.   Nose: Nose normal. No mucosal edema, rhinorrhea or nasal deformity. No epistaxis. Right sinus exhibits no maxillary sinus tenderness and no frontal sinus tenderness. Left sinus exhibits no maxillary sinus tenderness and no frontal sinus tenderness.   Mouth/Throat: Uvula is midline, oropharynx is clear and moist and mucous membranes are normal. No trismus in the jaw. Normal dentition. No uvula swelling. No oropharyngeal exudate, posterior oropharyngeal edema or posterior oropharyngeal erythema.   Eyes: Conjunctivae and lids are normal. No scleral icterus.   Neck: Trachea normal and phonation normal. Neck supple. No edema present.  No erythema present. No neck rigidity present.   Cardiovascular: Normal rate, regular rhythm, normal heart sounds and normal pulses.   Pulmonary/Chest: Effort normal. No respiratory distress. She has decreased breath sounds in the right upper field, the right middle field, the right lower field, the left upper field, the left middle field and the left lower field. She has wheezes in the right upper field and the right middle field. She has rhonchi in the left upper field and the left middle field.   Abdominal: Normal appearance.   Musculoskeletal: Normal range of motion.         General: No deformity. Normal range of motion.   Neurological: She is alert and oriented to person, place, and time. She exhibits normal muscle tone. Coordination normal.   Skin: Skin is warm, dry, intact, not diaphoretic and not pale.   Psychiatric: Her speech is normal and behavior is normal. Judgment and thought content normal.   Nursing note and vitals reviewed.      Assessment:     1. Cough, unspecified type    2. Nasal sinus congestion    3. Post-COVID chronic cough    4. Acute bacterial bronchitis        Plan:     Results for orders placed or performed in visit on 12/04/24   POCT Influenza A/B MOLECULAR    Collection Time: 12/04/24  4:18 PM   Result Value Ref Range    POC Molecular Influenza A Ag Negative Negative    POC Molecular Influenza B Ag Negative Negative     Acceptable Yes    SARS Coronavirus 2 Antigen, POCT Manual Read    Collection Time: 12/04/24  4:19 PM   Result Value Ref Range    SARS Coronavirus 2 Antigen Negative Negative     Acceptable Yes          Cough, unspecified type  -     POCT Influenza A/B MOLECULAR  -     XR CHEST PA AND LATERAL; Future; Expected date: 12/04/2024  -     SARS Coronavirus 2 Antigen, POCT Manual Read    Nasal sinus congestion  -     XR CHEST PA AND LATERAL; Future; Expected date: 12/04/2024  -     SARS Coronavirus 2 Antigen, POCT Manual Read    Post-COVID chronic  cough  -     XR CHEST PA AND LATERAL; Future; Expected date: 12/04/2024  -     methylPREDNISolone sod suc(PF) injection 125 mg  -     SARS Coronavirus 2 Antigen, POCT Manual Read    Acute bacterial bronchitis  -     cefTRIAXone injection 1 g  -     LIDOcaine HCL 10 mg/ml (1%) injection 2.1 mL  -     methylPREDNISolone sod suc(PF) injection 125 mg      Type of Interpretation: Outside Written Report.  Radiology Procedure Done: AP & Lat CXR.  Interpretation: EXAM:  XR CHEST PA AND LATERAL   CLINICAL HISTORY:  Cough  FINDINGS:   2 views.  Comparison 11/20/2023.  Previous cervical spine surgery.   The heart is normal in size.     Left lung is clear.   There is elevation of the right hemidiaphragm with adjacent discoid atelectasis.  This appears stable.   Spinal stimulator seen at the midthoracic level.     Impression:   Stable chest.  No acute infiltrates.  Chronic elevation of the right hemidiaphragm with adjacent stable discoid atelectasis.     Finalized on: 12/4/2024 3:17 PM By:  Eugene Singh MD  [facility]# [id number]      2024-12-04 15:19:51.121    BRGE[facility]GES[facility]          Medical Decision Making:   Initial Assessment:   59-year-old female patient presents with cough congestion wheezing and shortness of breath for several days.  Patient has tried over-the-counter medications without relief.  Patient has a history right hemidiaphragm with adjacent discoid atelectasis, post COVID chronic cough, asthma, and recent history of pneumonia.  Differential Diagnosis:   Pneumonia, COVID, flu, asthma flare up, upper respiratory infection, among others  Clinical Tests:   Lab Tests: Ordered and Reviewed       <> Summary of Lab: See chart  Radiological Study: Ordered and Reviewed  Urgent Care Management:  Patient tested negative for flu and COVID in clinic.  X-ray taken with no acute findings.  Patient had significant wheezing and decreased breath sounds.  Patient treated with injection Rocephin and Solu-Medrol.  Patient will continue with breathing  treatments at home and use rescue inhaler as needed.  Patient to follow up with primary care or specialist or return to clinic if symptoms do not improve or if they suddenly get worse.

## 2024-12-09 ENCOUNTER — OFFICE VISIT (OUTPATIENT)
Dept: FAMILY MEDICINE | Facility: CLINIC | Age: 59
End: 2024-12-09
Payer: MEDICARE

## 2024-12-09 ENCOUNTER — HOSPITAL ENCOUNTER (OUTPATIENT)
Dept: RADIOLOGY | Facility: HOSPITAL | Age: 59
Discharge: HOME | End: 2024-12-09
Attending: PHYSICIAN ASSISTANT
Payer: MEDICARE

## 2024-12-09 VITALS
HEART RATE: 94 BPM | RESPIRATION RATE: 16 BRPM | HEIGHT: 68 IN | WEIGHT: 178.56 LBS | OXYGEN SATURATION: 98 % | BODY MASS INDEX: 27.06 KG/M2 | SYSTOLIC BLOOD PRESSURE: 130 MMHG | DIASTOLIC BLOOD PRESSURE: 84 MMHG

## 2024-12-09 DIAGNOSIS — J32.9 CHRONIC SINUSITIS, UNSPECIFIED LOCATION: ICD-10-CM

## 2024-12-09 DIAGNOSIS — J02.9 SORE THROAT: ICD-10-CM

## 2024-12-09 DIAGNOSIS — R11.0 NAUSEA IN ADULT PATIENT: ICD-10-CM

## 2024-12-09 DIAGNOSIS — J98.8 BACTERIAL RESPIRATORY INFECTION: Primary | ICD-10-CM

## 2024-12-09 DIAGNOSIS — J98.8 BACTERIAL RESPIRATORY INFECTION: ICD-10-CM

## 2024-12-09 DIAGNOSIS — R05.9 COUGH, UNSPECIFIED TYPE: ICD-10-CM

## 2024-12-09 DIAGNOSIS — B96.89 BACTERIAL RESPIRATORY INFECTION: ICD-10-CM

## 2024-12-09 DIAGNOSIS — B96.89 BACTERIAL RESPIRATORY INFECTION: Primary | ICD-10-CM

## 2024-12-09 PROCEDURE — 3061F NEG MICROALBUMINURIA REV: CPT | Mod: CPTII,S$GLB,, | Performed by: PHYSICIAN ASSISTANT

## 2024-12-09 PROCEDURE — 3066F NEPHROPATHY DOC TX: CPT | Mod: CPTII,S$GLB,, | Performed by: PHYSICIAN ASSISTANT

## 2024-12-09 PROCEDURE — 1159F MED LIST DOCD IN RCRD: CPT | Mod: CPTII,S$GLB,, | Performed by: PHYSICIAN ASSISTANT

## 2024-12-09 PROCEDURE — 1160F RVW MEDS BY RX/DR IN RCRD: CPT | Mod: CPTII,S$GLB,, | Performed by: PHYSICIAN ASSISTANT

## 2024-12-09 PROCEDURE — 3075F SYST BP GE 130 - 139MM HG: CPT | Mod: CPTII,S$GLB,, | Performed by: PHYSICIAN ASSISTANT

## 2024-12-09 PROCEDURE — 3044F HG A1C LEVEL LT 7.0%: CPT | Mod: CPTII,S$GLB,, | Performed by: PHYSICIAN ASSISTANT

## 2024-12-09 PROCEDURE — 3079F DIAST BP 80-89 MM HG: CPT | Mod: CPTII,S$GLB,, | Performed by: PHYSICIAN ASSISTANT

## 2024-12-09 PROCEDURE — 71046 X-RAY EXAM CHEST 2 VIEWS: CPT | Mod: TC,PO

## 2024-12-09 PROCEDURE — 3008F BODY MASS INDEX DOCD: CPT | Mod: CPTII,S$GLB,, | Performed by: PHYSICIAN ASSISTANT

## 2024-12-09 PROCEDURE — 99999 PR PBB SHADOW E&M-EST. PATIENT-LVL IV: CPT | Mod: PBBFAC,,, | Performed by: PHYSICIAN ASSISTANT

## 2024-12-09 PROCEDURE — 99213 OFFICE O/P EST LOW 20 MIN: CPT | Mod: S$GLB,,, | Performed by: PHYSICIAN ASSISTANT

## 2024-12-09 PROCEDURE — 71046 X-RAY EXAM CHEST 2 VIEWS: CPT | Mod: 26,,, | Performed by: RADIOLOGY

## 2024-12-09 RX ORDER — LEVOFLOXACIN 500 MG/1
500 TABLET, FILM COATED ORAL DAILY
Qty: 7 TABLET | Refills: 0 | Status: CANCELLED | OUTPATIENT
Start: 2024-12-09 | End: 2024-12-16

## 2024-12-09 RX ORDER — AMOXICILLIN AND CLAVULANATE POTASSIUM 875; 125 MG/1; MG/1
1 TABLET, FILM COATED ORAL EVERY 12 HOURS
Qty: 20 TABLET | Refills: 0 | Status: SHIPPED | OUTPATIENT
Start: 2024-12-09 | End: 2024-12-19

## 2024-12-09 RX ORDER — AZELASTINE 1 MG/ML
1 SPRAY, METERED NASAL 2 TIMES DAILY
Qty: 30 ML | Refills: 11 | Status: SHIPPED | OUTPATIENT
Start: 2024-12-09

## 2024-12-09 RX ORDER — AZITHROMYCIN 250 MG/1
TABLET, FILM COATED ORAL
Qty: 6 TABLET | Refills: 0 | Status: SHIPPED | OUTPATIENT
Start: 2024-12-09 | End: 2024-12-14

## 2024-12-09 RX ORDER — AMOXICILLIN AND CLAVULANATE POTASSIUM 875; 125 MG/1; MG/1
1 TABLET, FILM COATED ORAL EVERY 12 HOURS
Qty: 20 TABLET | Refills: 0 | Status: SHIPPED | OUTPATIENT
Start: 2024-12-09 | End: 2024-12-09 | Stop reason: ALTCHOICE

## 2024-12-09 RX ORDER — PROMETHAZINE HYDROCHLORIDE AND DEXTROMETHORPHAN HYDROBROMIDE 6.25; 15 MG/5ML; MG/5ML
5 SYRUP ORAL EVERY 6 HOURS PRN
Qty: 118 ML | Refills: 0 | Status: SHIPPED | OUTPATIENT
Start: 2024-12-09 | End: 2024-12-19

## 2024-12-09 RX ORDER — METHYLPREDNISOLONE 4 MG/1
TABLET ORAL
Qty: 21 TABLET | Refills: 0 | Status: SHIPPED | OUTPATIENT
Start: 2024-12-09

## 2024-12-09 NOTE — PATIENT INSTRUCTIONS
Lloyd Kong,     If you are due for any health screening(s) below please notify me so we can arrange them to be ordered and scheduled. Most healthy patients at your age complete them, but you are free to accept or refuse.     If you can't do it, I'll definitely understand. If you can, I'd certainly appreciate it!    All of your core healthy metrics are met.

## 2024-12-09 NOTE — PROGRESS NOTES
Results have been released via [facility]. Please verify that these have been viewed by patient. If not, please call patient with results.     I have sent a message to them with the following interpretation (see below).    I have reviewed your recent CXR which showed atelectasis vs. scarring which has been seen on previous imaging. There was also chronic elevation of the right hemidiaphragm. This has also been seen on previous imaging. Overall, imaging appears to be stable with no new findings or any other significant abnormalities. Please start the medication as discussed at your visit.     Please do not hesitate to call or message with any additional questions or concerns.    Mary Lane PA-C

## 2024-12-09 NOTE — PROGRESS NOTES
Assessment/Plan:    Problem List Items Addressed This Visit    None  Visit Diagnoses       Bacterial respiratory infection    -  Primary    Relevant Medications    methylPREDNISolone (MEDROL DOSEPACK) 4 mg tablet    amoxicillin-clavulanate 875-125mg (AUGMENTIN) 875-125 mg per tablet    azithromycin (Z-RENETTA) 250 MG tablet    Other Relevant Orders    X-Ray Chest PA And Lateral (Completed)    Cough, unspecified type        Relevant Medications    promethazine-dextromethorphan (PROMETHAZINE-DM) 6.25-15 mg/5 mL Syrp    Other Relevant Orders    X-Ray Chest PA And Lateral (Completed)    Sore throat        Relevant Orders    POCT Rapid Strep A    Chronic sinusitis, unspecified location        Relevant Medications    azelastine (ASTELIN) 137 mcg (0.1 %) nasal spray        -start antibiotics and oral steroids as prescribed  -will obtain a CXR today  -recommend Flonase and Mucinex  -supportive care- rest, increase hydration with water, OTC Tylenol/Ibuprofen for pain/fever  -follow up if no improvement in symptoms   -ER precautions for severe or worsening of symptoms     Follow up if symptoms worsen or fail to improve.    Mary Lane PA-C  _____________________________________________________________________________________________________________________________________________________    CC: respiratory symptoms    HPI: Patient is in clinic today as an established patient here for respiratory symptoms.    HISTORY OF PRESENT ILLNESS:  She reports symptoms started last Monday and have progressively worsened over the past week. She experiences cough with green and blood-tinged sputum, wheezing, and congestion. She experienced fever which resolved after taking two Excedrin Migraine tablets. She denies current fever symptoms. She also reports a sore throat with significant pain in the glands and describes her throat as feeling irritated.     MEDICAL HISTORY:  She has a history of asthma, which has been well-controlled  recently. She reports a previous COVID-19 infection that resulted in lung scarring and atelectasis, confirmed by CT. She expresses concern about the fragility of her lungs due to these past medical issues.    MEDICATIONS:  Current medications include antihistamine, Astelin, and an OTC medication containing a decongestant, cough suppressant, and Tylenol. She received treatment at urgent care last week, where she was given a shot of both an antibiotic and steroid.    RECENT TESTS:  COVID and flu tests were negative last week. A chest XR was performed and reported as normal.    No other complaints today.     Past Medical History:   Diagnosis Date    Allergy     seasonal     Anxiety     Arthritis     back    Asthma     seasonal    Chronic shoulder pain     Depression     GERD (gastroesophageal reflux disease)     Insomnia     Migraine     Nephrolithiasis     Pneumonia     PONV (postoperative nausea and vomiting)     Type 2 diabetes mellitus with hyperglycemia, without long-term current use of insulin 10/6/2023     Past Surgical History:   Procedure Laterality Date    ADENOIDECTOMY      APPENDECTOMY      CERVICAL FUSION  09/14/2020    C3-6    CHOLECYSTECTOMY      COLONOSCOPY N/A 11/16/2023    Procedure: COLONOSCOPY;  Surgeon: Alex Moy MD;  Location: [facility];  Service: Endoscopy;  Laterality: N/A;    CYSTOSCOPY W/ URETERAL STENT PLACEMENT Left 03/31/2021    Procedure: CYSTOSCOPY, WITH URETERAL STENT INSERTION;  Surgeon: Dexter Sadler MD;  Location: [facility] OR;  Service: Urology;  Laterality: Left;    CYSTOURETEROSCOPY WITH RETROGRADE PYELOGRAPHY AND INSERTION OF STENT INTO URETER Right 01/21/2021    Procedure: CYSTOURETEROSCOPY, WITH RETROGRADE PYELOGRAM AND URETERAL STENT INSERTION;  Surgeon: Dexter Sadler MD;  Location: [facility] OR;  Service: Urology;  Laterality: Right;    KNEE SURGERY Left     age 13 - injury - no tears, just cleaned it up     LUMBAR FUSION  2013    L5-S1    LUMBAR NERVE  STIMLATOR INSERTION  10/2018    SPINE SURGERY      TONSILLECTOMY      TUBAL LIGATION  01/19/1990    URETEROSCOPIC REMOVAL OF URETERIC CALCULUS Left 04/07/2021    Procedure: REMOVAL, CALCULUS, URETER, URETEROSCOPIC;  Surgeon: Dexter Sadler MD;  Location: [facility];  Service: Urology;  Laterality: Left;    VAGINAL DELIVERY      2 births      Review of patient's allergies indicates:   Allergen Reactions    Meloxicam Anaphylaxis     Chest and neck pain .  Decrease O2    Iodinated contrast media      Pt states that she is not allergic omn[place] dye, but is only allergic to the older dyes.    Iodine and iodide containing products      Injectables ???     Loperamide-simethicone     Metoclopramide hcl      reglan     Diltiazem hcl Rash and Swelling     Social History     Tobacco Use    Smoking status: Never    Smokeless tobacco: Never    Tobacco comments:     None, 1988 everywhere that I was or in my house-none   Substance Use Topics    Alcohol use: No    Drug use: No     Family History   Problem Relation Name Age of Onset    Thyroid disease Mother Meme Nicole     Depression Mother Meme Nicole     Hearing loss Mother Meme Nicole     Vision loss Mother Meme Nicole     Heart disease Father Vivek     COPD Father Vivek     Supraventricular tachycardia Sister      Heart disease Brother Brother         smoking /stents - 40's    Heart disease Paternal Grandfather Trung Risa     Arthritis Brother Trung     Hyperlipidemia Brother Trung      Current Outpatient Medications on File Prior to Visit   Medication Sig Dispense Refill    albuterol (PROVENTIL) 2.5 mg /3 mL (0.083 %) nebulizer solution Take 3 mLs (2.5 mg total) by nebulization every 6 (six) hours as needed for Wheezing or Shortness of Breath. Rescue 180 mL 1    aspirin-acetaminophen-caffeine 250-250-65 mg (EXCEDRIN MIGRAINE) 250-250-65 mg per tablet Take 1 tablet by mouth as needed.       benzonatate (TESSALON) 200 MG capsule Take 1 capsule (200 mg total) by  mouth 3 (three) times daily as needed for Cough. 180 capsule 3    blood sugar diagnostic Strp Use to check glucose daily 100 strip 3    blood-glucose meter kit Use as instructed 1 each 0    cetirizine (ZYRTEC) 10 MG tablet Take 10 mg by mouth every evening.      diazePAM (VALIUM) 10 MG Tab Take 10 mg by mouth.      diphenhydrAMINE (BENADRYL) 25 mg capsule Take 25 mg by mouth nightly as needed for Itching.      ergocalciferol (ERGOCALCIFEROL) 50,000 unit Cap Take 1 capsule (50,000 Units total) by mouth every 7 days. 12 capsule 3    furosemide (LASIX) 40 MG tablet Take 40 mg by mouth 2 (two) times daily.      ipratropium (ATROVENT) 0.02 % nebulizer solution NEBULIZE CONTENTS OF 1 VIAL AS NEEDED FOR WHEEZING *THANK YOU* 150 mL 6    lancets Misc Use to check glucose daily 100 each 3    lovastatin (MEVACOR) 40 MG tablet Take 40 mg by mouth once daily.      mupirocin (BACTROBAN) 2 % ointment Apply topically 3 (three) times daily. 22 g 0    NARCAN 4 mg/actuation [place] CALL 911. ADMINISTER A SINGLE SPRAY INTRANASALLY INTO ONE NOSTRIL UPON SIGNS OF OPIOID OVERDOSE. MAY REPEAT AFTER 3 MINUTES IF NO RESPONSE.      nystatin-triamcinolone (MYCOLOG II) cream Apply topically 3 (three) times daily.      ondansetron (ZOFRAN) 8 MG tablet TAKE 1 TABLET EVERY 8 HOURS AS NEEDED FOR NAUSEA AND VOMITING *THANK YOU* ** MAY CAUSE DROWSINESS ** 60 tablet 3    pantoprazole (PROTONIX) 40 MG tablet TAKE 1 TABLET TWICE DAILY FOR ACID REFLUX *THANK YOU* 180 tablet 1    promethazine (PHENERGAN) 25 MG tablet TAKE 1 TABLET EVERY 6 HOURS AS NEEDED FOR NAUSEA & VOMITING *THANK YOU* 60 tablet 2    tirzepatide (MOUNJARO) 7.5 mg/0.5 mL PnIj Inject 7.5 mg into the skin every 7 days. 12 Pen 1    tiZANidine (ZANAFLEX) 4 MG tablet TAKE 1 TABLET 3 TIMES A DAY AS NEEDED FOR MUSCLE SPASMS *THANK YOU* 270 tablet 0    triamcinolone acetonide 0.1% (KENALOG) 0.1 % ointment Apply topically 2 (two) times daily. 30 g 0    verapamiL (CALAN) 80 MG tablet Take by mouth.    "   [DISCONTINUED] azelastine (ASTELIN) 137 mcg (0.1 %) nasal spray 1 spray (137 mcg total) by Nasal route 2 (two) times daily. 30 mL 5     No current facility-administered medications on file prior to visit.       Review of Systems   Constitutional:  Positive for fever. Negative for chills, diaphoresis and fatigue.   HENT:  Positive for congestion and sore throat. Negative for ear pain, postnasal drip and sinus pain.    Eyes:  Negative for pain and redness.   Respiratory:  Positive for cough and wheezing. Negative for chest tightness and shortness of breath.    Cardiovascular:  Negative for chest pain and leg swelling.   Gastrointestinal:  Negative for abdominal pain, constipation, diarrhea, nausea and vomiting.   Genitourinary:  Negative for dysuria and hematuria.   Musculoskeletal:  Negative for arthralgias and joint swelling.   Skin:  Negative for rash.   Neurological:  Negative for dizziness, syncope and headaches.   Psychiatric/Behavioral:  Negative for dysphoric mood. The patient is not nervous/anxious.        Vitals:    12/09/24 1022 12/09/24 1044   BP: (!) 141/93 130/84   Pulse: 94    Resp: 16    SpO2: 98%    Weight: 81 kg (178 lb 9.2 oz)    Height: 5' 8" (1.727 m)        Wt Readings from Last 3 Encounters:   12/09/24 81 kg (178 lb 9.2 oz)   12/04/24 81 kg (178 lb 9.2 oz)   11/25/24 82.9 kg (182 lb 12.2 oz)       Physical Exam  Constitutional:       General: She is not in acute distress.     Appearance: Normal appearance. She is well-developed.   HENT:      Head: Normocephalic and atraumatic.      Right Ear: Tympanic membrane normal.      Left Ear: Tympanic membrane normal.      Nose: Congestion present.      Mouth/Throat:      Mouth: Mucous membranes are moist.      Pharynx: Pharyngeal swelling, oropharyngeal exudate and posterior oropharyngeal erythema present.   Eyes:      Conjunctiva/sclera: Conjunctivae normal.   Cardiovascular:      Rate and Rhythm: Normal rate and regular rhythm.      Pulses: Normal " pulses.      Heart sounds: Normal heart sounds. No murmur heard.  Pulmonary:      Effort: Pulmonary effort is normal. No respiratory distress.      Breath sounds: Wheezing present.   Abdominal:      General: Bowel sounds are normal. There is no distension.      Palpations: Abdomen is soft.      Tenderness: There is no abdominal tenderness.   Musculoskeletal:         General: Normal range of motion.      Cervical back: Normal range of motion and neck supple.   Lymphadenopathy:      Cervical: No cervical adenopathy.   Skin:     General: Skin is warm and dry.      Findings: No rash.   Neurological:      General: No focal deficit present.      Mental Status: She is alert and oriented to person, place, and time.   Psychiatric:         Mood and Affect: Mood normal.         Behavior: Behavior normal.         Health Maintenance   Topic Date Due    Shingles Vaccine (2 of 3) 04/28/2016    Influenza Vaccine (1) 09/01/2024    COVID-19 Vaccine (7 - 2024-25 season) 09/01/2024    RSV Vaccine (Age 60+ and Pregnant patients) (1 - Risk 60-74 years 1-dose series) Never done    Hemoglobin A1c  04/23/2025    Diabetes Urine Screening  07/17/2025    Foot Exam  07/17/2025    Eye Exam  07/22/2025    Lipid Panel  10/23/2025    Mammogram  10/28/2025    Low Dose Statin  12/09/2025    Cervical Cancer Screening  10/09/2028    Colorectal Cancer Screening  11/16/2028    Pneumococcal Vaccines (Age 0-64) (3 of 3 - PPSV23 or PCV20) 11/13/2029    TETANUS VACCINE  09/20/2034    Hepatitis C Screening  Completed    HIV Screening  Completed     DISCLAIMER: This note was compiled by using a speech recognition dictation system and therefore please be aware that typographical / speech recognition errors can and do occur.  Please contact me if you see any errors specifically.  Consent was obtained for [employer] recording system prior to the visit.

## 2024-12-10 RX ORDER — PROMETHAZINE HYDROCHLORIDE 25 MG/1
TABLET ORAL
Qty: 90 TABLET | Refills: 0 | Status: SHIPPED | OUTPATIENT
Start: 2024-12-10

## 2024-12-10 NOTE — PROGRESS NOTES
History & Physical    Subjective     History of Present Illness:  Patient is a 59 y.o. female status post left breast mole excision presents for postop suture removal.  She is doing well with no complaints.    Initially referred for abnormal mammogram of the right breast.  She reports mole growth on her left breast but otherwise denies any breast mass nipple discharge, breast pain.  No family history of breast or ovarian cancer.  Menarche age 12,  1st at 23, menopause at 44.     No chief complaint on file.      Review of patient's allergies indicates:   Allergen Reactions    Meloxicam Anaphylaxis     Chest and neck pain .  Decrease O2    Iodinated contrast media      Pt states that she is not allergic [place] dye, but is only allergic to the older dyes.    Iodine and iodide containing products      Injectables ???     Loperamide-simethicone     Metoclopramide hcl      reglan     Diltiazem hcl Rash and Swelling       Current Outpatient Medications   Medication Sig Dispense Refill    albuterol (PROVENTIL) 2.5 mg /3 mL (0.083 %) nebulizer solution Take 3 mLs (2.5 mg total) by nebulization every 6 (six) hours as needed for Wheezing or Shortness of Breath. Rescue 180 mL 1    aspirin-acetaminophen-caffeine 250-250-65 mg (EXCEDRIN MIGRAINE) 250-250-65 mg per tablet Take 1 tablet by mouth as needed.       benzonatate (TESSALON) 200 MG capsule Take 1 capsule (200 mg total) by mouth 3 (three) times daily as needed for Cough. 180 capsule 3    blood sugar diagnostic Strp Use to check glucose daily 100 strip 3    blood-glucose meter kit Use as instructed 1 each 0    cetirizine (ZYRTEC) 10 MG tablet Take 10 mg by mouth every evening.      diazePAM (VALIUM) 10 MG Tab Take 10 mg by mouth.      diphenhydrAMINE (BENADRYL) 25 mg capsule Take 25 mg by mouth nightly as needed for Itching.      ergocalciferol (ERGOCALCIFEROL) 50,000 unit Cap Take 1 capsule (50,000 Units total) by mouth every 7 days. 12 capsule 3    furosemide (LASIX)  40 MG tablet Take 40 mg by mouth 2 (two) times daily.      ipratropium (ATROVENT) 0.02 % nebulizer solution NEBULIZE CONTENTS OF 1 VIAL AS NEEDED FOR WHEEZING *THANK YOU* 150 mL 6    lancets Misc Use to check glucose daily 100 each 3    lovastatin (MEVACOR) 40 MG tablet Take 40 mg by mouth once daily.      mupirocin (BACTROBAN) 2 % ointment Apply topically 3 (three) times daily. 22 g 0    NARCAN 4 mg/actuation [place] CALL 911. ADMINISTER A SINGLE SPRAY INTRANASALLY INTO ONE NOSTRIL UPON SIGNS OF OPIOID OVERDOSE. MAY REPEAT AFTER 3 MINUTES IF NO RESPONSE.      nystatin-triamcinolone (MYCOLOG II) cream Apply topically 3 (three) times daily.      ondansetron (ZOFRAN) 8 MG tablet TAKE 1 TABLET EVERY 8 HOURS AS NEEDED FOR NAUSEA AND VOMITING *THANK YOU* ** MAY CAUSE DROWSINESS ** 60 tablet 3    pantoprazole (PROTONIX) 40 MG tablet TAKE 1 TABLET TWICE DAILY FOR ACID REFLUX *THANK YOU* 180 tablet 1    tirzepatide (MOUNJARO) 7.5 mg/0.5 mL PnIj Inject 7.5 mg into the skin every 7 days. 12 Pen 1    tiZANidine (ZANAFLEX) 4 MG tablet TAKE 1 TABLET 3 TIMES A DAY AS NEEDED FOR MUSCLE SPASMS *THANK YOU* 270 tablet 0    triamcinolone acetonide 0.1% (KENALOG) 0.1 % ointment Apply topically 2 (two) times daily. 30 g 0    verapamiL (CALAN) 80 MG tablet Take by mouth.      amoxicillin-clavulanate 875-125mg (AUGMENTIN) 875-125 mg per tablet Take 1 tablet by mouth every 12 (twelve) hours. for 10 days 20 tablet 0    azelastine (ASTELIN) 137 mcg (0.1 %) nasal spray 1 spray (137 mcg total) by Nasal route 2 (two) times daily. 30 mL 11    azithromycin (Z-RENETTA) 250 MG tablet Take 2 tablets by mouth on day 1; Take 1 tablet by mouth on days 2-5 6 tablet 0    methylPREDNISolone (MEDROL DOSEPACK) 4 mg tablet follow package directions 21 tablet 0    promethazine (PHENERGAN) 25 MG tablet TAKE 1 TABLET BY MOUTH EVERY 6 HOURS AS NEEDED 90 tablet 0    promethazine-dextromethorphan (PROMETHAZINE-DM) 6.25-15 mg/5 mL Syrp Take 5 mLs by mouth every 6 (six)  hours as needed (cough). 118 mL 0     No current facility-administered medications for this visit.       Past Medical History:   Diagnosis Date    Allergy     seasonal     Anxiety     Arthritis     back    Asthma     seasonal    Chronic shoulder pain     Depression     GERD (gastroesophageal reflux disease)     Insomnia     Migraine     Nephrolithiasis     Pneumonia     PONV (postoperative nausea and vomiting)     Type 2 diabetes mellitus with hyperglycemia, without long-term current use of insulin 10/6/2023     Past Surgical History:   Procedure Laterality Date    ADENOIDECTOMY      APPENDECTOMY      CERVICAL FUSION  09/14/2020    C3-6    CHOLECYSTECTOMY      COLONOSCOPY N/A 11/16/2023    Procedure: COLONOSCOPY;  Surgeon: Alex Moy MD;  Location: [facility] ENDO;  Service: Endoscopy;  Laterality: N/A;    CYSTOSCOPY W/ URETERAL STENT PLACEMENT Left 03/31/2021    Procedure: CYSTOSCOPY, WITH URETERAL STENT INSERTION;  Surgeon: Dexter Sadler MD;  Location: [facility] OR;  Service: Urology;  Laterality: Left;    CYSTOURETEROSCOPY WITH RETROGRADE PYELOGRAPHY AND INSERTION OF STENT INTO URETER Right 01/21/2021    Procedure: CYSTOURETEROSCOPY, WITH RETROGRADE PYELOGRAM AND URETERAL STENT INSERTION;  Surgeon: Dexter Sadler MD;  Location: [facility] OR;  Service: Urology;  Laterality: Right;    KNEE SURGERY Left     age 13 - injury - no tears, just cleaned it up     LUMBAR FUSION  2013    L5-S1    LUMBAR NERVE STIMLATOR INSERTION  10/2018    SPINE SURGERY      TONSILLECTOMY      TUBAL LIGATION  01/19/1990    URETEROSCOPIC REMOVAL OF URETERIC CALCULUS Left 04/07/2021    Procedure: REMOVAL, CALCULUS, URETER, URETEROSCOPIC;  Surgeon: Dexter Sadler MD;  Location: [facility] OR;  Service: Urology;  Laterality: Left;    VAGINAL DELIVERY      2 births      Family History   Problem Relation Name Age of Onset    Thyroid disease Mother Meme Nicole     Depression Mother Meme Nicole     Hearing loss Mother Meme  "Bonnie     Vision loss Mother Meme Nicole     Heart disease Father Vivek     COPD Father Vivek     Supraventricular tachycardia Sister      Heart disease Brother Brother         smoking /stents - 40's    Heart disease Paternal Grandfather Trung Lord     Arthritis Brother Trung     Hyperlipidemia Brother Trung      Social History     Tobacco Use    Smoking status: Never    Smokeless tobacco: Never    Tobacco comments:     None, 1988 everywhere that I was or in my house-none   Substance Use Topics    Alcohol use: No    Drug use: No        Review of Systems:  Review of Systems   Constitutional:  Negative for activity change, chills, fatigue, fever and unexpected weight change.   HENT:  Negative for hearing loss, rhinorrhea and trouble swallowing.    Eyes:  Negative for discharge and visual disturbance.   Respiratory:  Negative for cough, chest tightness, shortness of breath, wheezing and stridor.    Cardiovascular:  Negative for chest pain, palpitations and leg swelling.   Gastrointestinal:  Negative for abdominal distention, abdominal pain, blood in stool, constipation, diarrhea, nausea and vomiting.   Endocrine: Negative for polydipsia and polyuria.   Genitourinary:  Negative for difficulty urinating, dysuria, frequency, hematuria, menstrual problem and urgency.   Musculoskeletal:  Negative for arthralgias, joint swelling and neck pain.   Skin:  Negative for color change, pallor, rash and wound.   Neurological:  Negative for weakness and headaches.   Hematological:  Does not bruise/bleed easily.   Psychiatric/Behavioral:  Negative for confusion and dysphoric mood.           Objective     Vital Signs (Most Recent)     5' 8" (1.727 m)  81 kg (178 lb 9.2 oz)     Physical Exam:  Physical Exam  Vitals reviewed.   Constitutional:       Appearance: She is well-developed.   HENT:      Head: Normocephalic and atraumatic.   Cardiovascular:      Rate and Rhythm: Normal rate and regular rhythm.   Pulmonary:      Effort: " Pulmonary effort is normal.      Breath sounds: Normal breath sounds.   Chest:   Breasts:     Right: No swelling, bleeding, inverted nipple, mass, nipple discharge, skin change or tenderness.      Left: Skin change present. No swelling, bleeding, inverted nipple, mass, nipple discharge or tenderness.       Abdominal:      General: Bowel sounds are normal. There is no distension.      Palpations: Abdomen is soft.      Tenderness: There is no abdominal tenderness.   Musculoskeletal:      Cervical back: Neck supple.   Skin:     General: Skin is warm and dry.   Neurological:      Mental Status: She is alert and oriented to person, place, and time.         Diagnostic Results:  Result:   Mammo Digital Diagnostic Right with Carlos  US Breast Right Limited     History:  Patient is 58 y.o. and is seen for abnormal mammogram.     Films Compared:  Compared to: 10/23/2024 Mammo Digital Screening Bilat w/ Carlos, 10/09/2023 Mammo Digital Screening Bilat w/ Carlos, 04/29/2021 Mammo Digital Screening Bilat w/ Carlos, and 06/27/2018 MAMMO DIGITAL SCREENING BILAT     Findings:  This procedure was performed using tomosynthesis. Computer-aided detection was utilized in the interpretation of this examination.        Mammo Digital Diagnostic Right with Carlos  There are scattered areas of fibroglandular density.  There is a focal asymmetry seen in the upper central region of the right breast.      US Breast Right Limited  There is a vague hyperechoic area of probable fat necrosis seen in the upper central region of the right breast at 12 o'clock.      Impression:  Right  Focal Asymmetry: Right breast focal asymmetry at the upper central position. Assessment: 3 - Probably benign. Short Interval Follow-Up in 6 Months is recommended.      BI-RADS Category:   Overall: 3 - Probably Benign        Recommendation:  Short interval follow-up is recommended in 6 months.        Your estimated lifetime risk of breast cancer (to age 85) based on Tyrer-Cuzick  risk assessment model is 4.68%.  According to the [employer], patients with a lifetime breast cancer risk of 20% or higher might benefit from supplemental screening tests, such as screening breast MRI.     Final Pathologic Diagnosis Skin, left breast, shave biopsy:  -SEBORRHEIC KERATOSIS, IRRITATED AND INFLAMED    This lesion is benign.          Assessment and Plan       59-year-old female s/p left breast mole excision; abnormal mammogram right breast BI-RADS 3 probably benign-asymmetry     PLAN:    Pathology reviewed   Healing well suture removed       Reviewed mammogram with patient right breast BI-RADS 3  Normal clinical breast exam  Will schedule for six-month interval breast imaging and breast exam

## 2024-12-10 NOTE — TELEPHONE ENCOUNTER
Refill Routing Note   Medication(s) are not appropriate for processing by [facility] for the following reason(s):        Outside of protocol  Non-participating provider    [facility] action(s):  Route               Appointments  past 12m or future 3m with PCP    Date Provider   Last Visit   10/23/2024 Roslyn Neff NP   Next Visit   Visit date not found Roslyn Neff NP   ED visits in past 90 days: 0        Note composed:2:48 AM 12/10/2024

## 2025-01-14 ENCOUNTER — HOSPITAL ENCOUNTER (OUTPATIENT)
Dept: RADIOLOGY | Facility: HOSPITAL | Age: 60
Discharge: HOME | End: 2025-01-14
Attending: FAMILY MEDICINE
Payer: MEDICARE

## 2025-01-14 ENCOUNTER — OFFICE VISIT (OUTPATIENT)
Dept: FAMILY MEDICINE | Facility: CLINIC | Age: 60
End: 2025-01-14
Payer: MEDICARE

## 2025-01-14 VITALS
OXYGEN SATURATION: 97 % | WEIGHT: 169 LBS | DIASTOLIC BLOOD PRESSURE: 82 MMHG | BODY MASS INDEX: 25.61 KG/M2 | HEIGHT: 68 IN | HEART RATE: 103 BPM | SYSTOLIC BLOOD PRESSURE: 126 MMHG

## 2025-01-14 DIAGNOSIS — M25.552 LEFT HIP PAIN: ICD-10-CM

## 2025-01-14 DIAGNOSIS — T14.8XXA HEMATOMA: ICD-10-CM

## 2025-01-14 DIAGNOSIS — G95.9 DISEASE OF SPINAL CORD: ICD-10-CM

## 2025-01-14 DIAGNOSIS — E66.3 OVERWEIGHT WITH BODY MASS INDEX (BMI) OF 25 TO 25.9 IN ADULT: ICD-10-CM
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
## 2025-01-14 DIAGNOSIS — E11.65 TYPE 2 DIABETES MELLITUS WITH HYPERGLYCEMIA, WITHOUT LONG-TERM CURRENT USE OF INSULIN: Primary | ICD-10-CM

## 2025-01-14 PROBLEM — E66.01 SEVERE OBESITY (BMI 35.0-35.9 WITH COMORBIDITY): Status: RESOLVED | Noted: 2021-05-05 | Resolved: 2025-01-14

## 2025-01-14 PROCEDURE — 73502 X-RAY EXAM HIP UNI 2-3 VIEWS: CPT | Mod: TC,PO,LT

## 2025-01-14 PROCEDURE — 76857 US EXAM PELVIC LIMITED: CPT | Mod: 26,,, | Performed by: RADIOLOGY

## 2025-01-14 PROCEDURE — 1160F RVW MEDS BY RX/DR IN RCRD: CPT | Mod: CPTII,S$GLB,, | Performed by: FAMILY MEDICINE

## 2025-01-14 PROCEDURE — 76857 US EXAM PELVIC LIMITED: CPT | Mod: TC,PO

## 2025-01-14 PROCEDURE — G2211 COMPLEX E/M VISIT ADD ON: HCPCS | Mod: S$GLB,,, | Performed by: FAMILY MEDICINE

## 2025-01-14 PROCEDURE — 3079F DIAST BP 80-89 MM HG: CPT | Mod: CPTII,S$GLB,, | Performed by: FAMILY MEDICINE

## 2025-01-14 PROCEDURE — 3072F LOW RISK FOR RETINOPATHY: CPT | Mod: CPTII,S$GLB,, | Performed by: FAMILY MEDICINE

## 2025-01-14 PROCEDURE — 73502 X-RAY EXAM HIP UNI 2-3 VIEWS: CPT | Mod: 26,LT,, | Performed by: RADIOLOGY

## 2025-01-14 PROCEDURE — 99999 PR PBB SHADOW E&M-EST. PATIENT-LVL IV: CPT | Mod: PBBFAC,,, | Performed by: FAMILY MEDICINE

## 2025-01-14 PROCEDURE — 3074F SYST BP LT 130 MM HG: CPT | Mod: CPTII,S$GLB,, | Performed by: FAMILY MEDICINE

## 2025-01-14 PROCEDURE — 3008F BODY MASS INDEX DOCD: CPT | Mod: CPTII,S$GLB,, | Performed by: FAMILY MEDICINE

## 2025-01-14 PROCEDURE — 99214 OFFICE O/P EST MOD 30 MIN: CPT | Mod: S$GLB,,, | Performed by: FAMILY MEDICINE

## 2025-01-14 PROCEDURE — 1159F MED LIST DOCD IN RCRD: CPT | Mod: CPTII,S$GLB,, | Performed by: FAMILY MEDICINE

## 2025-01-14 NOTE — PROGRESS NOTES
1st check to see if patient has seen the results.  If not then  CALL patient with results and Document verification.  Schedule follow-up if needed.  [phone]      Ultrasound has been reviewed by radiology.  There is a 6.1 centimeter hematoma noted.  Please be advised that this may take several weeks to resolve.  Consider surgery consult if no improvement or worsening.

## 2025-01-15 NOTE — PROGRESS NOTES
1st check to see if patient has seen the results.  If not then  CALL patient with results and Document verification.  Schedule follow-up if needed.  [phone]    X-ray of the left hip reviewed by radiology.  There is no fracture or dislocation.  Incidental finding of hardware in the lumbar spine.  Other incidental findings include calcium deposits.  Prominent stool burden consistent with constipation is present.  Start stool softener and laxative.    Follow-up sooner if pain does not improve.

## 2025-01-15 NOTE — PROGRESS NOTES
PLAN:      Assessment & Plan  1. Left hip pain.  The patient's ability to ambulate suggests that a fracture is unlikely. An x-ray of the hip will be conducted today to rule out any fractures. Additionally, an ultrasound of the affected area will be performed to ensure there is no active bleeding. She is advised to use a donut or wedge cushion for sitting to alleviate discomfort.    2. Sinus infection.  The symptoms are indicative of an allergic or viral etiology. She is advised to continue with Claritin, Flonase, and Tylenol for symptom management. If her condition does not improve, she should inform the clinic.    Problem List Items Addressed This Visit       Type 2 diabetes mellitus with hyperglycemia, without long-term current use of insulin - Primary (Chronic)     Update A1C. Refill Mounjaro. Plan to monitor hemoglobin A1C at designated intervals 3-6 months.  I recommend ongoing Education for diabetic diet and exercise protocol. Follow up with Ophthalmology/Optometry and Podiatry at least annually.          Relevant Orders    CBC Without Differential    Comprehensive Metabolic Panel    TSH    Hemoglobin A1C    Lipid Panel    Disease of spinal cord (Chronic)    Relevant Orders    CBC Without Differential    Comprehensive Metabolic Panel    TSH    Hemoglobin A1C    Lipid Panel    Left hip pain (Chronic)    Relevant Orders    X-Ray Hip 2 or 3 views Left with Pelvis when performed (Completed)    CBC Without Differential    Comprehensive Metabolic Panel    TSH    Hemoglobin A1C    Lipid Panel    RESOLVED: Severe obesity (BMI 35.0-35.9 with comorbidity)    Fall    Relevant Orders    CBC Without Differential    Comprehensive Metabolic Panel    TSH    Hemoglobin A1C    Lipid Panel    Hematoma    Relevant Orders    US Soft Tissue Buttocks (Completed)    CBC Without Differential    Comprehensive Metabolic Panel    TSH    Hemoglobin A1C    Lipid Panel        Medication Management for assessment above:   Medication List  with Changes/Refills   Current Medications    ALBUTEROL (PROVENTIL) 2.5 MG /3 ML (0.083 %) NEBULIZER SOLUTION    Take 3 mLs (2.5 mg total) by nebulization every 6 (six) hours as needed for Wheezing or Shortness of Breath. Rescue    ASPIRIN-ACETAMINOPHEN-CAFFEINE 250-250-65 MG (EXCEDRIN MIGRAINE) 250-250-65 MG PER TABLET    Take 1 tablet by mouth as needed.     AZELASTINE (ASTELIN) 137 MCG (0.1 %) NASAL SPRAY    1 spray (137 mcg total) by Nasal route 2 (two) times daily.    BENZONATATE (TESSALON) 200 MG CAPSULE    Take 1 capsule (200 mg total) by mouth 3 (three) times daily as needed for Cough.    BLOOD SUGAR DIAGNOSTIC STRP    Use to check glucose daily    BLOOD-GLUCOSE METER KIT    Use as instructed    CETIRIZINE (ZYRTEC) 10 MG TABLET    Take 10 mg by mouth every evening.    DIAZEPAM (VALIUM) 10 MG TAB    Take 10 mg by mouth.    DIPHENHYDRAMINE (BENADRYL) 25 MG CAPSULE    Take 25 mg by mouth nightly as needed for Itching.    ERGOCALCIFEROL (ERGOCALCIFEROL) 50,000 UNIT CAP    Take 1 capsule (50,000 Units total) by mouth every 7 days.    FUROSEMIDE (LASIX) 40 MG TABLET    Take 40 mg by mouth 2 (two) times daily.    IPRATROPIUM (ATROVENT) 0.02 % NEBULIZER SOLUTION    NEBULIZE CONTENTS OF 1 VIAL AS NEEDED FOR WHEEZING *THANK YOU*    LANCETS MISC    Use to check glucose daily    LOVASTATIN (MEVACOR) 40 MG TABLET    Take 40 mg by mouth once daily.    MUPIROCIN (BACTROBAN) 2 % OINTMENT    Apply topically 3 (three) times daily.    NARCAN 4 MG/ACTUATION SPRY    CALL 911. ADMINISTER A SINGLE SPRAY INTRANASALLY INTO ONE NOSTRIL UPON SIGNS OF OPIOID OVERDOSE. MAY REPEAT AFTER 3 MINUTES IF NO RESPONSE.    NYSTATIN-TRIAMCINOLONE (MYCOLOG II) CREAM    Apply topically 3 (three) times daily.    ONDANSETRON (ZOFRAN) 8 MG TABLET    TAKE 1 TABLET EVERY 8 HOURS AS NEEDED FOR NAUSEA AND VOMITING *THANK YOU* ** MAY CAUSE DROWSINESS **    PANTOPRAZOLE (PROTONIX) 40 MG TABLET    TAKE 1 TABLET TWICE DAILY FOR ACID REFLUX *THANK YOU*     TIRZEPATIDE (MOUNJARO) 7.5 MG/0.5 ML PNIJ    Inject 7.5 mg into the skin every 7 days.    TIZANIDINE (ZANAFLEX) 4 MG TABLET    TAKE 1 TABLET 3 TIMES A DAY AS NEEDED FOR MUSCLE SPASMS *THANK YOU*    TRIAMCINOLONE ACETONIDE 0.1% (KENALOG) 0.1 % OINTMENT    Apply topically 2 (two) times daily.    VERAPAMIL (CALAN) 80 MG TABLET    Take by mouth.   Discontinued Medications    METHYLPREDNISOLONE (MEDROL DOSEPACK) 4 MG TABLET    follow package directions    PROMETHAZINE (PHENERGAN) 25 MG TABLET    TAKE 1 TABLET BY MOUTH EVERY 6 HOURS AS NEEDED       Fred Zambrano M.D.  ==========================================================================  Subjective:   Patient ID: Luann Mcgovern is a 59 y.o. female.  has a past medical history of Allergy, Anxiety, Arthritis, Asthma, Chronic shoulder pain, Depression, GERD (gastroesophageal reflux disease), Insomnia, Migraine, Nephrolithiasis, Pneumonia, PONV (postoperative nausea and vomiting), and Type 2 diabetes mellitus with hyperglycemia, without long-term current use of insulin (10/6/2023).   Chief Complaint: Fall (6 days ago)      History of Present Illness  The patient is a 59-year-old female here for a follow-up of a chronic medical condition and evaluation of left hip pain.    She experienced a fall on 01/08/2025 while in her attic, resulting in a head injury and neck pain. She reports no loss of consciousness, nausea, vomiting, or headaches. She describes feeling dazed following the incident. She also reports severe pain in her buttocks, which radiates down her leg, and swelling in her entire leg. She has been unable to sit comfortably or put on her socks due to the pain. She has been using her prescribed neck pain medication to manage her current pain. She has been managing her neck pain with a hard collar.    She has been experiencing sinus pressure and ear discomfort. She has been using Claritin, Flonase, and Tylenol to manage her symptoms.    Supplemental  "Information  She has lost weight over the past 17 months and aims to maintain a weight between 170 and 180 pounds. She is adhering to her diet and exercise regimen.    MEDICATIONS  Current: Claritin, Flonase, Tylenol    Problem List Items Addressed This Visit       Type 2 diabetes mellitus with hyperglycemia, without long-term current use of insulin - Primary (Chronic)    Overview     January 2025:   Diabetes Medications               tirzepatide (MOUNJARO) 7.5 mg/0.5 mL PnIj Inject 7.5 mg into the skin every 7 days.          October 2024: doing well with mounjaro 7.5 mg weekly. She would like to stay on current regimen. No SE reported.     April 2024: doing well with Mounjaro 5 mg weekly. She is wanting to go up to 7.5 mg. Tolerating current dose well. No SE reported.     October 2023:  Patient has been on metformin.  She reports intolerance to this medication.  She would like to try alternative medication.  Patient denies any history of thyroid cancer, pancreatitis, MEN syndrome.  Diabetes Management Status    Statin: Taking  ACE/ARB: Not taking    Screening or Prevention Patient's value Goal Complete/Controlled?   HgA1C Testing and Control   Lab Results   Component Value Date    HGBA1C 5.2 10/23/2024      Annually/Less than 8% Yes   Lipid profile : 10/23/2024 Annually Yes   LDL control Lab Results   Component Value Date    LDLCALC 65.6 10/23/2024    Annually/Less than 100 mg/dl  Yes   Nephropathy screening Lab Results   Component Value Date    LABMICR 16.0 07/17/2024     Lab Results   Component Value Date    PROTEINUA Negative 11/20/2023     No results found for: "UTPCR"   Annually Yes   Blood pressure BP Readings from Last 1 Encounters:   01/14/25 126/82    Less than 140/90 Yes   Dilated retinal exam : 07/22/2024 Annually Yes   Foot exam   : 07/17/2024 Annually Yes              Current Assessment & Plan     Update A1C. Refill Mounjaro. Plan to monitor hemoglobin A1C at designated intervals 3-6 months.  I " recommend ongoing Education for diabetic diet and exercise protocol. Follow up with Ophthalmology/Optometry and Podiatry at least annually.          Disease of spinal cord (Chronic)    Left hip pain (Chronic)    RESOLVED: Severe obesity (BMI 35.0-35.9 with comorbidity)    Fall    Hematoma        Review of patient's allergies indicates:   Allergen Reactions    Meloxicam Anaphylaxis     Chest and neck pain .  Decrease O2    Iodinated contrast media      Pt states that she is not allergic [place] dye, but is only allergic to the older dyes.    Iodine and iodide containing products      Injectables ???     Loperamide-simethicone     Metoclopramide hcl      reglan     Diltiazem hcl Rash and Swelling     Current Outpatient Medications   Medication Instructions    albuterol (PROVENTIL) 2.5 mg, Nebulization, Every 6 hours PRN, Rescue    aspirin-acetaminophen-caffeine 250-250-65 mg (EXCEDRIN MIGRAINE) 250-250-65 mg per tablet 1 tablet, As needed (PRN)    azelastine (ASTELIN) 137 mcg, Nasal, 2 times daily    benzonatate (TESSALON) 200 mg, Oral, 3 times daily PRN    blood sugar diagnostic Strp Use to check glucose daily    blood-glucose meter kit Use as instructed    cetirizine (ZYRTEC) 10 mg, Nightly    diazePAM (VALIUM) 10 mg    diphenhydrAMINE (BENADRYL) 25 mg, Nightly PRN    ergocalciferol (ERGOCALCIFEROL) 50,000 Units, Oral, Every 7 days    furosemide (LASIX) 40 mg, 2 times daily    ipratropium (ATROVENT) 0.02 % nebulizer solution NEBULIZE CONTENTS OF 1 VIAL AS NEEDED FOR WHEEZING *THANK YOU*    lancets Misc Use to check glucose daily    lovastatin (MEVACOR) 40 mg, Daily    MOUNJARO 7.5 mg, Subcutaneous, Every 7 days    mupirocin (BACTROBAN) 2 % ointment Topical (Top), 3 times daily    NARCAN 4 mg/actuation [place] CALL 911. ADMINISTER A SINGLE SPRAY INTRANASALLY INTO ONE NOSTRIL UPON SIGNS OF OPIOID OVERDOSE. MAY REPEAT AFTER 3 MINUTES IF NO RESPONSE.    nystatin-triamcinolone (MYCOLOG II) cream 3 times daily    ondansetron  "(ZOFRAN) 8 MG tablet TAKE 1 TABLET EVERY 8 HOURS AS NEEDED FOR NAUSEA AND VOMITING *THANK YOU* ** MAY CAUSE DROWSINESS **    pantoprazole (PROTONIX) 40 MG tablet TAKE 1 TABLET TWICE DAILY FOR ACID REFLUX *THANK YOU*    tiZANidine (ZANAFLEX) 4 MG tablet TAKE 1 TABLET 3 TIMES A DAY AS NEEDED FOR MUSCLE SPASMS *THANK YOU*    triamcinolone acetonide 0.1% (KENALOG) 0.1 % ointment Topical (Top), 2 times daily    verapamiL (CALAN) 80 MG tablet Take by mouth.      I have reviewed the PMH, social history, FamilyHx, surgical history, allergies and medications documented / confirmed by the patient at the time of this visit.  Review of Systems   Constitutional:  Negative for chills, fatigue, fever and unexpected weight change.   HENT:  Negative for ear pain and sore throat.    Eyes:  Negative for redness and visual disturbance.   Respiratory:  Negative for cough and shortness of breath.    Cardiovascular:  Negative for chest pain and palpitations.   Gastrointestinal:  Negative for nausea and vomiting.   Genitourinary:  Negative for difficulty urinating and hematuria.   Musculoskeletal:  Positive for myalgias. Negative for arthralgias.   Skin:  Negative for rash and wound.   Neurological:  Negative for weakness and headaches.   Hematological:  Bruises/bleeds easily.   Psychiatric/Behavioral:  Negative for sleep disturbance. The patient is not nervous/anxious.      Objective:   /82   Pulse 103   Ht 5' 8" (1.727 m)   Wt 76.7 kg (169 lb)   LMP 11/15/2002   SpO2 97%   BMI 25.70 kg/m²   Physical Exam  Vitals and nursing note reviewed.   Constitutional:       General: She is not in acute distress.     Appearance: She is well-developed. She is not ill-appearing, toxic-appearing or diaphoretic.   HENT:      Head: Normocephalic and atraumatic.      Right Ear: Hearing and external ear normal.      Left Ear: Hearing and external ear normal.      Nose: Nose normal. No rhinorrhea.   Eyes:      General: Lids are normal.      " Extraocular Movements: Extraocular movements intact.      Conjunctiva/sclera: Conjunctivae normal.      Pupils: Pupils are equal, round, and reactive to light.   Cardiovascular:      Rate and Rhythm: Normal rate.      Pulses: Normal pulses.   Pulmonary:      Effort: Pulmonary effort is normal. No respiratory distress.      Breath sounds: Normal breath sounds.   Abdominal:      General: Bowel sounds are normal.      Palpations: Abdomen is soft.   Musculoskeletal:         General: Tenderness present. No deformity. Normal range of motion.      Cervical back: Normal range of motion and neck supple.   Skin:     General: Skin is warm and dry.      Capillary Refill: Capillary refill takes less than 2 seconds.      Coloration: Skin is not pale.      Findings: Bruising present.             Comments: Tenderness, moderate size soft tissue mass   Neurological:      General: No focal deficit present.      Mental Status: She is alert and oriented to person, place, and time. She is not disoriented.      Cranial Nerves: No cranial nerve deficit.      Motor: No weakness.      Gait: Gait normal.   Psychiatric:         Attention and Perception: She is attentive.         Mood and Affect: Mood normal. Mood is not anxious or depressed.         Speech: Speech is not rapid and pressured or slurred.         Behavior: Behavior normal. Behavior is not agitated, aggressive or hyperactive. Behavior is cooperative.         Thought Content: Thought content normal. Thought content is not paranoid or delusional. Thought content does not include homicidal or suicidal ideation. Thought content does not include homicidal or suicidal plan.         Cognition and Memory: Memory is not impaired.         Judgment: Judgment normal.       Physical Exam  Lungs are clear.    Results      Assessment:     1. Type 2 diabetes mellitus with hyperglycemia, without long-term current use of insulin    2. Overweight with body mass index (BMI) of 25 to 25.9 in adult     3. Disease of spinal cord    4. Fall, initial encounter    5. Left hip pain    6. Hematoma      MDM:   Moderate medical complexity.  Moderate risk.  Total time: 21 minutes.  This includes total time spent on the encounter, which includes face to face time and non-face to face time preparing to see the patient (eg, review of previous medical records, tests), Obtaining and/or reviewing separately obtained history, documenting clinical information in the electronic or other health record, independently interpreting results (not separately reported)/communicating results to the patient/family/caregiver, and/or care coordination (not separately reported).    I have Reviewed and summarized old records.  I have performed thorough medication reconciliation today and discussed risk and benefits of medications.  I have reviewed labs and discussed with patient.  All questions were answered.  I am requesting old records and will review them once they are available.  Visit today included increased complexity associated with the care of the episodic problem see above assessment addressed and managing the longitudinal care of the patient due to the serious and/or complex managed problem(s) see above.    I have signed for the following orders AND/OR meds.  Orders Placed This Encounter   Procedures    US Soft Tissue Buttocks     Standing Status:   Future     Number of Occurrences:   1     Standing Expiration Date:   1/14/2026     Order Specific Question:   May the Radiologist modify the order per protocol to meet the clinical needs of the patient?     Answer:   Yes     Order Specific Question:   Release to patient     Answer:   Immediate    X-Ray Hip 2 or 3 views Left with Pelvis when performed     Standing Status:   Future     Number of Occurrences:   1     Standing Expiration Date:   1/14/2026    CBC Without Differential     Standing Status:   Future     Standing Expiration Date:   3/15/2026    Comprehensive Metabolic Panel      Standing Status:   Future     Standing Expiration Date:   3/15/2026    TSH     Standing Status:   Future     Standing Expiration Date:   3/15/2026    Hemoglobin A1C     Standing Status:   Future     Standing Expiration Date:   3/15/2026    Lipid Panel     Standing Status:   Future     Standing Expiration Date:   3/15/2026           Follow up in about 6 months (around 7/14/2025), or if symptoms worsen or fail to improve.    If no improvement in symptoms or symptoms worsen, advised to call/follow-up at clinic or go to ER. Patient voiced understanding and all questions/concerns were addressed.   DISCLAIMER: This note was compiled by using a speech recognition dictation system and therefore please be aware that typographical / speech recognition errors can and do occur.  Please contact me if you see any errors specifically.  Consent was obtained for MATT recording system prior to the visit.    This note was generated with the assistance of ambient listening technology. Verbal consent was obtained by the patient and accompanying visitor(s) for the recording of patient appointment to facilitate this note. I attest to having reviewed and edited the generated note for accuracy, though some syntax or spelling errors may persist. Please contact the author of this note for any clarification.    Fred Zambrano M.D.       Office: [phone]   [address] [place] [address]  FAX: [phone]     Take over the counter pain medication

## 2025-03-12 DIAGNOSIS — E55.9 VITAMIN D DEFICIENCY: ICD-10-CM

## 2025-03-12 DIAGNOSIS — R21 RASH AND NONSPECIFIC SKIN ERUPTION: ICD-10-CM

## 2025-03-12 DIAGNOSIS — U09.9 POST-COVID CHRONIC COUGH: ICD-10-CM

## 2025-03-12 DIAGNOSIS — R05.3 POST-COVID CHRONIC COUGH: ICD-10-CM

## 2025-03-12 DIAGNOSIS — R11.0 NAUSEA IN ADULT PATIENT: ICD-10-CM

## 2025-03-12 RX ORDER — ERGOCALCIFEROL 1.25 MG/1
50000 CAPSULE ORAL
Qty: 12 CAPSULE | Refills: 3 | Status: SHIPPED | OUTPATIENT
Start: 2025-03-12

## 2025-03-12 RX ORDER — ONDANSETRON HYDROCHLORIDE 8 MG/1
8 TABLET, FILM COATED ORAL EVERY 8 HOURS PRN
Qty: 60 TABLET | Refills: 3 | Status: SHIPPED | OUTPATIENT
Start: 2025-03-12

## 2025-03-12 RX ORDER — TRIAMCINOLONE ACETONIDE 1 MG/G
OINTMENT TOPICAL 2 TIMES DAILY
Qty: 80 G | Refills: 5 | Status: SHIPPED | OUTPATIENT
Start: 2025-03-12

## 2025-03-12 RX ORDER — BENZONATATE 200 MG/1
200 CAPSULE ORAL 3 TIMES DAILY PRN
Qty: 180 CAPSULE | Refills: 3 | Status: SHIPPED | OUTPATIENT
Start: 2025-03-12

## 2025-03-12 NOTE — TELEPHONE ENCOUNTER
Refill Decision Note   Luann Froilan  is requesting a refill authorization.  Brief Assessment and Rationale for Refill:  Approve     Medication Therapy Plan:         Comments:     Note composed:11:54 AM 03/12/2025

## 2025-03-12 NOTE — TELEPHONE ENCOUNTER
Refill Routing Note   Medication(s) are not appropriate for processing by [facility] for the following reason(s):        Outside of protocol    [facility] action(s):  Route               Appointments  past 12m or future 3m with PCP    Date Provider   Last Visit   1/14/2025 Fred Zambrano MD   Next Visit   3/12/2025 Fred Zambrano MD   ED visits in past 90 days: 0        Note composed:12:39 PM 03/12/2025

## 2025-03-12 NOTE — TELEPHONE ENCOUNTER
No care due was identified.  [facility] Embedded Care Due Messages. Reference number: [id number].   3/12/2025 6:16:43 AM CDT

## 2025-03-24 ENCOUNTER — TELEPHONE (OUTPATIENT)
Dept: FAMILY MEDICINE | Facility: CLINIC | Age: 60
End: 2025-03-24
Payer: MEDICARE

## 2025-03-24 NOTE — TELEPHONE ENCOUNTER
Copied from CRM #[id number]. Topic: General Inquiry - Patient Advice  >> Mar 24, 2025  2:39 PM Kiko wrote:  .Type:  Needs Medical Advice    Who Called:  Tiffanie/ [facility]    Would the patient rather a call back or a response via [employer] [facility]?  Call back   Best Call Back Number:  [phone]  Ref#[id number]  Additional Information:Needs an known diagnostic code if pt has   diabetes, chronic heart disease and or cardiovascular disease

## 2025-04-11 ENCOUNTER — TELEPHONE (OUTPATIENT)
Dept: FAMILY MEDICINE | Facility: CLINIC | Age: 60
End: 2025-04-11
Payer: MEDICARE

## 2025-04-11 NOTE — TELEPHONE ENCOUNTER
----- Message from [employer] sent at 4/11/2025  8:18 AM CDT -----  .Type: Patient Call BackWho called:patientWhat is the request in detail:   calling concerning needing a clearance for surgery Can the clinic reply by [facility]?   Would the patient rather a call back or a response via [facility]?Best call back number:  .[phone]

## 2025-04-14 ENCOUNTER — HOSPITAL ENCOUNTER (OUTPATIENT)
Dept: RADIOLOGY | Facility: HOSPITAL | Age: 60
Discharge: HOME | End: 2025-04-14
Attending: NURSE PRACTITIONER
Payer: MEDICARE

## 2025-04-14 ENCOUNTER — OFFICE VISIT (OUTPATIENT)
Dept: FAMILY MEDICINE | Facility: CLINIC | Age: 60
End: 2025-04-14
Payer: MEDICARE

## 2025-04-14 VITALS
HEIGHT: 68 IN | HEART RATE: 74 BPM | OXYGEN SATURATION: 98 % | TEMPERATURE: 98 F | DIASTOLIC BLOOD PRESSURE: 70 MMHG | RESPIRATION RATE: 17 BRPM | SYSTOLIC BLOOD PRESSURE: 118 MMHG | WEIGHT: 163.63 LBS | BODY MASS INDEX: 24.8 KG/M2

## 2025-04-14 DIAGNOSIS — G95.9 DISEASE OF SPINAL CORD: Chronic | ICD-10-CM

## 2025-04-14 DIAGNOSIS — Z01.818 PREOPERATIVE CLEARANCE: Primary | ICD-10-CM

## 2025-04-14 DIAGNOSIS — E11.65 TYPE 2 DIABETES MELLITUS WITH HYPERGLYCEMIA, WITHOUT LONG-TERM CURRENT USE OF INSULIN: Chronic | ICD-10-CM

## 2025-04-14 DIAGNOSIS — R92.8 ABNORMAL MAMMOGRAM: ICD-10-CM

## 2025-04-14 DIAGNOSIS — Z79.899 ENCOUNTER FOR LONG-TERM (CURRENT) USE OF MEDICATIONS: ICD-10-CM

## 2025-04-14 DIAGNOSIS — J45.20 MILD INTERMITTENT ASTHMA WITHOUT COMPLICATION: ICD-10-CM

## 2025-04-14 LAB
OHS QRS DURATION: 72 MS
OHS QTC CALCULATION: 412 MS

## 2025-04-14 PROCEDURE — G2211 COMPLEX E/M VISIT ADD ON: HCPCS | Mod: S$GLB,,, | Performed by: NURSE PRACTITIONER

## 2025-04-14 PROCEDURE — 1160F RVW MEDS BY RX/DR IN RCRD: CPT | Mod: CPTII,S$GLB,, | Performed by: NURSE PRACTITIONER

## 2025-04-14 PROCEDURE — 93010 ELECTROCARDIOGRAM REPORT: CPT | Mod: S$GLB,,, | Performed by: INTERNAL MEDICINE

## 2025-04-14 PROCEDURE — 3008F BODY MASS INDEX DOCD: CPT | Mod: CPTII,S$GLB,, | Performed by: NURSE PRACTITIONER

## 2025-04-14 PROCEDURE — 3074F SYST BP LT 130 MM HG: CPT | Mod: CPTII,S$GLB,, | Performed by: NURSE PRACTITIONER

## 2025-04-14 PROCEDURE — 3072F LOW RISK FOR RETINOPATHY: CPT | Mod: CPTII,S$GLB,, | Performed by: NURSE PRACTITIONER

## 2025-04-14 PROCEDURE — 99214 OFFICE O/P EST MOD 30 MIN: CPT | Mod: S$GLB,,, | Performed by: NURSE PRACTITIONER

## 2025-04-14 PROCEDURE — 71046 X-RAY EXAM CHEST 2 VIEWS: CPT | Mod: 26,,, | Performed by: RADIOLOGY

## 2025-04-14 PROCEDURE — 71046 X-RAY EXAM CHEST 2 VIEWS: CPT | Mod: TC,PO

## 2025-04-14 PROCEDURE — 1159F MED LIST DOCD IN RCRD: CPT | Mod: CPTII,S$GLB,, | Performed by: NURSE PRACTITIONER

## 2025-04-14 PROCEDURE — 99999 PR PBB SHADOW E&M-EST. PATIENT-LVL V: CPT | Mod: PBBFAC,,, | Performed by: NURSE PRACTITIONER

## 2025-04-14 PROCEDURE — 93005 ELECTROCARDIOGRAM TRACING: CPT | Mod: S$GLB,,, | Performed by: NURSE PRACTITIONER

## 2025-04-14 PROCEDURE — 3078F DIAST BP <80 MM HG: CPT | Mod: CPTII,S$GLB,, | Performed by: NURSE PRACTITIONER

## 2025-04-14 NOTE — Clinical Note
Please see result notes. Patient has been cleared for upcoming procedure. Please fax note and provide a copy at the  for the patient to pickup.

## 2025-04-14 NOTE — ASSESSMENT & PLAN NOTE
Plan to monitor hemoglobin A1C at designated intervals 3-6 months.  I recommend ongoing Education for diabetic diet and exercise protocol. Follow up with Ophthalmology/Optometry and Podiatry at least annually.

## 2025-04-14 NOTE — PROGRESS NOTES
Assessment/Plan:  Problem List Items Addressed This Visit          Neuro    Disease of spinal cord (Chronic)    Overview   History of cervical and lumbar fusion in the past. She is followed by Dr. Sampson. Has neurostimulator. Plans for exchange at the end of the month.          Current Assessment & Plan   Continue following with specialist.             Psychiatric    Encounter for long-term (current) use of medications    Overview   CHRONIC long-term drug therapy for managed conditions. See medication list. Reports compliance.  No side effects reported.  Routine lab work is being monitored.  Patient does need refills today. Reviewed labs.      Lab Results   Component Value Date    WBC 8.24 10/23/2024    HGB 13.5 10/23/2024    HCT 44.3 10/23/2024    MCV 96 10/23/2024     10/23/2024         Chemistry        Component Value Date/Time     10/23/2024 0928    K 3.9 10/23/2024 0928     10/23/2024 0928    CO2 24 10/23/2024 0928    BUN 20 10/23/2024 0928    CREATININE 1.0 10/23/2024 0928    GLU 85 10/23/2024 0928        Component Value Date/Time    CALCIUM 9.9 10/23/2024 0928    ALKPHOS 106 10/23/2024 0928    AST 39 10/23/2024 0928    ALT 26 10/23/2024 0928    BILITOT 0.4 10/23/2024 0928    ESTGFRAFRICA >60.0 10/15/2021 0843    EGFRNONAA >60.0 10/15/2021 0843        Lab Results   Component Value Date    TSH 0.679 10/23/2024    Y8PEKND 8.0 07/10/2018    FREET4 0.83 10/06/2023            Current Assessment & Plan   Labs today. Complete history and physical was completed today.  Complete and thorough medication reconciliation was performed.  Discussed risks and benefits of medications.  Advised patient on orders and health maintenance.  We discussed old records and old labs if available.  Will request any records not available through epic.  Continue current medications listed on your summary sheet.         Relevant Orders    TSH    Lipid Panel    Hemoglobin A1C    Comprehensive Metabolic Panel    CBC  Without Differential       Pulmonary    Asthma    Overview   Chronic. Stable. Intermittent flares, usually seasonal. Has albuterol inhaler and neb. Denies frequent use.          Current Assessment & Plan   Continue albuterol PRN. Avoid triggers.          Relevant Orders    X-Ray Chest PA And Lateral       Renal/    Abnormal mammogram    Overview   Narrative & Impression  Facility:  [facility] [address] [address]  [phone]     Name: Luann Mcgovern     MRN: [id number]     Result:   Mammo Digital Diagnostic Right with Carlos  US Breast Right Limited     History:  Patient is 58 y.o. and is seen for abnormal mammogram.     Films Compared:  Compared to: 10/23/2024 Mammo Digital Screening Bilat w/ Carlos, 10/09/2023 Mammo Digital Screening Bilat w/ Carlos, 04/29/2021 Mammo Digital Screening Bilat w/ Carlos, and 06/27/2018 MAMMO DIGITAL SCREENING BILAT     Findings:  This procedure was performed using tomosynthesis. Computer-aided detection was utilized in the interpretation of this examination.     Mammo Digital Diagnostic Right with Carlos  There are scattered areas of fibroglandular density.  There is a focal asymmetry seen in the upper central region of the right breast.      US Breast Right Limited  There is a vague hyperechoic area of probable fat necrosis seen in the upper central region of the right breast at 12 o'clock.      Impression:  Right  Focal Asymmetry: Right breast focal asymmetry at the upper central position. Assessment: 3 - Probably benign. Short Interval Follow-Up in 6 Months is recommended.      BI-RADS Category:   Overall: 3 - Probably Benign     Recommendation:  Short interval follow-up is recommended in 6 months.     Your estimated lifetime risk of breast cancer (to age 85) based on Tyrer-Cuzick risk assessment model is 4.68%.  According to the American Cancer Society, patients with a lifetime breast cancer risk of 20% or higher might benefit from  "supplemental screening tests, such as screening breast MRI.     Darío Rick, DO        Exam Ended: 10/28/24 08:20 CDT            Current Assessment & Plan   Short interval follow up recommended in 6 months on previous mammo. She is due for updated diagnostic mammogram and ultrasound.          Relevant Orders    Mammo Digital Diagnostic Bilat with Carlos (XPD)    US Breast Bilateral Limited       Endocrine    Type 2 diabetes mellitus with hyperglycemia, without long-term current use of insulin (Chronic)    Overview   April 2025: doing very well with mounjaro. A1C stable. She has lost weight.    October 2024: doing well with mounjaro 7.5 mg weekly. She would like to stay on current regimen. No SE reported.     April 2024: doing well with Mounjaro 5 mg weekly. She is wanting to go up to 7.5 mg. Tolerating current dose well. No SE reported.     October 2023:  Patient has been on metformin.  She reports intolerance to this medication.  She would like to try alternative medication.  Patient denies any history of thyroid cancer, pancreatitis, MEN syndrome.  Diabetes Management Status    Statin: Taking  ACE/ARB: Not taking    Screening or Prevention Patient's value Goal Complete/Controlled?   HgA1C Testing and Control   Lab Results   Component Value Date    HGBA1C 5.2 10/23/2024      Annually/Less than 8% Yes   Lipid profile : 10/23/2024 Annually Yes   LDL control Lab Results   Component Value Date    LDLCALC 65.6 10/23/2024    Annually/Less than 100 mg/dl  Yes   Nephropathy screening Lab Results   Component Value Date    LABMICR 16.0 07/17/2024     Lab Results   Component Value Date    PROTEINUA Negative 11/20/2023     No results found for: "UTPCR"   Annually Yes   Blood pressure BP Readings from Last 1 Encounters:   04/14/25 118/70    Less than 140/90 Yes   Dilated retinal exam : 07/22/2024 Annually Yes   Foot exam   : 07/17/2024 Annually Yes              Current Assessment & Plan   Plan to monitor hemoglobin A1C at " designated intervals 3-6 months.  I recommend ongoing Education for diabetic diet and exercise protocol. Follow up with Ophthalmology/Optometry and Podiatry at least annually.           Other Visit Diagnoses         Preoperative clearance    -  Primary    Relevant Orders    IN OFFICE EKG 12-LEAD (to [place])          Patient here for medical preoperative evaluation.  History and physical exam reviewed and performed.  Lab data and imaging that is available was reviewed.  Based off of this patient is CLEARED with class I risk which correlates to a 3.9 % 30-day risk of death, MI, or cardiac arrest based off of modified Shipley cardiac Risk index.  Other chronic conditions have been reviewed and remains stable. Further details as stated above.   Follow up if symptoms worsen or fail to improve.  ER precautions for severe or worsening symptoms.     Roslyn Neff NP  _____________________________________________________________________________________________________________________________________________________    CC: preop clearance    HPI: Patient is a 59-year-old female who presents in clinic today as an established patient here for preop clearance.  The patient is here for a preop clearance to have surgery to have a neuro stimulator battery exchange   The physician that is performing the surgery is Dr. Jett Sampson   The surgery is being planned for 4/28/2025     The patient states that there has not been previous problems with sedation. She has had prior surgeries in the past with no adverse effects from anesthesia.   She is not currently taking a blood thinner.    She has no history of blood clots or bleeding disorders.   Patient is a nonsmoker. No history of COREY.  No history of MI, PE, DVT, arrhythmia, CHF, and/or COPD.   She has a history of diabetes and asthma. She denies recent asthma flares.   She denies chest pain, palpitations, SOB, swelling, fever, chills, dysuria, recent illness. She reports feeling  well.   Other chronic conditions have been reviewed and remains stable. Further details as stated above.     Wt Readings from Last 3 Encounters:   04/14/25 1014 74.2 kg (163 lb 9.6 oz)   01/14/25 1402 76.7 kg (169 lb)   12/09/24 1022 81 kg (178 lb 9.2 oz)     Lab Results   Component Value Date    WBC 9.03 04/14/2025    HGB 13.4 04/14/2025    HCT 43.4 04/14/2025    MCV 94 04/14/2025     04/14/2025     Lab Results   Component Value Date     04/14/2025    K 4.0 04/14/2025     04/14/2025    CO2 25 04/14/2025    BUN 23 (H) 04/14/2025    CREATININE 0.8 04/14/2025    CALCIUM 9.0 04/14/2025    ANIONGAP 10 04/14/2025    EGFRNORACEVR >60 04/14/2025     Lab Results   Component Value Date    ALT 17 04/14/2025    AST 23 04/14/2025    ALKPHOS 111 04/14/2025    BILITOT 0.4 04/14/2025     Lab Results   Component Value Date    HGBA1C 4.9 04/14/2025     Lab Results   Component Value Date    TSH 0.844 04/14/2025     Lab Results   Component Value Date    CHOL 131 04/14/2025     Lab Results   Component Value Date    HDL 52 04/14/2025     Lab Results   Component Value Date    LDLCALC 65.6 10/23/2024     Lab Results   Component Value Date    TRIG 48 04/14/2025     X-Ray Chest PA And Lateral  Narrative: EXAM:  XR CHEST PA AND LATERAL    CLINICAL HISTORY: [J45.20]-Mild intermittent asthma, uncomplicated.    COMPARISON: 2/24/2025    FINDINGS: The size of the heart is normal. There is no evidence of an acute pulmonary process. There is a mild amount of scarring in the base of the right lung. There is no pneumothorax or pleural effusion. There is anterior and posterior spinal fusion hardware in the cervical spine. There are electrodes in the spinal canal of the thoracic spine. There is mild elevation of the right hemidiaphragm.  Impression: 1. There is no evidence of an acute pulmonary process. There is a mild amount of scarring in the base of the right lung.  2. There is mild elevation of the right  hemidiaphragm.    Finalized on: 4/14/2025 12:24 PM By:  Michael Baker MD  [facility]# [id number]      2025-04-14 12:26:[id number]     [facility]    Results for orders placed or performed in visit on 04/14/25   IN OFFICE EKG 12-LEAD (to [place])    Collection Time: 04/14/25 10:18 AM   Result Value Ref Range    QRS Duration 72 ms    OHS QTC Calculation 412 ms    Narrative    Test Reason : Z01.818,    Vent. Rate :  74 BPM     Atrial Rate :  74 BPM     P-R Int : 132 ms          QRS Dur :  72 ms      QT Int : 372 ms       P-R-T Axes :  80  52  49 degrees    QTcB Int : 412 ms    Normal sinus rhythm  Nonspecific ST abnormality  Abnormal ECG  When compared with ECG of 20-Nov-2023 08:16,  Nonspecific T wave abnormality now evident in Lateral leads  Confirmed by Basil Vasquez ([id number]) on 4/14/2025 4:10:41 PM    Referred By: CHERYL ALMENDAREZ           Confirmed By: Basil Vasquez     Past Medical History:  Past Medical History:   Diagnosis Date    Allergy     seasonal     Anxiety     Arthritis     back    Asthma     seasonal    Chronic shoulder pain     Depression     GERD (gastroesophageal reflux disease)     Insomnia     Migraine     Nephrolithiasis     Pneumonia     PONV (postoperative nausea and vomiting)     Type 2 diabetes mellitus with hyperglycemia, without long-term current use of insulin 10/6/2023     Past Surgical History:   Procedure Laterality Date    ADENOIDECTOMY      APPENDECTOMY      CERVICAL FUSION  09/14/2020    C3-6    CHOLECYSTECTOMY      COLONOSCOPY N/A 11/16/2023    Procedure: COLONOSCOPY;  Surgeon: Alex Moy MD;  Location: [facility] ENDO;  Service: Endoscopy;  Laterality: N/A;    CYSTOSCOPY W/ URETERAL STENT PLACEMENT Left 03/31/2021    Procedure: CYSTOSCOPY, WITH URETERAL STENT INSERTION;  Surgeon: Dexter Sadler MD;  Location: [facility] OR;  Service: Urology;  Laterality: Left;    CYSTOURETEROSCOPY WITH RETROGRADE PYELOGRAPHY AND INSERTION OF STENT INTO URETER Right 01/21/2021    Procedure:  CYSTOURETEROSCOPY, WITH RETROGRADE PYELOGRAM AND URETERAL STENT INSERTION;  Surgeon: Dexter Sadler MD;  Location: [facility] OR;  Service: Urology;  Laterality: Right;    KNEE SURGERY Left     age 13 - injury - no tears, just cleaned it up     LUMBAR FUSION  2013    L5-S1    LUMBAR NERVE STIMLATOR INSERTION  10/2018    SPINE SURGERY      TONSILLECTOMY      TUBAL LIGATION  01/19/1990    URETEROSCOPIC REMOVAL OF URETERIC CALCULUS Left 04/07/2021    Procedure: REMOVAL, CALCULUS, URETER, URETEROSCOPIC;  Surgeon: Dexter Sadler MD;  Location: [facility] OR;  Service: Urology;  Laterality: Left;    VAGINAL DELIVERY      2 births      Review of patient's allergies indicates:   Allergen Reactions    Meloxicam Anaphylaxis     Chest and neck pain .  Decrease O2    Iodinated contrast media      Pt states that she is not allergic omn[place] dye, but is only allergic to the older dyes.    Iodine and iodide containing products      Injectables ???     Loperamide-simethicone     Metoclopramide hcl      reglan     Diltiazem hcl Rash and Swelling     Social History[1]  Family History   Problem Relation Name Age of Onset    Thyroid disease Mother Meme Nicole     Depression Mother Meme Nicole     Hearing loss Mother Meme Nicole     Vision loss Mother Meme Nicole     Heart disease Father Vivek     COPD Father Vivek     Supraventricular tachycardia Sister      Heart disease Brother Brother         smoking /stents - 40's    Heart disease Paternal Grandfather Trung Lord     Arthritis Brother Trung     Hyperlipidemia Brother Trung      Medications Ordered Prior to Encounter[2]    Review of Systems   Constitutional:  Negative for appetite change, chills, fatigue, fever and unexpected weight change.   HENT:  Negative for congestion, ear pain, rhinorrhea and sore throat.    Eyes:  Negative for redness and visual disturbance.   Respiratory:  Negative for cough and shortness of breath.    Cardiovascular:  Negative for chest  "pain, palpitations and leg swelling.   Gastrointestinal:  Negative for abdominal pain, diarrhea, nausea and vomiting.   Genitourinary:  Negative for difficulty urinating, dysuria and hematuria.   Musculoskeletal:  Positive for arthralgias and back pain. Negative for myalgias.   Skin:  Negative for rash and wound.   Neurological:  Negative for dizziness, weakness and headaches.   Hematological:  Bruises/bleeds easily.   Psychiatric/Behavioral:  Negative for behavioral problems and sleep disturbance. The patient is not nervous/anxious.      Vitals:    04/14/25 1014   BP: 118/70   Pulse: 74   Resp: 17   Temp: 98.2 °F (36.8 °C)   TempSrc: Oral   SpO2: 98%   Weight: 74.2 kg (163 lb 9.6 oz)   Height: 5' 8" (1.727 m)     Wt Readings from Last 3 Encounters:   04/14/25 74.2 kg (163 lb 9.6 oz)   01/14/25 76.7 kg (169 lb)   12/09/24 81 kg (178 lb 9.2 oz)     Physical Exam  Vitals reviewed.   Constitutional:       General: She is not in acute distress.     Appearance: Normal appearance. She is not ill-appearing.   HENT:      Head: Normocephalic and atraumatic.      Right Ear: External ear normal.      Left Ear: External ear normal.      Nose: Nose normal.   Eyes:      Extraocular Movements: Extraocular movements intact.      Conjunctiva/sclera: Conjunctivae normal.   Cardiovascular:      Rate and Rhythm: Normal rate.      Heart sounds: Normal heart sounds.   Pulmonary:      Effort: Pulmonary effort is normal. No respiratory distress.      Breath sounds: Normal breath sounds.   Abdominal:      General: Abdomen is flat. There is no distension.   Musculoskeletal:         General: Normal range of motion.      Cervical back: Normal range of motion.   Skin:     General: Skin is warm and dry.      Capillary Refill: Capillary refill takes less than 2 seconds.      Coloration: Skin is not pale.      Findings: No rash.   Neurological:      General: No focal deficit present.      Mental Status: She is alert and oriented to person, place, " and time. Mental status is at baseline.   Psychiatric:         Mood and Affect: Mood normal.         Speech: Speech normal. Speech is not rapid and pressured, delayed or slurred.         Behavior: Behavior normal. Behavior is not agitated, slowed, aggressive, withdrawn, hyperactive or combative. Behavior is cooperative.         Thought Content: Thought content normal.         Judgment: Judgment normal.       Health Maintenance   Topic Date Due    Shingles Vaccine (2 of 3) 04/28/2016    Pneumococcal Vaccines (Age 50+) (3 of 3 - PCV20 or PCV21) 05/30/2022    Influenza Vaccine (1) 09/01/2024    RSV Vaccine (Age 60+ and Pregnant patients) (1 - Risk 60-74 years 1-dose series) Never done    Hemoglobin A1c  04/23/2025    COVID-19 Vaccine (7 - 2024-25 season) 01/14/2026 (Originally 9/1/2024)    Diabetes Urine Screening  07/17/2025    Foot Exam  07/17/2025    Diabetic Eye Exam  07/22/2025    Lipid Panel  10/23/2025    Mammogram  10/28/2025    Low Dose Statin  01/14/2026    Cervical Cancer Screening  10/09/2028    Colorectal Cancer Screening  11/16/2028    TETANUS VACCINE  09/20/2034    Hepatitis C Screening  Completed    HIV Screening  Completed     This note was generated with the assistance of ambient listening technology. Verbal consent was obtained by the patient and accompanying visitor(s) for the recording of patient appointment to facilitate this note. I attest to having reviewed and edited the generated note for accuracy, though some syntax or spelling errors may persist. Please contact the author of this note for any clarification.    Visit today included increased complexity associated with the care of the episodic problem - see above- addressed and managing the longitudinal care of the patient due to the serious and/or complex managed problem(s) - see above.         [1]   Social History  Tobacco Use    Smoking status: Never    Smokeless tobacco: Never    Tobacco comments:     None, 1988 everywhere that I was or in  my house-none   Substance Use Topics    Alcohol use: No    Drug use: No   [2]   Current Outpatient Medications on File Prior to Visit   Medication Sig Dispense Refill    albuterol (PROVENTIL) 2.5 mg /3 mL (0.083 %) nebulizer solution Take 3 mLs (2.5 mg total) by nebulization every 6 (six) hours as needed for Wheezing or Shortness of Breath. Rescue 180 mL 1    aspirin-acetaminophen-caffeine 250-250-65 mg (EXCEDRIN MIGRAINE) 250-250-65 mg per tablet Take 1 tablet by mouth as needed.       azelastine (ASTELIN) 137 mcg (0.1 %) nasal spray 1 spray (137 mcg total) by Nasal route 2 (two) times daily. 30 mL 11    benzonatate (TESSALON) 200 MG capsule Take 1 capsule (200 mg total) by mouth 3 (three) times daily as needed for Cough. 180 capsule 3    blood sugar diagnostic Strp Use to check glucose daily 100 strip 3    blood-glucose meter kit Use as instructed 1 each 0    cetirizine (ZYRTEC) 10 MG tablet Take 10 mg by mouth every evening.      diazePAM (VALIUM) 10 MG Tab Take 10 mg by mouth.      diphenhydrAMINE (BENADRYL) 25 mg capsule Take 25 mg by mouth nightly as needed for Itching.      ergocalciferol (ERGOCALCIFEROL) 50,000 unit Cap Take 1 capsule (50,000 Units total) by mouth every 7 days. 12 capsule 3    furosemide (LASIX) 40 MG tablet Take 40 mg by mouth 2 (two) times daily.      ipratropium (ATROVENT) 0.02 % nebulizer solution NEBULIZE CONTENTS OF 1 VIAL AS NEEDED FOR WHEEZING *THANK YOU* 150 mL 6    lancets Misc Use to check glucose daily 100 each 3    lovastatin (MEVACOR) 40 MG tablet Take 40 mg by mouth once daily.      mupirocin (BACTROBAN) 2 % ointment Apply topically 3 (three) times daily. 22 g 0    NARCAN 4 mg/actuation [place] CALL 911. ADMINISTER A SINGLE SPRAY INTRANASALLY INTO ONE NOSTRIL UPON SIGNS OF OPIOID OVERDOSE. MAY REPEAT AFTER 3 MINUTES IF NO RESPONSE.      nystatin-triamcinolone (MYCOLOG II) cream Apply topically 3 (three) times daily.      ondansetron (ZOFRAN) 8 MG tablet Take 1 tablet (8 mg total)  by mouth every 8 (eight) hours as needed for Nausea. 60 tablet 3    pantoprazole (PROTONIX) 40 MG tablet TAKE 1 TABLET TWICE DAILY FOR ACID REFLUX *THANK YOU* 180 tablet 1    tirzepatide (MOUNJARO) 7.5 mg/0.5 mL PnIj Inject 7.5 mg into the skin every 7 days. 12 Pen 1    tiZANidine (ZANAFLEX) 4 MG tablet TAKE 1 TABLET 3 TIMES A DAY AS NEEDED FOR MUSCLE SPASMS *THANK YOU* 270 tablet 0    verapamiL (CALAN) 80 MG tablet Take by mouth.      triamcinolone acetonide 0.1% (KENALOG) 0.1 % ointment Apply topically 2 (two) times daily. 80 g 5     No current facility-administered medications on file prior to visit.

## 2025-04-14 NOTE — ASSESSMENT & PLAN NOTE
Short interval follow up recommended in 6 months on previous mammo. She is due for updated diagnostic mammogram and ultrasound.

## 2025-04-15 ENCOUNTER — RESULTS FOLLOW-UP (OUTPATIENT)
Dept: FAMILY MEDICINE | Facility: CLINIC | Age: 60
End: 2025-04-15

## 2025-04-21 ENCOUNTER — TELEPHONE (OUTPATIENT)
Dept: FAMILY MEDICINE | Facility: CLINIC | Age: 60
End: 2025-04-21
Payer: MEDICARE

## 2025-04-21 DIAGNOSIS — E11.65 TYPE 2 DIABETES MELLITUS WITH HYPERGLYCEMIA, WITHOUT LONG-TERM CURRENT USE OF INSULIN: Chronic | ICD-10-CM

## 2025-04-21 DIAGNOSIS — Z79.899 ENCOUNTER FOR LONG-TERM (CURRENT) USE OF HIGH-RISK MEDICATION: ICD-10-CM

## 2025-04-21 DIAGNOSIS — E11.29 TYPE 2 DIABETES MELLITUS WITH OTHER DIABETIC KIDNEY COMPLICATION: ICD-10-CM

## 2025-04-21 DIAGNOSIS — Z01.818 PREOPERATIVE CLEARANCE: Primary | ICD-10-CM

## 2025-04-21 NOTE — TELEPHONE ENCOUNTER
I have signed for the following orders AND/OR meds.  Please call the patient and ask the patient to schedule the testing AND/OR inform about any medications that were sent. Medications have been sent to pharmacy listed below      Orders Placed This Encounter   Procedures    Protime-INR     Standing Status:   Future     Expected Date:   5/22/2025     Expiration Date:   4/22/2026     Send normal result to authorizing provider's In Basket if patient is active on MyChart::   Yes    APTT     Standing Status:   Future     Expected Date:   4/21/2025     Expiration Date:   4/22/2026     Send normal result to authorizing provider's In Basket if patient is active on MyChart::   Yes    C-Reactive Protein     Standing Status:   Future     Expected Date:   4/21/2025     Expiration Date:   6/20/2026     Send normal result to authorizing provider's In Basket if patient is active on MyChart::   Yes    Sedimentation rate     Standing Status:   Future     Expected Date:   4/21/2025     Expiration Date:   6/20/2026     Send normal result to authorizing provider's In Basket if patient is active on MyChart::   Yes              [facility] - [place], [place] - [address]  [address]  [place] [address]  Phone: [phone] Fax: [phone]    [facility] [place] - [address] [facility]  [address] [place] [address] [facility] [address]  Phone: [phone] Fax: [phone]    [id number] - [facility] [place] - [address] AT [facility] [address] [address]  [address]  [place] [address]  Phone: [phone] Fax: [phone]

## 2025-04-22 ENCOUNTER — TELEPHONE (OUTPATIENT)
Dept: FAMILY MEDICINE | Facility: CLINIC | Age: 60
End: 2025-04-22
Payer: MEDICARE

## 2025-04-22 ENCOUNTER — PATIENT MESSAGE (OUTPATIENT)
Dept: FAMILY MEDICINE | Facility: CLINIC | Age: 60
End: 2025-04-22
Payer: MEDICARE

## 2025-04-22 NOTE — TELEPHONE ENCOUNTER
----- Message from Gayle sent at 4/22/2025 12:14 PM CDT -----  Name of Caller:.Luann Froilan Best Call Back Number:..[phone]  Additional Information: The patient called to check if her medical clearance was sent to Dr. Sampson. Call the patient back to advise. Jas BEAVERS

## 2025-04-22 NOTE — TELEPHONE ENCOUNTER
Call returned. Pt was advised that her pre op clearance w/pre op testing that was ordered by CHERYL Mcgowan has been faxed to [facility]. Additional labs ordered yesterday by on call provider, Dr. Curtis, I do not have access to. Per pt she had those done at [place] and will upload copies via My chart.

## 2025-05-12 ENCOUNTER — HOSPITAL ENCOUNTER (OUTPATIENT)
Dept: RADIOLOGY | Facility: HOSPITAL | Age: 60
Discharge: HOME | End: 2025-05-12
Attending: NURSE PRACTITIONER
Payer: MEDICARE

## 2025-05-12 DIAGNOSIS — R92.8 ABNORMAL MAMMOGRAM: ICD-10-CM

## 2025-05-12 PROCEDURE — 77065 DX MAMMO INCL CAD UNI: CPT | Mod: 26,RT,, | Performed by: STUDENT IN AN ORGANIZED HEALTH CARE EDUCATION/TRAINING PROGRAM

## 2025-05-12 PROCEDURE — 76642 ULTRASOUND BREAST LIMITED: CPT | Mod: TC,PO,RT

## 2025-05-12 PROCEDURE — 77061 BREAST TOMOSYNTHESIS UNI: CPT | Mod: 26,,, | Performed by: STUDENT IN AN ORGANIZED HEALTH CARE EDUCATION/TRAINING PROGRAM

## 2025-05-12 PROCEDURE — 77061 BREAST TOMOSYNTHESIS UNI: CPT | Mod: TC,PO,RT

## 2025-05-12 PROCEDURE — 76642 ULTRASOUND BREAST LIMITED: CPT | Mod: 26,RT,, | Performed by: STUDENT IN AN ORGANIZED HEALTH CARE EDUCATION/TRAINING PROGRAM

## 2025-06-19 DIAGNOSIS — E11.65 TYPE 2 DIABETES MELLITUS WITH HYPERGLYCEMIA, WITHOUT LONG-TERM CURRENT USE OF INSULIN: Chronic | ICD-10-CM

## 2025-06-19 DIAGNOSIS — K21.00 GASTROESOPHAGEAL REFLUX DISEASE WITH ESOPHAGITIS WITHOUT HEMORRHAGE: ICD-10-CM

## 2025-06-19 RX ORDER — PANTOPRAZOLE SODIUM 40 MG/1
TABLET, DELAYED RELEASE ORAL
Qty: 180 TABLET | Refills: 1 | Status: SHIPPED | OUTPATIENT
Start: 2025-06-19

## 2025-06-19 RX ORDER — TIRZEPATIDE 7.5 MG/.5ML
INJECTION, SOLUTION SUBCUTANEOUS
Qty: 12 PEN | Refills: 1 | Status: SHIPPED | OUTPATIENT
Start: 2025-06-19

## 2025-06-19 NOTE — TELEPHONE ENCOUNTER
No care due was identified.  [facility] Embedded Care Due Messages. Reference number: [id number].   6/19/2025 8:37:32 AM CDT

## 2025-06-19 NOTE — TELEPHONE ENCOUNTER
Refill Decision Note   Luann Froilan  is requesting a refill authorization.  Brief Assessment and Rationale for Refill:  Approve     Medication Therapy Plan:         Comments:     Note composed:10:40 AM 06/19/2025            
LOV 04/14/2025  
4 = No assist / stand by assistance

## 2025-06-19 NOTE — TELEPHONE ENCOUNTER
Refill Routing Note   Medication(s) are not appropriate for processing by [facility] for the following reason(s):        Outside of protocol    [facility] action(s):  Route        Medication Therapy Plan: PRN USAGE OOP FOR PPI OOP FOR [facility]; MAX DAILY AMOUNT IS 40MG PER DAY      Appointments  past 12m or future 3m with PCP    Date Provider   Last Visit   1/14/2025 Fred Zambrano MD   Next Visit   9/16/2025 Fred Zambrano MD   ED visits in past 90 days: 0        Note composed:10:24 AM 06/19/2025

## 2025-06-23 ENCOUNTER — PATIENT MESSAGE (OUTPATIENT)
Dept: FAMILY MEDICINE | Facility: CLINIC | Age: 60
End: 2025-06-23
Payer: MEDICARE

## 2025-06-25 ENCOUNTER — E-VISIT (OUTPATIENT)
Dept: FAMILY MEDICINE | Facility: CLINIC | Age: 60
End: 2025-06-25
Payer: MEDICARE

## 2025-06-25 DIAGNOSIS — T24.011A BURN OF RIGHT THIGH, UNSPECIFIED BURN DEGREE, INITIAL ENCOUNTER: Primary | ICD-10-CM

## 2025-06-25 RX ORDER — SILVER SULFADIAZINE 10 G/1000G
CREAM TOPICAL 2 TIMES DAILY
Qty: 50 G | Refills: 0 | Status: SHIPPED | OUTPATIENT
Start: 2025-06-25
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
## 2025-07-23 DIAGNOSIS — E11.9 TYPE 2 DIABETES MELLITUS WITHOUT COMPLICATION: ICD-10-CM

## 2025-08-12 ENCOUNTER — PATIENT OUTREACH (OUTPATIENT)
Dept: ADMINISTRATIVE | Facility: HOSPITAL | Age: 60
End: 2025-08-12
Payer: MEDICARE

## 2025-09-02 ENCOUNTER — PATIENT OUTREACH (OUTPATIENT)
Dept: ADMINISTRATIVE | Facility: HOSPITAL | Age: 60
End: 2025-09-02
Payer: MEDICARE

## (undated) DEVICE — CATH 5FR OPEN END URETERAL

## (undated) DEVICE — EXTRACTOR STONE 4WR NIT 2.2F 1

## (undated) DEVICE — SEE MEDLINE ITEM 154981

## (undated) DEVICE — SET IRR URLGY 2LINE UNIV SPIKE

## (undated) DEVICE — SEE MEDLINE ITEM 157117

## (undated) DEVICE — UROVIEW 2600/2800

## (undated) DEVICE — GOWN SMARTGOWN LVL4 X-LONG XL

## (undated) DEVICE — SOL WATER STRL IRR 1000ML

## (undated) DEVICE — SEE MEDLINE ITEM 157185

## (undated) DEVICE — SEE MEDLINE ITEM 152622

## (undated) DEVICE — GLOVE SURG BIOGEL LATEX SZ 7.5

## (undated) DEVICE — SYR ONLY LUER LOCK 20CC

## (undated) DEVICE — SET IRRIGATION WARMING NORMAL

## (undated) DEVICE — SOL IRR NACL .9% 3000ML

## (undated) DEVICE — CONTAINER SPECIMEN STRL 4OZ

## (undated) DEVICE — GAUZE SPONGE 4X4 12PLY

## (undated) DEVICE — SEE MEDLINE ITEM 152487

## (undated) DEVICE — CATH POLLACK OPEN-END FLEXI-TI

## (undated) DEVICE — SHEATH FLEXOR ACCESS 12X35

## (undated) DEVICE — Device

## (undated) DEVICE — GUIDE WIRE MOTION .035 X 150CM

## (undated) DEVICE — FIBER LASER HOLMIUM 273 MICRON

## (undated) DEVICE — EXTRACTOR TIPLESS 2.4FRX1115CM

## (undated) DEVICE — SKIN MARKER DEVON 160

## (undated) DEVICE — URETEROSCOPE LITHOVUE REVERSE

## (undated) DEVICE — FIBER HOLMIUM LASER RED 273UM

## (undated) DEVICE — SHEATH FLEXOR URET 10.7FRX35CM

## (undated) DEVICE — GLOVE SURG ULTRA TOUCH 8

## (undated) DEVICE — GUIDEWIRE AMPLATZ .035X145CM